# Patient Record
Sex: FEMALE | Race: WHITE | NOT HISPANIC OR LATINO | Employment: PART TIME | URBAN - METROPOLITAN AREA
[De-identification: names, ages, dates, MRNs, and addresses within clinical notes are randomized per-mention and may not be internally consistent; named-entity substitution may affect disease eponyms.]

---

## 2017-01-11 ENCOUNTER — LAB REQUISITION (OUTPATIENT)
Dept: LAB | Facility: HOSPITAL | Age: 37
End: 2017-01-11
Payer: COMMERCIAL

## 2017-01-11 ENCOUNTER — ALLSCRIPTS OFFICE VISIT (OUTPATIENT)
Dept: OTHER | Facility: OTHER | Age: 37
End: 2017-01-11

## 2017-01-11 DIAGNOSIS — B37.3 CANDIDIASIS OF VULVA AND VAGINA: ICD-10-CM

## 2017-01-11 PROCEDURE — 87480 CANDIDA DNA DIR PROBE: CPT | Performed by: NURSE PRACTITIONER

## 2017-01-11 PROCEDURE — 87660 TRICHOMONAS VAGIN DIR PROBE: CPT | Performed by: NURSE PRACTITIONER

## 2017-01-11 PROCEDURE — 87510 GARDNER VAG DNA DIR PROBE: CPT | Performed by: NURSE PRACTITIONER

## 2017-01-13 LAB
CANDIDA RRNA VAG QL PROBE: NEGATIVE
G VAGINALIS RRNA GENITAL QL PROBE: NEGATIVE
T VAGINALIS RRNA GENITAL QL PROBE: NEGATIVE

## 2017-02-28 ENCOUNTER — ALLSCRIPTS OFFICE VISIT (OUTPATIENT)
Dept: OTHER | Facility: OTHER | Age: 37
End: 2017-02-28

## 2017-03-22 ENCOUNTER — TRANSCRIBE ORDERS (OUTPATIENT)
Dept: ADMINISTRATIVE | Facility: HOSPITAL | Age: 37
End: 2017-03-22

## 2017-03-22 ENCOUNTER — ALLSCRIPTS OFFICE VISIT (OUTPATIENT)
Dept: OTHER | Facility: OTHER | Age: 37
End: 2017-03-22

## 2017-03-22 ENCOUNTER — APPOINTMENT (OUTPATIENT)
Dept: LAB | Facility: HOSPITAL | Age: 37
End: 2017-03-22
Payer: COMMERCIAL

## 2017-03-22 DIAGNOSIS — J30.9 ALLERGIC RHINITIS: ICD-10-CM

## 2017-03-22 DIAGNOSIS — J45.909 UNCOMPLICATED ASTHMA: ICD-10-CM

## 2017-03-22 DIAGNOSIS — M06.9 RHEUMATOID ARTHRITIS (HCC): ICD-10-CM

## 2017-03-22 DIAGNOSIS — F41.9 ANXIETY DISORDER: ICD-10-CM

## 2017-03-22 DIAGNOSIS — E55.9 VITAMIN D DEFICIENCY: ICD-10-CM

## 2017-03-22 LAB
25(OH)D3 SERPL-MCNC: 28.2 NG/ML (ref 30–100)
ALBUMIN SERPL BCP-MCNC: 3.9 G/DL (ref 3.5–5)
ALP SERPL-CCNC: 87 U/L (ref 46–116)
ALT SERPL W P-5'-P-CCNC: 30 U/L (ref 12–78)
ANION GAP SERPL CALCULATED.3IONS-SCNC: 8 MMOL/L (ref 4–13)
AST SERPL W P-5'-P-CCNC: 19 U/L (ref 5–45)
BASOPHILS # BLD AUTO: 0.11 THOUSANDS/ΜL (ref 0–0.1)
BASOPHILS NFR BLD AUTO: 2 % (ref 0–1)
BILIRUB SERPL-MCNC: 1 MG/DL (ref 0.2–1)
BUN SERPL-MCNC: 12 MG/DL (ref 5–25)
CALCIUM SERPL-MCNC: 8.9 MG/DL (ref 8.3–10.1)
CHLORIDE SERPL-SCNC: 104 MMOL/L (ref 100–108)
CO2 SERPL-SCNC: 30 MMOL/L (ref 21–32)
CREAT SERPL-MCNC: 0.8 MG/DL (ref 0.6–1.3)
EOSINOPHIL # BLD AUTO: 0.66 THOUSAND/ΜL (ref 0–0.61)
EOSINOPHIL NFR BLD AUTO: 11 % (ref 0–6)
ERYTHROCYTE [DISTWIDTH] IN BLOOD BY AUTOMATED COUNT: 13.5 % (ref 11.6–15.1)
GFR SERPL CREATININE-BSD FRML MDRD: >60 ML/MIN/1.73SQ M
GLUCOSE P FAST SERPL-MCNC: 92 MG/DL (ref 65–99)
HCT VFR BLD AUTO: 43 % (ref 34.8–46.1)
HGB BLD-MCNC: 14.1 G/DL (ref 11.5–15.4)
LYMPHOCYTES # BLD AUTO: 1.5 THOUSANDS/ΜL (ref 0.6–4.47)
LYMPHOCYTES NFR BLD AUTO: 25 % (ref 14–44)
MCH RBC QN AUTO: 30.1 PG (ref 26.8–34.3)
MCHC RBC AUTO-ENTMCNC: 32.8 G/DL (ref 31.4–37.4)
MCV RBC AUTO: 92 FL (ref 82–98)
MONOCYTES # BLD AUTO: 0.53 THOUSAND/ΜL (ref 0.17–1.22)
MONOCYTES NFR BLD AUTO: 9 % (ref 4–12)
NEUTROPHILS # BLD AUTO: 3.19 THOUSANDS/ΜL (ref 1.85–7.62)
NEUTS SEG NFR BLD AUTO: 53 % (ref 43–75)
PLATELET # BLD AUTO: 254 THOUSANDS/UL (ref 149–390)
PMV BLD AUTO: 11.5 FL (ref 8.9–12.7)
POTASSIUM SERPL-SCNC: 4.4 MMOL/L (ref 3.5–5.3)
PROT SERPL-MCNC: 7.5 G/DL (ref 6.4–8.2)
RBC # BLD AUTO: 4.69 MILLION/UL (ref 3.81–5.12)
SODIUM SERPL-SCNC: 142 MMOL/L (ref 136–145)
TSH SERPL DL<=0.05 MIU/L-ACNC: 1.66 UIU/ML (ref 0.36–3.74)
WBC # BLD AUTO: 5.99 THOUSAND/UL (ref 4.31–10.16)

## 2017-03-22 PROCEDURE — 85025 COMPLETE CBC W/AUTO DIFF WBC: CPT

## 2017-03-22 PROCEDURE — 84443 ASSAY THYROID STIM HORMONE: CPT

## 2017-03-22 PROCEDURE — 82306 VITAMIN D 25 HYDROXY: CPT

## 2017-03-22 PROCEDURE — 80053 COMPREHEN METABOLIC PANEL: CPT

## 2017-03-22 PROCEDURE — 36415 COLL VENOUS BLD VENIPUNCTURE: CPT

## 2017-03-26 ENCOUNTER — GENERIC CONVERSION - ENCOUNTER (OUTPATIENT)
Dept: OTHER | Facility: OTHER | Age: 37
End: 2017-03-26

## 2017-03-28 ENCOUNTER — ALLSCRIPTS OFFICE VISIT (OUTPATIENT)
Dept: OTHER | Facility: OTHER | Age: 37
End: 2017-03-28

## 2017-04-07 ENCOUNTER — TRANSCRIBE ORDERS (OUTPATIENT)
Dept: SLEEP CENTER | Facility: HOSPITAL | Age: 37
End: 2017-04-07

## 2017-04-07 DIAGNOSIS — G47.33 OBSTRUCTIVE SLEEP APNEA (ADULT) (PEDIATRIC): Primary | ICD-10-CM

## 2017-04-20 ENCOUNTER — ALLSCRIPTS OFFICE VISIT (OUTPATIENT)
Dept: OTHER | Facility: OTHER | Age: 37
End: 2017-04-20

## 2017-05-03 ENCOUNTER — ALLSCRIPTS OFFICE VISIT (OUTPATIENT)
Dept: OTHER | Facility: OTHER | Age: 37
End: 2017-05-03

## 2017-05-15 ENCOUNTER — HOSPITAL ENCOUNTER (OUTPATIENT)
Dept: SLEEP CENTER | Facility: HOSPITAL | Age: 37
Discharge: HOME | End: 2017-05-15
Payer: COMMERCIAL

## 2017-05-15 DIAGNOSIS — G47.33 OBSTRUCTIVE SLEEP APNEA (ADULT) (PEDIATRIC): ICD-10-CM

## 2017-05-15 PROCEDURE — 95810 POLYSOM 6/> YRS 4/> PARAM: CPT

## 2017-05-16 ENCOUNTER — ALLSCRIPTS OFFICE VISIT (OUTPATIENT)
Dept: OTHER | Facility: OTHER | Age: 37
End: 2017-05-16

## 2017-05-23 ENCOUNTER — TRANSCRIBE ORDERS (OUTPATIENT)
Dept: SLEEP CENTER | Facility: HOSPITAL | Age: 37
End: 2017-05-23

## 2017-05-23 DIAGNOSIS — G47.33 OBSTRUCTIVE SLEEP APNEA (ADULT) (PEDIATRIC): Primary | ICD-10-CM

## 2017-05-29 DIAGNOSIS — F41.9 ANXIETY DISORDER: ICD-10-CM

## 2017-05-29 DIAGNOSIS — Z13.1 ENCOUNTER FOR SCREENING FOR DIABETES MELLITUS: ICD-10-CM

## 2017-05-29 DIAGNOSIS — B37.3 CANDIDIASIS OF VULVA AND VAGINA: ICD-10-CM

## 2017-05-29 DIAGNOSIS — E55.9 VITAMIN D DEFICIENCY: ICD-10-CM

## 2017-05-29 DIAGNOSIS — G47.19 OTHER HYPERSOMNIA: ICD-10-CM

## 2017-05-29 DIAGNOSIS — Z00.00 ENCOUNTER FOR GENERAL ADULT MEDICAL EXAMINATION WITHOUT ABNORMAL FINDINGS: ICD-10-CM

## 2017-05-29 DIAGNOSIS — E66.01 MORBID (SEVERE) OBESITY DUE TO EXCESS CALORIES (HCC): ICD-10-CM

## 2017-05-29 DIAGNOSIS — R53.83 OTHER FATIGUE: ICD-10-CM

## 2017-05-29 DIAGNOSIS — M06.9 RHEUMATOID ARTHRITIS(714.0): ICD-10-CM

## 2017-06-01 ENCOUNTER — ALLSCRIPTS OFFICE VISIT (OUTPATIENT)
Dept: OTHER | Facility: OTHER | Age: 37
End: 2017-06-01

## 2017-06-13 ENCOUNTER — ALLSCRIPTS OFFICE VISIT (OUTPATIENT)
Dept: OTHER | Facility: OTHER | Age: 37
End: 2017-06-13

## 2017-06-25 ENCOUNTER — HOSPITAL ENCOUNTER (OUTPATIENT)
Dept: SLEEP CENTER | Facility: HOSPITAL | Age: 37
Discharge: HOME | End: 2017-06-25
Payer: COMMERCIAL

## 2017-06-25 DIAGNOSIS — G47.33 OBSTRUCTIVE SLEEP APNEA (ADULT) (PEDIATRIC): ICD-10-CM

## 2017-06-25 PROCEDURE — 95811 POLYSOM 6/>YRS CPAP 4/> PARM: CPT

## 2017-06-27 ENCOUNTER — TRANSCRIBE ORDERS (OUTPATIENT)
Dept: SLEEP CENTER | Facility: HOSPITAL | Age: 37
End: 2017-06-27

## 2017-06-27 DIAGNOSIS — G47.33 OBSTRUCTIVE SLEEP APNEA (ADULT) (PEDIATRIC): Primary | ICD-10-CM

## 2017-07-06 ENCOUNTER — HOSPITAL ENCOUNTER (OUTPATIENT)
Dept: SLEEP CENTER | Facility: HOSPITAL | Age: 37
Discharge: HOME | End: 2017-07-06
Payer: COMMERCIAL

## 2017-07-06 ENCOUNTER — TRANSCRIBE ORDERS (OUTPATIENT)
Dept: SLEEP CENTER | Facility: HOSPITAL | Age: 37
End: 2017-07-06

## 2017-07-06 DIAGNOSIS — G47.33 OBSTRUCTIVE SLEEP APNEA (ADULT) (PEDIATRIC): ICD-10-CM

## 2017-07-06 DIAGNOSIS — G47.33 OBSTRUCTIVE SLEEP APNEA (ADULT) (PEDIATRIC): Primary | ICD-10-CM

## 2017-07-14 ENCOUNTER — ALLSCRIPTS OFFICE VISIT (OUTPATIENT)
Dept: OTHER | Facility: OTHER | Age: 37
End: 2017-07-14

## 2017-07-14 ENCOUNTER — TRANSCRIBE ORDERS (OUTPATIENT)
Dept: ADMINISTRATIVE | Facility: HOSPITAL | Age: 37
End: 2017-07-14

## 2017-07-14 ENCOUNTER — APPOINTMENT (OUTPATIENT)
Dept: LAB | Facility: HOSPITAL | Age: 37
End: 2017-07-14
Payer: COMMERCIAL

## 2017-07-14 DIAGNOSIS — Z00.8 HEALTH EXAMINATION IN POPULATION SURVEY: Primary | ICD-10-CM

## 2017-07-14 DIAGNOSIS — Z00.8 HEALTH EXAMINATION IN POPULATION SURVEY: ICD-10-CM

## 2017-07-14 LAB
CHOLEST SERPL-MCNC: 152 MG/DL (ref 50–200)
EST. AVERAGE GLUCOSE BLD GHB EST-MCNC: 114 MG/DL
HBA1C MFR BLD: 5.6 % (ref 4.2–6.3)
HDLC SERPL-MCNC: 43 MG/DL (ref 40–60)
LDLC SERPL CALC-MCNC: 95 MG/DL (ref 0–100)
TRIGL SERPL-MCNC: 70 MG/DL

## 2017-07-14 PROCEDURE — 83036 HEMOGLOBIN GLYCOSYLATED A1C: CPT

## 2017-07-14 PROCEDURE — 80061 LIPID PANEL: CPT

## 2017-07-14 PROCEDURE — 36415 COLL VENOUS BLD VENIPUNCTURE: CPT

## 2017-07-18 ENCOUNTER — GENERIC CONVERSION - ENCOUNTER (OUTPATIENT)
Dept: OTHER | Facility: OTHER | Age: 37
End: 2017-07-18

## 2017-08-02 ENCOUNTER — ALLSCRIPTS OFFICE VISIT (OUTPATIENT)
Dept: OTHER | Facility: OTHER | Age: 37
End: 2017-08-02

## 2017-08-02 DIAGNOSIS — E55.9 VITAMIN D DEFICIENCY: ICD-10-CM

## 2017-08-02 DIAGNOSIS — G47.33 OBSTRUCTIVE SLEEP APNEA: ICD-10-CM

## 2017-08-02 DIAGNOSIS — E66.01 MORBID (SEVERE) OBESITY DUE TO EXCESS CALORIES (HCC): ICD-10-CM

## 2017-08-02 DIAGNOSIS — J32.9 CHRONIC SINUSITIS: ICD-10-CM

## 2017-08-02 DIAGNOSIS — F41.8 OTHER SPECIFIED ANXIETY DISORDERS: ICD-10-CM

## 2017-08-02 DIAGNOSIS — J30.9 ALLERGIC RHINITIS: ICD-10-CM

## 2017-09-07 ENCOUNTER — ALLSCRIPTS OFFICE VISIT (OUTPATIENT)
Dept: OTHER | Facility: OTHER | Age: 37
End: 2017-09-07

## 2017-09-07 ENCOUNTER — HOSPITAL ENCOUNTER (OUTPATIENT)
Dept: SLEEP CENTER | Facility: HOSPITAL | Age: 37
Discharge: HOME | End: 2017-09-07
Payer: COMMERCIAL

## 2017-09-07 DIAGNOSIS — G47.33 OBSTRUCTIVE SLEEP APNEA (ADULT) (PEDIATRIC): ICD-10-CM

## 2017-09-08 ENCOUNTER — TRANSCRIBE ORDERS (OUTPATIENT)
Dept: SLEEP CENTER | Facility: HOSPITAL | Age: 37
End: 2017-09-08

## 2017-09-08 DIAGNOSIS — G47.33 OBSTRUCTIVE SLEEP APNEA (ADULT) (PEDIATRIC): Primary | ICD-10-CM

## 2017-10-13 ENCOUNTER — ALLSCRIPTS OFFICE VISIT (OUTPATIENT)
Dept: OTHER | Facility: OTHER | Age: 37
End: 2017-10-13

## 2017-10-19 ENCOUNTER — HOSPITAL ENCOUNTER (OUTPATIENT)
Dept: SLEEP CENTER | Facility: HOSPITAL | Age: 37
Discharge: HOME | End: 2017-10-19
Payer: COMMERCIAL

## 2017-10-19 DIAGNOSIS — G47.33 OBSTRUCTIVE SLEEP APNEA: ICD-10-CM

## 2017-10-19 NOTE — PROGRESS NOTES
Follow-Up Note - [address]  40 y o  female  IRP:  [facility]:[id number]    CC: I saw this patient for follow-up in clinic today for her Sleep Disordered Breathing, Coexisting Sleep and Medical Problems  Past medical, Family, Social History, ROS and medications were reviewed  Herewith my findings in summary  Full Details are available on request      HPI:  She was prescribed positive airway pressure therapy and reports:   - using 5 nights/week for 4  hours or more a night  She works night shift at times  Compliance data confirms use > 4 hours 73% of the  of the time  The AHI is 1 4 per hour at 12 cm H2O  - She experiencing no major difficulties tolerating or adverse effects  She tends to remove the musk unknowingly at times   - benefiting from  use  She is not as tired  Sleep Routine: [place] Ashraf reports getting 4-6 hours sleep; she is having no difficulty initiating or maintaining sleep    She awakens feeling refreshed but continues to report excessive drowsiness  She rated herself at 13/24 on the [place] sleepiness scale  She attributed this to insufficient sleep  She continues to report teeth grinding  On Exam:   Physical findings are essentially unchanged from previous  Impression :   1  Obstructive Sleep Apnea  2  Insufficient sleep  3  Hypersomnolence secondary to the above  4  Bruxism  5  Morbid obesity  6  Comorbidities: as outlined previously    Plan:  1  Treatment with  PAP is medically necessary and [place] Ashraf is agreable to continue use  Pressure settin cm   2  Instruction on care of equipment and strategies to improve comfort with use of PAP were discussed  A prescription to replace supplies as needed was provided and care coordinated with the DME provider  3  Need for compliance with therapy and weight reduction were emphasized  4  I advised her to increase the amount of sleep that she gets and on strategies to cope with shift work    5  Follow-up is advised in 1 year or sooner if needed to monitor progress and to adjust therapy  Thank you for allowing me to participate in the care of this patient      Sincerely,    Kiet Trimble MD  Board Certified Specialist

## 2017-10-23 ENCOUNTER — TRANSCRIBE ORDERS (OUTPATIENT)
Dept: SLEEP CENTER | Facility: HOSPITAL | Age: 37
End: 2017-10-23

## 2017-10-23 DIAGNOSIS — G47.33 OBSTRUCTIVE SLEEP APNEA: Primary | ICD-10-CM

## 2017-10-26 ENCOUNTER — ALLSCRIPTS OFFICE VISIT (OUTPATIENT)
Dept: OTHER | Facility: OTHER | Age: 37
End: 2017-10-26

## 2017-11-16 ENCOUNTER — ALLSCRIPTS OFFICE VISIT (OUTPATIENT)
Dept: OTHER | Facility: OTHER | Age: 37
End: 2017-11-16

## 2017-11-17 NOTE — PROCEDURES
Assessment  1  Encounter for IUD removal and reinsertion (V25 13) (Z30 433)    Active Problems  1  Allergic reaction, initial encounter (995 3) (T78 40XA)   2  Allergic rhinitis (477 9) (J30 9)   3  Anxiety (300 00) (F41 9)   4  Asthma (493 90) (J45 909)   5  Chronic sinusitis (473 9) (J32 9)   6  Depression with anxiety (300 4) (F41 8)   7  Fatigue (780 79) (R53 83)   8  Morbid obesity with BMI of 50 0-59 9, adult (278 01,V85 43) (E66 01,Z68 43)   9  Nasal polyp (471 9) (J33 9)   10  Obstructive sleep apnea (327 23) (G47 33)   11  Rheumatoid arthritis (714 0) (M06 9)   12  Seasonal mood disorder (296 99) (F39)   13  Snoring (786 09) (R06 83)   14  Vaginal candidiasis (112 1) (B37 3)   15  Vaginal itching (698 1) (L29 8)   16  Vitamin D deficiency (268 9) (E55 9)    Current Meds    1  Allegra Allergy 180 MG Oral Tablet; 1 PO QD; Therapy: (Recorded:22Mar2017) to Recorded    2  DiazePAM 2 MG Oral Tablet; TAKE 1 TABLET TWICE DAILY AS NEEDED; Therapy: 04Zdr8733 to (Evaluate:65Tyj1546); Last Rx:54Nqe1906 Ordered    3  Albuterol Sulfate (2 5 MG/3ML) 0 083% Inhalation Nebulization Solution; USE 1 UNIT DOSE IN NEBULIZER EVERY 4 HOURS AS NEEDED Recorded   4  Breo Ellipta 100-25 MCG/INH Inhalation Aerosol Powder Breath Activated; ONE INHALATION DAILY  AFTER INHALATION RINSE MOUTH WITH WATER & SPIT  USE SAME TIME EACH DAY, NO MORE THAN 1 TIME IN 24 HOURS; Therapy: 59JDY9828 to (Last Rx:32Svf9745)  Requested for: 72DSE3702 Ordered   5  ProAir  (90 Base) MCG/ACT Inhalation Aerosol Solution; INHALE 1 TO 2 PUFFS EVERY 4 TO 6 HOURS AS NEEDED  Requested for: 71Ihb6145; Last EP:53BQQ5005 Ordered    6  PredniSONE 10 MG Oral Tablet; 5 a day for 2 days, 4 a day for 2 days, 3 a day for 2 days, 2 a day for 2 days, 1 a day for 2 days; Therapy: 02Aug2017 to (Last Rx:02Aug2017)  Requested for: 02Aug2017 Ordered    7  Fluconazole 150 MG Oral Tablet; TAKE 1 TABLET AS NEEDED  MAY REPEAT IN 3-5 DAYS IF SYMPTOMS PERSIST;  Therapy: 02Aug2017 to (Evaluate:2017)  Requested for: 98Izi4192; Last Rx:90Zww9184 Ordered    8  FLUoxetine HCl - 40 MG Oral Capsule; TAKE ONE CAPSULE BY MOUTH EVERY DAY; Therapy: 15TRV7305 to (Last Rx:2017)  Requested for: 50VWL5712 Ordered    9  Mirena (52 MG) 20 MCG/24HR Intrauterine Intrauterine Device; USE AS DIRECTED; Therapy: 95ALM1381 to (Last 78 170)  Requested for: 2012 Ordered    10  Vitamin D TABS; TAKE 1 TABLET Daily 4000 units; Therapy: (Doriemundo Garces) to Recorded    11  Magnesium CAPS; take 1 capsule daily; Therapy: (Recorded:66Eap5664) to Recorded    Allergies    1  No Known Drug Allergies    Procedure   Procedure: intrauterine device (IUD) placement  Risks, benefits and alternatives were discussed with the patient  We discussed possible complications and risks, including infection,-- bleeding,-- pelvic pain,-- expulsion,-- uterine perforation-- and-- increased risk of ectopic should pregnancy occur  written consent was obtained prior to the procedure and is detailed in the patient's record  Procedure Note:  Anesthesia: none  The cervix was prepped with betadine  The cervix was stabilized with a single toothed tenaculum  The cervix allowed easy passage of a uterine sound without dilation  The uterus was in mid position-- and-- sounded to 7 cm  The Mirena IUD was gently inserted to the fundus of the uterus using standard technique  Strings were cut to 4 cm  Post-Procedure:  Patient Status: the patient tolerated the procedure well  Complications: there were no complications  Follow up: return for follow up visit in 1-2 months  Procedure: removal of Mirena IUD  Indications for the procedure include  IUD  Risks, benefits and alternatives were discussed with the patient  Procedure Note:   a speculum was placed in the vagina,-- the IUD strings were visualized-- and-- the strings were grasped with forceps and the IUD was removed  The IUD was discarded     Post-Procedure: Complications: none  Follow-up in the office as needed  Future Appointments    Date/Time Provider Specialty Site   02/05/2018 09:20 AM Donnise Seip, M D   [address] [place] [address]       Signatures   Electronically signed by : ESTELA Thomas ; Nov 16 2017 10:22AM EST                       (Author)

## 2017-11-22 ENCOUNTER — ALLSCRIPTS OFFICE VISIT (OUTPATIENT)
Dept: OTHER | Facility: OTHER | Age: 37
End: 2017-11-22

## 2018-01-09 ENCOUNTER — GENERIC CONVERSION - ENCOUNTER (OUTPATIENT)
Dept: OTHER | Facility: OTHER | Age: 38
End: 2018-01-09

## 2018-01-09 NOTE — PROGRESS NOTES
Chief Complaint  CC Patient is here for Allergy injections  Active Problems    1  Allergic reaction, initial encounter (995 3) (T78 40XA)   2  Allergic rhinitis (477 9) (J30 9)   3  Anxiety (300 00) (F41 9)   4  Asthma (493 90) (J45 909)   5  Daytime hypersomnolence (780 54) (G47 19)   6  Depression with anxiety (300 4) (F41 8)   7  Encounter for routine gynecological examination (V72 31) (Z01 419)   8  Fatigue (780 79) (R53 83)   9  Morbid obesity with BMI of 50 0-59 9, adult (278 01,V85 43) (E66 01,Z68 43)   10  Nasal polyp (471 9) (J33 9)   11  Obstructive sleep apnea (327 23) (G47 33)   12  Rheumatoid arthritis (714 0) (M06 9)   13  Screening for diabetes mellitus (V77 1) (Z13 1)   14  Seasonal mood disorder (296 99) (F39)   15  Snoring (786 09) (R06 83)   16  Vaginal candidiasis (112 1) (B37 3)   17  Vaginal itching (698 1) (L29 8)   18  Vitamin D deficiency (268 9) (E55 9)    Current Meds   1  Albuterol Sulfate (2 5 MG/3ML) 0 083% Inhalation Nebulization Solution; USE 1 UNIT   DOSE IN NEBULIZER EVERY 4 HOURS AS NEEDED Recorded   2  Allegra Allergy 180 MG Oral Tablet; 1 PO QD; Therapy: (Aggie Matute to Recorded   3  Breo Ellipta 100-25 MCG/INH Inhalation Aerosol Powder Breath Activated; ONE   INHALATION DAILY  AFTER INHALATION RINSE MOUTH WITH WATER & SPIT  USE   SAME TIME EACH DAY, NO MORE THAN 1 TIME IN 24 HOURS; Therapy: 60VMC6028 to (Last Rx:26Goe2590)  Requested for: 32XLY4794 Ordered   4  DiazePAM 2 MG Oral Tablet; TAKE 1 TABLET TWICE DAILY AS NEEDED; Therapy: 34KJW4050 to (Evaluate:03Dxz1946); Last Rx:72Eni8720 Ordered   5  FLUoxetine HCl - 20 MG Oral Tablet; TAKE 1 TABLET DAILY; Therapy: 99QKA6070 to (Evaluate:12Nov2017)  Requested for: 40WGT8801; Last   Rx:17Vqa4270 Ordered   6  Magnesium CAPS; take 1 capsule daily; Therapy: (Recorded:76Xwc1391) to Recorded   7  Mirena (52 MG) 20 MCG/24HR Intrauterine Intrauterine Device; USE AS DIRECTED;    Therapy: 27Nov2012 to (Last 9651 1475)  Requested for: 43STB2854 Ordered   8  ProAir  (90 Base) MCG/ACT Inhalation Aerosol Solution; INHALE 1 TO 2 PUFFS   EVERY 4 TO 6 HOURS AS NEEDED  Requested for: 07Sep2016; Last Rx:07Sep2016   Ordered   9  Vitamin D TABS; TAKE 1 TABLET Daily 4000 units; Therapy: (Recorded:22Mar2017) to Recorded    Allergies    1  No Known Drug Allergies    Results/Data  Allergy Injection Flow Sheet [id number] 09:36AM      Test Name Result Flag Reference   Allergy Injection #1 0 50     Allergy Injection #1 Site L     Allergy Inj #1 Lot Number A4     Allergy Inj #1 Reaction No Reaction     Allergy Injection #2 0 50     Allergy Injection #2 Site R     Allergy Inj #2 Lot Number B4     Allergy Inj #2 Reaction Light Reddness         Future Appointments    Date/Time Provider Specialty Site   08/02/2017 10:00 AM ESTELA Jin   [address] [place] [address]     Signatures   Electronically signed by : ESTELA Jacobsen ; Jun 16 2017  3:32PM EST                       (Author)

## 2018-01-10 NOTE — PROGRESS NOTES
Chief Complaint  CC patient here today for allergy injections  Active Problems    1  Allergic reaction, initial encounter (995 3) (T78 40XA)   2  Allergic rhinitis (477 9) (J30 9)   3  Anxiety (300 00) (F41 9)   4  Asthma (493 90) (J45 909)   5  Chronic sinusitis (473 9) (J32 9)   6  Depression with anxiety (300 4) (F41 8)   7  Fatigue (780 79) (R53 83)   8  Morbid obesity with BMI of 50 0-59 9, adult (278 01,V85 43) (E66 01,Z68 43)   9  Nasal polyp (471 9) (J33 9)   10  Obstructive sleep apnea (327 23) (G47 33)   11  Rheumatoid arthritis (714 0) (M06 9)   12  Seasonal mood disorder (296 99) (F39)   13  Snoring (786 09) (R06 83)   14  Vaginal candidiasis (112 1) (B37 3)   15  Vaginal itching (698 1) (L29 8)   16  Vitamin D deficiency (268 9) (E55 9)    Current Meds   1  Albuterol Sulfate (2 5 MG/3ML) 0 083% Inhalation Nebulization Solution; USE 1 UNIT   DOSE IN NEBULIZER EVERY 4 HOURS AS NEEDED Recorded   2  Allegra Allergy 180 MG Oral Tablet; 1 PO QD; Therapy: (Macario Quiles to Recorded   3  Amoxicillin-Pot Clavulanate 875-125 MG Oral Tablet; TAKE 1 TABLET EVERY 12   HOURS WITH MEALS UNTIL GONE;   Therapy: 72Ors9182 to (Evaluate:91Yye4212)  Requested for: 66Ryj4347; Last   Rx:25Eli7742 Ordered   4  Breo Ellipta 100-25 MCG/INH Inhalation Aerosol Powder Breath Activated; ONE   INHALATION DAILY  AFTER INHALATION RINSE MOUTH WITH WATER & SPIT  USE   SAME TIME EACH DAY, NO MORE THAN 1 TIME IN 24 HOURS; Therapy: 45HKP8797 to (Last Rx:51Znn2670)  Requested for: 83ZMC6806 Ordered   5  DiazePAM 2 MG Oral Tablet; TAKE 1 TABLET TWICE DAILY AS NEEDED; Therapy: 09Vku8725 to (Evaluate:96Iuc1155); Last Rx:68Iyn3792 Ordered   6  Fluconazole 150 MG Oral Tablet; TAKE 1 TABLET AS NEEDED  MAY REPEAT IN 3-5   DAYS IF SYMPTOMS PERSIST; Therapy: 83Cep4676 to (Evaluate:11Aug2017)  Requested for: 86Jtr9079; Last   Rx:17Ijf0089 Ordered   7   FLUoxetine HCl - 40 MG Oral Capsule; take 1 capsule daily  Requested for: 04ZAB8869;   Last Rx:71Hrc4635 Ordered   8  Magnesium CAPS; take 1 capsule daily; Therapy: (Recorded:04Xxl8387) to Recorded   9  Mirena (52 MG) 20 MCG/24HR Intrauterine Intrauterine Device; USE AS DIRECTED; Therapy: 30FWF9320 to (Last Jose A Frost)  Requested for: 27Nov2012 Ordered   10  PredniSONE 10 MG Oral Tablet; 5 a day for 2 days, 4 a day for 2 days, 3 a day    for 2 days, 2 a day for 2 days, 1 a day for 2 days; Therapy: 49Jtk6746 to (Last Rx:33Bex4057)  Requested for: 02Aug2017 Ordered   11  ProAir  (90 Base) MCG/ACT Inhalation Aerosol Solution; INHALE 1 TO 2 PUFFS    EVERY 4 TO 6 HOURS AS NEEDED  Requested for: 07Sep2016; Last Rx:60Com2870    Ordered   12  Vitamin D TABS; TAKE 1 TABLET Daily 4000 units; Therapy: (Recorded:22Mar2017) to Recorded    Allergies    1  No Known Drug Allergies    Future Appointments    Date/Time Provider Specialty Site   11/16/2017 09:30 AM ESTELA Quintero  Obstetrics/Gynecology [facility]   02/05/2018 09:20 AM ESTELA Will   [address] [place] [address]     Signatures   Electronically signed by : Cynthia Maldonado, ; Oct 13 2017  2:26PM EST                       (Author)    Electronically signed by : ESTELA Porter ; Oct 13 2017  2:38PM EST                       (Co-author)

## 2018-01-10 NOTE — PROGRESS NOTES
Chief Complaint  CC patient here today for allergy injections  Active Problems    1  Allergic reaction, initial encounter (995 3) (T78 40XA)   2  Allergic rhinitis (477 9) (J30 9)   3  Anxiety (300 00) (F41 9)   4  Asthma (493 90) (J45 909)   5  Chronic sinusitis (473 9) (J32 9)   6  Depression with anxiety (300 4) (F41 8)   7  Fatigue (780 79) (R53 83)   8  Morbid obesity with BMI of 50 0-59 9, adult (278 01,V85 43) (E66 01,Z68 43)   9  Nasal polyp (471 9) (J33 9)   10  Obstructive sleep apnea (327 23) (G47 33)   11  Rheumatoid arthritis (714 0) (M06 9)   12  Seasonal mood disorder (296 99) (F39)   13  Snoring (786 09) (R06 83)   14  Vaginal candidiasis (112 1) (B37 3)   15  Vaginal itching (698 1) (L29 8)   16  Vitamin D deficiency (268 9) (E55 9)    Current Meds   1  Albuterol Sulfate (2 5 MG/3ML) 0 083% Inhalation Nebulization Solution; USE 1 UNIT   DOSE IN NEBULIZER EVERY 4 HOURS AS NEEDED Recorded   2  Allegra Allergy 180 MG Oral Tablet; 1 PO QD; Therapy: (Guillermina Joseph) to Recorded   3  Amoxicillin-Pot Clavulanate 875-125 MG Oral Tablet; TAKE 1 TABLET EVERY 12   HOURS WITH MEALS UNTIL GONE;   Therapy: 01Agg8692 to (Evaluate:47Vsh9836)  Requested for: 72Nrn3646; Last   Rx:74Wsg6226 Ordered   4  Breo Ellipta 100-25 MCG/INH Inhalation Aerosol Powder Breath Activated; ONE   INHALATION DAILY  AFTER INHALATION RINSE MOUTH WITH WATER & SPIT  USE   SAME TIME EACH DAY, NO MORE THAN 1 TIME IN 24 HOURS; Therapy: 63UNV1987 to (Last Rx:52Vig9364)  Requested for: 03HWI7680 Ordered   5  DiazePAM 2 MG Oral Tablet; TAKE 1 TABLET TWICE DAILY AS NEEDED; Therapy: 82Oih1992 to (Evaluate:44Ofo4253); Last Rx:59Vsd1900 Ordered   6  Fluconazole 150 MG Oral Tablet; TAKE 1 TABLET AS NEEDED  MAY REPEAT IN 3-5   DAYS IF SYMPTOMS PERSIST; Therapy: 01Owa0010 to (Evaluate:11Aug2017)  Requested for: 72Cxb4860; Last   Rx:72Jas7080 Ordered   7   FLUoxetine HCl - 40 MG Oral Capsule; take 1 capsule daily  Requested for: 53IFI5161;   Last Rx:88Ttx0592 Ordered   8  Magnesium CAPS; take 1 capsule daily; Therapy: (Recorded:44Ckp0449) to Recorded   9  Mirena (52 MG) 20 MCG/24HR Intrauterine Intrauterine Device; USE AS DIRECTED; Therapy: 74JDE2794 to (Last 21 )  Requested for: 27Nov2012 Ordered   10  PredniSONE 10 MG Oral Tablet; 5 a day for 2 days, 4 a day for 2 days, 3 a day    for 2 days, 2 a day for 2 days, 1 a day for 2 days; Therapy: 22Mlf1090 to (Last Rx:73Kwo3593)  Requested for: 02Aug2017 Ordered   11  ProAir  (90 Base) MCG/ACT Inhalation Aerosol Solution; INHALE 1 TO 2 PUFFS    EVERY 4 TO 6 HOURS AS NEEDED  Requested for: 07Oyv1143; Last Rx:83Fsl3482    Ordered   12  Vitamin D TABS; TAKE 1 TABLET Daily 4000 units; Therapy: (Recorded:22Mar2017) to Recorded    Allergies    1  No Known Drug Allergies    Future Appointments    Date/Time Provider Specialty Site   11/16/2017 09:30 AM ESTELA Hernandez  Obstetrics/Gynecology OB [facility]   02/05/2018 09:20 AM ESTELA Krause   [address] [place] [address]     Signatures   Electronically signed by : ESTELA Roberts ; Oct 27 2017 11:25AM EST                       (Author)

## 2018-01-11 NOTE — PROGRESS NOTES
Chief Complaint  Pt here for allergy injections      Active Problems    1  Allergic reaction, initial encounter (995 3) (T78 40XA)   2  Allergic rhinitis (477 9) (J30 9)   3  Anxiety (300 00) (F41 9)   4  Asthma (493 90) (J45 909)   5  Depression with anxiety (300 4) (F41 8)   6  Morbid obesity with BMI of 50 0-59 9, adult (278 01,V85 43) (E66 01,Z68 43)   7  Nasal polyp (471 9) (J33 9)   8  Rheumatoid arthritis (714 0) (M06 9)   9  Vaginal candidiasis (112 1) (B37 3)   10  Vitamin D deficiency (268 9) (E55 9)    Current Meds   1  Albuterol Sulfate (2 5 MG/3ML) 0 083% Inhalation Nebulization Solution; USE 1 UNIT   DOSE IN NEBULIZER EVERY 4 HOURS AS NEEDED Recorded   2  Breo Ellipta 100-25 MCG/INH Inhalation Aerosol Powder Breath Activated; ONE   INHALATION DAILY  AFTER INHALATION RINSE MOUTH WITH WATER & SPIT  USE   SAME TIME EACH DAY, NO MORE THAN 1 TIME IN 24 HOURS    Requested for: 09IYW4376; Last Rx:09Emp0650 Ordered   3  Citalopram Hydrobromide 20 MG Oral Tablet; Take 1 tablet daily  Requested for:   83DLF0405; Last Rx:61Rao1915 Ordered   4  DiazePAM 2 MG Oral Tablet; TAKE 1 TABLET TWICE DAILY AS NEEDED; Therapy: 57WQJ8799 to (Evaluate:53Niz0704); Last Rx:73Cdp2754 Ordered   5  Mirena (52 MG) 20 MCG/24HR Intrauterine Intrauterine Device; USE AS DIRECTED; Therapy: 02IQF5472 to (Last 781 6484)  Requested for: 75Blg7820 Ordered   6  ProAir  (90 Base) MCG/ACT Inhalation Aerosol Solution; INHALE 1 TO 2 PUFFS   EVERY 4 TO 6 HOURS AS NEEDED  Requested for: 29Vhy8323; Last Rx:82Hni7217   Ordered   7  Vitamin D 2000 UNIT Oral Tablet; Take 1 tablet daily; Therapy: 72Xca3004 to (Evaluate:55Pzt5085); Last Rx:03Flk7612 Ordered    Allergies    1   No Known Drug Allergies    Results/Data  Allergy Injection Flow Sheet [id number] 01:44PM      Test Name Result Flag Reference   Informed Consent Signed Yes     Allergy Injection #1  15     Allergy Injection #1 Site LA     Allergy Inj #1 Lot Number A4     Allergy Inj #1 Reaction None     Allergy Injection #2 0 15     Allergy Injection #2 Site RA     Allergy Inj #2 Lot Number B4     Allergy Inj #2 Reaction None         Plan  Allergic rhinitis    · Allergy Injection, multiple injections; Status:Active; Requested for:11Muq9509;   Vaginal candidiasis    · Fluconazole 150 MG Oral Tablet; TAKE 1 TABLET 1 TIME ONLY    Future Appointments    Date/Time Provider Specialty Site   03/07/2017 09:30 AM ESTELA Sofia   [address] [place] [address]     Signatures   Electronically signed by : ESTELA Mcrae ; Dec  6 2016  7:59PM EST                       (Author)

## 2018-01-12 VITALS
BODY MASS INDEX: 45.99 KG/M2 | OXYGEN SATURATION: 98 % | RESPIRATION RATE: 18 BRPM | TEMPERATURE: 97.5 F | WEIGHT: 293 LBS | HEIGHT: 67 IN | SYSTOLIC BLOOD PRESSURE: 118 MMHG | DIASTOLIC BLOOD PRESSURE: 70 MMHG | HEART RATE: 78 BPM

## 2018-01-12 NOTE — PROGRESS NOTES
Chief Complaint  Pt here for allergy injections      Active Problems    1  Allergic reaction, initial encounter (995 3) (T78 40XA)   2  Allergic rhinitis (477 9) (J30 9)   3  Anxiety (300 00) (F41 9)   4  Asthma (493 90) (J45 909)   5  Depression with anxiety (300 4) (F41 8)   6  Morbid obesity with BMI of 50 0-59 9, adult (278 01,V85 43) (E66 01,Z68 43)   7  Nasal polyp (471 9) (J33 9)   8  Rheumatoid arthritis (714 0) (M06 9)   9  Vitamin D deficiency (268 9) (E55 9)    Current Meds   1  Albuterol Sulfate (2 5 MG/3ML) 0 083% Inhalation Nebulization Solution; USE 1 UNIT   DOSE IN NEBULIZER EVERY 4 HOURS AS NEEDED Recorded   2  Breo Ellipta 100-25 MCG/INH Inhalation Aerosol Powder Breath Activated; ONE   INHALATION DAILY  AFTER INHALATION RINSE MOUTH WITH WATER & SPIT  USE   SAME TIME EACH DAY, NO MORE THAN 1 TIME IN 24 HOURS    Requested for: 07JDN2590; Last Rx:35Isg0037 Ordered   3  Citalopram Hydrobromide 20 MG Oral Tablet; Take 1 tablet daily  Requested for:   27UYV2007; Last Rx:14Jwn7816 Ordered   4  Diazepam 2 MG Oral Tablet; TAKE 1 TABLET TWICE DAILY AS NEEDED; Therapy: 45DLB9933 to (Evaluate:81Mrh1932); Last Rx:15Xzn4059 Ordered   5  Mirena 20 MCG/24HR Intrauterine Intrauterine Device; USE AS DIRECTED; Therapy: 50XQH2224 to (Last 9138 7233)  Requested for: 60Xgo4251 Ordered   6  ProAir  (90 Base) MCG/ACT Inhalation Aerosol Solution; INHALE 1 TO 2 PUFFS   EVERY 4 TO 6 HOURS AS NEEDED  Requested for: 23Apz1881; Last Rx:17Zjt5313   Ordered   7  Vitamin D 2000 UNIT Oral Tablet; Take 1 tablet daily; Therapy: 42Dob1575 to (Evaluate:61Yup4634); Last Rx:88Xuy1041 Ordered    Allergies    1  No Known Drug Allergies    Plan  Allergic rhinitis    · Allergy Injection, multiple injections; Status:Active - Perform Order; Requested  for:06Oct2016;     Future Appointments    Date/Time Provider Specialty Site   03/07/2017 09:30 AM ESTELA Phillips   [address] [place] [address] Signatures   Electronically signed by : ESTELA Pedraza ; Oct  7 2016  7:36AM EST                       (Author)

## 2018-01-12 NOTE — PROGRESS NOTES
Chief Complaint  CC patient here today for allergy injections  Active Problems    1  Acute vulvitis (616 10) (N76 2)   2  Allergic reaction, initial encounter (995 3) (T78 40XA)   3  Allergic rhinitis (477 9) (J30 9)   4  Anxiety (300 00) (F41 9)   5  Asthma (493 90) (J45 909)   6  Daytime hypersomnolence (780 54) (G47 19)   7  Depression with anxiety (300 4) (F41 8)   8  Encounter for routine gynecological examination (V72 31) (Z01 419)   9  Fatigue (780 79) (R53 83)   10  Morbid obesity with BMI of 50 0-59 9, adult (278 01,V85 43) (E66 01,Z68 43)   11  Nasal polyp (471 9) (J33 9)   12  Rheumatoid arthritis (714 0) (M06 9)   13  Seasonal mood disorder (296 99) (F39)   14  Snoring (786 09) (R06 83)   15  Vaginal candidiasis (112 1) (B37 3)   16  Vaginal itching (698 1) (L29 8)   17  Vitamin D deficiency (268 9) (E55 9)    Current Meds   1  Albuterol Sulfate (2 5 MG/3ML) 0 083% Inhalation Nebulization Solution; USE 1 UNIT   DOSE IN NEBULIZER EVERY 4 HOURS AS NEEDED Recorded   2  Allegra Allergy 180 MG Oral Tablet; 1 PO QD; Therapy: (Arnold Bumpers) to Recorded   3  Breo Ellipta 100-25 MCG/INH Inhalation Aerosol Powder Breath Activated; ONE   INHALATION DAILY  AFTER INHALATION RINSE MOUTH WITH WATER & SPIT  USE   SAME TIME EACH DAY, NO MORE THAN 1 TIME IN 24 HOURS    Requested for: 55STS6266; Last Rx:95Fkd1934 Ordered   4  BuPROPion HCl ER (XL) 150 MG Oral Tablet Extended Release 24 Hour; TAKE 1   TABLET DAILY AS DIRECTED; Therapy: 57OFV3213 to (Evaluate:41Eoy2838)  Requested for: 18OZR3086; Last   Rx:22Mar2017 Ordered   5  Citalopram Hydrobromide 20 MG Oral Tablet; Take 1 tablet daily  Requested for:   77DXU6665; Last Rx:04Feb2016 Ordered   6  DiazePAM 2 MG Oral Tablet; TAKE 1 TABLET TWICE DAILY AS NEEDED; Therapy: 61MTQ5565 to (Evaluate:87Lbu5142); Last Rx:07Sep2016 Ordered   7  Mirena (52 MG) 20 MCG/24HR Intrauterine Intrauterine Device; USE AS DIRECTED;    Therapy: 96QWA0891 to (Last Amos Hickman) Requested for: 27Nov2012 Ordered   8  ProAir  (90 Base) MCG/ACT Inhalation Aerosol Solution; INHALE 1 TO 2 PUFFS   EVERY 4 TO 6 HOURS AS NEEDED  Requested for: 07Sep2016; Last Rx:07Sep2016   Ordered   9  Vitamin D TABS; TAKE 1 TABLET Daily 4000 units; Therapy: (Recorded:22Mar2017) to Recorded    Allergies    1  No Known Drug Allergies    Results/Data  Allergy Injection Flow Sheet [id number] 10:24AM      Test Name Result Flag Reference   Allergy Injection #1 0 45     Allergy Injection #1 Site L     Allergy Inj #1 Lot Number A4     Allergy Inj #1 Reaction hive     Allergy Injection #2 0 45     Allergy Injection #2 Site R     Allergy Inj #2 Lot Number B4     Allergy Inj #2 Reaction hive with reddness         Future Appointments    Date/Time Provider Specialty Site   05/22/2017 08:40 AM ESTELA Hdez   [address] [place] [address]     Signatures   Electronically signed by : ESTELA Foster ; May  4 2017 11:08AM EST                       (Author)

## 2018-01-13 VITALS — BODY MASS INDEX: 51.21 KG/M2 | DIASTOLIC BLOOD PRESSURE: 78 MMHG | SYSTOLIC BLOOD PRESSURE: 130 MMHG | WEIGHT: 293 LBS

## 2018-01-13 VITALS
RESPIRATION RATE: 18 BRPM | DIASTOLIC BLOOD PRESSURE: 98 MMHG | BODY MASS INDEX: 45.99 KG/M2 | SYSTOLIC BLOOD PRESSURE: 130 MMHG | TEMPERATURE: 97.6 F | OXYGEN SATURATION: 97 % | WEIGHT: 293 LBS | HEART RATE: 75 BPM | HEIGHT: 67 IN

## 2018-01-13 VITALS
WEIGHT: 293 LBS | SYSTOLIC BLOOD PRESSURE: 130 MMHG | BODY MASS INDEX: 45.99 KG/M2 | DIASTOLIC BLOOD PRESSURE: 80 MMHG | HEIGHT: 67 IN

## 2018-01-13 VITALS
BODY MASS INDEX: 45.99 KG/M2 | HEIGHT: 67 IN | DIASTOLIC BLOOD PRESSURE: 82 MMHG | SYSTOLIC BLOOD PRESSURE: 112 MMHG | WEIGHT: 293 LBS

## 2018-01-13 NOTE — RESULT NOTES
Verified Results  (1) CBC/PLT/DIFF 35Irq2860 11:25AM Jeremy Chowdhury     Test Name Result Flag Reference   WBC COUNT 6 57 Thousand/uL  4 31-10 16   RBC COUNT 4 49 Million/uL  3 81-5 12   HEMOGLOBIN 13 4 g/dL  11 5-15 4   HEMATOCRIT 40 6 %  34 8-46  1   MCV 90 fL  82-98   MCH 29 8 pg  26 8-34 3   MCHC 33 0 g/dL  31 4-37 4   RDW 13 3 %  11 6-15 1   MPV 11 5 fL  8 9-12 7   PLATELET COUNT 350 Thousands/uL  149-390   nRBC AUTOMATED 0 /100 WBCs     NEUTROPHILS RELATIVE PERCENT 58 %  43-75   LYMPHOCYTES RELATIVE PERCENT 24 %  14-44   MONOCYTES RELATIVE PERCENT 7 %  4-12   EOSINOPHILS RELATIVE PERCENT 9 % H 0-6   BASOPHILS RELATIVE PERCENT 2 % H 0-1   NEUTROPHILS ABSOLUTE COUNT 3 74 Thousands/?L  1 85-7 62   LYMPHOCYTES ABSOLUTE COUNT 1 60 Thousands/?L  0 60-4 47   MONOCYTES ABSOLUTE COUNT 0 48 Thousand/?L  0 17-1 22   EOSINOPHILS ABSOLUTE COUNT 0 62 Thousand/?L H 0 00-0 61   BASOPHILS ABSOLUTE COUNT 0 11 Thousands/?L H 0 00-0 10     (1) COMPREHENSIVE METABOLIC PANEL 59PQE5874 58:96QI Jeremy Chowdhury     Test Name Result Flag Reference   GLUCOSE,RANDM 84 mg/dL     If the patient is fasting, the ADA then defines impaired fasting glucose as > 100 mg/dL and diabetes as > or equal to 123 mg/dL  SODIUM 138 mmol/L  136-145   POTASSIUM 3 8 mmol/L  3 5-5 3   CHLORIDE 106 mmol/L  100-108   CARBON DIOXIDE 25 mmol/L  21-32   ANION GAP (CALC) 7 mmol/L  4-13   BLOOD UREA NITROGEN 11 mg/dL  5-25   CREATININE 0 76 mg/dL  0 60-1 30   Standardized to IDMS reference method   CALCIUM 8 6 mg/dL  8 3-10 1   BILI, TOTAL 0 85 mg/dL  0 20-1 00   ALK PHOSPHATAS 80 U/L     ALT (SGPT) 27 U/L  12-78   AST(SGOT) 15 U/L  5-45   ALBUMIN 3 6 g/dL  3 5-5 0   TOTAL PROTEIN 7 4 g/dL  6 4-8 2   eGFR Non-African American      >60 0 ml/min/1 73sq [facility] Disease Education Program recommendations are as follows:  GFR calculation is accurate only with a steady state creatinine  Chronic Kidney disease less than 60 ml/min/1 73 sq  meters  Kidney failure less than 15 ml/min/1 73 sq  meters  (1) HEMOGLOBIN A1C 63Tup3592 11:25AM Arabella Tran     Test Name Result Flag Reference   HEMOGLOBIN A1C 5 6 %  4 2-6 3   EST  AVG  GLUCOSE 114 mg/dl       (1) LIPID PANEL, FASTING 04Goh0050 11:25AM Arabella Tran     Test Name Result Flag Reference   CHOLESTEROL 128 mg/dL     HDL,DIRECT 48 mg/dL  40-60   Specimen collection should occur prior to Metamizole administration due to the potential for falsely depressed results  LDL CHOLESTEROL CALCULATED 65 mg/dL  0-100   Triglyceride:         Normal              <150 mg/dl       Borderline High    150-199 mg/dl       High               200-499 mg/dl       Very High          >499 mg/dl  Cholesterol:         Desirable        <200 mg/dl      Borderline High  200-239 mg/dl      High             >239 mg/dl  HDL Cholesterol:        High    >59 mg/dL      Low     <41 mg/dL  LDL CALCULATED:    This screening LDL is a calculated result  It does not have the accuracy of the Direct Measured LDL in the monitoring of patients with hyperlipidemia and/or statin therapy  Direct Measure LDL (RJK320) must be ordered separately in these patients  TRIGLYCERIDES 76 mg/dL  <=150   Specimen collection should occur prior to N-Acetylcysteine or Metamizole administration due to the potential for falsely depressed results  (1) SED RATE 03Aug2016 11:25AM Arabella Tran     Test Name Result Flag Reference   SED RATE 14 mm/hour  0-20     (1) TSH 84Jpu4167 11:25AM Arabella Tran     Test Name Result Flag Reference   TSH 1 510 uIU/mL  0 358-3 740   Patients undergoing fluorescein dye angiography may retain small amounts of fluorescein in the body for 48-72 hours post procedure  Samples containing fluorescein can produce falsely depressed TSH values  If the patient had this procedure,a specimen should be resubmitted post fluorescein clearance            The recommended reference ranges for TSH during pregnancy are as follows:  First trimester 0 1 to 2 5 uIU/mL  Second trimester  0 2 to 3 0 uIU/mL  Third trimester 0 3 to 3 0 uIU/m     (1) VITAMIN D 25-HYDROXY 70Bhr5926 11:25AM Rogue March     Test Name Result Flag Reference   VIT D 25-HYDROX 26 8 ng/mL L 30 0-100 0   This assay is a certified procedure of the CDC Vitamin D Standardization Certification Program (VDSCP)     Deficiency <20ng/ml   Insufficiency 20-30ng/ml   Sufficient  ng/ml     *Patients undergoing fluorescein dye angiography may retain small amounts of fluorescein in the body for 48-72 hours post procedure  Samples containing fluorescein can produce falsely elevated Vitamin D values  If the patient had this procedure, a specimen should be resubmitted post fluorescein clearance

## 2018-01-13 NOTE — PROGRESS NOTES
Chief Complaint  CC Patient is here for allergy injection  Active Problems    1  Allergic reaction, initial encounter (995 3) (T78 40XA)   2  Allergic rhinitis (477 9) (J30 9)   3  Anxiety (300 00) (F41 9)   4  Asthma (493 90) (J45 909)   5  Daytime hypersomnolence (780 54) (G47 19)   6  Depression with anxiety (300 4) (F41 8)   7  Encounter for routine gynecological examination (V72 31) (Z01 419)   8  Fatigue (780 79) (R53 83)   9  Morbid obesity with BMI of 50 0-59 9, adult (278 01,V85 43) (E66 01,Z68 43)   10  Nasal polyp (471 9) (J33 9)   11  Obstructive sleep apnea (327 23) (G47 33)   12  Rheumatoid arthritis (714 0) (M06 9)   13  Screening for diabetes mellitus (V77 1) (Z13 1)   14  Seasonal mood disorder (296 99) (F39)   15  Snoring (786 09) (R06 83)   16  Vaginal candidiasis (112 1) (B37 3)   17  Vaginal itching (698 1) (L29 8)   18  Vitamin D deficiency (268 9) (E55 9)    Current Meds   1  Albuterol Sulfate (2 5 MG/3ML) 0 083% Inhalation Nebulization Solution; USE 1 UNIT   DOSE IN NEBULIZER EVERY 4 HOURS AS NEEDED Recorded   2  Allegra Allergy 180 MG Oral Tablet; 1 PO QD; Therapy: (Isadora Robbins) to Recorded   3  Breo Ellipta 100-25 MCG/INH Inhalation Aerosol Powder Breath Activated; ONE   INHALATION DAILY  AFTER INHALATION RINSE MOUTH WITH WATER & SPIT  USE   SAME TIME EACH DAY, NO MORE THAN 1 TIME IN 24 HOURS; Therapy: 65CAC9662 to (Last Rx:84Rhs6121)  Requested for: 68FWB5893 Ordered   4  DiazePAM 2 MG Oral Tablet; TAKE 1 TABLET TWICE DAILY AS NEEDED; Therapy: 90UAE6491 to (Evaluate:28Vvf2449); Last Rx:48Zpy0166 Ordered   5  FLUoxetine HCl - 20 MG Oral Tablet; TAKE 1 TABLET DAILY; Therapy: 46JZQ9288 to (Evaluate:12Nov2017)  Requested for: 02IWC9609; Last   Rx:18Oqh8828 Ordered   6  Magnesium CAPS; take 1 capsule daily; Therapy: (Recorded:12Yaa7645) to Recorded   7  Mirena (52 MG) 20 MCG/24HR Intrauterine Intrauterine Device; USE AS DIRECTED;    Therapy: 27Nov2012 to (Last 787 9874)  Requested for: 62HHV4250 Ordered   8  ProAir  (90 Base) MCG/ACT Inhalation Aerosol Solution; INHALE 1 TO 2 PUFFS   EVERY 4 TO 6 HOURS AS NEEDED  Requested for: 55Grg1316; Last Rx:38Daf7659   Ordered   9  Vitamin D TABS; TAKE 1 TABLET Daily 4000 units; Therapy: (Recorded:22Mar2017) to Recorded    Allergies    1  No Known Drug Allergies    Future Appointments    Date/Time Provider Specialty Site   08/02/2017 10:00 AM ESTELA Liu   [address] [place] [address]     Signatures   Electronically signed by : Barbara Ballesteros, ; Jul 14 2017 12:28PM EST                       (Author)    Electronically signed by : ESTELA Maradiaga ; Jul 15 2017 12:57PM EST                       (Co-author)

## 2018-01-13 NOTE — PROGRESS NOTES
Chief Complaint  CC patient here today for allergy injections      Active Problems    1  Acute vulvitis (616 10) (N76 2)   2  Allergic reaction, initial encounter (995 3) (T78 40XA)   3  Allergic rhinitis (477 9) (J30 9)   4  Anxiety (300 00) (F41 9)   5  Asthma (493 90) (J45 909)   6  Depression with anxiety (300 4) (F41 8)   7  Morbid obesity with BMI of 50 0-59 9, adult (278 01,V85 43) (E66 01,Z68 43)   8  Nasal polyp (471 9) (J33 9)   9  Rheumatoid arthritis (714 0) (M06 9)   10  Vaginal candidiasis (112 1) (B37 3)   11  Vaginal itching (698 1) (L29 8)   12  Vitamin D deficiency (268 9) (E55 9)    Current Meds   1  Albuterol Sulfate (2 5 MG/3ML) 0 083% Inhalation Nebulization Solution; USE 1 UNIT   DOSE IN NEBULIZER EVERY 4 HOURS AS NEEDED Recorded   2  Breo Ellipta 100-25 MCG/INH Inhalation Aerosol Powder Breath Activated; ONE   INHALATION DAILY  AFTER INHALATION RINSE MOUTH WITH WATER & SPIT  USE   SAME TIME EACH DAY, NO MORE THAN 1 TIME IN 24 HOURS    Requested for: 98AVB1444; Last Rx:96Mks4976 Ordered   3  Citalopram Hydrobromide 20 MG Oral Tablet; Take 1 tablet daily  Requested for:   86TRP3077; Last Rx:09Rzx0665 Ordered   4  DiazePAM 2 MG Oral Tablet; TAKE 1 TABLET TWICE DAILY AS NEEDED; Therapy: 73DVP0196 to (Evaluate:22Sxq0577); Last Rx:52Ham2793 Ordered   5  Fluconazole 150 MG Oral Tablet; TAKE 1 TABLET 1 TIME ONLY; Therapy: 08IER3067 to (Evaluate:02Jan2017)  Requested for: 14Nbt1533; Last   Rx:13Fry1651 Ordered   6  Mirena (52 MG) 20 MCG/24HR Intrauterine Intrauterine Device; USE AS DIRECTED; Therapy: 87TZL7202 to (Last 9138 4367)  Requested for: 27Nov2012 Ordered   7  Nystatin-Triamcinolone 883055-0 1 UNIT/GM-% External Ointment; APPLY SPARINGLY   TO AFFECTED AREA TWICE A DAY FOR 7   TO 14 DAYS; Therapy: 63HNV9868 to (Last Rx:11Jan2017)  Requested for: 44RDF2004 Ordered   8   ProAir  (90 Base) MCG/ACT Inhalation Aerosol Solution; INHALE 1 TO 2 PUFFS   EVERY 4 TO 6 HOURS AS NEEDED Requested for: 88EHS6256; Last YO:66ALR3922   Ordered   9  Vitamin D 2000 UNIT Oral Tablet; Take 1 tablet daily; Therapy: 25Ejb0625 to (Evaluate:53Ccq1106); Last Rx:88Azd8633 Ordered    Allergies    1  No Known Drug Allergies    Results/Data  Allergy Injection Flow Sheet 29VQE2693 03:04PM      Test Name Result Flag Reference   Allergy Injection #1 0 30     Allergy Injection #1 Site LA     Allergy Inj #1 Lot Number A4     Allergy Inj #1 Reaction NONE     Allergy Injection #2 0 30     Allergy Injection #2 Site RA     Allergy Inj #2 Lot Number B4     Allergy Inj #2 Reaction      HIVE WITH A LITTLE REDNESS       Future Appointments    Date/Time Provider Specialty Site   03/28/2017 11:45 AM ESTELA Villar  Obstetrics/Gynecology OB [facility]YN [facility]ARE [facility]   03/13/2017 08:20 AM ESTELA Weaver   [address] [place] [address]     Signatures   Electronically signed by : ESTELA Hansen ; Feb 28 2017  3:58PM EST                       (Author)

## 2018-01-13 NOTE — RESULT NOTES
Verified Results  (1) CBC/PLT/DIFF 25GKN8094 11:03AM Jennifer Brantleysong Order Number: [id number]     Test Name Result Flag Reference   WBC COUNT 5 99 Thousand/uL  4 31-10 16   RBC COUNT 4 69 Million/uL  3 81-5 12   HEMOGLOBIN 14 1 g/dL  11 5-15 4   HEMATOCRIT 43 0 %  34 8-46  1   MCV 92 fL  82-98   MCH 30 1 pg  26 8-34 3   MCHC 32 8 g/dL  31 4-37 4   RDW 13 5 %  11 6-15 1   MPV 11 5 fL  8 9-12 7   PLATELET COUNT 296 Thousands/uL  149-390   NEUTROPHILS RELATIVE PERCENT 53 %  43-75   LYMPHOCYTES RELATIVE PERCENT 25 %  14-44   MONOCYTES RELATIVE PERCENT 9 %  4-12   EOSINOPHILS RELATIVE PERCENT 11 % H 0-6   BASOPHILS RELATIVE PERCENT 2 % H 0-1   NEUTROPHILS ABSOLUTE COUNT 3 19 Thousands/? ??L  1 85-7 62   LYMPHOCYTES ABSOLUTE COUNT 1 50 Thousands/? ??L  0 60-4 47   MONOCYTES ABSOLUTE COUNT 0 53 Thousand/? ??L  0 17-1 22   EOSINOPHILS ABSOLUTE COUNT 0 66 Thousand/? ??L H 0 00-0 61   BASOPHILS ABSOLUTE COUNT 0 11 Thousands/? ??L H 0 00-0 10   - Patient Instructions: This bloodwork is non-fasting  Please drink two glasses of water morning of bloodwork  - Patient Instructions: This bloodwork is non-fasting  Please drink two glasses of water morning of bloodwork  (1) COMPREHENSIVE METABOLIC PANEL 31VJN1652 57:92XJ Jennifer Brantleysong Order Number: [id number]     Test Name Result Flag Reference   SODIUM 142 mmol/L  136-145   POTASSIUM 4 4 mmol/L  3 5-5 3   CHLORIDE 104 mmol/L  100-108   CARBON DIOXIDE 30 mmol/L  21-32   ANION GAP (CALC) 8 mmol/L  4-13   BLOOD UREA NITROGEN 12 mg/dL  5-25   CREATININE 0 80 mg/dL  0 60-1 30   Standardized to IDMS reference method   CALCIUM 8 9 mg/dL  8 3-10 1   BILI, TOTAL 1 00 mg/dL  0 20-1 00   ALK PHOSPHATAS 87 U/L     ALT (SGPT) 30 U/L  12-78   AST(SGOT) 19 U/L  5-45   ALBUMIN 3 9 g/dL  3 5-5 0   TOTAL PROTEIN 7 5 g/dL  6 4-8 2   eGFR Non-African American      >60 0 ml/min/1 73sq m   - Patient Instructions: This bloodwork is non-fasting  Please drink two glasses of water morning of bloodwork  National Kidney Disease Education Program recommendations are as follows:  GFR calculation is accurate only with a steady state creatinine  Chronic Kidney disease less than 60 ml/min/1 73 sq  meters  Kidney failure less than 15 ml/min/1 73 sq  meters  GLUCOSE FASTING 92 mg/dL  65-99     (1) TSH 22Mar2017 11:03AM Merari Kacey Order Number: [id number]     Test Name Result Flag Reference   TSH 1 661 uIU/mL  0 358-3 740   - Patient Instructions: This bloodwork is non-fasting  Please drink two glasses of water morning of bloodwork  - Patient Instructions: This bloodwork is non-fasting  Please drink two glasses of water morning of bloodwork  Patients undergoing fluorescein dye angiography may retain small amounts of fluorescein in the body for 48-72 hours post procedure  Samples containing fluorescein can produce falsely depressed TSH values  If the patient had this procedure,a specimen should be resubmitted post fluorescein clearance  The recommended reference ranges for TSH during pregnancy are as follows:  First trimester 0 1 to 2 5 uIU/mL  Second trimester  0 2 to 3 0 uIU/mL  Third trimester 0 3 to 3 0 uIU/m     (1) VITAMIN D 25-HYDROXY 22Mar2017 11:03AM [employer] Order Number: [id number]     Test Name Result Flag Reference   VIT D 25-HYDROX 28 2 ng/mL L 30 0-100 0   This assay is a certified procedure of the CDC Vitamin D Standardization Certification Program (VDSCP)     Deficiency <20ng/ml   Insufficiency 20-30ng/ml   Sufficient  ng/ml     *Patients undergoing fluorescein dye angiography may retain small amounts of fluorescein in the body for 48-72 hours post procedure  Samples containing fluorescein can produce falsely elevated Vitamin D values  If the patient had this procedure, a specimen should be resubmitted post fluorescein clearance

## 2018-01-13 NOTE — PROGRESS NOTES
Chief Complaint  Pt here for allergy injections      Active Problems    1  Allergic reaction, initial encounter (995 3) (T78 40XA)   2  Allergic rhinitis (477 9) (J30 9)   3  Anxiety (300 00) (F41 9)   4  Asthma (493 90) (J45 909)   5  Depression with anxiety (300 4) (F41 8)   6  Morbid obesity with BMI of 50 0-59 9, adult (278 01,V85 43) (E66 01,Z68 43)   7  Nasal polyp (471 9) (J33 9)   8  Rheumatoid arthritis (714 0) (M06 9)   9  Vitamin D deficiency (268 9) (E55 9)    Current Meds   1  Albuterol Sulfate (2 5 MG/3ML) 0 083% Inhalation Nebulization Solution; USE 1 UNIT   DOSE IN NEBULIZER EVERY 4 HOURS AS NEEDED Recorded   2  Breo Ellipta 100-25 MCG/INH Inhalation Aerosol Powder Breath Activated; ONE   INHALATION DAILY  AFTER INHALATION RINSE MOUTH WITH WATER & SPIT  USE   SAME TIME EACH DAY, NO MORE THAN 1 TIME IN 24 HOURS    Requested for: 47ZJU5112; Last Rx:02Asg3633 Ordered   3  Citalopram Hydrobromide 20 MG Oral Tablet; Take 1 tablet daily  Requested for:   16BGA0680; Last Rx:62Xhj8537 Ordered   4  DiazePAM 2 MG Oral Tablet; TAKE 1 TABLET TWICE DAILY AS NEEDED; Therapy: 01NPO3565 to (Evaluate:59Lep0202); Last Rx:93Huu9372 Ordered   5  Mirena 20 MCG/24HR Intrauterine Intrauterine Device; USE AS DIRECTED; Therapy: 96SJV9884 to (Last 06-84202732)  Requested for: 27Nov2012 Ordered   6  ProAir  (90 Base) MCG/ACT Inhalation Aerosol Solution; INHALE 1 TO 2 PUFFS   EVERY 4 TO 6 HOURS AS NEEDED  Requested for: 24Okm2657; Last Rx:79Upw0491   Ordered   7  Vitamin D 2000 UNIT Oral Tablet; Take 1 tablet daily; Therapy: 40Rue7573 to (Evaluate:10Sse5298); Last Rx:05Fnh3154 Ordered    Allergies    1   No Known Drug Allergies    Results/Data  Allergy Injection Flow Sheet 00Fsa6588 12:39PM      Test Name Result Flag Reference   Informed Consent Signed Yes     Allergy Injection #1 0 50cc     Allergy Injection #1 Site LEFT ARM     Allergy Inj #1 Lot Number A3     Allergy Inj #1 Reaction NEG     Allergy Injection #2 0 50CC     Allergy Injection #2 Site RIGHT ARM     Allergy Inj #2 Lot Number B3     Allergy Inj #2 Reaction NEG         Future Appointments    Date/Time Provider Specialty Site   03/07/2017 09:30 AM ESTELA Phillips   [address]     Signatures   Electronically signed by : ESTELA Valencia ; Oct 21 2016  6:59PM EST                       (Author)

## 2018-01-14 VITALS
DIASTOLIC BLOOD PRESSURE: 82 MMHG | SYSTOLIC BLOOD PRESSURE: 132 MMHG | OXYGEN SATURATION: 97 % | WEIGHT: 293 LBS | BODY MASS INDEX: 45.99 KG/M2 | RESPIRATION RATE: 18 BRPM | HEIGHT: 67 IN | HEART RATE: 95 BPM | TEMPERATURE: 97.7 F

## 2018-01-15 NOTE — PROGRESS NOTES
Chief Complaint  CC patient here today for allergy injections  Active Problems    1  Allergic reaction, initial encounter (995 3) (T78 40XA)   2  Allergic rhinitis (477 9) (J30 9)   3  Anxiety (300 00) (F41 9)   4  Asthma (493 90) (J45 909)   5  Chronic sinusitis (473 9) (J32 9)   6  Depression with anxiety (300 4) (F41 8)   7  Encounter for IUD removal and reinsertion (V25 13) (Z30 433)   8  Fatigue (780 79) (R53 83)   9  Morbid obesity with BMI of 50 0-59 9, adult (278 01,V85 43) (E66 01,Z68 43)   10  Nasal polyp (471 9) (J33 9)   11  Obstructive sleep apnea (327 23) (G47 33)   12  Rheumatoid arthritis (714 0) (M06 9)   13  Seasonal mood disorder (296 99) (F39)   14  Snoring (786 09) (R06 83)   15  Vaginal candidiasis (112 1) (B37 3)   16  Vaginal itching (698 1) (L29 8)   17  Vitamin D deficiency (268 9) (E55 9)    Current Meds   1  Albuterol Sulfate (2 5 MG/3ML) 0 083% Inhalation Nebulization Solution; USE 1 UNIT   DOSE IN NEBULIZER EVERY 4 HOURS AS NEEDED Recorded   2  Allegra Allergy 180 MG Oral Tablet; 1 PO QD; Therapy: (Kari Moreira) to Recorded   3  Breo Ellipta 100-25 MCG/INH Inhalation Aerosol Powder Breath Activated; ONE   INHALATION DAILY  AFTER INHALATION RINSE MOUTH WITH WATER & SPIT  USE   SAME TIME EACH DAY, NO MORE THAN 1 TIME IN 24 HOURS; Therapy: 72YQF8315 to (Last Rx:97Twe6019)  Requested for: 71XTE4049 Ordered   4  DiazePAM 2 MG Oral Tablet; TAKE 1 TABLET TWICE DAILY AS NEEDED; Therapy: 65Omz3532 to (Evaluate:62Mke6479); Last Rx:29Fxg2693 Ordered   5  Fluconazole 150 MG Oral Tablet; TAKE 1 TABLET AS NEEDED  MAY REPEAT IN 3-5   DAYS IF SYMPTOMS PERSIST; Therapy: 21Otr1814 to (Evaluate:11Aug2017)  Requested for: 79Blg1205; Last   Rx:04Twf3175 Ordered   6  FLUoxetine HCl - 40 MG Oral Capsule; TAKE ONE CAPSULE BY MOUTH EVERY DAY; Therapy: 47ZBF9481 to (Last Rx:12Nov2017)  Requested for: 58XGJ0520 Ordered   7  Magnesium CAPS; take 1 capsule daily;    Therapy: (Recorded:03Ysp9120) to Recorded   8  Mirena (52 MG) 20 MCG/24HR Intrauterine Intrauterine Device; USE AS DIRECTED; Therapy: 49COR2098 to (Last 781 1704)  Requested for: 27Nov2012 Ordered   9  PredniSONE 10 MG Oral Tablet; 5 a day for 2 days, 4 a day for 2 days, 3 a day   for 2 days, 2 a day for 2 days, 1 a day for 2 days; Therapy: 46Eyw4474 to (Last Rx:15Rio0677)  Requested for: 02Aug2017 Ordered   10  ProAir  (90 Base) MCG/ACT Inhalation Aerosol Solution; INHALE 1 TO 2 PUFFS    EVERY 4 TO 6 HOURS AS NEEDED  Requested for: 07Sep2016; Last Rx:55Hhf7534    Ordered   11  Vitamin D TABS; TAKE 1 TABLET Daily 4000 units; Therapy: (Recorded:22Mar2017) to Recorded    Allergies    1  No Known Drug Allergies    Future Appointments    Date/Time Provider Specialty Site   02/05/2018 09:20 AM ESTELA Hoskins   [address] [place] [address]   12/26/2017 02:10 PM Claudine Anthony, [facility]  Obstetrics/Gynecology OB GYN CARE [facility]     Signatures   Electronically signed by : Gianna Whatley, ; Nov 22 2017  2:33PM EST                       (Author)    Electronically signed by : ESTELA Puri ; Nov 23 2017 11:49AM EST                       (Co-author)

## 2018-01-15 NOTE — PROGRESS NOTES
Chief Complaint  CC patient here today for allergy injections      Active Problems    1  Acute vulvitis (616 10) (N76 2)   2  Allergic reaction, initial encounter (995 3) (T78 40XA)   3  Allergic rhinitis (477 9) (J30 9)   4  Anxiety (300 00) (F41 9)   5  Asthma (493 90) (J45 909)   6  Daytime hypersomnolence (780 54) (G47 19)   7  Depression with anxiety (300 4) (F41 8)   8  Encounter for routine gynecological examination (V72 31) (Z01 419)   9  Fatigue (780 79) (R53 83)   10  Morbid obesity with BMI of 50 0-59 9, adult (278 01,V85 43) (E66 01,Z68 43)   11  Nasal polyp (471 9) (J33 9)   12  Rheumatoid arthritis (714 0) (M06 9)   13  Seasonal mood disorder (296 99) (F39)   14  Snoring (786 09) (R06 83)   15  Vaginal candidiasis (112 1) (B37 3)   16  Vaginal itching (698 1) (L29 8)   17  Vitamin D deficiency (268 9) (E55 9)    Current Meds   1  Albuterol Sulfate (2 5 MG/3ML) 0 083% Inhalation Nebulization Solution; USE 1 UNIT   DOSE IN NEBULIZER EVERY 4 HOURS AS NEEDED Recorded   2  Allegra Allergy 180 MG Oral Tablet; 1 PO QD; Therapy: (Sudarshan Ny) to Recorded   3  Breo Ellipta 100-25 MCG/INH Inhalation Aerosol Powder Breath Activated; ONE   INHALATION DAILY  AFTER INHALATION RINSE MOUTH WITH WATER & SPIT  USE   SAME TIME EACH DAY, NO MORE THAN 1 TIME IN 24 HOURS    Requested for: 15RLQ6354; Last Rx:04Feb2016 Ordered   4  BuPROPion HCl ER (XL) 150 MG Oral Tablet Extended Release 24 Hour; TAKE 1   TABLET DAILY AS DIRECTED; Therapy: 76ICD8001 to (Evaluate:71Oct5627)  Requested for: 33BNZ1210; Last   Rx:22Mar2017 Ordered   5  Citalopram Hydrobromide 20 MG Oral Tablet; Take 1 tablet daily  Requested for:   38FMM5871; Last Rx:04Feb2016 Ordered   6  DiazePAM 2 MG Oral Tablet; TAKE 1 TABLET TWICE DAILY AS NEEDED; Therapy: 84EUS8994 to (Evaluate:02Mlm1728); Last Rx:07Sep2016 Ordered   7  Mirena (52 MG) 20 MCG/24HR Intrauterine Intrauterine Device; USE AS DIRECTED;    Therapy: 43IXP4836 to (Last 451 5854) Requested for: 27Nov2012 Ordered   8  ProAir  (90 Base) MCG/ACT Inhalation Aerosol Solution; INHALE 1 TO 2 PUFFS   EVERY 4 TO 6 HOURS AS NEEDED  Requested for: 07Sep2016; Last Rx:07Sep2016   Ordered   9  Vitamin D TABS; TAKE 1 TABLET Daily 4000 units; Therapy: (Recorded:22Mar2017) to Recorded    Allergies    1  No Known Drug Allergies    Results/Data  Allergy Injection Flow Sheet 20Apr2017 01:15PM      Test Name Result Flag Reference   Allergy Injection #1 0 40     Allergy Injection #1 Site LA     Allergy Inj #1 Lot Number A4     Allergy Inj #1 Reaction hive     Allergy Injection #2 0 40     Allergy Injection #2 Site RA     Allergy Inj #2 Lot Number B4     Allergy Inj #2 Reaction hive         Future Appointments    Date/Time Provider Specialty Site   05/03/2017 09:20 AM ESTELA Alcantara  [address] [place] [address]   05/22/2017 08:40 AM ESTELA Alcantara   [address] [place] [address]     Signatures   Electronically signed by : ESTELA Sanchez ; Apr 20 2017  9:15PM EST                       (Author)

## 2018-01-16 NOTE — PROGRESS NOTES
Assessment    1  Encounter for routine gynecological examination (V72 31) (Z01 419)    Plan  Encounter for routine gynecological examination    · (1) THIN PREP PAP WITH IMAGING; Status: In Progress - Specimen/Data  Collected,Retrospective By Protocol Authorization;   Done: 02FVY9022   Perform:[facility] Lab In Office Collection; EWS:[id number]; Last Updated By:Corona Vogel; 2/3/2016 11:41:26 AM;Ordered;  For:Encounter for routine gynecological examination; Ordered By:Lynda Lewis; Maturation index required? : No  Date? : [id number]  HPV? : if ASCUS    Chief Complaint  Pt presents today for annual exam, IUD string check  Active Problems    1  Anxiety (300 00) (F41 9)   2  Asthma (493 90) (J45 909)   3  Depression (311) (F32 9)   4  Encounter for routine gynecological examination (V72 31) (Z01 419)   5  Gynecologic Services Intrauterine Device (IUD) Insertion   6  Morbid obesity with BMI of 50 0-59 9, adult (278 01,V85 43) (E66 01,Z68 43)   7  Oral contraceptive prescribed (V25 01) (Z30 011)   8   Rheumatoid arthritis (714 0) (M06 9)    Past Medical History    · History of Arthritis (V13 4)   · History of Encounter for routine checking of intrauterine contraceptive device (V25 42)  (Z30 431)   · History of Gallbladder disease (575 9) (K82 9)   · History of Lumbar disc disease (722 93) (M51 9)   · Oral contraceptive prescribed (V25 01) (Z30 011)    Surgical History    · Gynecologic Services Intrauterine Device (IUD) Insertion   · History of Nasal Septal Deviation Repair    Family History    · Family history of asthma (V17 5) (Z82 5)   · Family history of hypertension (V17 49) (Z82 49)    · Family history of Colon cancer   · Family history of chronic obstructive pulmonary disease (V17 6) (Z82 5)    · Family history of Asthma (493 90) (J45 909)   · Denied: Family history of Breast Cancer   · Denied: Family history of Colon Cancer   · Family history of COPD (chronic obstructive pulmonary disease) (80) (J44 9)   · Family history of Diabetes Mellitus (V18 0)   · Family history of Obesity (278 00) (E66 9)   · Denied: Family history of Osteoporosis   · Denied: Family history of Ovarian Cancer   · Denied: Family history of Uterine Cancer    Social History    · Former smoker (V15 82) (H18 801)   · No drug use   · Social drinker    Current Meds   1  Albuterol Sulfate (2 5 MG/3ML) 0 083% Inhalation Nebulization Solution; USE 1 UNIT   DOSE IN NEBULIZER EVERY 4 HOURS AS NEEDED Recorded   2  Breo Ellipta 100-25 MCG/INH Inhalation Aerosol Powder Breath Activated; Therapy: (Recorded:14Gnq4905) to Recorded   3  CeleXA 40 MG Oral Tablet; Take 1 tablet daily Recorded   4  Mirena 20 MCG/24HR Intrauterine Intrauterine Device; USE AS DIRECTED; Therapy: 72OAJ6419 to (Last Corinne Granger)  Requested for: 27Nov2012 Ordered   5  ProAir  (90 Base) MCG/ACT Inhalation Aerosol Solution; INHALE 1 TO 2 PUFFS   EVERY 4 TO 6 HOURS AS NEEDED Recorded    Allergies    1  No Known Drug Allergies    Vitals   Recorded: 09LUE1863 86:12UN   Systolic 832   Diastolic 84   Height 5 ft 7 in   Weight 328 lb    BMI Calculated 51 37   BSA Calculated 2 49     Physical Exam    Genitourinary   External genitalia: Normal and no lesions appreciated  Vagina: Normal, no lesions or dryness appreciated  Urethra: Normal     Urethral meatus: Normal     Bladder: Normal, soft, non-tender and no prolapse or masses appreciated  Cervix: Normal, no palpable masses  String present  Uterus: Normal, non-tender, not enlarged, and no palpable masses  Adnexa/parametria: Normal, non-tender and no fullness or masses appreciated  Breasts: normal in appearance, no palpable masses and no nipple discharge  Future Appointments    Date/Time Provider Specialty Site   02/25/2016 02:00 PM ESTELA Post  Bariatric Medicine [facility]   02/04/2016 09:00 AM ESTELA Hoskins Signatures   Electronically signed by : Sebastien Griffith MD; Feb  3 2016 12:04PM EST                       (Author)

## 2018-01-17 NOTE — PROGRESS NOTES
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  not consistently food logging      Past Medical History    1  History of Arthritis (V13 4)   2  History of Encounter for routine checking of intrauterine contraceptive device (V25 42)   (Z30 431)   3  History of Gallbladder disease (575 9) (K82 9)   4  History of Lumbar disc disease (722 93) (M51 9)    Assessment    1  Morbid obesity with BMI of 50 0-59 9, adult (278 01,V85 43) (C02 08,G54 66)   2  Depression with anxiety (300 4) (F41 8)   3  Rheumatoid arthritis (714 0) (M06 9)    Discussion/Summary  Discussion Summary:   29 yo female w/ asthma and morbid obesity here to pursue medical weight management to improve weight and health  Obesity class 3:  -enrolled in conservative program   -initial focus of 5-10% weight loss over 3-6 mos for improved health  -lost 1 5lbs since last visit    Asthma: stable  -c/w current regimen  -followed by pulm    Anxiety/depression: stable  -on celexa    RA: stable  -weight loss may help w/ this    +STOPBANG:  -reviewed risks of untreated NATALEE  -pt will discuss this further with her PCP    RTC in 1mos    Goals:  Brisas 7851! 1  Food log www  myfitnesspal com  2  No sugary beverages  At least 64oz of water daily  Cut caffeine in half  3  Increase physical activity: goal steps 7500 per day  4  4767-9160 calories, see sample menu  5  Meal plan/prep for the week  6  Eat within 2 hours of waking up  Active Problems    1  Allergic rhinitis (477 9) (J30 9)   2  Anxiety (300 00) (F41 9)   3  Asthma (493 90) (J45 909)   4  Depression with anxiety (300 4) (F41 8)   5  Morbid obesity with BMI of 50 0-59 9, adult (278 01,V85 43) (E66 01,Z68 43)   6  Nasal polyp (471 9) (J33 9)   7  Rheumatoid arthritis (714 0) (M06 9)   8   Vitamin D deficiency (268 9) (E55 9)    Surgical History    1  History of Nasal Septal Deviation Repair    Family History    1  Family history of asthma (V17 5) (Z82 5)   2  Family history of hypertension (V17 49) (Z82 49)    3  Family history of Colon cancer   4  Family history of chronic obstructive pulmonary disease (V17 6) (Z82 5)    5  Family history of Asthma (493 90) (J45 909)   6  Denied: Family history of Breast Cancer   7  Denied: Family history of Colon Cancer   8  Family history of COPD (chronic obstructive pulmonary disease) (496) (J44 9)   9  Family history of Diabetes Mellitus (V18 0)   10  Family history of Obesity (278 00) (E66 9)   11  Denied: Family history of Osteoporosis   12  Denied: Family history of Ovarian Cancer   13  Denied: Family history of Uterine Cancer    Social History    · Former smoker (S27 30) (U85 115)   · No drug use   · Social drinker    Current Meds   1  Albuterol Sulfate (2 5 MG/3ML) 0 083% Inhalation Nebulization Solution; USE 1 UNIT   DOSE IN NEBULIZER EVERY 4 HOURS AS NEEDED Recorded   2  Breo Ellipta 100-25 MCG/INH Inhalation Aerosol Powder Breath Activated; ONE   INHALATION DAILY  AFTER INHALATION RINSE MOUTH WITH WATER & SPIT  USE SAME   TIME EACH DAY, NO MORE THAN 1 TIME IN 24 HOURS    Requested for: 15BSK7674; Last Rx:85Huy0182 Ordered   3  Citalopram Hydrobromide 20 MG Oral Tablet; Take 1 tablet daily  Requested for:   19EBO4840; Last Rx:13Dxh0128 Ordered   4  Diazepam TABS; TAKE 1 TABLET  AS NEEDED; Therapy: (Recorded:04Feb2016) to Recorded   5  Mirena 20 MCG/24HR Intrauterine Intrauterine Device; USE AS DIRECTED; Therapy: 13ZQE6802 to (Last 781 0044)  Requested for: 27Nov2012 Ordered   6  ProAir  (90 Base) MCG/ACT Inhalation Aerosol Solution; INHALE 1 TO 2 PUFFS   EVERY 4 TO 6 HOURS AS NEEDED Recorded    Allergies    1   No Known Drug Allergies    Vitals  Vital Signs [Data Includes: Current Encounter]    Recorded: 25Feb2016 02:07PM   Temperature 98 4 F   Heart Rate 80   Respiration 16   Systolic 841 Diastolic 70   Height 5 ft 7 in   Weight 324 lb 11 2 oz   BMI Calculated 50 85   BSA Calculated 2 49     Physical Exam    Constitutional   General appearance: No acute distress, well appearing and well nourished  well developed and morbidly obese  Eyes no conjunctival pallor  Ears, Nose, Mouth, and Throat moist oral mucosa  Pulmonary   Respiratory effort: No increased work of breathing or signs of respiratory distress  Auscultation of lungs: Clear to auscultation  Cardiovascular   Auscultation of heart: Normal rate and rhythm, normal S1 and S2, without murmurs  Abdomen   Abdomen: Non-tender, no masses  The abdomen was obese  The abdomen was soft and nontender  Musculoskeletal   Gait and station: Normal     Psychiatric   Orientation to person, place, and time: Normal     Mood and affect: Normal          Future Appointments    Date/Time Provider Specialty Site   04/01/2016 01:30 PM Mortimer Keeling, M D  Bariatric Medicine [facility]   09/07/2016 09:00 AM ESTELA Mccullough   [address]     Signatures   Electronically signed by : ESTELA Casiano ; Feb 25 2016  2:39PM EST                       (Author)

## 2018-01-17 NOTE — PROGRESS NOTES
Chief Complaint  Chief Complaint Free Text Note Form: Pt is here for 1 month MWM f/u  She denies any concerns  Past Medical History    1  History of Arthritis (V13 4)   2  History of Encounter for routine checking of intrauterine contraceptive device (V25 42)   (Z30 431)   3  History of Gallbladder disease (575 9) (K82 9)   4  History of Lumbar disc disease (722 93) (M51 9)   5  Oral contraceptive prescribed (V25 01) (Z30 011)    Assessment    1  Morbid obesity with BMI of 50 0-59 9, adult (278 01,V85 43) (S50 62,N19 91)   2  Depression (311) (F32 9)   3  Anxiety (300 00) (F41 9)   4  Rheumatoid arthritis (714 0) (M06 9)    Discussion/Summary  Discussion Summary:   29 yo female w/ asthma and morbid obesity here to pursue medical weight management to improve weight and health  Obesity class 3:  -enrolled in conservative program   -initial focus of 5-10% weight loss over 3-6 mos for improved health  -lost 3 lbs    Asthma: stable  -c/w current regimen  -followed by pulm    Anxiety/depression: stable  -on celexa    RA: stable  -weight loss may help w/ this    +STOPBANG:  -reviewed risks of untreated NATALEE  -pt will discuss this further with her PCP    RTC in 1mos    Goals:  Brisas 7851! 1  Food log www  myfitnesspal com  2  No sugary beverages  At least 64oz of water daily  Cut caffeine in half  3  Increase physical activity: goal steps 7500 per day  4  3488-3364 calories, see sample menu  5  Meal plan/prep for the week  6  Eat within 2 hours of waking up  Active Problems    1  Anxiety (300 00) (F41 9)   2  Asthma (493 90) (J45 909)   3  Depression (311) (F32 9)   4  Encounter for routine gynecological examination (V72 31) (Z01 419)   5  Gynecologic Services Intrauterine Device (IUD) Insertion   6  Morbid obesity with BMI of 50 0-59 9, adult (278 01,V85 43) (E66 01,Z68 43)   7  Oral contraceptive prescribed (V25 01) (Z30 011)   8  Rheumatoid arthritis (714 0) (M06 9)    Surgical History    1  Gynecologic Services Intrauterine Device (IUD) Insertion   2  History of Nasal Septal Deviation Repair    Family History    1  Family history of asthma (V17 5) (Z82 5)   2  Family history of hypertension (V17 49) (Z82 49)    3  Family history of Colon cancer   4  Family history of chronic obstructive pulmonary disease (V17 6) (Z82 5)    5  Family history of Asthma (493 90) (J45 909)   6  Denied: Family history of Breast Cancer   7  Denied: Family history of Colon Cancer   8  Family history of COPD (chronic obstructive pulmonary disease) (496) (J44 9)   9  Family history of Diabetes Mellitus (V18 0)   10  Family history of Obesity (278 00) (E66 9)   11  Denied: Family history of Osteoporosis   12  Denied: Family history of Ovarian Cancer   13  Denied: Family history of Uterine Cancer    Social History    · Former smoker (M67 60) (I97 365)   · No drug use   · Social drinker    Current Meds   1  Albuterol Sulfate (2 5 MG/3ML) 0 083% Inhalation Nebulization Solution; USE 1 UNIT   DOSE IN NEBULIZER EVERY 4 HOURS AS NEEDED Recorded   2  Breo Ellipta 100-25 MCG/INH Inhalation Aerosol Powder Breath Activated; Therapy: (Recorded:54Bwq4758) to Recorded   3  CeleXA 40 MG Oral Tablet; Take 1 tablet daily Recorded   4  Mirena 20 MCG/24HR Intrauterine Intrauterine Device; USE AS DIRECTED; Therapy: 43NFK0542 to (Last Lary Thornton)  Requested for: 27Nov2012 Ordered   5  ProAir  (90 Base) MCG/ACT Inhalation Aerosol Solution; INHALE 1 TO 2 PUFFS   EVERY 4 TO 6 HOURS AS NEEDED Recorded    Allergies    1  No Known Drug Allergies    Vitals  Vital Signs [Data Includes: Current Encounter]    Recorded: 26NFA7640 03:12PM   Temperature 97 9 F   Heart Rate 68   Respiration 14   Systolic 271   Diastolic 84   Height 5 ft 7 in   Weight 326 lb    BMI Calculated 51 06   BSA Calculated 2 49     Physical Exam    Constitutional   General appearance: No acute distress, well appearing and well nourished  well developed and morbidly obese  Eyes no conjunctival pallor  Ears, Nose, Mouth, and Throat moist oral mucosa  Pulmonary   Respiratory effort: No increased work of breathing or signs of respiratory distress  Auscultation of lungs: Clear to auscultation  Cardiovascular   Auscultation of heart: Normal rate and rhythm, normal S1 and S2, without murmurs  Abdomen   Abdomen: Non-tender, no masses  The abdomen was obese  The abdomen was soft and nontender  Musculoskeletal   Gait and station: Normal     Psychiatric   Orientation to person, place, and time: Normal     Mood and affect: Normal          Future Appointments    Date/Time Provider Specialty Site   02/25/2016 02:00 PM ESTELA Goncalves  Bariatric Medicine [facility]   01/28/2016 09:30 AM Maurilio Powers MD Obstetrics/Gynecology [facility]   02/04/2016 09:00 AM ESTELA Dukes   [address]     Signatures   Electronically signed by : ESTELA Low ; Jan 19 2016  3:48PM EST                       (Author)

## 2018-01-17 NOTE — PROGRESS NOTES
Chief Complaint  CC Patient is here for allergy injections  Active Problems    1  Allergic reaction, initial encounter (995 3) (T78 40XA)   2  Allergic rhinitis (477 9) (J30 9)   3  Anxiety (300 00) (F41 9)   4  Asthma (493 90) (J45 909)   5  Chronic sinusitis (473 9) (J32 9)   6  Depression with anxiety (300 4) (F41 8)   7  Fatigue (780 79) (R53 83)   8  Morbid obesity with BMI of 50 0-59 9, adult (278 01,V85 43) (E66 01,Z68 43)   9  Nasal polyp (471 9) (J33 9)   10  Obstructive sleep apnea (327 23) (G47 33)   11  Rheumatoid arthritis (714 0) (M06 9)   12  Seasonal mood disorder (296 99) (F39)   13  Snoring (786 09) (R06 83)   14  Vaginal candidiasis (112 1) (B37 3)   15  Vaginal itching (698 1) (L29 8)   16  Vitamin D deficiency (268 9) (E55 9)    Current Meds   1  Albuterol Sulfate (2 5 MG/3ML) 0 083% Inhalation Nebulization Solution; USE 1 UNIT   DOSE IN NEBULIZER EVERY 4 HOURS AS NEEDED Recorded   2  Allegra Allergy 180 MG Oral Tablet; 1 PO QD; Therapy: (Nitin Nix) to Recorded   3  Amoxicillin-Pot Clavulanate 875-125 MG Oral Tablet; TAKE 1 TABLET EVERY 12   HOURS WITH MEALS UNTIL GONE;   Therapy: 46Fgp8753 to (Evaluate:84Yyj3675)  Requested for: 49Iea1554; Last   Rx:57Uyj8253 Ordered   4  Breo Ellipta 100-25 MCG/INH Inhalation Aerosol Powder Breath Activated; ONE   INHALATION DAILY  AFTER INHALATION RINSE MOUTH WITH WATER & SPIT  USE   SAME TIME EACH DAY, NO MORE THAN 1 TIME IN 24 HOURS; Therapy: 13QVF4134 to (Last Rx:97Kmw6353)  Requested for: 47JKH3615 Ordered   5  DiazePAM 2 MG Oral Tablet; TAKE 1 TABLET TWICE DAILY AS NEEDED; Therapy: 22Dck6400 to (Evaluate:09Bso6521); Last Rx:76Ubu9839 Ordered   6  Fluconazole 150 MG Oral Tablet; TAKE 1 TABLET AS NEEDED  MAY REPEAT IN 3-5   DAYS IF SYMPTOMS PERSIST; Therapy: 02Kjm2766 to (Evaluate:11Aug2017)  Requested for: 00Qzj4290; Last   Rx:95Pre7590 Ordered   7   FLUoxetine HCl - 40 MG Oral Capsule; take 1 capsule daily  Requested for: 11LFW2638;   Last Rx:74Zow8414 Ordered   8  Magnesium CAPS; take 1 capsule daily; Therapy: (Recorded:67Udh7063) to Recorded   9  Mirena (52 MG) 20 MCG/24HR Intrauterine Intrauterine Device; USE AS DIRECTED; Therapy: 41HIT6352 to (Last 781 1704)  Requested for: 27Nov2012 Ordered   10  PredniSONE 10 MG Oral Tablet; 5 a day for 2 days, 4 a day for 2 days, 3 a day    for 2 days, 2 a day for 2 days, 1 a day for 2 days; Therapy: 02Aug2017 to (Last Rx:99Jfz0202)  Requested for: 02Aug2017 Ordered   11  ProAir  (90 Base) MCG/ACT Inhalation Aerosol Solution; INHALE 1 TO 2 PUFFS    EVERY 4 TO 6 HOURS AS NEEDED  Requested for: 07Sep2016; Last Rx:43Fva3509    Ordered   12  Vitamin D TABS; TAKE 1 TABLET Daily 4000 units; Therapy: (Recorded:22Mar2017) to Recorded    Allergies    1  No Known Drug Allergies    Results/Data  Allergy Injection Flow Sheet 73LRJ3274 02:51PM      Test Name Result Flag Reference   Allergy Injection #1 0 15     Allergy Injection #1 Site LA     Allergy Inj #1 Lot Number A4     Allergy Inj #1 Reaction Redness     Allergy Injection #2 0 15     Allergy Injection #2 Site RA     Allergy Inj #2 Lot Number B4     Allergy Inj #2 Reaction Redness         Future Appointments    Date/Time Provider Specialty Site   02/05/2018 09:20 AM ESTELA Will   [address] [place] [address]     Signatures   Electronically signed by : ESTELA [address] ; Sep  8 2017 12:06AM EST                       (Author)

## 2018-01-18 NOTE — PROGRESS NOTES
Chief Complaint  CC patient here today for allergy injection today  Pt developed 2 small hives on R side of arm  Patient complained of some itching  Pt had no SOB and had no other symptoms  Patient advised to put ice on R side of arm  Pt said that she would take Benadryl when she got back to work  Active Problems    1  Allergic reaction, initial encounter (995 3) (T78 40XA)   2  Allergic rhinitis (477 9) (J30 9)   3  Anxiety (300 00) (F41 9)   4  Asthma (493 90) (J45 909)   5  Depression with anxiety (300 4) (F41 8)   6  Morbid obesity with BMI of 50 0-59 9, adult (278 01,V85 43) (E66 01,Z68 43)   7  Nasal polyp (471 9) (J33 9)   8  Rheumatoid arthritis (714 0) (M06 9)   9  Vaginal candidiasis (112 1) (B37 3)   10  Vitamin D deficiency (268 9) (E55 9)    Current Meds   1  Albuterol Sulfate (2 5 MG/3ML) 0 083% Inhalation Nebulization Solution; USE 1 UNIT   DOSE IN NEBULIZER EVERY 4 HOURS AS NEEDED Recorded   2  Breo Ellipta 100-25 MCG/INH Inhalation Aerosol Powder Breath Activated; ONE   INHALATION DAILY  AFTER INHALATION RINSE MOUTH WITH WATER & SPIT  USE   SAME TIME EACH DAY, NO MORE THAN 1 TIME IN 24 HOURS    Requested for: 36EYP3682; Last Rx:86Ddh6726 Ordered   3  Citalopram Hydrobromide 20 MG Oral Tablet; Take 1 tablet daily  Requested for:   50JAH6564; Last Rx:81Cth1151 Ordered   4  DiazePAM 2 MG Oral Tablet; TAKE 1 TABLET TWICE DAILY AS NEEDED; Therapy: 15UCA5095 to (Evaluate:32Tgz2142); Last Rx:73Zpx3229 Ordered   5  Fluconazole 150 MG Oral Tablet; TAKE 1 TABLET 1 TIME ONLY; Therapy: 75JIH6069 to (Patt Corn)  Requested for: 31DDK2003; Last   Rx:35Kqt9448 Ordered   6  Mirena (52 MG) 20 MCG/24HR Intrauterine Intrauterine Device; USE AS DIRECTED; Therapy: 58PBD6954 to (Last 872 9644)  Requested for: 27Nov2012 Ordered   7   ProAir  (90 Base) MCG/ACT Inhalation Aerosol Solution; INHALE 1 TO 2 PUFFS   EVERY 4 TO 6 HOURS AS NEEDED  Requested for: 63Kvl4407; Last LO:08LTF3863 Ordered   8  Vitamin D 2000 UNIT Oral Tablet; Take 1 tablet daily; Therapy: 47Yhh4240 to (Evaluate:93Rxk4943); Last Rx:14Wvf9734 Ordered    Allergies    1  No Known Drug Allergies    Results/Data  Allergy Injection Flow Sheet [id number] 01:34PM      Test Name Result Flag Reference   Informed Consent Signed Yes     Allergy Injection #1  20     Allergy Injection #1 Site la     Allergy Inj #1 Lot Number A4     Allergy Inj #1 Reaction none     Allergy Injection #2 0 20     Allergy Injection #2 Site RA     Allergy Inj #2 Lot Number B4     Allergy Inj #2 Reaction Pt got hives         Plan  Allergic rhinitis    · Allergy Injection, multiple injections; Status:Active - Retrospective By Protocol  Authorization; Requested for:43Rhz3836;     Future Appointments    Date/Time Provider Specialty Site   03/07/2017 09:30 AM ESTELA Carreno   [address] [place] [address]     Signatures   Electronically signed by : ESTELA Eli ; Dec 29 2016  5:44PM EST                       (Author)

## 2018-01-18 NOTE — PROGRESS NOTES
Chief Complaint  CC Patient is here for Allergy injections  Active Problems    1  Allergic reaction, initial encounter (995 3) (T78 40XA)   2  Allergic rhinitis (477 9) (J30 9)   3  Anxiety (300 00) (F41 9)   4  Asthma (493 90) (J45 909)   5  Daytime hypersomnolence (780 54) (G47 19)   6  Depression with anxiety (300 4) (F41 8)   7  Encounter for routine gynecological examination (V72 31) (Z01 419)   8  Fatigue (780 79) (R53 83)   9  Morbid obesity with BMI of 50 0-59 9, adult (278 01,V85 43) (E66 01,Z68 43)   10  Nasal polyp (471 9) (J33 9)   11  Obstructive sleep apnea (327 23) (G47 33)   12  Rheumatoid arthritis (714 0) (M06 9)   13  Screening for diabetes mellitus (V77 1) (Z13 1)   14  Seasonal mood disorder (296 99) (F39)   15  Snoring (786 09) (R06 83)   16  Vaginal candidiasis (112 1) (B37 3)   17  Vaginal itching (698 1) (L29 8)   18  Vitamin D deficiency (268 9) (E55 9)    Current Meds   1  Albuterol Sulfate (2 5 MG/3ML) 0 083% Inhalation Nebulization Solution; USE 1 UNIT   DOSE IN NEBULIZER EVERY 4 HOURS AS NEEDED Recorded   2  Allegra Allergy 180 MG Oral Tablet (Fexofenadine HCl); 1 PO QD; Therapy: (Isadora Flow) to Recorded   3  Breo Ellipta 100-25 MCG/INH Inhalation Aerosol Powder Breath Activated; ONE   INHALATION DAILY  AFTER INHALATION RINSE MOUTH WITH WATER & SPIT  USE   SAME TIME EACH DAY, NO MORE THAN 1 TIME IN 24 HOURS; Therapy: 16HWA3545 to (Last Rx:84Gzq5930)  Requested for: 78CVH7611 Ordered   4  DiazePAM 2 MG Oral Tablet; TAKE 1 TABLET TWICE DAILY AS NEEDED; Therapy: 68KHO1456 to (Evaluate:35Caf4037); Last Rx:96Nsv8150 Ordered   5  FLUoxetine HCl - 20 MG Oral Tablet; TAKE 1 TABLET DAILY; Therapy: 82IXH7649 to (Evaluate:12Nov2017)  Requested for: 58BPQ2317; Last   Rx:16May2017 Ordered   6  Magnesium CAPS; take 1 capsule daily; Therapy: (Recorded:62Irh4283) to Recorded   7  Mirena (52 MG) 20 MCG/24HR Intrauterine Intrauterine Device; USE AS DIRECTED;    Therapy: 17EAE2463 to (Last 9138 4547)  Requested for: 27Nov2012 Ordered   8  ProAir  (90 Base) MCG/ACT Inhalation Aerosol Solution; INHALE 1 TO 2 PUFFS   EVERY 4 TO 6 HOURS AS NEEDED  Requested for: 07Sep2016; Last Rx:07Sep2016   Ordered   9  Vitamin D TABS; TAKE 1 TABLET Daily 4000 units; Therapy: (Recorded:22Mar2017) to Recorded    Allergies    1  No Known Drug Allergies    Results/Data  Allergy Injection Flow Sheet [id number] 10:24AM      Test Name Result Flag Reference   Allergy Injection #1 0 50     Allergy Injection #1 Site L     Allergy Inj #1 Lot Number A4     Allergy Inj #1 Reaction Hive     Allergy Injection #2 0 50     Allergy Injection #2 Site R     Allergy Inj #2 Lot Number B4     Allergy Inj #2 Reaction Small hive         Future Appointments    Date/Time Provider Specialty Site   08/02/2017 10:00 AM ESTELA Giraldo   [address] [place] [address]     Signatures   Electronically signed by : ESTELA Kraft ; Jun 16 2017  3:33PM EST                       (Author)

## 2018-01-24 VITALS — WEIGHT: 293 LBS | DIASTOLIC BLOOD PRESSURE: 80 MMHG | BODY MASS INDEX: 51.06 KG/M2 | SYSTOLIC BLOOD PRESSURE: 126 MMHG

## 2018-02-01 ENCOUNTER — OFFICE VISIT (OUTPATIENT)
Dept: INTERNAL MEDICINE CLINIC | Facility: CLINIC | Age: 38
End: 2018-02-01
Payer: COMMERCIAL

## 2018-02-01 ENCOUNTER — TRANSCRIBE ORDERS (OUTPATIENT)
Dept: LAB | Facility: CLINIC | Age: 38
End: 2018-02-01

## 2018-02-01 ENCOUNTER — APPOINTMENT (OUTPATIENT)
Dept: LAB | Facility: CLINIC | Age: 38
End: 2018-02-01
Payer: COMMERCIAL

## 2018-02-01 VITALS
RESPIRATION RATE: 18 BRPM | HEIGHT: 67 IN | TEMPERATURE: 99.6 F | SYSTOLIC BLOOD PRESSURE: 128 MMHG | BODY MASS INDEX: 45.99 KG/M2 | WEIGHT: 293 LBS | OXYGEN SATURATION: 98 % | HEART RATE: 85 BPM | DIASTOLIC BLOOD PRESSURE: 74 MMHG

## 2018-02-01 DIAGNOSIS — E55.9 VITAMIN D DEFICIENCY: ICD-10-CM

## 2018-02-01 DIAGNOSIS — F41.8 OTHER SPECIFIED ANXIETY DISORDERS: ICD-10-CM

## 2018-02-01 DIAGNOSIS — G47.33 OBSTRUCTIVE SLEEP APNEA: ICD-10-CM

## 2018-02-01 DIAGNOSIS — J20.9 ACUTE BRONCHITIS, UNSPECIFIED ORGANISM: Primary | ICD-10-CM

## 2018-02-01 DIAGNOSIS — E66.01 MORBID (SEVERE) OBESITY DUE TO EXCESS CALORIES (HCC): ICD-10-CM

## 2018-02-01 DIAGNOSIS — J32.9 CHRONIC SINUSITIS: ICD-10-CM

## 2018-02-01 DIAGNOSIS — J30.9 ALLERGIC RHINITIS: ICD-10-CM

## 2018-02-01 LAB
25(OH)D3 SERPL-MCNC: 25 NG/ML (ref 30–100)
ALBUMIN SERPL BCP-MCNC: 3.7 G/DL (ref 3.5–5)
ALP SERPL-CCNC: 82 U/L (ref 46–116)
ALT SERPL W P-5'-P-CCNC: 42 U/L (ref 12–78)
ANION GAP SERPL CALCULATED.3IONS-SCNC: 7 MMOL/L (ref 4–13)
AST SERPL W P-5'-P-CCNC: 21 U/L (ref 5–45)
BASOPHILS # BLD AUTO: 0.05 THOUSANDS/ΜL (ref 0–0.1)
BASOPHILS NFR BLD AUTO: 1 % (ref 0–1)
BILIRUB SERPL-MCNC: 0.82 MG/DL (ref 0.2–1)
BUN SERPL-MCNC: 16 MG/DL (ref 5–25)
CALCIUM SERPL-MCNC: 8.6 MG/DL (ref 8.3–10.1)
CHLORIDE SERPL-SCNC: 107 MMOL/L (ref 100–108)
CHOLEST SERPL-MCNC: 153 MG/DL (ref 50–200)
CO2 SERPL-SCNC: 27 MMOL/L (ref 21–32)
CREAT SERPL-MCNC: 0.75 MG/DL (ref 0.6–1.3)
EOSINOPHIL # BLD AUTO: 0.29 THOUSAND/ΜL (ref 0–0.61)
EOSINOPHIL NFR BLD AUTO: 5 % (ref 0–6)
ERYTHROCYTE [DISTWIDTH] IN BLOOD BY AUTOMATED COUNT: 13.7 % (ref 11.6–15.1)
GFR SERPL CREATININE-BSD FRML MDRD: 103 ML/MIN/1.73SQ M
GLUCOSE P FAST SERPL-MCNC: 89 MG/DL (ref 65–99)
HCT VFR BLD AUTO: 40.6 % (ref 34.8–46.1)
HDLC SERPL-MCNC: 42 MG/DL (ref 40–60)
HGB BLD-MCNC: 13.5 G/DL (ref 11.5–15.4)
LDLC SERPL CALC-MCNC: 92 MG/DL (ref 0–100)
LYMPHOCYTES # BLD AUTO: 1.57 THOUSANDS/ΜL (ref 0.6–4.47)
LYMPHOCYTES NFR BLD AUTO: 28 % (ref 14–44)
MCH RBC QN AUTO: 30 PG (ref 26.8–34.3)
MCHC RBC AUTO-ENTMCNC: 33.3 G/DL (ref 31.4–37.4)
MCV RBC AUTO: 90 FL (ref 82–98)
MONOCYTES # BLD AUTO: 0.5 THOUSAND/ΜL (ref 0.17–1.22)
MONOCYTES NFR BLD AUTO: 9 % (ref 4–12)
NEUTROPHILS # BLD AUTO: 3.28 THOUSANDS/ΜL (ref 1.85–7.62)
NEUTS SEG NFR BLD AUTO: 57 % (ref 43–75)
NRBC BLD AUTO-RTO: 0 /100 WBCS
PLATELET # BLD AUTO: 279 THOUSANDS/UL (ref 149–390)
PMV BLD AUTO: 11.1 FL (ref 8.9–12.7)
POTASSIUM SERPL-SCNC: 4.1 MMOL/L (ref 3.5–5.3)
PROT SERPL-MCNC: 7.7 G/DL (ref 6.4–8.2)
RBC # BLD AUTO: 4.5 MILLION/UL (ref 3.81–5.12)
SODIUM SERPL-SCNC: 141 MMOL/L (ref 136–145)
TRIGL SERPL-MCNC: 94 MG/DL
WBC # BLD AUTO: 5.71 THOUSAND/UL (ref 4.31–10.16)

## 2018-02-01 PROCEDURE — 99214 OFFICE O/P EST MOD 30 MIN: CPT | Performed by: NURSE PRACTITIONER

## 2018-02-01 PROCEDURE — 82306 VITAMIN D 25 HYDROXY: CPT

## 2018-02-01 PROCEDURE — 80053 COMPREHEN METABOLIC PANEL: CPT

## 2018-02-01 PROCEDURE — 85025 COMPLETE CBC W/AUTO DIFF WBC: CPT

## 2018-02-01 PROCEDURE — 36415 COLL VENOUS BLD VENIPUNCTURE: CPT

## 2018-02-01 PROCEDURE — 80061 LIPID PANEL: CPT

## 2018-02-01 RX ORDER — FLUTICASONE FUROATE AND VILANTEROL 100; 25 UG/1; UG/1
1 POWDER RESPIRATORY (INHALATION) DAILY
COMMUNITY
Start: 2017-05-17 | End: 2018-02-01

## 2018-02-01 RX ORDER — DIAZEPAM 2 MG/1
1 TABLET ORAL 2 TIMES DAILY PRN
COMMUNITY
Start: 2016-09-07 | End: 2018-03-14 | Stop reason: SDUPTHER

## 2018-02-01 RX ORDER — ALBUTEROL SULFATE 90 UG/1
1-2 AEROSOL, METERED RESPIRATORY (INHALATION) EVERY 6 HOURS PRN
COMMUNITY

## 2018-02-01 RX ORDER — PREDNISONE 10 MG/1
TABLET ORAL
Qty: 18 TABLET | Refills: 0 | Status: SHIPPED | OUTPATIENT
Start: 2018-02-01 | End: 2018-03-14

## 2018-02-01 RX ORDER — DOXYCYCLINE 100 MG/1
100 CAPSULE ORAL 2 TIMES DAILY
Qty: 20 CAPSULE | Refills: 0 | Status: SHIPPED | OUTPATIENT
Start: 2018-02-01 | End: 2018-02-08

## 2018-02-01 RX ORDER — PREDNISONE 10 MG/1
10 TABLET ORAL
COMMUNITY
Start: 2017-08-02 | End: 2018-03-14

## 2018-02-01 RX ORDER — ALBUTEROL SULFATE 2.5 MG/3ML
1 SOLUTION RESPIRATORY (INHALATION) EVERY 4 HOURS PRN
COMMUNITY

## 2018-02-01 RX ORDER — FLUOXETINE HYDROCHLORIDE 40 MG/1
1 CAPSULE ORAL DAILY
COMMUNITY
Start: 2017-11-12 | End: 2018-10-01 | Stop reason: SDUPTHER

## 2018-02-01 RX ORDER — BENZONATATE 200 MG/1
200 CAPSULE ORAL 3 TIMES DAILY PRN
Qty: 20 CAPSULE | Refills: 0 | Status: SHIPPED | OUTPATIENT
Start: 2018-02-01 | End: 2018-03-14

## 2018-02-01 RX ORDER — FLUCONAZOLE 150 MG/1
150 TABLET ORAL
COMMUNITY
Start: 2017-08-02 | End: 2018-02-01

## 2018-02-01 RX ORDER — FEXOFENADINE HCL 180 MG/1
180 TABLET ORAL DAILY
COMMUNITY
End: 2018-12-31

## 2018-02-01 NOTE — PATIENT INSTRUCTIONS

## 2018-02-01 NOTE — PROGRESS NOTES
Assessment/Plan:  Patient to start Prednisone taper, Tessalon 200 mg by mouth TID PRN for cough, and Doxycycline 100 mg by mouth every 12 hours for 10 days  Continue supportive care increase fluid intake, take Tylenol or Motrin for pain or fever, and continue breathing treatments  Rapid flu in office was negative  If any worsening of symptoms please call office  No problem-specific Assessment & Plan notes found for this encounter  Problem List Items Addressed This Visit     None      Visit Diagnoses     Acute bronchitis, unspecified organism    -  Primary    Relevant Medications    predniSONE 10 mg tablet    benzonatate (TESSALON) 200 MG capsule    doxycycline monohydrate (MONODOX) 100 mg capsule            Subjective:      Patient ID: Antonia Bob is a 40 y o  female  Roxy Lathe is here today for a sick visit  She states starting last week she was having chills, fever, body aches, and congestion  She was taking Motrin and Tylenol and her fever did break but she is having a dry hacking cough  She states at times she is having chest congestion  She denies any chest pain or SOB but states when she does cough her chest feels tight  She is drinking but is not having an appetite  She is using her breathing treatments at home as needed  She offers no other complaints  The following portions of the patient's history were reviewed and updated as appropriate:   She  has a past medical history of Asthma; GERD (gastroesophageal reflux disease); and Rheumatoid arthritis ([address] )  She  does not have a problem list on file  She  has a past surgical history that includes Cholecystectomy and Sinus surgery  Her family history is not on file  She  reports that she has quit smoking  She does not have any smokeless tobacco history on file  She reports that she drinks alcohol  She reports that she does not use drugs    Current Outpatient Prescriptions   Medication Sig Dispense Refill    albuterol (2 5 mg/3 mL) 0 083 % nebulizer solution Inhale 1 each every 4 (four) hours as needed      albuterol (PROAIR HFA) 90 mcg/act inhaler Inhale 1-2 puffs      Cholecalciferol (VITAMIN D) 2000 UNITS tablet Take 2,000 Units by mouth once a week   diazepam (VALIUM) 2 mg tablet Take 1 tablet by mouth 2 (two) times a day as needed      fexofenadine (ALLEGRA) 180 MG tablet Take 180 mg by mouth daily      FLUoxetine (PROzac) 40 MG capsule Take 1 capsule by mouth daily      Fluticasone Furoate-Vilanterol (BREO ELLIPTA) 100-25 MCG/INH AEPB Inhale 1 puff daily   ibuprofen (MOTRIN) 200 mg tablet Take 200 mg by mouth every 6 (six) hours as needed for mild pain   Magnesium Oxide -Mg Supplement 400 MG CAPS Take 1 capsule by mouth daily      benzonatate (TESSALON) 200 MG capsule Take 1 capsule (200 mg total) by mouth 3 (three) times a day as needed for cough 20 capsule 0    doxycycline monohydrate (MONODOX) 100 mg capsule Take 1 capsule (100 mg total) by mouth 2 (two) times a day for 7 days 20 capsule 0    predniSONE 10 mg tablet Take 10 mg by mouth      predniSONE 10 mg tablet 30 mg by mouth daily for 3 days, then 20 mg by mouth daily for 3 days, then 10 mg by mouth daily for 3 days, then stop 18 tablet 0     No current facility-administered medications for this visit  Current Outpatient Prescriptions on File Prior to Visit   Medication Sig    Cholecalciferol (VITAMIN D) 2000 UNITS tablet Take 2,000 Units by mouth once a week   Fluticasone Furoate-Vilanterol (BREO ELLIPTA) 100-25 MCG/INH AEPB Inhale 1 puff daily   ibuprofen (MOTRIN) 200 mg tablet Take 200 mg by mouth every 6 (six) hours as needed for mild pain   [DISCONTINUED] citalopram (CeleXA) 20 mg tablet Take 20 mg by mouth daily   [DISCONTINUED] diazepam (VALIUM) 2 mg tablet Take 2 mg by mouth as needed for anxiety   [DISCONTINUED] loratadine (CLARITIN) 10 mg tablet Take 10 mg by mouth as needed for allergies       No current facility-administered medications on file prior to visit  [facility] Below is the patient's most recent value for Albumin, ALT, AST, BUN, Calcium, Chloride, Cholesterol, CO2, Creatinine, GFR, Glucose, HDL, Hematocrit, Hemoglobin, Hemoglobin A1C, LDL, Magnesium, Phosphorus, Platelets, Potassium, PSA, Sodium, Triglycerides, and WBC  Lab Results   Component Value Date    ALT 30 03/22/2017    AST 19 03/22/2017    BUN 12 03/22/2017    CALCIUM 8 9 03/22/2017     03/22/2017    CHOL 152 07/14/2017    CO2 30 03/22/2017    CREATININE 0 80 03/22/2017    HDL 43 07/14/2017    HCT 43 0 03/22/2017    HGB 14 1 03/22/2017    HGBA1C 5 6 07/14/2017    MG 2 0 08/25/2016     03/22/2017    K 4 4 03/22/2017     03/22/2017    TRIG 70 07/14/2017    WBC 5 99 03/22/2017     Note: for a comprehensive list of the patient's lab results, access the Results Review activity  Review of Systems   Constitutional: Positive for appetite change, chills, fatigue and fever  HENT: Positive for congestion  Eyes: Negative  Respiratory: Positive for chest tightness  Cardiovascular: Negative  Gastrointestinal: Negative  Endocrine: Negative  Genitourinary: Negative  Musculoskeletal: Negative  Skin: Negative  Allergic/Immunologic: Negative  Neurological: Negative  Hematological: Negative  Psychiatric/Behavioral: Negative  Objective:     Physical Exam   Constitutional: She is oriented to person, place, and time  She appears well-developed and well-nourished  HENT:   Head: Normocephalic and atraumatic  Right Ear: External ear normal    Left Ear: External ear normal    Nose: Nose normal    Mouth/Throat: Oropharyngeal exudate present  Eyes: Conjunctivae and EOM are normal  Pupils are equal, round, and reactive to light  Neck: Normal range of motion  Neck supple  Cardiovascular: Normal rate, regular rhythm, normal heart sounds and intact distal pulses      Pulmonary/Chest: Effort normal and breath sounds normal    Abdominal: Soft  Bowel sounds are normal    Musculoskeletal: Normal range of motion  Neurological: She is alert and oriented to person, place, and time  She has normal reflexes  Skin: Skin is warm and dry  Psychiatric: She has a normal mood and affect   Her behavior is normal  Judgment and thought content normal

## 2018-02-01 NOTE — LETTER
February 1, 2018     Patient: Dariana Wiggins   YOB: 1980   Date of Visit: 2/1/2018       To Whom it May Concern:    Dariana Wiggins is under my professional care  She was seen in my office on 2/1/2018  She may return to school on 2/2/2018  If you have any questions or concerns, please don't hesitate to call           Sincerely,          LIONEL Beauchamp        CC: Dex Sandoval, [address] [place] [address]

## 2018-02-05 PROBLEM — J32.9 CHRONIC SINUSITIS: Status: ACTIVE | Noted: 2017-08-02

## 2018-02-05 PROBLEM — R53.83 FATIGUE: Status: ACTIVE | Noted: 2017-03-22

## 2018-02-05 PROBLEM — F39 SEASONAL MOOD DISORDER (HCC): Status: ACTIVE | Noted: 2017-03-22

## 2018-02-05 PROBLEM — G47.33 OBSTRUCTIVE SLEEP APNEA: Status: ACTIVE | Noted: 2017-05-23

## 2018-03-14 ENCOUNTER — OFFICE VISIT (OUTPATIENT)
Dept: INTERNAL MEDICINE CLINIC | Facility: CLINIC | Age: 38
End: 2018-03-14
Payer: COMMERCIAL

## 2018-03-14 VITALS
RESPIRATION RATE: 16 BRPM | SYSTOLIC BLOOD PRESSURE: 124 MMHG | HEART RATE: 79 BPM | TEMPERATURE: 98.2 F | BODY MASS INDEX: 45.99 KG/M2 | HEIGHT: 67 IN | DIASTOLIC BLOOD PRESSURE: 70 MMHG | WEIGHT: 293 LBS | OXYGEN SATURATION: 95 %

## 2018-03-14 DIAGNOSIS — E66.01 MORBID OBESITY WITH BMI OF 50.0-59.9, ADULT (HCC): ICD-10-CM

## 2018-03-14 DIAGNOSIS — F41.8 DEPRESSION WITH ANXIETY: Primary | ICD-10-CM

## 2018-03-14 DIAGNOSIS — R53.83 FATIGUE, UNSPECIFIED TYPE: ICD-10-CM

## 2018-03-14 DIAGNOSIS — J30.9 CHRONIC ALLERGIC RHINITIS, UNSPECIFIED SEASONALITY, UNSPECIFIED TRIGGER: ICD-10-CM

## 2018-03-14 PROCEDURE — 99214 OFFICE O/P EST MOD 30 MIN: CPT | Performed by: FAMILY MEDICINE

## 2018-03-14 PROCEDURE — 3008F BODY MASS INDEX DOCD: CPT | Performed by: FAMILY MEDICINE

## 2018-03-14 RX ORDER — DIAZEPAM 2 MG/1
2 TABLET ORAL EVERY 6 HOURS PRN
Qty: 200 TABLET | Refills: 0 | Status: SHIPPED | OUTPATIENT
Start: 2018-03-14 | End: 2020-05-12 | Stop reason: SDUPTHER

## 2018-03-14 NOTE — PROGRESS NOTES
Assessment/Plan:    Allergic rhinitis  Allergy symptoms controlled off of allergy shots at present  She plans on following up with ENT to discuss whether to restart the allergy shots and house to do so since she has been off of them for a prolonged period at present  Depression with anxiety  Symptoms relatively well controlled except for the stress and anxiety symptoms at times  Discussed methods for stress relief  Continue with the Valium as needed  Continue with the fluoxetine  Fatigue  Advised her to get rest if possible  Continue to use the CPAP  Continue to use the mouth guard  Morbid obesity with BMI of 50 0-59 9, adult ([facility] [address] )  Because of the recent stressors she has not been watching her diet as closely  Advised her to try to watch her diet more closely especially once the stressors of past somewhat  Small changes can make a big difference  Increase activity  Diagnoses and all orders for this visit:    Depression with anxiety  -     diazepam (VALIUM) 2 mg tablet; Take 1 tablet (2 mg total) by mouth every 6 (six) hours as needed for anxiety    Chronic allergic rhinitis, unspecified seasonality, unspecified trigger    Fatigue, unspecified type    Morbid obesity with BMI of 50 0-59 9, adult [facility])        Reviewed recent laboratory testing with her  Discussed taking over-the-counter vitamin-D on a daily basis  Recommend 2000 units on a daily basis  Discussed rechecking laboratory testing over the summer to satisfy the wellness program through work as well  Discussed stress relief techniques  Discussed with her that she has to take time for herself 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 as well as her and her  both going to school  Will have her follow up in about 4-6 months or sooner if needed  Discussed these issues with her for approximately 30 minutes with over half of this time spent in counseling      Subjective:      Patient ID: Jeniferdwain Garsia is a 40 y o  female  She presents for routine visit  Has been under a lot of stress  Her daughter has continued to have some difficulty with her type 1 diabetes  Her son follows up for his autism and is undergoing the further workup and evaluation  Her mother was recently hospitalized for prolonged period and has chronic medical issues  Her father recently had abscess in the rectal area and is being evaluated for possible cancer  She is doing 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 and doing clinicals at this point  Her  is going for his nursing degree and is spending a week had school in [place]  She has a lot on her plate at present  Does not sleep well  Continues to work full-time  She has noticed that she is grinding her teeth even during the day  Has difficulty relaxing  Does notice a large difference with the fluoxetine but continues to take the Valium if needed  This does not give her significant sedation but tends to give her some calming effect  Uses her CPAP at night but does not get much sleep at all  Is tired most of the time  Denies any suicidal homicidal ideation  Has some joint stiffness but nothing severe at present  Has been eating more and not watching her diet as closely  She feels this is because of the recent stressors  Allergy symptoms have been well controlled  She did not go on her allergy shots after her recent illness  Her cough and chest congestion have basically resolved  Since that point her allergy symptoms have been stable  She is hesitant to resume the allergy shots since her symptoms have been stable and she is concerned because of the localized reaction she had even when she was getting the regularly  She plans on following up with ENT prior to resuming the shots  Denies any chest pain or palpitations  Breathing has been stable  Denies any significant shortness of breath          The following portions of the patient's history were reviewed and updated as appropriate:   She  has a past medical history of Arthritis; Asthma; Daytime hypersomnolence; Gallbladder disease; GERD (gastroesophageal reflux disease); Lumbar disc disease; and Rheumatoid arthritis ([place] [address] )  She   Patient Active Problem List    Diagnosis Date Noted    Chronic sinusitis 08/02/2017    Obstructive sleep apnea 05/23/2017    Fatigue 03/22/2017    Seasonal mood disorder ([place] [address] ) 03/22/2017    Vaginal candidiasis 12/06/2016    Allergic rhinitis 02/04/2016    Nasal polyp 02/04/2016    Vitamin D deficiency 02/04/2016    Depression with anxiety 12/17/2015    Morbid obesity with BMI of 50 0-59 9, adult ([place] [address] ) 12/17/2015    Rheumatoid arthritis ([place] [address] ) 12/17/2015    Anxiety 08/04/2015    Asthma 08/04/2015     She  has a past surgical history that includes Cholecystectomy; Sinus surgery; and Nasal septum surgery  Her family history includes Asthma in her family and mother; Breast cancer in her family; COPD in her family and father; Colon cancer in her father; Diabetes in her family; Hypertension in her mother; Obesity in her family  She  reports that she has quit smoking  She does not have any smokeless tobacco history on file  She reports that she drinks alcohol  She reports that she does not use drugs  Current Outpatient Prescriptions   Medication Sig Dispense Refill    albuterol (2 5 mg/3 mL) 0 083 % nebulizer solution Inhale 1 each every 4 (four) hours as needed      albuterol (PROAIR HFA) 90 mcg/act inhaler Inhale 1-2 puffs      Cholecalciferol (VITAMIN D) 2000 UNITS tablet Take 2,000 Units by mouth once a week   diazepam (VALIUM) 2 mg tablet Take 1 tablet (2 mg total) by mouth every 6 (six) hours as needed for anxiety 200 tablet 0    fexofenadine (ALLEGRA) 180 MG tablet Take 180 mg by mouth daily      FLUoxetine (PROzac) 40 MG capsule Take 1 capsule by mouth daily      Fluticasone Furoate-Vilanterol (BREO ELLIPTA) 100-25 MCG/INH AEPB Inhale 1 puff daily        ibuprofen (MOTRIN) 200 mg tablet Take 200 mg by mouth every 6 (six) hours as needed for mild pain   Magnesium Oxide -Mg Supplement 400 MG CAPS Take 1 capsule by mouth daily       No current facility-administered medications for this visit  Current Outpatient Prescriptions on File Prior to Visit   Medication Sig    albuterol (2 5 mg/3 mL) 0 083 % nebulizer solution Inhale 1 each every 4 (four) hours as needed    albuterol (PROAIR HFA) 90 mcg/act inhaler Inhale 1-2 puffs    Cholecalciferol (VITAMIN D) 2000 UNITS tablet Take 2,000 Units by mouth once a week   fexofenadine (ALLEGRA) 180 MG tablet Take 180 mg by mouth daily    FLUoxetine (PROzac) 40 MG capsule Take 1 capsule by mouth daily    Fluticasone Furoate-Vilanterol (BREO ELLIPTA) 100-25 MCG/INH AEPB Inhale 1 puff daily   ibuprofen (MOTRIN) 200 mg tablet Take 200 mg by mouth every 6 (six) hours as needed for mild pain   Magnesium Oxide -Mg Supplement 400 MG CAPS Take 1 capsule by mouth daily    [DISCONTINUED] diazepam (VALIUM) 2 mg tablet Take 1 tablet by mouth 2 (two) times a day as needed    [DISCONTINUED] benzonatate (TESSALON) 200 MG capsule Take 1 capsule (200 mg total) by mouth 3 (three) times a day as needed for cough    [DISCONTINUED] predniSONE 10 mg tablet Take 10 mg by mouth    [DISCONTINUED] predniSONE 10 mg tablet 30 mg by mouth daily for 3 days, then 20 mg by mouth daily for 3 days, then 10 mg by mouth daily for 3 days, then stop     No current facility-administered medications on file prior to visit  She has No Known Allergies       Review of Systems   Constitutional: Positive for activity change, appetite change and fatigue  HENT: Negative for congestion and rhinorrhea  See HPI   Eyes: Negative for visual disturbance  Respiratory: Negative for cough  Cardiovascular: Negative for chest pain and palpitations  Gastrointestinal: Negative for abdominal pain  Endocrine: Negative for polydipsia, polyphagia and polyuria  Musculoskeletal: Positive for arthralgias  Psychiatric/Behavioral: Positive for decreased concentration  The patient is nervous/anxious  See HPI         Objective:      /70 (BP Location: Left arm, Patient Position: Sitting, Cuff Size: Large)   Pulse 79   Temp 98 2 °F (36 8 °C) (Temporal)   Resp 16   Ht 5' 7" (1 702 m)   Wt (!) 150 kg (331 lb)   SpO2 95%   BMI 51 84 kg/m²          Physical Exam   Constitutional: She is cooperative  HENT:   Head: Normocephalic and atraumatic  Slight cerumen bilaterally; no significant nasal congestion noted; slight posterior pharyngeal cobblestoning   Cardiovascular: Normal rate and regular rhythm  No murmur heard  Pulmonary/Chest: Breath sounds normal  She has no decreased breath sounds  She has no wheezes  She has no rhonchi  Lymphadenopathy:     She has no cervical adenopathy  Neurological: She is alert  Psychiatric: Her mood appears anxious  Tired appearing   Nursing note and vitals reviewed

## 2018-03-15 NOTE — ASSESSMENT & PLAN NOTE
Because of the recent stressors she has not been watching her diet as closely  Advised her to try to watch her diet more closely especially once the stressors of past somewhat  Small changes can make a big difference  Increase activity

## 2018-03-15 NOTE — ASSESSMENT & PLAN NOTE
Symptoms relatively well controlled except for the stress and anxiety symptoms at times  Discussed methods for stress relief  Continue with the Valium as needed  Continue with the fluoxetine

## 2018-03-15 NOTE — ASSESSMENT & PLAN NOTE
Allergy symptoms controlled off of allergy shots at present  She plans on following up with ENT to discuss whether to restart the allergy shots and house to do so since she has been off of them for a prolonged period at present

## 2018-04-23 ENCOUNTER — TRANSCRIBE ORDERS (OUTPATIENT)
Dept: ADMINISTRATIVE | Facility: HOSPITAL | Age: 38
End: 2018-04-23

## 2018-04-23 ENCOUNTER — APPOINTMENT (OUTPATIENT)
Dept: LAB | Facility: HOSPITAL | Age: 38
End: 2018-04-23
Attending: EMERGENCY MEDICINE
Payer: COMMERCIAL

## 2018-04-23 DIAGNOSIS — Z13.9 SCREENING FOR CONDITION: Primary | ICD-10-CM

## 2018-04-23 PROCEDURE — 80307 DRUG TEST PRSMV CHEM ANLYZR: CPT | Performed by: EMERGENCY MEDICINE

## 2018-04-28 LAB
AMPHETAMINES UR QL SCN: NEGATIVE NG/ML
BARBITURATES UR QL SCN: NEGATIVE NG/ML
BENZODIAZ UR QL SCN: POSITIVE
BZE UR QL: NEGATIVE NG/ML
CANNABINOIDS UR QL SCN: NEGATIVE NG/ML
METHADONE UR QL SCN: NEGATIVE NG/ML
OPIATES UR QL: NEGATIVE NG/ML
PCP UR QL: NEGATIVE NG/ML
PROPOXYPH UR QL: NEGATIVE NG/ML

## 2018-07-16 DIAGNOSIS — J45.909 ASTHMA, UNSPECIFIED ASTHMA SEVERITY, UNSPECIFIED WHETHER COMPLICATED, UNSPECIFIED WHETHER PERSISTENT: Primary | ICD-10-CM

## 2018-07-16 RX ORDER — FLUTICASONE FUROATE AND VILANTEROL TRIFENATATE 100; 25 UG/1; UG/1
POWDER RESPIRATORY (INHALATION)
Qty: 60 EACH | Refills: 3 | Status: SHIPPED | OUTPATIENT
Start: 2018-07-16 | End: 2019-04-06 | Stop reason: SDUPTHER

## 2018-07-19 ENCOUNTER — TELEPHONE (OUTPATIENT)
Dept: INTERNAL MEDICINE CLINIC | Facility: CLINIC | Age: 38
End: 2018-07-19

## 2018-07-19 NOTE — TELEPHONE ENCOUNTER
Patient called stating that she is seeing Dr West Thompson for her allergy injections  She wants to know if we have her allergy injection chart from when she received her allergy injections here  If so please fax it to Dr Gabi Busch office at [phone]

## 2018-09-14 ENCOUNTER — TRANSCRIBE ORDERS (OUTPATIENT)
Dept: ADMINISTRATIVE | Facility: HOSPITAL | Age: 38
End: 2018-09-14

## 2018-09-14 ENCOUNTER — APPOINTMENT (OUTPATIENT)
Dept: LAB | Facility: HOSPITAL | Age: 38
End: 2018-09-14

## 2018-09-14 DIAGNOSIS — Z00.8 HEALTH EXAMINATION IN POPULATION SURVEYS: Primary | ICD-10-CM

## 2018-09-14 DIAGNOSIS — Z00.8 HEALTH EXAMINATION IN POPULATION SURVEYS: ICD-10-CM

## 2018-09-14 LAB
CHOLEST SERPL-MCNC: 144 MG/DL (ref 50–200)
EST. AVERAGE GLUCOSE BLD GHB EST-MCNC: 114 MG/DL
HBA1C MFR BLD: 5.6 % (ref 4.2–6.3)
HDLC SERPL-MCNC: 45 MG/DL (ref 40–60)
LDLC SERPL CALC-MCNC: 81 MG/DL (ref 0–100)
NONHDLC SERPL-MCNC: 99 MG/DL
TRIGL SERPL-MCNC: 91 MG/DL

## 2018-09-14 PROCEDURE — 36415 COLL VENOUS BLD VENIPUNCTURE: CPT

## 2018-09-14 PROCEDURE — 80061 LIPID PANEL: CPT

## 2018-09-14 PROCEDURE — 83036 HEMOGLOBIN GLYCOSYLATED A1C: CPT

## 2018-10-01 ENCOUNTER — OFFICE VISIT (OUTPATIENT)
Dept: INTERNAL MEDICINE CLINIC | Facility: CLINIC | Age: 38
End: 2018-10-01
Payer: COMMERCIAL

## 2018-10-01 VITALS
SYSTOLIC BLOOD PRESSURE: 132 MMHG | OXYGEN SATURATION: 98 % | HEIGHT: 67 IN | TEMPERATURE: 98.5 F | BODY MASS INDEX: 45.99 KG/M2 | DIASTOLIC BLOOD PRESSURE: 72 MMHG | WEIGHT: 293 LBS | HEART RATE: 94 BPM

## 2018-10-01 DIAGNOSIS — R53.83 FATIGUE, UNSPECIFIED TYPE: ICD-10-CM

## 2018-10-01 DIAGNOSIS — E66.01 MORBID OBESITY WITH BMI OF 50.0-59.9, ADULT (HCC): ICD-10-CM

## 2018-10-01 DIAGNOSIS — F41.8 DEPRESSION WITH ANXIETY: Primary | ICD-10-CM

## 2018-10-01 DIAGNOSIS — E55.9 VITAMIN D DEFICIENCY: ICD-10-CM

## 2018-10-01 PROBLEM — M35.00 SJOGREN'S SYNDROME (HCC): Status: ACTIVE | Noted: 2018-10-01

## 2018-10-01 PROCEDURE — 99214 OFFICE O/P EST MOD 30 MIN: CPT | Performed by: FAMILY MEDICINE

## 2018-10-01 RX ORDER — FLUOXETINE HYDROCHLORIDE 40 MG/1
40 CAPSULE ORAL DAILY
Qty: 90 CAPSULE | Refills: 1 | Status: SHIPPED | OUTPATIENT
Start: 2018-10-01 | End: 2019-04-18 | Stop reason: SDUPTHER

## 2018-10-01 RX ORDER — BUPROPION HYDROCHLORIDE 75 MG/1
75 TABLET ORAL 2 TIMES DAILY
Qty: 60 TABLET | Refills: 1 | Status: SHIPPED | OUTPATIENT
Start: 2018-10-01 | End: 2019-02-09 | Stop reason: SDUPTHER

## 2018-10-01 NOTE — ASSESSMENT & PLAN NOTE
Anxiety symptoms are improved with the Prozac but the depression symptoms have worsened  Has a lot of anhedonia  Discussed options with her  Would like to give her something that is more activating  Will start with the quick release Wellbutrin and have her initially take this once a day in the morning and then increase to twice a day if needed  Potential side effects reviewed with her  Will have her follow up in about 2 months for recheck

## 2018-10-01 NOTE — ASSESSMENT & PLAN NOTE
Watch diet more closely  She feels that improvement in her mood will help with this  She feels that if her mood improve she will also exercise which will help with weight loss  Continue watch diet  Increase fiber in diet  Increase activity level

## 2018-10-01 NOTE — PROGRESS NOTES
Assessment/Plan:    Depression with anxiety  Anxiety symptoms are improved with the Prozac but the depression symptoms have worsened  Has a lot of anhedonia  Discussed options with her  Would like to give her something that is more activating  Will start with the quick release Wellbutrin and have her initially take this once a day in the morning and then increase to twice a day if needed  Potential side effects reviewed with her  Will have her follow up in about 2 months for recheck  Fatigue  Fatigue is multi factorial most likely  Possibly related to depression as well as stress at home and at work as well as CPAP although she uses the mask regularly  Discussed with her watching that she gets adequate sleep  Watch stress level  Try to take some time for herself  Morbid obesity with BMI of 50 0-59 9, adult ([facility] )  Watch diet more closely  She feels that improvement in her mood will help with this  She feels that if her mood improve she will also exercise which will help with weight loss  Continue watch diet  Increase fiber in diet  Increase activity level  Vitamin D deficiency  Continue vitamin-D supplementation  Will recheck laboratory testing  Diagnoses and all orders for this visit:    Depression with anxiety  -     Comprehensive metabolic panel; Future  -     TSH, 3rd generation; Future  -     buPROPion (WELLBUTRIN) 75 mg tablet; Take 1 tablet (75 mg total) by mouth 2 (two) times a day    Vitamin D deficiency  -     Comprehensive metabolic panel; Future  -     TSH, 3rd generation; Future  -     Vitamin D 25 hydroxy; Future    Fatigue, unspecified type  -     CBC and differential; Future  -     Comprehensive metabolic panel; Future  -     TSH, 3rd generation; Future    Morbid obesity with BMI of 50 0-59 9, adult ([facility])  -     Comprehensive metabolic panel; Future  -     TSH, 3rd generation; Future        Orders and recommendations as noted above  Slip given for laboratory testing  Continue the Prozac  Will start low dose Wellbutrin once daily in the morning and then increase to twice a day thereafter  Watch for potential side effects  Will have her follow up in about 2-3 months or sooner if needed  Subjective:      Patient ID: Sarah Burton is a 41 y o  female  She presents for routine visit  Has been under a lot of stress  Will be changing shifts soon  Will have to change her whole schedule  Son is still having some issues  Her daughter is still having issues with her blood sugars and iliac  Her  is still away most of the time at nursing school  Her mother still has medical issues  Anxiety issues have improved significantly but she is feeling increasingly depressed  Feels overwhelmed  Has difficulty concentrating  Feels sad most of the time  Tired almost all the time  Feels like she can sleep all the time  The Wellbutrin had helped more with the depression symptoms but had made her very irritable  Has not been very active because of the overwhelming fatigue  Has been taking the vitamin-D on a regular basis  Has not had her vitamin-D level checked recently  Allergy symptoms have generally been poorly controlled this year  Much more congested  He has been back on her allergy shots  Has been using the Guerraview  Is using her Breo inhaler  Denies any chest pain or palpitations  Has some joint aches but not severe  Takes ibuprofen if needed  The following portions of the patient's history were reviewed and updated as appropriate:   She  has a past medical history of Arthritis; Asthma; Daytime hypersomnolence; Gallbladder disease; GERD (gastroesophageal reflux disease); Lumbar disc disease; and Rheumatoid arthritis ([facility] )    She   Patient Active Problem List    Diagnosis Date Noted    Sjogren's syndrome ([facility] ) 10/01/2018    Chronic sinusitis 08/02/2017    Obstructive sleep apnea 05/23/2017    Fatigue 03/22/2017    Seasonal mood disorder ([facility] ) 03/22/2017    Vaginal candidiasis 12/06/2016    Allergic rhinitis 02/04/2016    Nasal polyp 02/04/2016    Vitamin D deficiency 02/04/2016    Depression with anxiety 12/17/2015    Morbid obesity with BMI of 50 0-59 9, adult ([facility] ) 12/17/2015    Rheumatoid arthritis ([facility] ) 12/17/2015    Anxiety 08/04/2015    Asthma 08/04/2015     She  has a past surgical history that includes Cholecystectomy; Sinus surgery; and Nasal septum surgery  Her family history includes Asthma in her family and mother; Breast cancer in her family; COPD in her family and father; Colon cancer in her father; Diabetes in her family; Hypertension in her mother; Obesity in her family  She  reports that she has quit smoking  She does not have any smokeless tobacco history on file  She reports that she drinks alcohol  She reports that she does not use drugs  Current Outpatient Prescriptions   Medication Sig Dispense Refill    albuterol (2 5 mg/3 mL) 0 083 % nebulizer solution Inhale 1 each every 4 (four) hours as needed      albuterol (PROAIR HFA) 90 mcg/act inhaler Inhale 1-2 puffs      BREO ELLIPTA 100-25 MCG/INH inhaler ONE INHALATION DAILY  AFTER INHALATION RINSE MOUTH WITH WATER & SPIT  USE SAME TIME EACH DAY, NO MORE THAN 1 TIME IN 24 HOURS 60 each 3    buPROPion (WELLBUTRIN) 75 mg tablet Take 1 tablet (75 mg total) by mouth 2 (two) times a day 60 tablet 1    Cholecalciferol (VITAMIN D) 2000 UNITS tablet Take 2,000 Units by mouth once a week   diazepam (VALIUM) 2 mg tablet Take 1 tablet (2 mg total) by mouth every 6 (six) hours as needed for anxiety 200 tablet 0    fexofenadine (ALLEGRA) 180 MG tablet Take 180 mg by mouth daily      FLUoxetine (PROzac) 40 MG capsule Take 1 capsule (40 mg total) by mouth daily 90 capsule 1    ibuprofen (MOTRIN) 200 mg tablet Take 200 mg by mouth every 6 (six) hours as needed for mild pain        Magnesium Oxide -Mg Supplement 400 MG CAPS Take 1 capsule by mouth daily       No current facility-administered medications for this visit  Current Outpatient Prescriptions on File Prior to Visit   Medication Sig    albuterol (2 5 mg/3 mL) 0 083 % nebulizer solution Inhale 1 each every 4 (four) hours as needed    albuterol (PROAIR HFA) 90 mcg/act inhaler Inhale 1-2 puffs    BREO ELLIPTA 100-25 MCG/INH inhaler ONE INHALATION DAILY  AFTER INHALATION RINSE MOUTH WITH WATER & SPIT  USE SAME TIME EACH DAY, NO MORE THAN 1 TIME IN 24 HOURS    Cholecalciferol (VITAMIN D) 2000 UNITS tablet Take 2,000 Units by mouth once a week   diazepam (VALIUM) 2 mg tablet Take 1 tablet (2 mg total) by mouth every 6 (six) hours as needed for anxiety    fexofenadine (ALLEGRA) 180 MG tablet Take 180 mg by mouth daily    ibuprofen (MOTRIN) 200 mg tablet Take 200 mg by mouth every 6 (six) hours as needed for mild pain   Magnesium Oxide -Mg Supplement 400 MG CAPS Take 1 capsule by mouth daily    [DISCONTINUED] FLUoxetine (PROzac) 40 MG capsule Take 1 capsule by mouth daily     No current facility-administered medications on file prior to visit  She has No Known Allergies       Review of Systems   Constitutional: Positive for activity change, appetite change and fatigue  HENT: Positive for congestion, postnasal drip and sinus pressure  Negative for rhinorrhea and voice change  See HPI   Eyes: Negative for visual disturbance  Respiratory: Negative for cough  Cardiovascular: Negative for chest pain and palpitations  Gastrointestinal: Negative for abdominal pain, constipation, diarrhea and nausea  Endocrine: Negative for polydipsia, polyphagia and polyuria  Musculoskeletal: Positive for arthralgias  Psychiatric/Behavioral: Positive for decreased concentration  The patient is nervous/anxious           See HPI         Objective:      /72 (BP Location: Left arm, Patient Position: Sitting, Cuff Size: Large)   Pulse 94   Temp 98 5 °F (36 9 °C) (Temporal)   Ht 5' 7" (1 702 m)   Wt (!) 153 kg (337 lb)   SpO2 98%   BMI 52 78 kg/m²          Physical Exam   Constitutional: She is cooperative  Morbidly obese   HENT:   Head: Normocephalic and atraumatic  Slight cerumen bilaterally; slight posterior pharyngeal cobblestoning; clear nasal discharge   Cardiovascular: Normal rate and regular rhythm  No murmur heard  Pulmonary/Chest: Breath sounds normal  She has no decreased breath sounds  She has no wheezes  She has no rhonchi  Lymphadenopathy:     She has no cervical adenopathy  Neurological: She is alert  Psychiatric: Her mood appears not anxious  She exhibits a depressed mood  Tired appearing   Nursing note and vitals reviewed  Below is the patient's most recent value for Albumin, ALT, AST, BUN, Calcium, Chloride, Cholesterol, CO2, Creatinine, GFR, Glucose, HDL, Hematocrit, Hemoglobin, Hemoglobin A1C, LDL, Magnesium, Phosphorus, Platelets, Potassium, PSA, Sodium, Triglycerides, and WBC  Lab Results   Component Value Date    ALT 42 02/01/2018    AST 21 02/01/2018    BUN 16 02/01/2018    CALCIUM 8 6 02/01/2018     02/01/2018    CHOL 152 08/12/2015    CO2 27 02/01/2018    CREATININE 0 75 02/01/2018    HDL 45 09/14/2018    HCT 40 6 02/01/2018    HGB 13 5 02/01/2018    HGBA1C 5 6 09/14/2018    MG 2 0 08/25/2016     02/01/2018    K 4 1 02/01/2018     02/01/2018    TRIG 91 09/14/2018    WBC 5 71 02/01/2018     Note: for a comprehensive list of the patient's lab results, access the Results Review activity

## 2018-10-01 NOTE — ASSESSMENT & PLAN NOTE
Fatigue is multi factorial most likely  Possibly related to depression as well as stress at home and at work as well as CPAP although she uses the mask regularly  Discussed with her watching that she gets adequate sleep  Watch stress level  Try to take some time for herself

## 2018-10-01 NOTE — PATIENT INSTRUCTIONS
Depression   AMBULATORY CARE:   Depression  is a medical condition that causes feelings of sadness or hopelessness that do not go away  Depression may cause you to lose interest in things you used to enjoy  These feelings may interfere with your daily life  Common symptoms include the following:   · Appetite changes, or weight gain or loss    · Trouble going to sleep or staying asleep, or sleeping too much    · Fatigue or lack of energy    · Feeling restless, irritable, or withdrawn    · Feeling worthless, hopeless, discouraged, or guilty    · Trouble concentrating, remembering things, doing daily tasks, or making decisions    · Thoughts about hurting or killing yourself  Call 911 for any of the following:   · You think about harming yourself or someone else  Contact your healthcare provider if:   · Your symptoms do not improve  · You cannot make it to your next appointment  · You have new symptoms  · You have questions or concerns about your condition or care  Treatment for depression  may include medicine to improve or balance your mood  Therapy may also be used to treat your depression  A therapist will help you learn to cope with your thoughts and feelings  Therapy can be done alone or in a group  It may also be done with family members or a significant other  Self-care:   · Get regular physical activity  Try to exercise for 30 minutes, 3 to 5 days a week  Work with your healthcare provider to develop an exercise plan that you enjoy  Physical activity may improve your symptoms  · Get enough sleep  Create a routine to help you relax before bed  You can listen to music, read, or do yoga  Try to go to bed and wake up at the same time every day  Sleep is important for emotional health  · Eat a variety of healthy foods from all of the food groups  A healthy meal plan is low in fat, salt, and added sugar   Ask your healthcare provider for more information about a meal plan that is right for you      · Do not drink alcohol or use drugs  Alcohol and drugs can make your symptoms worse  Follow up with your healthcare provider as directed: Your healthcare provider will monitor your progress at follow-up visits  He or she will also monitor your medicine if you take antidepressants  Your healthcare provider will ask if the medicine is helping  Tell him or her about any side effects or problems you may have with your medicine  The type or amount of medicine may need to be changed  Write down your questions so you remember to ask them during your visits  © 2017 [employer] Information is for End User's use only and may not be sold, redistributed or otherwise used for commercial purposes  All illustrations and images included in CareNotes® are the copyrighted property of [employer]  or [employer]  The above information is an  only  It is not intended as medical advice for individual conditions or treatments  Talk to your doctor, nurse or pharmacist before following any medical regimen to see if it is safe and effective for you

## 2018-10-01 NOTE — TELEPHONE ENCOUNTER
Patient also needed her Prozac refilled and sent to [facility] mail order

## 2018-10-02 RX ORDER — CELECOXIB 200 MG/1
CAPSULE ORAL
COMMUNITY
End: 2018-12-11

## 2018-10-02 RX ORDER — CYCLOSPORINE 0.5 MG/ML
EMULSION OPHTHALMIC
COMMUNITY
End: 2018-12-11

## 2018-10-02 RX ORDER — ERGOCALCIFEROL (VITAMIN D2) 1250 MCG
CAPSULE ORAL
COMMUNITY
End: 2018-12-11

## 2018-10-02 RX ORDER — CYCLOBENZAPRINE HCL 10 MG
TABLET ORAL 3 TIMES DAILY
COMMUNITY
End: 2018-12-11

## 2018-10-04 ENCOUNTER — OFFICE VISIT (OUTPATIENT)
Dept: SLEEP CENTER | Facility: CLINIC | Age: 38
End: 2018-10-04
Payer: COMMERCIAL

## 2018-10-04 VITALS
WEIGHT: 293 LBS | SYSTOLIC BLOOD PRESSURE: 128 MMHG | OXYGEN SATURATION: 98 % | BODY MASS INDEX: 45.99 KG/M2 | HEIGHT: 67 IN | DIASTOLIC BLOOD PRESSURE: 82 MMHG

## 2018-10-04 DIAGNOSIS — J32.0 CHRONIC MAXILLARY SINUSITIS: ICD-10-CM

## 2018-10-04 DIAGNOSIS — G47.33 MODERATE OBSTRUCTIVE SLEEP APNEA: ICD-10-CM

## 2018-10-04 DIAGNOSIS — J45.20 MILD INTERMITTENT ASTHMA WITHOUT COMPLICATION: ICD-10-CM

## 2018-10-04 DIAGNOSIS — E66.01 MORBID OBESITY WITH BMI OF 40.0-44.9, ADULT (HCC): ICD-10-CM

## 2018-10-04 DIAGNOSIS — G47.33 OBSTRUCTIVE SLEEP APNEA: Primary | ICD-10-CM

## 2018-10-04 DIAGNOSIS — J33.9 NASAL POLYP: ICD-10-CM

## 2018-10-04 DIAGNOSIS — F41.8 DEPRESSION WITH ANXIETY: ICD-10-CM

## 2018-10-04 DIAGNOSIS — F51.12 INSUFFICIENT SLEEP SYNDROME: ICD-10-CM

## 2018-10-04 PROCEDURE — 99214 OFFICE O/P EST MOD 30 MIN: CPT | Performed by: INTERNAL MEDICINE

## 2018-10-04 NOTE — PROGRESS NOTES
Follow-Up Note - [address]  41 y o  female  GSV:[birth year]  YOT:[id number]    CC: I saw this patient for follow-up in clinic today for her Sleep Disordered Breathing, Coexisting Sleep and Medical Problems  PFSH, Problem List, Medications & Allergies were reviewed in EMR  Interval changes: none reported  She  has a past medical history of Arthritis; Asthma; Daytime hypersomnolence; Gallbladder disease; GERD (gastroesophageal reflux disease); Lumbar disc disease; and Rheumatoid arthritis ([facility] )  She has a current medication list which includes the following prescription(s): albuterol, albuterol, breo ellipta, bupropion, vitamin d, cyclobenzaprine, cyclosporine, diazepam, ergocalciferol, escitalopram oxalate, fexofenadine, fluoxetine, ibuprofen, magnesium oxide -mg supplement, celecoxib, etanercept, and etanercept  ROS: Reviewed (see attached)  Significant for allergies and asthma are controlled  She has nasal symptoms due to nasal polyps and chronic sinusitis  She had recent medication change for her anxiety and depression  HPI:  With respect to compliance, data download shows use for only 40 out of the past 90 days and using PAP > 4 hours/night 32% of the time  RACHAEL (estimated) 1 5/hour at  pressure of 12cm H2O  Parminder Yan reports  · no  difficulty tolerating PAP;   · significant adverse effects: nasal congesetion and removes mask unknowingly  · Benefitting from use: more rested      Sleep Routine: She reports getting 5-6 hours sleep  ; she has no difficulty initiating or maintaining sleep   She awakens with the aid of an alarm feeling refreshed  She denied excessive drowsiness   She rated herself at Total score: 7 /24 on the [place] sleepiness scale  Habits: reports that she has quit smoking  She does not have any smokeless tobacco history on file  ,  reports that she drinks alcohol ,  reports that she does not use drugs  , Caffeine use: limited , Exercise routine: sometimes   EXAM: /82   Ht 5' 7" (1 702 m)   Wt (!) 151 kg (333 lb 3 2 oz)   SpO2 98%   BMI 52 19 kg/m²      Patient is well groomed; well appearing; no deformity  She is alert, orientated, cooperative and in no distress  Mental state appears normal   Skin/Extrem: warm & dry; col & hydration normal; no edema  EOMI; There are no facial pressure marks or rashes  Neck Circumference: 45 cm  Nasal airway is patent  Oral airway is crowded  Mucous membranes appeared normal  RRR; Resp effort is normal  There is truncal obesity  Gait & Balance normal   Speech is clear & coherent  IMPRESSION: Primary Sleep/Secondary(to Medical or Psych conditions) & comorbidities   1  Obstructive sleep apnea  Sleep F/U  - established patient    PAP DME Resupply/Reorder   2  Insufficient sleep syndrome     3  Chronic maxillary sinusitis     4  Nasal polyp     5  Mild intermittent asthma without complication     6  Depression with anxiety     7  Morbid obesity with BMI of 40 0-44 9, adult ([address] )     8  Moderate obstructive sleep apnea         PLAN:  1  Treatment with  [facility] is medically necessary [facility]  2  We discussed methods to improve comfort using PAP, care of equipment, and importance of compliance with therapy  3  Pressure settincm H2O     4  She is using insufficiently but feels she is benefitting from use  Rx provided to replace supplies and Care coordinated with DME provider  5  Strategies for weight reduction were discussed  6  I also advised allowing sufficient opportunity for sleep  7  In addition to medications, she is getting immunotherapy for her allergies  8  Follow-up is advised in 1 year or sooner if needed to monitor progress, compliance and to adjust therapy  Thank you for allowing me to participate in the care of this patient      Sincerely,    Authenticated electronically by Mehrdad Hilton MD on    Board Certified Specialist

## 2018-10-04 NOTE — PROGRESS NOTES
Review of Systems      Genitourinary none   Cardiology none   Gastrointestinal none   Neurology none   Constitutional fatigue   Integumentary none   Psychiatry anxiety and depression   Musculoskeletal none   Pulmonary wheezing   ENT none   Endocrine none   Hematological none

## 2018-11-14 ENCOUNTER — HOSPITAL ENCOUNTER (OUTPATIENT)
Dept: CT IMAGING | Facility: HOSPITAL | Age: 38
Discharge: HOME | End: 2018-11-14
Payer: COMMERCIAL

## 2018-11-14 DIAGNOSIS — J01.90 SINUSITIS, ACUTE: ICD-10-CM

## 2018-11-14 PROCEDURE — 70486 CT MAXILLOFACIAL W/O DYE: CPT

## 2018-11-29 ENCOUNTER — TELEPHONE (OUTPATIENT)
Dept: INTERNAL MEDICINE CLINIC | Facility: CLINIC | Age: 38
End: 2018-11-29

## 2018-11-29 NOTE — TELEPHONE ENCOUNTER
Received a call from Tomas Miller requesting other labs to be ordered  She would like a rheumatoid factor and sed rate      Please advise

## 2018-12-05 ENCOUNTER — APPOINTMENT (OUTPATIENT)
Dept: LAB | Facility: HOSPITAL | Age: 38
End: 2018-12-05
Payer: COMMERCIAL

## 2018-12-05 DIAGNOSIS — M06.9 RHEUMATOID ARTHRITIS INVOLVING MULTIPLE SITES, UNSPECIFIED RHEUMATOID FACTOR PRESENCE: ICD-10-CM

## 2018-12-05 DIAGNOSIS — F41.8 DEPRESSION WITH ANXIETY: ICD-10-CM

## 2018-12-05 DIAGNOSIS — R53.83 FATIGUE, UNSPECIFIED TYPE: ICD-10-CM

## 2018-12-05 DIAGNOSIS — E55.9 VITAMIN D DEFICIENCY: ICD-10-CM

## 2018-12-05 DIAGNOSIS — M06.9 RHEUMATOID ARTHRITIS INVOLVING MULTIPLE SITES, UNSPECIFIED RHEUMATOID FACTOR PRESENCE: Primary | ICD-10-CM

## 2018-12-05 DIAGNOSIS — E66.01 MORBID OBESITY WITH BMI OF 50.0-59.9, ADULT (HCC): ICD-10-CM

## 2018-12-05 LAB
25(OH)D3 SERPL-MCNC: 25.6 NG/ML (ref 30–100)
ALBUMIN SERPL BCP-MCNC: 3.8 G/DL (ref 3.5–5)
ALP SERPL-CCNC: 86 U/L (ref 46–116)
ALT SERPL W P-5'-P-CCNC: 44 U/L (ref 12–78)
ANION GAP SERPL CALCULATED.3IONS-SCNC: 10 MMOL/L (ref 4–13)
AST SERPL W P-5'-P-CCNC: 22 U/L (ref 5–45)
BASOPHILS # BLD AUTO: 0.11 THOUSANDS/ΜL (ref 0–0.1)
BASOPHILS NFR BLD AUTO: 2 % (ref 0–1)
BILIRUB SERPL-MCNC: 0.8 MG/DL (ref 0.2–1)
BUN SERPL-MCNC: 18 MG/DL (ref 5–25)
CALCIUM SERPL-MCNC: 8.9 MG/DL (ref 8.3–10.1)
CHLORIDE SERPL-SCNC: 102 MMOL/L (ref 100–108)
CO2 SERPL-SCNC: 27 MMOL/L (ref 21–32)
CREAT SERPL-MCNC: 0.87 MG/DL (ref 0.6–1.3)
EOSINOPHIL # BLD AUTO: 0.78 THOUSAND/ΜL (ref 0–0.61)
EOSINOPHIL NFR BLD AUTO: 11 % (ref 0–6)
ERYTHROCYTE [DISTWIDTH] IN BLOOD BY AUTOMATED COUNT: 13.2 % (ref 11.6–15.1)
ERYTHROCYTE [SEDIMENTATION RATE] IN BLOOD: 26 MM/HOUR (ref 0–20)
GFR SERPL CREATININE-BSD FRML MDRD: 85 ML/MIN/1.73SQ M
GLUCOSE SERPL-MCNC: 86 MG/DL (ref 65–140)
HCT VFR BLD AUTO: 41.6 % (ref 34.8–46.1)
HGB BLD-MCNC: 13.6 G/DL (ref 11.5–15.4)
IMM GRANULOCYTES # BLD AUTO: 0.03 THOUSAND/UL (ref 0–0.2)
IMM GRANULOCYTES NFR BLD AUTO: 0 % (ref 0–2)
LYMPHOCYTES # BLD AUTO: 1.84 THOUSANDS/ΜL (ref 0.6–4.47)
LYMPHOCYTES NFR BLD AUTO: 26 % (ref 14–44)
MCH RBC QN AUTO: 30.2 PG (ref 26.8–34.3)
MCHC RBC AUTO-ENTMCNC: 32.7 G/DL (ref 31.4–37.4)
MCV RBC AUTO: 92 FL (ref 82–98)
MONOCYTES # BLD AUTO: 0.61 THOUSAND/ΜL (ref 0.17–1.22)
MONOCYTES NFR BLD AUTO: 8 % (ref 4–12)
NEUTROPHILS # BLD AUTO: 3.85 THOUSANDS/ΜL (ref 1.85–7.62)
NEUTS SEG NFR BLD AUTO: 53 % (ref 43–75)
NRBC BLD AUTO-RTO: 0 /100 WBCS
PLATELET # BLD AUTO: 279 THOUSANDS/UL (ref 149–390)
PMV BLD AUTO: 10.8 FL (ref 8.9–12.7)
POTASSIUM SERPL-SCNC: 4.2 MMOL/L (ref 3.5–5.3)
PROT SERPL-MCNC: 7.6 G/DL (ref 6.4–8.2)
RBC # BLD AUTO: 4.5 MILLION/UL (ref 3.81–5.12)
SODIUM SERPL-SCNC: 139 MMOL/L (ref 136–145)
TSH SERPL DL<=0.05 MIU/L-ACNC: 1.12 UIU/ML (ref 0.36–3.74)
WBC # BLD AUTO: 7.22 THOUSAND/UL (ref 4.31–10.16)

## 2018-12-05 PROCEDURE — 85025 COMPLETE CBC W/AUTO DIFF WBC: CPT

## 2018-12-05 PROCEDURE — 82306 VITAMIN D 25 HYDROXY: CPT

## 2018-12-05 PROCEDURE — 86430 RHEUMATOID FACTOR TEST QUAL: CPT

## 2018-12-05 PROCEDURE — 86431 RHEUMATOID FACTOR QUANT: CPT

## 2018-12-05 PROCEDURE — 85652 RBC SED RATE AUTOMATED: CPT

## 2018-12-05 PROCEDURE — 36415 COLL VENOUS BLD VENIPUNCTURE: CPT

## 2018-12-05 PROCEDURE — 80053 COMPREHEN METABOLIC PANEL: CPT

## 2018-12-05 PROCEDURE — 84443 ASSAY THYROID STIM HORMONE: CPT

## 2018-12-06 LAB
CRYOGLOB RF SER-ACNC: ABNORMAL [IU]/ML
RHEUMATOID FACT SER QL LA: POSITIVE

## 2018-12-11 ENCOUNTER — OFFICE VISIT (OUTPATIENT)
Dept: INTERNAL MEDICINE CLINIC | Facility: CLINIC | Age: 38
End: 2018-12-11
Payer: COMMERCIAL

## 2018-12-11 VITALS
OXYGEN SATURATION: 98 % | BODY MASS INDEX: 45.99 KG/M2 | HEART RATE: 78 BPM | SYSTOLIC BLOOD PRESSURE: 110 MMHG | DIASTOLIC BLOOD PRESSURE: 70 MMHG | TEMPERATURE: 99.5 F | HEIGHT: 67 IN | WEIGHT: 293 LBS

## 2018-12-11 DIAGNOSIS — R53.83 FATIGUE, UNSPECIFIED TYPE: ICD-10-CM

## 2018-12-11 DIAGNOSIS — E66.01 MORBID OBESITY WITH BMI OF 40.0-44.9, ADULT (HCC): ICD-10-CM

## 2018-12-11 DIAGNOSIS — Z13.1 SCREENING FOR DIABETES MELLITUS: ICD-10-CM

## 2018-12-11 DIAGNOSIS — E55.9 VITAMIN D DEFICIENCY: ICD-10-CM

## 2018-12-11 DIAGNOSIS — J30.9 ALLERGIC RHINITIS, UNSPECIFIED SEASONALITY, UNSPECIFIED TRIGGER: ICD-10-CM

## 2018-12-11 DIAGNOSIS — G47.33 MODERATE OBSTRUCTIVE SLEEP APNEA: Chronic | ICD-10-CM

## 2018-12-11 DIAGNOSIS — F41.8 DEPRESSION WITH ANXIETY: ICD-10-CM

## 2018-12-11 DIAGNOSIS — M06.9 RHEUMATOID ARTHRITIS INVOLVING MULTIPLE SITES, UNSPECIFIED RHEUMATOID FACTOR PRESENCE: Primary | ICD-10-CM

## 2018-12-11 PROCEDURE — 3008F BODY MASS INDEX DOCD: CPT | Performed by: FAMILY MEDICINE

## 2018-12-11 PROCEDURE — 99214 OFFICE O/P EST MOD 30 MIN: CPT | Performed by: FAMILY MEDICINE

## 2018-12-11 NOTE — PROGRESS NOTES
Assessment/Plan: Moderate obstructive sleep apnea  Continue with the CPAP on a regular basis  Continue follow-up with the sleep specialist   Watch for any increasing fatigue  Allergic rhinitis  Continue with allergy injections  Continue follow-up with ENT and proceed with sinus surgery as planned in January  Rheumatoid arthritis (HCC)  Rheumatoid arthritis symptoms have worsened again  Her fatigue was 1 of the symptoms she had initially and is recurrent now despite treatment of her anxiety in depression symptoms  Reviewed inflammatory testing with her  Recommend that she follow up with Rheumatology  She wants to hold off until after she has her sinus surgery  Watch for worsening symptoms  Depression with anxiety  Anxiety in depression symptoms are improved with the addition of the Wellbutrin along with Prozac  Continue current medications  Watch for potential side effects  Watch for any worsening of symptoms especially with the upcoming winter weather  Fatigue  Sleep apnea as well as depression symptoms controlled  She feels that the fatigue is related to her rheumatoid since this was 1 of her initial symptoms when she was diagnosed  Follow-up with Rheumatology as discussed  Morbid obesity with BMI of 40 0-44 9, adult ([address] )  Continue be as active as possible  Watch diet  Vitamin D deficiency  Continue with vitamin-D supplementation  Will continue follow vitamin-D level about 2 to 3 times a year  Diagnoses and all orders for this visit:    Rheumatoid arthritis involving multiple sites, unspecified rheumatoid factor presence (HCC)  -     CBC and differential; Future    Moderate obstructive sleep apnea    Depression with anxiety  -     CBC and differential; Future  -     TSH, 3rd generation; Future    Vitamin D deficiency  -     Vitamin D 25 hydroxy; Future    Fatigue, unspecified type  -     CBC and differential; Future  -     Comprehensive metabolic panel;  Future  -     TSH, 3rd generation; Future    Morbid obesity with BMI of 40 0-44 9, adult ([facility] [address] )  -     Lipid panel; Future    Allergic rhinitis, unspecified seasonality, unspecified trigger    Screening for diabetes mellitus  -     Comprehensive metabolic panel; Future  -     HEMOGLOBIN A1C W/ EAG ESTIMATION; Future        Orders and recommendations as noted above  Recent laboratory testing reviewed with her  Will have her follow up in about 6 months with laboratory testing prior to that visit  Follow up sooner if needed  Subjective:      Patient ID: Betito Alas is a 41 y o  female  She presents for routine visit  Mood has improved significantly with the addition of the Wellbutrin along with the Prozac  Denies any significant irritability with these medications  She feels that her mood is the best that it has been quite awhile  Her only major complaint at present is the persistent fatigue  She feels this may be related to her rheumatoid arthritis since this was 1 of her initial symptoms upon diagnosis years ago  Sleep apnea is controlled  Continues with the CPAP and has followed up with the sleep specialist   Has had some increasing joint aches especially into her hands which she had initially when she was diagnosed with the rheumatoid arthritis  Sinus symptoms have worsened  She continues to follow-up with ENT on a regular basis and they plan on surgery near the beginning of January  They will be doing surgery to open the sinus area and using steroid implants to keep the areas open  She will likely be doing fungal rinses thereafter since they have diagnosed her with sinus infection related to fungus  Had a CT scan which showed opacification of her sinuses  Has had increasing sinus pressure as well as headaches at times  Denies any fevers or chills  Allergy testing was done and had improved somewhat  Continues with allergy shots  Appetite has been stable  Denies any nausea, vomiting, or diarrhea    Denies any changes in bowel movements  Usually sleeps relatively well but is in adjustment now that she is back on day shift  Still has more stressors with her  still going to school for nursing  Denies any significant cough or chest congestion recently  Denies any wheezing  He denies any chest pain or palpitations  The following portions of the patient's history were reviewed and updated as appropriate:   She  has a past medical history of Arthritis; Asthma; Daytime hypersomnolence; Gallbladder disease; GERD (gastroesophageal reflux disease); Lumbar disc disease; and Rheumatoid arthritis ([place] [address] )  She   Patient Active Problem List    Diagnosis Date Noted    Insufficient sleep syndrome 10/04/2018    Sjogren's syndrome ([place] [address] ) 10/01/2018    Chronic sinusitis 08/02/2017    Moderate obstructive sleep apnea 05/23/2017    Fatigue 03/22/2017    Seasonal mood disorder ([place] [address] ) 03/22/2017    Vaginal candidiasis 12/06/2016    Allergic rhinitis 02/04/2016    Nasal polyp 02/04/2016    Vitamin D deficiency 02/04/2016    Depression with anxiety 12/17/2015    Morbid obesity with BMI of 40 0-44 9, adult ([place] [address] ) 12/17/2015    Rheumatoid arthritis ([place] [address] ) 12/17/2015    Anxiety 08/04/2015    Asthma 08/04/2015     She  has a past surgical history that includes Cholecystectomy; Sinus surgery; and Nasal septum surgery  Her family history includes Asthma in her family and mother; Breast cancer in her family; COPD in her family and father; Colon cancer in her father; Diabetes in her family; Hypertension in her mother; Obesity in her family  She  reports that she has quit smoking  She does not have any smokeless tobacco history on file  She reports that she drinks alcohol  She reports that she does not use drugs    Current Outpatient Prescriptions   Medication Sig Dispense Refill    albuterol (2 5 mg/3 mL) 0 083 % nebulizer solution Inhale 1 each every 4 (four) hours as needed      albuterol (PROAIR HFA) 90 mcg/act inhaler Inhale 1-2 puffs      BREO ELLIPTA 100-25 MCG/INH inhaler ONE INHALATION DAILY  AFTER INHALATION RINSE MOUTH WITH WATER & SPIT  USE SAME TIME EACH DAY, NO MORE THAN 1 TIME IN 24 HOURS 60 each 3    buPROPion (WELLBUTRIN) 75 mg tablet Take 1 tablet (75 mg total) by mouth 2 (two) times a day 60 tablet 1    Cholecalciferol (VITAMIN D) 2000 UNITS tablet Take 2,000 Units by mouth once a week   diazepam (VALIUM) 2 mg tablet Take 1 tablet (2 mg total) by mouth every 6 (six) hours as needed for anxiety 200 tablet 0    fexofenadine (ALLEGRA) 180 MG tablet Take 180 mg by mouth daily      FLUoxetine (PROzac) 40 MG capsule Take 1 capsule (40 mg total) by mouth daily 90 capsule 1    ibuprofen (MOTRIN) 200 mg tablet Take 200 mg by mouth every 6 (six) hours as needed for mild pain   Magnesium Oxide -Mg Supplement 400 MG CAPS Take 1 capsule by mouth daily       No current facility-administered medications for this visit  Current Outpatient Prescriptions on File Prior to Visit   Medication Sig    albuterol (2 5 mg/3 mL) 0 083 % nebulizer solution Inhale 1 each every 4 (four) hours as needed    albuterol (PROAIR HFA) 90 mcg/act inhaler Inhale 1-2 puffs    BREO ELLIPTA 100-25 MCG/INH inhaler ONE INHALATION DAILY  AFTER INHALATION RINSE MOUTH WITH WATER & SPIT  USE SAME TIME EACH DAY, NO MORE THAN 1 TIME IN 24 HOURS    buPROPion (WELLBUTRIN) 75 mg tablet Take 1 tablet (75 mg total) by mouth 2 (two) times a day    Cholecalciferol (VITAMIN D) 2000 UNITS tablet Take 2,000 Units by mouth once a week   diazepam (VALIUM) 2 mg tablet Take 1 tablet (2 mg total) by mouth every 6 (six) hours as needed for anxiety    fexofenadine (ALLEGRA) 180 MG tablet Take 180 mg by mouth daily    FLUoxetine (PROzac) 40 MG capsule Take 1 capsule (40 mg total) by mouth daily    ibuprofen (MOTRIN) 200 mg tablet Take 200 mg by mouth every 6 (six) hours as needed for mild pain      Magnesium Oxide -Mg Supplement 400 MG CAPS Take 1 capsule by mouth daily    [DISCONTINUED] celecoxib (CELEBREX) 200 mg capsule Celebrex 200 mg capsule    [DISCONTINUED] cyclobenzaprine (FLEXERIL) 10 mg tablet 3 (three) times a day    [DISCONTINUED] cycloSPORINE (RESTASIS) 0 05 % ophthalmic emulsion Instill 1 drop into affected eye(s) by ophthalmic route every 12 hours    [DISCONTINUED] ergocalciferol (DRISDOL) 78336 units capsule Take 1 capsule (50,000 unit) by oral route once weekly x 8 weeks and then 1 capsule every other week until next blood draw    [DISCONTINUED] Escitalopram Oxalate (LEXAPRO PO) Lexapro    [DISCONTINUED] etanercept (ENBREL SURECLICK) 50 MG/ML injection 1 SC qweek    [DISCONTINUED] etanercept (ENBREL) 50 mg/mL SOSY 1 mL     No current facility-administered medications on file prior to visit  She has No Known Allergies       Review of Systems   Constitutional: Positive for fatigue  Negative for activity change and appetite change  HENT: Positive for congestion, postnasal drip, sinus pain and sinus pressure  Negative for rhinorrhea and voice change  See HPI   Eyes: Negative for visual disturbance  Respiratory: Negative for cough and wheezing  Cardiovascular: Negative for chest pain and palpitations  Gastrointestinal: Negative for abdominal pain, constipation, diarrhea and nausea  Endocrine: Negative for polydipsia, polyphagia and polyuria  Genitourinary: Negative for dysuria  Musculoskeletal: Positive for arthralgias  Allergic/Immunologic: Positive for environmental allergies  Neurological: Positive for headaches  Negative for dizziness  Psychiatric/Behavioral: Negative for decreased concentration and dysphoric mood  The patient is not nervous/anxious           See HPI         Objective:      /70 (BP Location: Left arm, Patient Position: Sitting, Cuff Size: Large)   Pulse 78   Temp 99 5 °F (37 5 °C) (Temporal)   Ht 5' 7" (1 702 m)   Wt (!) 152 kg (334 lb)   SpO2 98%   BMI 52 31 kg/m² Physical Exam   Constitutional: She is cooperative  Morbidly obese   HENT:   Head: Normocephalic and atraumatic  Slight cerumen bilaterally; slight posterior pharyngeal cobblestoning; purulent nasal discharge   Cardiovascular: Normal rate and regular rhythm  No murmur heard  Pulmonary/Chest: Breath sounds normal  She has no decreased breath sounds  She has no wheezes  She has no rhonchi  Lymphadenopathy:     She has no cervical adenopathy  Neurological: She is alert  Psychiatric: Her mood appears not anxious  She does not exhibit a depressed mood  Tired appearing   Nursing note and vitals reviewed  Below is the patient's most recent value for Albumin, ALT, AST, BUN, Calcium, Chloride, Cholesterol, CO2, Creatinine, GFR, Glucose, HDL, Hematocrit, Hemoglobin, Hemoglobin A1C, LDL, Magnesium, Phosphorus, Platelets, Potassium, PSA, Sodium, Triglycerides, and WBC  Lab Results   Component Value Date    ALT 44 12/05/2018    AST 22 12/05/2018    BUN 18 12/05/2018    CALCIUM 8 9 12/05/2018     12/05/2018    CHOL 152 08/12/2015    CO2 27 12/05/2018    CREATININE 0 87 12/05/2018    HDL 45 09/14/2018    HCT 41 6 12/05/2018    HGB 13 6 12/05/2018    HGBA1C 5 6 09/14/2018    MG 2 0 08/25/2016     12/05/2018    K 4 2 12/05/2018     12/05/2014    TRIG 91 09/14/2018    WBC 7 22 12/05/2018     Note: for a comprehensive list of the patient's lab results, access the Results Review activity

## 2018-12-12 NOTE — ASSESSMENT & PLAN NOTE
Rheumatoid arthritis symptoms have worsened again  Her fatigue was 1 of the symptoms she had initially and is recurrent now despite treatment of her anxiety in depression symptoms  Reviewed inflammatory testing with her  Recommend that she follow up with Rheumatology  She wants to hold off until after she has her sinus surgery  Watch for worsening symptoms

## 2018-12-12 NOTE — ASSESSMENT & PLAN NOTE
Continue with vitamin-D supplementation  Will continue follow vitamin-D level about 2 to 3 times a year

## 2018-12-12 NOTE — ASSESSMENT & PLAN NOTE
Continue with allergy injections  Continue follow-up with ENT and proceed with sinus surgery as planned in January

## 2018-12-12 NOTE — ASSESSMENT & PLAN NOTE
Sleep apnea as well as depression symptoms controlled  She feels that the fatigue is related to her rheumatoid since this was 1 of her initial symptoms when she was diagnosed  Follow-up with Rheumatology as discussed

## 2018-12-12 NOTE — ASSESSMENT & PLAN NOTE
Anxiety in depression symptoms are improved with the addition of the Wellbutrin along with Prozac  Continue current medications  Watch for potential side effects  Watch for any worsening of symptoms especially with the upcoming winter weather

## 2018-12-12 NOTE — ASSESSMENT & PLAN NOTE
Continue with the CPAP on a regular basis  Continue follow-up with the sleep specialist   Watch for any increasing fatigue

## 2018-12-31 RX ORDER — FEXOFENADINE HCL 180 MG/1
180 TABLET ORAL DAILY
COMMUNITY

## 2018-12-31 NOTE — PRE-PROCEDURE INSTRUCTIONS
Pre-Surgery Instructions:   Medication Instructions    albuterol (2 5 mg/3 mL) 0 083 % nebulizer solution Instructed patient per Anesthesia Guidelines   albuterol (PROAIR HFA) 90 mcg/act inhaler Instructed patient per Anesthesia Guidelines   BREO ELLIPTA 100-25 MCG/INH inhaler Instructed patient per Anesthesia Guidelines   buPROPion (WELLBUTRIN) 75 mg tablet Instructed patient per Anesthesia Guidelines   Cholecalciferol (VITAMIN D) 2000 UNITS tablet Instructed patient per Anesthesia Guidelines   diazepam (VALIUM) 2 mg tablet Instructed patient per Anesthesia Guidelines   fexofenadine (ALLEGRA) 180 MG tablet Instructed patient per Anesthesia Guidelines   FLUoxetine (PROzac) 40 MG capsule Instructed patient per Anesthesia Guidelines   ibuprofen (MOTRIN) 200 mg tablet Instructed patient per Anesthesia Guidelines   Magnesium Oxide -Mg Supplement 400 MG CAPS Instructed patient per Anesthesia Guidelines  Patient via TC and anesth guidelines instructed  to take wellbutrin and prozac*with a sip of water the morning of surgery  Patient given/ instructed on use of chlorhexidine soap per hospital protocol    Patient instructed to stop all ASA, NSAIDS, vitamins and herbal supplements one week prior to surgery or per Dr Mynor Georges

## 2019-01-02 ENCOUNTER — TRANSCRIBE ORDERS (OUTPATIENT)
Dept: ADMINISTRATIVE | Facility: HOSPITAL | Age: 39
End: 2019-01-02

## 2019-01-02 ENCOUNTER — APPOINTMENT (OUTPATIENT)
Dept: LAB | Facility: MEDICAL CENTER | Age: 39
End: 2019-01-02
Payer: COMMERCIAL

## 2019-01-02 DIAGNOSIS — J32.4 CHRONIC PANSINUSITIS: ICD-10-CM

## 2019-01-02 DIAGNOSIS — D68.8 FAMILIAL MULTIPLE FACTOR DEFICIENCY SYNDROME (HCC): ICD-10-CM

## 2019-01-02 DIAGNOSIS — D68.8 FAMILIAL MULTIPLE FACTOR DEFICIENCY SYNDROME (HCC): Primary | ICD-10-CM

## 2019-01-02 LAB
APTT PPP: 30 SECONDS (ref 26–38)
BASOPHILS # BLD AUTO: 0.13 THOUSANDS/ΜL (ref 0–0.1)
BASOPHILS NFR BLD AUTO: 2 % (ref 0–1)
EOSINOPHIL # BLD AUTO: 0.64 THOUSAND/ΜL (ref 0–0.61)
EOSINOPHIL NFR BLD AUTO: 10 % (ref 0–6)
ERYTHROCYTE [DISTWIDTH] IN BLOOD BY AUTOMATED COUNT: 13.2 % (ref 11.6–15.1)
HCT VFR BLD AUTO: 42.6 % (ref 34.8–46.1)
HGB BLD-MCNC: 13.6 G/DL (ref 11.5–15.4)
IMM GRANULOCYTES # BLD AUTO: 0.02 THOUSAND/UL (ref 0–0.2)
IMM GRANULOCYTES NFR BLD AUTO: 0 % (ref 0–2)
INR PPP: 0.97 (ref 0.86–1.17)
LYMPHOCYTES # BLD AUTO: 1.93 THOUSANDS/ΜL (ref 0.6–4.47)
LYMPHOCYTES NFR BLD AUTO: 30 % (ref 14–44)
MCH RBC QN AUTO: 30 PG (ref 26.8–34.3)
MCHC RBC AUTO-ENTMCNC: 31.9 G/DL (ref 31.4–37.4)
MCV RBC AUTO: 94 FL (ref 82–98)
MONOCYTES # BLD AUTO: 0.56 THOUSAND/ΜL (ref 0.17–1.22)
MONOCYTES NFR BLD AUTO: 9 % (ref 4–12)
NEUTROPHILS # BLD AUTO: 3.22 THOUSANDS/ΜL (ref 1.85–7.62)
NEUTS SEG NFR BLD AUTO: 49 % (ref 43–75)
NRBC BLD AUTO-RTO: 0 /100 WBCS
PLATELET # BLD AUTO: 265 THOUSANDS/UL (ref 149–390)
PMV BLD AUTO: 11.4 FL (ref 8.9–12.7)
PROTHROMBIN TIME: 13 SECONDS (ref 11.8–14.2)
RBC # BLD AUTO: 4.53 MILLION/UL (ref 3.81–5.12)
WBC # BLD AUTO: 6.5 THOUSAND/UL (ref 4.31–10.16)

## 2019-01-02 PROCEDURE — 36415 COLL VENOUS BLD VENIPUNCTURE: CPT

## 2019-01-02 PROCEDURE — 85025 COMPLETE CBC W/AUTO DIFF WBC: CPT

## 2019-01-02 PROCEDURE — 85730 THROMBOPLASTIN TIME PARTIAL: CPT

## 2019-01-02 PROCEDURE — 85610 PROTHROMBIN TIME: CPT

## 2019-01-07 ENCOUNTER — ANESTHESIA EVENT (OUTPATIENT)
Dept: PERIOP | Facility: HOSPITAL | Age: 39
End: 2019-01-07
Payer: COMMERCIAL

## 2019-01-08 ENCOUNTER — HOSPITAL ENCOUNTER (OUTPATIENT)
Facility: HOSPITAL | Age: 39
Setting detail: OUTPATIENT SURGERY
Discharge: HOME | End: 2019-01-08
Attending: OTOLARYNGOLOGY | Admitting: OTOLARYNGOLOGY
Payer: COMMERCIAL

## 2019-01-08 ENCOUNTER — ANESTHESIA (OUTPATIENT)
Dept: PERIOP | Facility: HOSPITAL | Age: 39
End: 2019-01-08
Payer: COMMERCIAL

## 2019-01-08 VITALS
BODY MASS INDEX: 45.99 KG/M2 | OXYGEN SATURATION: 97 % | SYSTOLIC BLOOD PRESSURE: 130 MMHG | TEMPERATURE: 98 F | DIASTOLIC BLOOD PRESSURE: 60 MMHG | HEIGHT: 67 IN | WEIGHT: 293 LBS | HEART RATE: 76 BPM | RESPIRATION RATE: 20 BRPM

## 2019-01-08 DIAGNOSIS — J33.9 NASAL POLYP: Primary | ICD-10-CM

## 2019-01-08 DIAGNOSIS — J32.4 CHRONIC PANSINUSITIS: ICD-10-CM

## 2019-01-08 LAB — EXT PREGNANCY TEST URINE: NEGATIVE

## 2019-01-08 PROCEDURE — 88304 TISSUE EXAM BY PATHOLOGIST: CPT | Performed by: PATHOLOGY

## 2019-01-08 PROCEDURE — C2625 STENT, NON-COR, TEM W/DEL SY: HCPCS | Performed by: OTOLARYNGOLOGY

## 2019-01-08 PROCEDURE — 81025 URINE PREGNANCY TEST: CPT | Performed by: OTOLARYNGOLOGY

## 2019-01-08 DEVICE — PROPEL SINUS IMPLANT
Type: IMPLANTABLE DEVICE | Site: NOSE | Status: FUNCTIONAL
Brand: PROPEL

## 2019-01-08 DEVICE — PROPEL MINI SINUS IMPLANT
Type: IMPLANTABLE DEVICE | Site: NOSE | Status: FUNCTIONAL
Brand: PROPEL MINI

## 2019-01-08 DEVICE — PROPEL CONTOUR SINUS IMPLANT
Type: IMPLANTABLE DEVICE | Site: NOSE | Status: FUNCTIONAL
Brand: PROPEL CONTOUR

## 2019-01-08 RX ORDER — ONDANSETRON 2 MG/ML
4 INJECTION INTRAMUSCULAR; INTRAVENOUS EVERY 6 HOURS PRN
Status: DISCONTINUED | OUTPATIENT
Start: 2019-01-08 | End: 2019-01-08 | Stop reason: HOSPADM

## 2019-01-08 RX ORDER — FENTANYL CITRATE 50 UG/ML
INJECTION, SOLUTION INTRAMUSCULAR; INTRAVENOUS AS NEEDED
Status: DISCONTINUED | OUTPATIENT
Start: 2019-01-08 | End: 2019-01-08 | Stop reason: SURG

## 2019-01-08 RX ORDER — ROCURONIUM BROMIDE 10 MG/ML
INJECTION, SOLUTION INTRAVENOUS AS NEEDED
Status: DISCONTINUED | OUTPATIENT
Start: 2019-01-08 | End: 2019-01-08 | Stop reason: SURG

## 2019-01-08 RX ORDER — GLYCOPYRROLATE 0.2 MG/ML
INJECTION INTRAMUSCULAR; INTRAVENOUS AS NEEDED
Status: DISCONTINUED | OUTPATIENT
Start: 2019-01-08 | End: 2019-01-08 | Stop reason: SURG

## 2019-01-08 RX ORDER — OXYCODONE HYDROCHLORIDE AND ACETAMINOPHEN 5; 325 MG/1; MG/1
TABLET ORAL
Refills: 0
Start: 2019-01-08 | End: 2019-06-18

## 2019-01-08 RX ORDER — OXYCODONE HYDROCHLORIDE AND ACETAMINOPHEN 5; 325 MG/1; MG/1
2 TABLET ORAL EVERY 4 HOURS PRN
Status: DISCONTINUED | OUTPATIENT
Start: 2019-01-08 | End: 2019-01-08 | Stop reason: HOSPADM

## 2019-01-08 RX ORDER — SODIUM CHLORIDE 9 MG/ML
125 INJECTION, SOLUTION INTRAVENOUS CONTINUOUS
Status: DISCONTINUED | OUTPATIENT
Start: 2019-01-08 | End: 2019-01-08 | Stop reason: HOSPADM

## 2019-01-08 RX ORDER — ONDANSETRON 2 MG/ML
INJECTION INTRAMUSCULAR; INTRAVENOUS AS NEEDED
Status: DISCONTINUED | OUTPATIENT
Start: 2019-01-08 | End: 2019-01-08 | Stop reason: SURG

## 2019-01-08 RX ORDER — ONDANSETRON 2 MG/ML
4 INJECTION INTRAMUSCULAR; INTRAVENOUS ONCE AS NEEDED
Status: DISCONTINUED | OUTPATIENT
Start: 2019-01-08 | End: 2019-01-08 | Stop reason: HOSPADM

## 2019-01-08 RX ORDER — DEXTROSE MONOHYDRATE AND SODIUM CHLORIDE 5; .45 G/100ML; G/100ML
125 INJECTION, SOLUTION INTRAVENOUS CONTINUOUS
Status: DISCONTINUED | OUTPATIENT
Start: 2019-01-08 | End: 2019-01-08 | Stop reason: HOSPADM

## 2019-01-08 RX ORDER — MIDAZOLAM HYDROCHLORIDE 1 MG/ML
INJECTION INTRAMUSCULAR; INTRAVENOUS AS NEEDED
Status: DISCONTINUED | OUTPATIENT
Start: 2019-01-08 | End: 2019-01-08 | Stop reason: SURG

## 2019-01-08 RX ORDER — LIDOCAINE HYDROCHLORIDE 10 MG/ML
INJECTION, SOLUTION INFILTRATION; PERINEURAL AS NEEDED
Status: DISCONTINUED | OUTPATIENT
Start: 2019-01-08 | End: 2019-01-08 | Stop reason: SURG

## 2019-01-08 RX ORDER — LIDOCAINE HYDROCHLORIDE AND EPINEPHRINE 10; 10 MG/ML; UG/ML
INJECTION, SOLUTION INFILTRATION; PERINEURAL AS NEEDED
Status: DISCONTINUED | OUTPATIENT
Start: 2019-01-08 | End: 2019-01-08 | Stop reason: HOSPADM

## 2019-01-08 RX ORDER — NEOSTIGMINE METHYLSULFATE 1 MG/ML
INJECTION INTRAVENOUS AS NEEDED
Status: DISCONTINUED | OUTPATIENT
Start: 2019-01-08 | End: 2019-01-08 | Stop reason: SURG

## 2019-01-08 RX ORDER — PROPOFOL 10 MG/ML
INJECTION, EMULSION INTRAVENOUS AS NEEDED
Status: DISCONTINUED | OUTPATIENT
Start: 2019-01-08 | End: 2019-01-08 | Stop reason: SURG

## 2019-01-08 RX ORDER — MAGNESIUM HYDROXIDE 1200 MG/15ML
LIQUID ORAL AS NEEDED
Status: DISCONTINUED | OUTPATIENT
Start: 2019-01-08 | End: 2019-01-08 | Stop reason: HOSPADM

## 2019-01-08 RX ORDER — PROPOFOL 10 MG/ML
INJECTION, EMULSION INTRAVENOUS CONTINUOUS PRN
Status: DISCONTINUED | OUTPATIENT
Start: 2019-01-08 | End: 2019-01-08 | Stop reason: SURG

## 2019-01-08 RX ORDER — FENTANYL CITRATE/PF 50 MCG/ML
50 SYRINGE (ML) INJECTION
Status: DISCONTINUED | OUTPATIENT
Start: 2019-01-08 | End: 2019-01-08 | Stop reason: HOSPADM

## 2019-01-08 RX ADMIN — ONDANSETRON 4 MG: 2 INJECTION INTRAMUSCULAR; INTRAVENOUS at 11:36

## 2019-01-08 RX ADMIN — ROCURONIUM BROMIDE 10 MG: 10 INJECTION INTRAVENOUS at 10:25

## 2019-01-08 RX ADMIN — FENTANYL CITRATE 50 MCG: 50 INJECTION, SOLUTION INTRAMUSCULAR; INTRAVENOUS at 10:23

## 2019-01-08 RX ADMIN — PROPOFOL 100 MCG/KG/MIN: 10 INJECTION, EMULSION INTRAVENOUS at 09:57

## 2019-01-08 RX ADMIN — GLYCOPYRROLATE 0.4 MG: 0.2 INJECTION, SOLUTION INTRAMUSCULAR; INTRAVENOUS at 11:40

## 2019-01-08 RX ADMIN — FENTANYL CITRATE 50 MCG: 50 INJECTION, SOLUTION INTRAMUSCULAR; INTRAVENOUS at 09:46

## 2019-01-08 RX ADMIN — OXYCODONE HYDROCHLORIDE AND ACETAMINOPHEN 2 TABLET: 5; 325 TABLET ORAL at 13:15

## 2019-01-08 RX ADMIN — NEOSTIGMINE METHYLSULFATE 3 MG: 1 INJECTION INTRAVENOUS at 11:40

## 2019-01-08 RX ADMIN — ROCURONIUM BROMIDE 40 MG: 10 INJECTION INTRAVENOUS at 09:52

## 2019-01-08 RX ADMIN — GLYCOPYRROLATE 0.1 MG: 0.2 INJECTION, SOLUTION INTRAMUSCULAR; INTRAVENOUS at 09:46

## 2019-01-08 RX ADMIN — PROPOFOL 200 MG: 10 INJECTION, EMULSION INTRAVENOUS at 09:52

## 2019-01-08 RX ADMIN — MIDAZOLAM 2 MG: 1 INJECTION INTRAMUSCULAR; INTRAVENOUS at 09:46

## 2019-01-08 RX ADMIN — SODIUM CHLORIDE 125 ML/HR: 0.9 INJECTION, SOLUTION INTRAVENOUS at 08:22

## 2019-01-08 RX ADMIN — REMIFENTANIL HYDROCHLORIDE 0.15 MCG/KG/MIN: 1 INJECTION, POWDER, LYOPHILIZED, FOR SOLUTION INTRAVENOUS at 09:57

## 2019-01-08 RX ADMIN — DEXAMETHASONE SODIUM PHOSPHATE 4 MG: 10 INJECTION INTRAMUSCULAR; INTRAVENOUS at 11:36

## 2019-01-08 RX ADMIN — ROCURONIUM BROMIDE 20 MG: 10 INJECTION INTRAVENOUS at 11:05

## 2019-01-08 RX ADMIN — LIDOCAINE HYDROCHLORIDE 100 MG: 10 INJECTION, SOLUTION INFILTRATION; PERINEURAL at 09:52

## 2019-01-08 RX ADMIN — SODIUM CHLORIDE: 0.9 INJECTION, SOLUTION INTRAVENOUS at 10:51

## 2019-01-08 NOTE — OP NOTE
OPERATIVE REPORT  PATIENT NAME: Kita Tilley    :  1980  MRN: [id number]  Pt Location: [facility] OR ROOM 08    SURGERY DATE: 2019    Surgeon(s) and Role:     * Joanna Gonzalez DO - Primary    Preop Diagnosis:  Chronic pansinusitis [J32 4]    Post-Op Diagnosis Codes:     * Chronic pansinusitis [J32 4]    Procedure(s) (LRB):  FUNCTIONAL ENDOSCOPIC SINUS SURGERY (FESS) IMAGED GUIDED; PLACEMENT OF MULTIPLE PROPEL IMPLANTS (N/A)    Specimen(s):  ID Type Source Tests Collected by Time Destination   1 : left sinus contents Tissue Nasal/Sinus Polyps TISSUE EXAM Joanna Gonzalez DO 2019 1013    2 : right sinus contents Tissue Nasal/Sinus Polyps TISSUE EXAM Joanna Gonzalez DO 2019 1013        Estimated Blood Loss:   Minimal    Drains:       Anesthesia Type:   General    Operative Indications:  Chronic pansinusitis [J32 4]      Operative Findings:  Significant polyp disease, significant edema of the tissues, and significant osteitis    Complications:   None    Procedure and Technique:  Patient was identified in the holding area  She was taken to the operating room and placed on the OR table in the supine position  She was placed under total IV anesthesia with endotracheal intubation the table was turned in normal position for image guided sinus surgery  She was prepped and draped in the typical manner for the surgery and a formal time-out was obtained  All information was noted to be correct  Cocaine soaked cotton pledgets x2 were placed in the nasal cavity for 5 min and then removed  Upon removal I tried to evaluate the nasal cavity with the 0 degree endoscope I was unable to visualize anything  There were placed in for another 5 min and then removed  There was now some decreased congestion and I was able to inject portions of the polyps, inferior turbinates, septal mucosa all with 1% xylocaine with 1 100,000 epinephrine    Cocaine pledgets were then reintroduced for another few minutes before they were removed  It is important to note that in this case I did go from left to right sides few times in order to tamponade bleeding with the cocaine soaked cotton pledgets but for the sake of this operative note I will dictate the entire right-sided procedure 1st followed by the left side  On the right side there was noted to be significant polyps  Using the image guidance endoscopic shaver I removed these polyps taking them down into the maxillary sinus and all the way up into the ethmoids  There was significant osteogenic tissue and because of this there was some oozing of blood during the case  After removing all of the polyp disease that I was able to with the suction debrider I then needed smaller instruments in order to get rid of the polyps along the lamina papyracea and the skull base  Throughout each of these maneuvers I would check my anatomic site with the image guided suction and then used the instruments including the up-biting Blakesley, straight Blakesley, and frontal sinus instruments to remove bone and polyp tissue from within all of the sinuses  At the completion I was able to open up all of the sinuses on the right side  There was still some oozing of blood but it was not from any specific site and no cauterization was completed  At this point I placed a contour propel in the right maxillary sinus and a large propel in the ethmoid sinuses  It is important to note dental otherwise is she is an extremely small frontal cell there was no duct and a propel could not be implanted into this area  I then proceeded with the same procedure on the opposite side with similar findings this again there was continued oozing on the left side however I was able to place a propel implant into the left frontal duct along with the 1 in the maxillary sinus and ethmoid sinuses  Within the lumens of each of these I placed Stammberger sinus foam to tamponade some of the bleeding    Upon awakening there was no significant bleeding noted at all  A drip pad was placed  She was woken, extubated, taken recovery in stable condition     I was present for the entire procedure    Patient Disposition:  PACU     SIGNATURE: Ramon Magallon DO  DATE: January 8, 2019  TIME: 11:43 AM

## 2019-01-08 NOTE — DISCHARGE INSTRUCTIONS
[facility]    POST OPERATIVE SINUS SURGERY INSTRUCTIONS      1  Change drip pad under her nose as needed  2  Keep head of bed elevated  Sleep on at least a few pillows at night  3  Expect and do not become concerned about not being able to breathe through your nose  You will be a mouth-breather for approximately one week  4  You may cleanse crusting from the anterior portion of your nose with hydrogen peroxide and Q-tips  You may use Ocean nasal spray throughout the day to prevent crusting inside nasal cavity and splints  5  Begin using "sinus rinse "2 times daily  If you have nasal splints in place you may start after the nasal splints are removed  6  Do not blow your nose until exactly 1 week after surgery  7  If you must sneeze, sneeze with mouth open  8  Avoid any strenuous activity, exercise, bending, or lifting, until approved by your surgeon  9  If there is significant bleeding, you may use over-the-counter Afrin  Place 2 sprays into the bleeding nostril every 5 minutes up to 3 consecutive times  If bleeding does not stop, call our office at [phone] or [phone] or go directly to the emergency room  10  If you have severe pain which is uncontrolled by medication or a fever greater than 101°F, notify our office immediately at [phone] or [phone]

## 2019-01-08 NOTE — ANESTHESIA POSTPROCEDURE EVALUATION
Post-Op Assessment Note      CV Status:  Stable    Mental Status:  Alert and awake    Hydration Status:  Euvolemic    PONV Controlled:  Controlled    Airway Patency:  Patent    Post Op Vitals Reviewed: Yes          Staff: CRNA, Anesthesiologist           /85 (01/08/19 1150)    Temp 98 4 °F (36 9 °C) (01/08/19 1150)    Pulse 71 (01/08/19 1150)   Resp 20 (01/08/19 1150)    SpO2 98 % (01/08/19 1150)

## 2019-01-08 NOTE — ANESTHESIA PREPROCEDURE EVALUATION
Review of Systems/Medical History  Patient summary reviewed  Chart reviewed  No history of anesthetic complications     Cardiovascular  Negative cardio ROS    Pulmonary  Smoker ex-smoker  , Asthma , well controlled/ stable , Sleep apnea CPAP,   Comment: Allergy/sinus induced asthma -- no recent symptoms or wheezing      GI/Hepatic    GERD ,        Negative  ROS        Endo/Other    Obesity  super morbid obesity   GYN  Negative gynecology ROS          Hematology  Negative hematology ROS      Musculoskeletal  Rheumatoid arthritis ,   Comment: Sjogrens  Arthritis     Neurology  Negative neurology ROS      Psychology   Anxiety, Depression ,              Physical Exam    Airway    Mallampati score: I  TM Distance: >3 FB  Neck ROM: full     Dental   No notable dental hx     Cardiovascular  Comment: Negative ROS, Rhythm: regular, Rate: normal, Cardiovascular exam normal    Pulmonary  Pulmonary exam normal Breath sounds clear to auscultation,     Other Findings        Anesthesia Plan  ASA Score- 3     Anesthesia Type- general with ASA Monitors  Additional Monitors:   Airway Plan:     Comment: TIVA     Very nervous   Plan Factors- Patient instructed to abstain from smoking on day of procedure       Induction- intravenous  Postoperative Plan- Plan for postoperative opioid use  Planned trial extubation    Informed Consent- Anesthetic plan and risks discussed with patient

## 2019-01-08 NOTE — ANESTHESIA POSTPROCEDURE EVALUATION
Post-Op Assessment Note      CV Status:  Stable    Mental Status:  Alert and awake    Hydration Status:  Euvolemic    PONV Controlled:  Controlled    Airway Patency:  Patent  Airway: intubated    Post Op Vitals Reviewed: Yes          Staff: AnesthesiologistKIRT           /60 (01/08/19 1330)    Temp      Pulse 76 (01/08/19 1330)   Resp 20 (01/08/19 1330)    SpO2 97 % (01/08/19 1330)

## 2019-01-11 ENCOUNTER — CLINICAL SUPPORT (OUTPATIENT)
Dept: INTERNAL MEDICINE CLINIC | Facility: CLINIC | Age: 39
End: 2019-01-11
Payer: COMMERCIAL

## 2019-01-11 DIAGNOSIS — J30.9 ALLERGIC RHINITIS, UNSPECIFIED SEASONALITY, UNSPECIFIED TRIGGER: ICD-10-CM

## 2019-01-11 PROCEDURE — 95117 IMMUNOTHERAPY INJECTIONS: CPT | Performed by: NURSE PRACTITIONER

## 2019-01-25 ENCOUNTER — CLINICAL SUPPORT (OUTPATIENT)
Dept: INTERNAL MEDICINE CLINIC | Facility: CLINIC | Age: 39
End: 2019-01-25
Payer: COMMERCIAL

## 2019-01-25 DIAGNOSIS — J30.9 ALLERGIC RHINITIS, UNSPECIFIED SEASONALITY, UNSPECIFIED TRIGGER: ICD-10-CM

## 2019-01-25 PROCEDURE — 95117 IMMUNOTHERAPY INJECTIONS: CPT | Performed by: NURSE PRACTITIONER

## 2019-02-09 DIAGNOSIS — F41.8 DEPRESSION WITH ANXIETY: ICD-10-CM

## 2019-02-09 RX ORDER — BUPROPION HYDROCHLORIDE 75 MG/1
TABLET ORAL
Qty: 60 TABLET | Refills: 1 | Status: SHIPPED | OUTPATIENT
Start: 2019-02-09 | End: 2019-06-22 | Stop reason: SDUPTHER

## 2019-02-13 ENCOUNTER — CLINICAL SUPPORT (OUTPATIENT)
Dept: INTERNAL MEDICINE CLINIC | Facility: CLINIC | Age: 39
End: 2019-02-13
Payer: COMMERCIAL

## 2019-02-13 DIAGNOSIS — J30.9 ALLERGIC RHINITIS, UNSPECIFIED SEASONALITY, UNSPECIFIED TRIGGER: ICD-10-CM

## 2019-02-13 PROCEDURE — 95117 IMMUNOTHERAPY INJECTIONS: CPT | Performed by: FAMILY MEDICINE

## 2019-02-21 ENCOUNTER — CLINICAL SUPPORT (OUTPATIENT)
Dept: INTERNAL MEDICINE CLINIC | Facility: CLINIC | Age: 39
End: 2019-02-21
Payer: COMMERCIAL

## 2019-02-21 DIAGNOSIS — J30.9 ALLERGIC RHINITIS, UNSPECIFIED SEASONALITY, UNSPECIFIED TRIGGER: ICD-10-CM

## 2019-02-21 PROCEDURE — 95115 IMMUNOTHERAPY ONE INJECTION: CPT | Performed by: FAMILY MEDICINE

## 2019-03-25 ENCOUNTER — CLINICAL SUPPORT (OUTPATIENT)
Dept: INTERNAL MEDICINE CLINIC | Facility: CLINIC | Age: 39
End: 2019-03-25
Payer: COMMERCIAL

## 2019-03-25 DIAGNOSIS — J30.9 ALLERGIC RHINITIS, UNSPECIFIED SEASONALITY, UNSPECIFIED TRIGGER: ICD-10-CM

## 2019-03-25 PROCEDURE — 95115 IMMUNOTHERAPY ONE INJECTION: CPT

## 2019-04-04 ENCOUNTER — CLINICAL SUPPORT (OUTPATIENT)
Dept: INTERNAL MEDICINE CLINIC | Facility: CLINIC | Age: 39
End: 2019-04-04
Payer: COMMERCIAL

## 2019-04-04 DIAGNOSIS — J30.9 ALLERGIC RHINITIS, UNSPECIFIED SEASONALITY, UNSPECIFIED TRIGGER: ICD-10-CM

## 2019-04-04 PROCEDURE — 95115 IMMUNOTHERAPY ONE INJECTION: CPT

## 2019-04-06 DIAGNOSIS — J45.909 ASTHMA, UNSPECIFIED ASTHMA SEVERITY, UNSPECIFIED WHETHER COMPLICATED, UNSPECIFIED WHETHER PERSISTENT: ICD-10-CM

## 2019-04-07 RX ORDER — FLUTICASONE FUROATE AND VILANTEROL TRIFENATATE 100; 25 UG/1; UG/1
POWDER RESPIRATORY (INHALATION)
Qty: 60 EACH | Refills: 4 | Status: SHIPPED | OUTPATIENT
Start: 2019-04-07 | End: 2019-11-14 | Stop reason: SDUPTHER

## 2019-04-17 ENCOUNTER — CLINICAL SUPPORT (OUTPATIENT)
Dept: INTERNAL MEDICINE CLINIC | Facility: CLINIC | Age: 39
End: 2019-04-17
Payer: COMMERCIAL

## 2019-04-17 DIAGNOSIS — J30.9 ALLERGIC RHINITIS, UNSPECIFIED SEASONALITY, UNSPECIFIED TRIGGER: ICD-10-CM

## 2019-04-17 PROCEDURE — 95115 IMMUNOTHERAPY ONE INJECTION: CPT

## 2019-04-18 DIAGNOSIS — F41.8 DEPRESSION WITH ANXIETY: ICD-10-CM

## 2019-04-18 RX ORDER — FLUOXETINE HYDROCHLORIDE 40 MG/1
CAPSULE ORAL
Qty: 90 CAPSULE | Refills: 0 | Status: SHIPPED | OUTPATIENT
Start: 2019-04-18 | End: 2019-07-26 | Stop reason: SDUPTHER

## 2019-05-14 ENCOUNTER — CLINICAL SUPPORT (OUTPATIENT)
Dept: INTERNAL MEDICINE CLINIC | Facility: CLINIC | Age: 39
End: 2019-05-14
Payer: COMMERCIAL

## 2019-05-14 DIAGNOSIS — J30.9 ALLERGIC RHINITIS, UNSPECIFIED SEASONALITY, UNSPECIFIED TRIGGER: ICD-10-CM

## 2019-05-14 PROCEDURE — 95115 IMMUNOTHERAPY ONE INJECTION: CPT

## 2019-05-21 ENCOUNTER — CLINICAL SUPPORT (OUTPATIENT)
Dept: INTERNAL MEDICINE CLINIC | Facility: CLINIC | Age: 39
End: 2019-05-21
Payer: COMMERCIAL

## 2019-05-21 DIAGNOSIS — J30.9 ALLERGIC RHINITIS, UNSPECIFIED SEASONALITY, UNSPECIFIED TRIGGER: ICD-10-CM

## 2019-05-21 PROCEDURE — 95115 IMMUNOTHERAPY ONE INJECTION: CPT

## 2019-06-06 ENCOUNTER — CLINICAL SUPPORT (OUTPATIENT)
Dept: INTERNAL MEDICINE CLINIC | Facility: CLINIC | Age: 39
End: 2019-06-06
Payer: COMMERCIAL

## 2019-06-06 DIAGNOSIS — J30.9 ALLERGIC RHINITIS, UNSPECIFIED SEASONALITY, UNSPECIFIED TRIGGER: ICD-10-CM

## 2019-06-06 PROCEDURE — 95115 IMMUNOTHERAPY ONE INJECTION: CPT

## 2019-06-18 ENCOUNTER — OFFICE VISIT (OUTPATIENT)
Dept: URGENT CARE | Facility: CLINIC | Age: 39
End: 2019-06-18
Payer: COMMERCIAL

## 2019-06-18 VITALS
HEIGHT: 67 IN | OXYGEN SATURATION: 97 % | DIASTOLIC BLOOD PRESSURE: 82 MMHG | SYSTOLIC BLOOD PRESSURE: 126 MMHG | RESPIRATION RATE: 18 BRPM | HEART RATE: 90 BPM | TEMPERATURE: 97.5 F | WEIGHT: 293 LBS | BODY MASS INDEX: 45.99 KG/M2

## 2019-06-18 DIAGNOSIS — J01.90 ACUTE SINUSITIS, RECURRENCE NOT SPECIFIED, UNSPECIFIED LOCATION: Primary | ICD-10-CM

## 2019-06-18 PROCEDURE — S9088 SERVICES PROVIDED IN URGENT: HCPCS | Performed by: PHYSICIAN ASSISTANT

## 2019-06-18 PROCEDURE — 99213 OFFICE O/P EST LOW 20 MIN: CPT | Performed by: PHYSICIAN ASSISTANT

## 2019-06-18 RX ORDER — AMOXICILLIN AND CLAVULANATE POTASSIUM 875; 125 MG/1; MG/1
1 TABLET, FILM COATED ORAL EVERY 12 HOURS SCHEDULED
Qty: 14 TABLET | Refills: 0 | Status: SHIPPED | OUTPATIENT
Start: 2019-06-18 | End: 2019-06-25

## 2019-06-18 RX ORDER — METHYLPREDNISOLONE 4 MG/1
TABLET ORAL
Qty: 1 EACH | Refills: 0 | Status: SHIPPED | OUTPATIENT
Start: 2019-06-18 | End: 2019-09-10

## 2019-06-22 DIAGNOSIS — F41.8 DEPRESSION WITH ANXIETY: ICD-10-CM

## 2019-06-24 RX ORDER — BUPROPION HYDROCHLORIDE 75 MG/1
TABLET ORAL
Qty: 60 TABLET | Refills: 1 | Status: SHIPPED | OUTPATIENT
Start: 2019-06-24 | End: 2019-09-10

## 2019-07-26 DIAGNOSIS — F41.8 DEPRESSION WITH ANXIETY: ICD-10-CM

## 2019-07-28 RX ORDER — FLUOXETINE HYDROCHLORIDE 40 MG/1
40 CAPSULE ORAL DAILY
Qty: 90 CAPSULE | Refills: 0 | Status: SHIPPED | OUTPATIENT
Start: 2019-07-28 | End: 2019-11-27 | Stop reason: SDUPTHER

## 2019-08-21 ENCOUNTER — CLINICAL SUPPORT (OUTPATIENT)
Dept: INTERNAL MEDICINE CLINIC | Facility: CLINIC | Age: 39
End: 2019-08-21
Payer: COMMERCIAL

## 2019-08-21 DIAGNOSIS — J30.9 ALLERGIC RHINITIS, UNSPECIFIED SEASONALITY, UNSPECIFIED TRIGGER: ICD-10-CM

## 2019-08-21 PROCEDURE — 95115 IMMUNOTHERAPY ONE INJECTION: CPT | Performed by: FAMILY MEDICINE

## 2019-08-29 ENCOUNTER — CLINICAL SUPPORT (OUTPATIENT)
Dept: INTERNAL MEDICINE CLINIC | Facility: CLINIC | Age: 39
End: 2019-08-29
Payer: COMMERCIAL

## 2019-08-29 DIAGNOSIS — J30.9 ALLERGIC RHINITIS, UNSPECIFIED SEASONALITY, UNSPECIFIED TRIGGER: ICD-10-CM

## 2019-08-29 PROCEDURE — 95115 IMMUNOTHERAPY ONE INJECTION: CPT

## 2019-09-05 ENCOUNTER — CLINICAL SUPPORT (OUTPATIENT)
Dept: INTERNAL MEDICINE CLINIC | Facility: CLINIC | Age: 39
End: 2019-09-05
Payer: COMMERCIAL

## 2019-09-05 ENCOUNTER — APPOINTMENT (OUTPATIENT)
Dept: LAB | Facility: CLINIC | Age: 39
End: 2019-09-05
Payer: COMMERCIAL

## 2019-09-05 DIAGNOSIS — R53.83 FATIGUE, UNSPECIFIED TYPE: ICD-10-CM

## 2019-09-05 DIAGNOSIS — F41.8 DEPRESSION WITH ANXIETY: ICD-10-CM

## 2019-09-05 DIAGNOSIS — E55.9 VITAMIN D DEFICIENCY: ICD-10-CM

## 2019-09-05 DIAGNOSIS — E66.01 MORBID OBESITY WITH BMI OF 40.0-44.9, ADULT (HCC): ICD-10-CM

## 2019-09-05 DIAGNOSIS — M06.9 RHEUMATOID ARTHRITIS INVOLVING MULTIPLE SITES, UNSPECIFIED RHEUMATOID FACTOR PRESENCE: ICD-10-CM

## 2019-09-05 DIAGNOSIS — Z13.1 SCREENING FOR DIABETES MELLITUS: ICD-10-CM

## 2019-09-05 DIAGNOSIS — J30.9 ALLERGIC RHINITIS, UNSPECIFIED SEASONALITY, UNSPECIFIED TRIGGER: ICD-10-CM

## 2019-09-05 LAB
25(OH)D3 SERPL-MCNC: 22.8 NG/ML (ref 30–100)
ALBUMIN SERPL BCP-MCNC: 3.7 G/DL (ref 3.5–5)
ALP SERPL-CCNC: 95 U/L (ref 46–116)
ALT SERPL W P-5'-P-CCNC: 30 U/L (ref 12–78)
ANION GAP SERPL CALCULATED.3IONS-SCNC: 6 MMOL/L (ref 4–13)
AST SERPL W P-5'-P-CCNC: 20 U/L (ref 5–45)
BASOPHILS # BLD AUTO: 0.12 THOUSANDS/ΜL (ref 0–0.1)
BASOPHILS NFR BLD AUTO: 2 % (ref 0–1)
BILIRUB SERPL-MCNC: 1.18 MG/DL (ref 0.2–1)
BUN SERPL-MCNC: 18 MG/DL (ref 5–25)
CALCIUM SERPL-MCNC: 8.7 MG/DL (ref 8.3–10.1)
CHLORIDE SERPL-SCNC: 106 MMOL/L (ref 100–108)
CHOLEST SERPL-MCNC: 163 MG/DL (ref 50–200)
CO2 SERPL-SCNC: 26 MMOL/L (ref 21–32)
CREAT SERPL-MCNC: 0.83 MG/DL (ref 0.6–1.3)
EOSINOPHIL # BLD AUTO: 0.52 THOUSAND/ΜL (ref 0–0.61)
EOSINOPHIL NFR BLD AUTO: 9 % (ref 0–6)
ERYTHROCYTE [DISTWIDTH] IN BLOOD BY AUTOMATED COUNT: 13.4 % (ref 11.6–15.1)
EST. AVERAGE GLUCOSE BLD GHB EST-MCNC: 105 MG/DL
GFR SERPL CREATININE-BSD FRML MDRD: 90 ML/MIN/1.73SQ M
GLUCOSE P FAST SERPL-MCNC: 86 MG/DL (ref 65–99)
HBA1C MFR BLD: 5.3 % (ref 4.2–6.3)
HCT VFR BLD AUTO: 42.4 % (ref 34.8–46.1)
HDLC SERPL-MCNC: 47 MG/DL (ref 40–60)
HGB BLD-MCNC: 13.7 G/DL (ref 11.5–15.4)
IMM GRANULOCYTES # BLD AUTO: 0.02 THOUSAND/UL (ref 0–0.2)
IMM GRANULOCYTES NFR BLD AUTO: 0 % (ref 0–2)
LDLC SERPL CALC-MCNC: 105 MG/DL (ref 0–100)
LYMPHOCYTES # BLD AUTO: 1.12 THOUSANDS/ΜL (ref 0.6–4.47)
LYMPHOCYTES NFR BLD AUTO: 19 % (ref 14–44)
MCH RBC QN AUTO: 29.8 PG (ref 26.8–34.3)
MCHC RBC AUTO-ENTMCNC: 32.3 G/DL (ref 31.4–37.4)
MCV RBC AUTO: 92 FL (ref 82–98)
MONOCYTES # BLD AUTO: 0.52 THOUSAND/ΜL (ref 0.17–1.22)
MONOCYTES NFR BLD AUTO: 9 % (ref 4–12)
NEUTROPHILS # BLD AUTO: 3.59 THOUSANDS/ΜL (ref 1.85–7.62)
NEUTS SEG NFR BLD AUTO: 61 % (ref 43–75)
NONHDLC SERPL-MCNC: 116 MG/DL
NRBC BLD AUTO-RTO: 0 /100 WBCS
PLATELET # BLD AUTO: 299 THOUSANDS/UL (ref 149–390)
PMV BLD AUTO: 11.7 FL (ref 8.9–12.7)
POTASSIUM SERPL-SCNC: 4.1 MMOL/L (ref 3.5–5.3)
PROT SERPL-MCNC: 8 G/DL (ref 6.4–8.2)
RBC # BLD AUTO: 4.59 MILLION/UL (ref 3.81–5.12)
SODIUM SERPL-SCNC: 138 MMOL/L (ref 136–145)
TRIGL SERPL-MCNC: 57 MG/DL
TSH SERPL DL<=0.05 MIU/L-ACNC: 1.41 UIU/ML (ref 0.36–3.74)
WBC # BLD AUTO: 5.89 THOUSAND/UL (ref 4.31–10.16)

## 2019-09-05 PROCEDURE — 84443 ASSAY THYROID STIM HORMONE: CPT

## 2019-09-05 PROCEDURE — 83036 HEMOGLOBIN GLYCOSYLATED A1C: CPT

## 2019-09-05 PROCEDURE — 95115 IMMUNOTHERAPY ONE INJECTION: CPT

## 2019-09-05 PROCEDURE — 82306 VITAMIN D 25 HYDROXY: CPT

## 2019-09-05 PROCEDURE — 36415 COLL VENOUS BLD VENIPUNCTURE: CPT

## 2019-09-05 PROCEDURE — 80053 COMPREHEN METABOLIC PANEL: CPT

## 2019-09-05 PROCEDURE — 85025 COMPLETE CBC W/AUTO DIFF WBC: CPT

## 2019-09-05 PROCEDURE — 80061 LIPID PANEL: CPT

## 2019-09-10 ENCOUNTER — OFFICE VISIT (OUTPATIENT)
Dept: INTERNAL MEDICINE CLINIC | Facility: CLINIC | Age: 39
End: 2019-09-10
Payer: COMMERCIAL

## 2019-09-10 VITALS
OXYGEN SATURATION: 98 % | HEART RATE: 70 BPM | DIASTOLIC BLOOD PRESSURE: 76 MMHG | WEIGHT: 293 LBS | BODY MASS INDEX: 45.99 KG/M2 | TEMPERATURE: 98.5 F | SYSTOLIC BLOOD PRESSURE: 130 MMHG | HEIGHT: 67 IN

## 2019-09-10 DIAGNOSIS — M06.9 RHEUMATOID ARTHRITIS INVOLVING MULTIPLE SITES, UNSPECIFIED RHEUMATOID FACTOR PRESENCE: ICD-10-CM

## 2019-09-10 DIAGNOSIS — E66.01 MORBID OBESITY WITH BMI OF 50.0-59.9, ADULT (HCC): ICD-10-CM

## 2019-09-10 DIAGNOSIS — E78.5 MILD HYPERLIPIDEMIA: ICD-10-CM

## 2019-09-10 DIAGNOSIS — E55.9 VITAMIN D DEFICIENCY: ICD-10-CM

## 2019-09-10 DIAGNOSIS — J30.9 ALLERGIC RHINITIS, UNSPECIFIED SEASONALITY, UNSPECIFIED TRIGGER: Primary | ICD-10-CM

## 2019-09-10 DIAGNOSIS — F41.8 DEPRESSION WITH ANXIETY: ICD-10-CM

## 2019-09-10 PROCEDURE — 99214 OFFICE O/P EST MOD 30 MIN: CPT | Performed by: FAMILY MEDICINE

## 2019-09-10 PROCEDURE — 3008F BODY MASS INDEX DOCD: CPT | Performed by: FAMILY MEDICINE

## 2019-09-10 RX ORDER — ERGOCALCIFEROL 1.25 MG/1
50000 CAPSULE ORAL WEEKLY
Qty: 12 CAPSULE | Refills: 1 | Status: SHIPPED | OUTPATIENT
Start: 2019-09-10 | End: 2020-03-10 | Stop reason: SDUPTHER

## 2019-09-10 NOTE — PATIENT INSTRUCTIONS

## 2019-09-10 NOTE — PROGRESS NOTES
Assessment/Plan:    Allergic rhinitis  Continue with allergy shots  Continue follow-up with ENT  She does have some persistent congestion  Discussed with her doing the nasal irrigation more regularly  Rheumatoid arthritis ([facility] )  Symptoms currently stable  Continue with the ibuprofen if needed  Continue to be as active as possible  Depression with anxiety  Symptoms currently stable off of the Wellbutrin  Continue with fluoxetine  She feels that more of her symptoms are related to anxiety than depression and fluoxetine has been controlling this well  Watch for any exacerbations  Mild hyperlipidemia  Cholesterol very mildly elevated with the LDL being 105  Watch diet  Increase fiber in diet  Increase activity level  Slow, steady weight loss recommended  Vitamin D deficiency  Vitamin-D level remains low  She admits that she has not always been taking her vitamin-D on a daily basis  Will give for the high-dose vitamin-D once a week  Advised her to continue this for about 4-6 months  Will recheck with the next laboratory testing  Diagnoses and all orders for this visit:    Allergic rhinitis, unspecified seasonality, unspecified trigger  -     CBC and differential; Future    Depression with anxiety  -     CBC and differential; Future    Vitamin D deficiency  -     Vitamin D 25 hydroxy; Future  -     ergocalciferol (VITAMIN D2) 50,000 units; Take 1 capsule (50,000 Units total) by mouth once a week    Morbid obesity with BMI of 50 0-59 9, adult (HCC)    Mild hyperlipidemia  -     Comprehensive metabolic panel; Future  -     Lipid panel; Future  -     TSH, 3rd generation; Future    Rheumatoid arthritis involving multiple sites, unspecified rheumatoid factor presence ([facility] )        Orders recommendations as noted above  She will likely receive her flu shot at work  Slow but steady weight loss recommended  Discussed with her making small, daily changes to her activity level and diet    Continue with the Prozac  Will have her follow up in about 6 months or sooner if needed  Subjective:      Patient ID: Anthony Gaffney is a 46 y o  female  She presents for routine follow-up  Has generally been doing well  Her  is back at home but continues to work part-time and take classes part-time  With him being back at home, distressed level has decreased somewhat  Her children are both doing well in school  She is considering changing jobs but feels that this is a good thing so the stress level is manageable at this point  She does feel that the Prozac help significantly with anxiety  Depression symptoms have resolved  No longer on the Wellbutrin since she did not feel this was helping significantly  Joint symptoms have improved  Some achiness but not severe  Takes ibuprofen if needed  Still continues with the allergy symptoms despite the allergy shots but has noticed significant improvement over the last few years  Still with significant nasal congestion and feels that she may be developing more issues in her nose again despite the previous surgery over the past year  She plans on following up with ENT again  She Will Be getting a new vial of her allergy medication this week and will be getting the 1st injection at the ENT  Usually sleeps relatively well  Appetite has been stable  She has been trying to lose weight and had lost a few lb but then did gain some of it back  Denies any nausea or vomiting  Denies any chest pain or palpitations  Denies any significant shortness of breath        The following portions of the patient's history were reviewed and updated as appropriate:   She  has a past medical history of Anxiety, Arthritis, Asthma, CPAP (continuous positive airway pressure) dependence, Daytime hypersomnolence, Depression, GERD (gastroesophageal reflux disease), IUD (intrauterine device) in place, Lumbar disc disease, RA (rheumatoid arthritis) ([facility] ), Rheumatoid arthritis ([facility] ), Sleep apnea, and Wears glasses  She   Patient Active Problem List    Diagnosis Date Noted    Mild hyperlipidemia 09/10/2019    Insufficient sleep syndrome 10/04/2018    Sjogren's syndrome ([place] [address] ) 10/01/2018    Chronic sinusitis 08/02/2017    Moderate obstructive sleep apnea 05/23/2017    Fatigue 03/22/2017    Seasonal mood disorder ([place] [address] ) 03/22/2017    Vaginal candidiasis 12/06/2016    Allergic rhinitis 02/04/2016    Nasal polyp 02/04/2016    Vitamin D deficiency 02/04/2016    Depression with anxiety 12/17/2015    Rheumatoid arthritis ([place] [address] ) 12/17/2015    Anxiety 08/04/2015    Asthma 08/04/2015     She  has a past surgical history that includes Cholecystectomy; Sinus surgery; Nasal septum surgery; [place] tooth extraction; and pr stereotactic comp assist proc,cranial,extradural (N/A, 1/8/2019)  Her family history includes Asthma in her family and mother; Breast cancer in her family; COPD in her family and father; Colon cancer in her father; Diabetes in her family; Hypertension in her mother; Obesity in her family  She  reports that she has quit smoking  Her smoking use included cigarettes  She has never used smokeless tobacco  She reports that she drinks alcohol  She reports that she does not use drugs  Current Outpatient Medications   Medication Sig Dispense Refill    albuterol (2 5 mg/3 mL) 0 083 % nebulizer solution Inhale 1 each every 4 (four) hours as needed      albuterol (PROAIR HFA) 90 mcg/act inhaler Inhale 1-2 puffs every 6 (six) hours as needed        BREO ELLIPTA 100-25 MCG/INH inhaler ONE INHALATION DAILY  AFTER INHALATION RINSE MOUTH WITH WATER & SPIT  USE SAME TIME EACH DAY, NO MORE THAN 1 TIME IN 24 HOURS 60 each 4    Cholecalciferol (VITAMIN D) 2000 UNITS tablet Take 2,000 Units by mouth once a week        diazepam (VALIUM) 2 mg tablet Take 1 tablet (2 mg total) by mouth every 6 (six) hours as needed for anxiety 200 tablet 0    fexofenadine (ALLEGRA) 180 MG tablet Take 180 mg by mouth daily      FLUoxetine (PROzac) 40 MG capsule Take 1 capsule (40 mg total) by mouth daily 90 capsule 0    ibuprofen (MOTRIN) 200 mg tablet Take 200 mg by mouth every 6 (six) hours as needed for mild pain   Magnesium Oxide -Mg Supplement 400 MG CAPS Take 1 capsule by mouth daily      ergocalciferol (VITAMIN D2) 50,000 units Take 1 capsule (50,000 Units total) by mouth once a week 12 capsule 1     No current facility-administered medications for this visit  Current Outpatient Medications on File Prior to Visit   Medication Sig    albuterol (2 5 mg/3 mL) 0 083 % nebulizer solution Inhale 1 each every 4 (four) hours as needed    albuterol (PROAIR HFA) 90 mcg/act inhaler Inhale 1-2 puffs every 6 (six) hours as needed      BREO ELLIPTA 100-25 MCG/INH inhaler ONE INHALATION DAILY  AFTER INHALATION RINSE MOUTH WITH WATER & SPIT  USE SAME TIME EACH DAY, NO MORE THAN 1 TIME IN 24 HOURS    Cholecalciferol (VITAMIN D) 2000 UNITS tablet Take 2,000 Units by mouth once a week   diazepam (VALIUM) 2 mg tablet Take 1 tablet (2 mg total) by mouth every 6 (six) hours as needed for anxiety    fexofenadine (ALLEGRA) 180 MG tablet Take 180 mg by mouth daily    FLUoxetine (PROzac) 40 MG capsule Take 1 capsule (40 mg total) by mouth daily    ibuprofen (MOTRIN) 200 mg tablet Take 200 mg by mouth every 6 (six) hours as needed for mild pain   Magnesium Oxide -Mg Supplement 400 MG CAPS Take 1 capsule by mouth daily     No current facility-administered medications on file prior to visit  She is allergic to morphine       Review of Systems   Constitutional: Negative for activity change, appetite change, chills and fever  HENT: Positive for congestion  Negative for rhinorrhea  Eyes: Negative for visual disturbance  Respiratory: Negative for cough, chest tightness and shortness of breath  Cardiovascular: Negative for chest pain and palpitations     Gastrointestinal: Negative for abdominal pain, blood in stool, diarrhea, nausea and vomiting  Endocrine: Negative for polydipsia, polyphagia and polyuria  Genitourinary: Negative for dysuria, frequency and urgency  Musculoskeletal: Negative for gait problem  Skin: Negative for color change  Neurological: Negative for dizziness and headaches  Hematological: Does not bruise/bleed easily  Psychiatric/Behavioral: Negative for confusion and sleep disturbance  The patient is not nervous/anxious  As per HPI         Objective:      /76 (BP Location: Left arm, Patient Position: Sitting, Cuff Size: Large)   Pulse 70   Temp 98 5 °F (36 9 °C) (Temporal)   Ht 5' 7" (1 702 m)   Wt (!) 152 kg (335 lb 12 8 oz)   SpO2 98%   BMI 52 59 kg/m²          Physical Exam   Constitutional: She is oriented to person, place, and time  She is cooperative  No distress  Morbidly obese   HENT:   Head: Normocephalic and atraumatic  Mouth/Throat: Oropharynx is clear and moist    Boggy nasal mucosa with clear nasal discharge   Eyes: Pupils are equal, round, and reactive to light  Conjunctivae are normal  Right eye exhibits no discharge  Left eye exhibits no discharge  Cardiovascular: Normal rate, regular rhythm and normal heart sounds  Exam reveals no gallop and no friction rub  No murmur heard  Pulmonary/Chest: No respiratory distress  She has no wheezes  She has no rales  Abdominal: Bowel sounds are normal  She exhibits no distension  There is no tenderness  Musculoskeletal: She exhibits no edema  Lymphadenopathy:     She has no cervical adenopathy  Neurological: She is alert and oriented to person, place, and time  Skin: Skin is warm and dry  Psychiatric: She has a normal mood and affect  Her behavior is normal    Nursing note and vitals reviewed        Below is the patient's most recent value for Albumin, ALT, AST, BUN, Calcium, Chloride, Cholesterol, CO2, Creatinine, GFR, Glucose, HDL, Hematocrit, Hemoglobin, Hemoglobin A1C, LDL, Magnesium, Phosphorus, Platelets, Potassium, PSA, Sodium, Triglycerides, and WBC  Lab Results   Component Value Date    ALT 30 09/05/2019    AST 20 09/05/2019    BUN 18 09/05/2019    CALCIUM 8 7 09/05/2019     09/05/2019    CHOL 152 08/12/2015    CO2 26 09/05/2019    CREATININE 0 83 09/05/2019    HDL 47 09/05/2019    HCT 42 4 09/05/2019    HGB 13 7 09/05/2019    HGBA1C 5 3 09/05/2019    MG 2 0 08/25/2016     09/05/2019    K 4 1 09/05/2019     12/05/2014    TRIG 57 09/05/2019    WBC 5 89 09/05/2019     Note: for a comprehensive list of the patient's lab results, access the Results Review activity  BMI Counseling: Body mass index is 52 59 kg/m²  The BMI is above normal  Nutrition recommendations include reducing portion sizes, decreasing overall calorie intake, 3-5 servings of fruits/vegetables daily, consuming healthier snacks, decreasing soda and/or juice intake, moderation in carbohydrate intake, reducing intake of saturated fat and trans fat and reducing intake of cholesterol  Exercise recommendations include exercising 3-5 times per week

## 2019-09-11 NOTE — ASSESSMENT & PLAN NOTE
Continue with allergy shots  Continue follow-up with ENT  She does have some persistent congestion  Discussed with her doing the nasal irrigation more regularly

## 2019-09-11 NOTE — ASSESSMENT & PLAN NOTE
Vitamin-D level remains low  She admits that she has not always been taking her vitamin-D on a daily basis  Will give for the high-dose vitamin-D once a week  Advised her to continue this for about 4-6 months  Will recheck with the next laboratory testing

## 2019-09-11 NOTE — ASSESSMENT & PLAN NOTE
Symptoms currently stable off of the Wellbutrin  Continue with fluoxetine  She feels that more of her symptoms are related to anxiety than depression and fluoxetine has been controlling this well  Watch for any exacerbations

## 2019-09-11 NOTE — ASSESSMENT & PLAN NOTE
Cholesterol very mildly elevated with the LDL being 105  Watch diet  Increase fiber in diet  Increase activity level  Slow, steady weight loss recommended

## 2019-09-11 NOTE — ASSESSMENT & PLAN NOTE
Symptoms currently stable  Continue with the ibuprofen if needed  Continue to be as active as possible

## 2019-09-24 ENCOUNTER — CLINICAL SUPPORT (OUTPATIENT)
Dept: INTERNAL MEDICINE CLINIC | Facility: CLINIC | Age: 39
End: 2019-09-24
Payer: COMMERCIAL

## 2019-09-24 DIAGNOSIS — J30.9 ALLERGIC RHINITIS, UNSPECIFIED SEASONALITY, UNSPECIFIED TRIGGER: ICD-10-CM

## 2019-09-24 PROCEDURE — 95115 IMMUNOTHERAPY ONE INJECTION: CPT

## 2019-10-09 ENCOUNTER — CLINICAL SUPPORT (OUTPATIENT)
Dept: INTERNAL MEDICINE CLINIC | Facility: CLINIC | Age: 39
End: 2019-10-09
Payer: COMMERCIAL

## 2019-10-09 DIAGNOSIS — J30.9 ALLERGIC RHINITIS, UNSPECIFIED SEASONALITY, UNSPECIFIED TRIGGER: ICD-10-CM

## 2019-10-09 PROCEDURE — 95115 IMMUNOTHERAPY ONE INJECTION: CPT

## 2019-11-06 ENCOUNTER — ALLERGY (OUTPATIENT)
Dept: INTERNAL MEDICINE CLINIC | Facility: CLINIC | Age: 39
End: 2019-11-06

## 2019-11-06 ENCOUNTER — CLINICAL SUPPORT (OUTPATIENT)
Dept: INTERNAL MEDICINE CLINIC | Facility: CLINIC | Age: 39
End: 2019-11-06
Payer: COMMERCIAL

## 2019-11-06 DIAGNOSIS — J30.9 ALLERGIC RHINITIS, UNSPECIFIED SEASONALITY, UNSPECIFIED TRIGGER: ICD-10-CM

## 2019-11-06 DIAGNOSIS — J30.9 ALLERGIC RHINITIS, UNSPECIFIED SEASONALITY, UNSPECIFIED TRIGGER: Primary | ICD-10-CM

## 2019-11-06 PROCEDURE — 95115 IMMUNOTHERAPY ONE INJECTION: CPT

## 2019-11-14 DIAGNOSIS — J45.909 ASTHMA, UNSPECIFIED ASTHMA SEVERITY, UNSPECIFIED WHETHER COMPLICATED, UNSPECIFIED WHETHER PERSISTENT: ICD-10-CM

## 2019-11-14 RX ORDER — FLUTICASONE FUROATE AND VILANTEROL TRIFENATATE 100; 25 UG/1; UG/1
POWDER RESPIRATORY (INHALATION)
Qty: 60 EACH | Refills: 0 | Status: SHIPPED | OUTPATIENT
Start: 2019-11-14 | End: 2019-12-30 | Stop reason: SDUPTHER

## 2019-11-27 ENCOUNTER — CLINICAL SUPPORT (OUTPATIENT)
Dept: INTERNAL MEDICINE CLINIC | Facility: CLINIC | Age: 39
End: 2019-11-27
Payer: COMMERCIAL

## 2019-11-27 DIAGNOSIS — F41.8 DEPRESSION WITH ANXIETY: ICD-10-CM

## 2019-11-27 DIAGNOSIS — J30.9 ALLERGIC RHINITIS, UNSPECIFIED SEASONALITY, UNSPECIFIED TRIGGER: ICD-10-CM

## 2019-11-27 PROCEDURE — 95115 IMMUNOTHERAPY ONE INJECTION: CPT

## 2019-11-27 RX ORDER — FLUOXETINE HYDROCHLORIDE 40 MG/1
CAPSULE ORAL
Qty: 90 CAPSULE | Refills: 0 | Status: SHIPPED | OUTPATIENT
Start: 2019-11-27 | End: 2020-03-10 | Stop reason: SDUPTHER

## 2019-12-16 ENCOUNTER — CLINICAL SUPPORT (OUTPATIENT)
Dept: INTERNAL MEDICINE CLINIC | Facility: CLINIC | Age: 39
End: 2019-12-16
Payer: COMMERCIAL

## 2019-12-16 DIAGNOSIS — J30.9 ALLERGIC RHINITIS, UNSPECIFIED SEASONALITY, UNSPECIFIED TRIGGER: ICD-10-CM

## 2019-12-16 PROCEDURE — 95115 IMMUNOTHERAPY ONE INJECTION: CPT

## 2019-12-30 DIAGNOSIS — J45.909 ASTHMA, UNSPECIFIED ASTHMA SEVERITY, UNSPECIFIED WHETHER COMPLICATED, UNSPECIFIED WHETHER PERSISTENT: ICD-10-CM

## 2019-12-30 RX ORDER — FLUTICASONE FUROATE AND VILANTEROL TRIFENATATE 100; 25 UG/1; UG/1
POWDER RESPIRATORY (INHALATION)
Qty: 3 INHALER | Refills: 1 | Status: SHIPPED | OUTPATIENT
Start: 2019-12-30 | End: 2020-07-14

## 2020-01-09 ENCOUNTER — CLINICAL SUPPORT (OUTPATIENT)
Dept: INTERNAL MEDICINE CLINIC | Facility: CLINIC | Age: 40
End: 2020-01-09
Payer: COMMERCIAL

## 2020-01-09 DIAGNOSIS — J30.9 ALLERGIC RHINITIS, UNSPECIFIED SEASONALITY, UNSPECIFIED TRIGGER: ICD-10-CM

## 2020-01-09 PROCEDURE — 95115 IMMUNOTHERAPY ONE INJECTION: CPT

## 2020-03-10 ENCOUNTER — HOSPITAL ENCOUNTER (OUTPATIENT)
Dept: MRI IMAGING | Facility: HOSPITAL | Age: 40
Discharge: HOME | End: 2020-03-10
Payer: COMMERCIAL

## 2020-03-10 ENCOUNTER — OFFICE VISIT (OUTPATIENT)
Dept: INTERNAL MEDICINE CLINIC | Facility: CLINIC | Age: 40
End: 2020-03-10
Payer: COMMERCIAL

## 2020-03-10 ENCOUNTER — APPOINTMENT (OUTPATIENT)
Dept: LAB | Facility: CLINIC | Age: 40
End: 2020-03-10
Payer: COMMERCIAL

## 2020-03-10 VITALS
WEIGHT: 293 LBS | TEMPERATURE: 97.5 F | OXYGEN SATURATION: 98 % | SYSTOLIC BLOOD PRESSURE: 130 MMHG | HEIGHT: 67 IN | HEART RATE: 75 BPM | BODY MASS INDEX: 45.99 KG/M2 | DIASTOLIC BLOOD PRESSURE: 78 MMHG

## 2020-03-10 DIAGNOSIS — E55.9 VITAMIN D DEFICIENCY: ICD-10-CM

## 2020-03-10 DIAGNOSIS — F41.8 DEPRESSION WITH ANXIETY: ICD-10-CM

## 2020-03-10 DIAGNOSIS — Z13.1 SCREENING FOR DIABETES MELLITUS: ICD-10-CM

## 2020-03-10 DIAGNOSIS — E78.5 MILD HYPERLIPIDEMIA: ICD-10-CM

## 2020-03-10 DIAGNOSIS — M25.362 INSTABILITY OF LEFT KNEE JOINT: ICD-10-CM

## 2020-03-10 DIAGNOSIS — J30.9 ALLERGIC RHINITIS, UNSPECIFIED SEASONALITY, UNSPECIFIED TRIGGER: ICD-10-CM

## 2020-03-10 DIAGNOSIS — M25.362 KNEE INSTABILITY, LEFT: ICD-10-CM

## 2020-03-10 DIAGNOSIS — M25.562 CHRONIC PAIN OF LEFT KNEE: Primary | ICD-10-CM

## 2020-03-10 DIAGNOSIS — G89.29 CHRONIC PAIN OF LEFT KNEE: Primary | ICD-10-CM

## 2020-03-10 LAB
25(OH)D3 SERPL-MCNC: 32 NG/ML (ref 30–100)
ALBUMIN SERPL BCP-MCNC: 3.1 G/DL (ref 3.5–5)
ALP SERPL-CCNC: 101 U/L (ref 46–116)
ALT SERPL W P-5'-P-CCNC: 21 U/L (ref 12–78)
ANION GAP SERPL CALCULATED.3IONS-SCNC: 7 MMOL/L (ref 4–13)
AST SERPL W P-5'-P-CCNC: 14 U/L (ref 5–45)
BASOPHILS # BLD AUTO: 0.13 THOUSANDS/ΜL (ref 0–0.1)
BASOPHILS NFR BLD AUTO: 2 % (ref 0–1)
BILIRUB SERPL-MCNC: 0.68 MG/DL (ref 0.2–1)
BUN SERPL-MCNC: 14 MG/DL (ref 5–25)
CALCIUM SERPL-MCNC: 8.8 MG/DL (ref 8.3–10.1)
CHLORIDE SERPL-SCNC: 109 MMOL/L (ref 100–108)
CHOLEST SERPL-MCNC: 158 MG/DL (ref 50–200)
CO2 SERPL-SCNC: 25 MMOL/L (ref 21–32)
CREAT SERPL-MCNC: 0.71 MG/DL (ref 0.6–1.3)
EOSINOPHIL # BLD AUTO: 0.68 THOUSAND/ΜL (ref 0–0.61)
EOSINOPHIL NFR BLD AUTO: 11 % (ref 0–6)
ERYTHROCYTE [DISTWIDTH] IN BLOOD BY AUTOMATED COUNT: 13.1 % (ref 11.6–15.1)
EST. AVERAGE GLUCOSE BLD GHB EST-MCNC: 100 MG/DL
GFR SERPL CREATININE-BSD FRML MDRD: 108 ML/MIN/1.73SQ M
GLUCOSE SERPL-MCNC: 83 MG/DL (ref 65–140)
HBA1C MFR BLD: 5.1 %
HCT VFR BLD AUTO: 39.7 % (ref 34.8–46.1)
HDLC SERPL-MCNC: 40 MG/DL
HGB BLD-MCNC: 12.5 G/DL (ref 11.5–15.4)
IMM GRANULOCYTES # BLD AUTO: 0.02 THOUSAND/UL (ref 0–0.2)
IMM GRANULOCYTES NFR BLD AUTO: 0 % (ref 0–2)
LDLC SERPL CALC-MCNC: 102 MG/DL (ref 0–100)
LYMPHOCYTES # BLD AUTO: 1.55 THOUSANDS/ΜL (ref 0.6–4.47)
LYMPHOCYTES NFR BLD AUTO: 25 % (ref 14–44)
MCH RBC QN AUTO: 29 PG (ref 26.8–34.3)
MCHC RBC AUTO-ENTMCNC: 31.5 G/DL (ref 31.4–37.4)
MCV RBC AUTO: 92 FL (ref 82–98)
MONOCYTES # BLD AUTO: 0.5 THOUSAND/ΜL (ref 0.17–1.22)
MONOCYTES NFR BLD AUTO: 8 % (ref 4–12)
NEUTROPHILS # BLD AUTO: 3.44 THOUSANDS/ΜL (ref 1.85–7.62)
NEUTS SEG NFR BLD AUTO: 54 % (ref 43–75)
NONHDLC SERPL-MCNC: 118 MG/DL
NRBC BLD AUTO-RTO: 0 /100 WBCS
PLATELET # BLD AUTO: 284 THOUSANDS/UL (ref 149–390)
PMV BLD AUTO: 11.2 FL (ref 8.9–12.7)
POTASSIUM SERPL-SCNC: 4.2 MMOL/L (ref 3.5–5.3)
PROT SERPL-MCNC: 7.4 G/DL (ref 6.4–8.2)
RBC # BLD AUTO: 4.31 MILLION/UL (ref 3.81–5.12)
SODIUM SERPL-SCNC: 141 MMOL/L (ref 136–145)
TRIGL SERPL-MCNC: 81 MG/DL
TSH SERPL DL<=0.05 MIU/L-ACNC: 1.71 UIU/ML (ref 0.36–3.74)
WBC # BLD AUTO: 6.32 THOUSAND/UL (ref 4.31–10.16)

## 2020-03-10 PROCEDURE — 1036F TOBACCO NON-USER: CPT | Performed by: FAMILY MEDICINE

## 2020-03-10 PROCEDURE — 85025 COMPLETE CBC W/AUTO DIFF WBC: CPT

## 2020-03-10 PROCEDURE — 82306 VITAMIN D 25 HYDROXY: CPT

## 2020-03-10 PROCEDURE — 83036 HEMOGLOBIN GLYCOSYLATED A1C: CPT

## 2020-03-10 PROCEDURE — 99214 OFFICE O/P EST MOD 30 MIN: CPT | Performed by: FAMILY MEDICINE

## 2020-03-10 PROCEDURE — 36415 COLL VENOUS BLD VENIPUNCTURE: CPT

## 2020-03-10 PROCEDURE — 73721 MRI JNT OF LWR EXTRE W/O DYE: CPT

## 2020-03-10 PROCEDURE — 84443 ASSAY THYROID STIM HORMONE: CPT

## 2020-03-10 PROCEDURE — 80061 LIPID PANEL: CPT

## 2020-03-10 PROCEDURE — 80053 COMPREHEN METABOLIC PANEL: CPT

## 2020-03-10 PROCEDURE — 3008F BODY MASS INDEX DOCD: CPT | Performed by: FAMILY MEDICINE

## 2020-03-10 RX ORDER — ERGOCALCIFEROL 1.25 MG/1
50000 CAPSULE ORAL WEEKLY
Qty: 12 CAPSULE | Refills: 1 | Status: SHIPPED | OUTPATIENT
Start: 2020-03-10 | End: 2020-03-10 | Stop reason: SDUPTHER

## 2020-03-10 RX ORDER — ERGOCALCIFEROL 1.25 MG/1
50000 CAPSULE ORAL WEEKLY
Qty: 12 CAPSULE | Refills: 1 | Status: SHIPPED | OUTPATIENT
Start: 2020-03-10 | End: 2022-02-21

## 2020-03-10 RX ORDER — FLUOXETINE HYDROCHLORIDE 40 MG/1
40 CAPSULE ORAL DAILY
Qty: 90 CAPSULE | Refills: 3 | Status: SHIPPED | OUTPATIENT
Start: 2020-03-10 | End: 2021-04-22

## 2020-03-10 RX ORDER — FLUOXETINE HYDROCHLORIDE 40 MG/1
40 CAPSULE ORAL DAILY
Qty: 90 CAPSULE | Refills: 3 | Status: SHIPPED | OUTPATIENT
Start: 2020-03-10 | End: 2020-03-10 | Stop reason: SDUPTHER

## 2020-03-11 NOTE — PROGRESS NOTES
Assessment/Plan:    No problem-specific Assessment & Plan notes found for this encounter  Diagnoses and all orders for this visit:    Chronic pain of left knee    Instability of left knee joint  -     MRI knee left  wo contrast; Future    Depression with anxiety  -     Discontinue: FLUoxetine (PROzac) 40 MG capsule; Take 1 capsule (40 mg total) by mouth daily  -     FLUoxetine (PROzac) 40 MG capsule; Take 1 capsule (40 mg total) by mouth daily    Vitamin D deficiency  -     Discontinue: ergocalciferol (VITAMIN D2) 50,000 units; Take 1 capsule (50,000 Units total) by mouth once a week  -     ergocalciferol (VITAMIN D2) 50,000 units; Take 1 capsule (50,000 Units total) by mouth once a week    Mild hyperlipidemia    Screening for diabetes mellitus  -     HEMOGLOBIN A1C W/ EAG ESTIMATION; Future    Knee instability, left  -     MRI knee left  wo contrast; Future        Orders recommendations as noted above  She will have her laboratory testing done today  We will contact her with the results  This should satisfy the wellness program through work  Try to remain as active as possible but this is difficult with her work schedule and especially with the worsening left knee pain  Discussed with her further investigation for this  Discussed with her that there is likely some effusion as well as meniscal issues based on her symptoms and physical exam   Will have her get an MRI  She will set this up at her workplace  Continue with vitamin-D supplementation  Continue with the high-dose vitamin-D on a weekly basis  Will adjust this if needed based on her upcoming laboratory testing  Watch for any increasing allergy symptoms  Continue follow-up with ENT if needed  Continue with nasal sprays and nasal irrigation  Continue with the Prozac  Mood has been stable  Watch for any worsening  Will have her follow up in about 6 months or sooner if needed  Subjective:      Patient ID: Grant Madrid is a 46 y o  female  She presents for routine follow-up  Has generally been doing relatively well  Has had some recent stressors with her family recently undergoing flu symptoms  Has enjoyed her new job at the [employer]   It is not yet busy but getting busier every day  Allergy and sinus symptoms persist   Not as bad as they had been but not well controlled at present either  Denies any fevers or chills  Denies any significant cough or shortness of breath  She did not have her laboratory testing done prior to this visit  She will have done today and we will contact her with results  Has been trying to watch her diet  Has gained some weight back and is frustrated by this  She is limited with her activities because of left knee pain  Has been having worsening left knee pain over the last few months  Left knee seems sore and swollen at times  Worse over the medial aspect of the knee  Denies any injury  Has noticed the pain has worsened over the last 2 weeks  Has been clicking and almost locking at times  Has tried ice, heat, and Motrin with minimal alleviation  Appetite has been generally stable  Denies any nausea, vomiting, or diarrhea  Denies any chest pain or palpitations  Denies any significant shortness of breath  Mood has been stable  Continues on Prozac without difficulty  Continues with vitamin-D supplementation  The following portions of the patient's history were reviewed and updated as appropriate:   She  has a past medical history of Allergic rhinitis, Anxiety, Arthritis, Asthma, CPAP (continuous positive airway pressure) dependence, Daytime hypersomnolence, Depression, GERD (gastroesophageal reflux disease), IUD (intrauterine device) in place, Lumbar disc disease, RA (rheumatoid arthritis) ([facility] ), Rheumatoid arthritis ([facility] ), Sleep apnea, and Wears glasses    She   Patient Active Problem List    Diagnosis Date Noted    Chronic pain of left knee 03/10/2020    Knee instability, left 03/10/2020    Mild hyperlipidemia 09/10/2019    Insufficient sleep syndrome 10/04/2018    Sjogren's syndrome ([place] [address] ) 10/01/2018    Chronic sinusitis 08/02/2017    Moderate obstructive sleep apnea 05/23/2017    Fatigue 03/22/2017    Seasonal mood disorder ([place] [address] ) 03/22/2017    Vaginal candidiasis 12/06/2016    Allergic rhinitis 02/04/2016    Nasal polyp 02/04/2016    Vitamin D deficiency 02/04/2016    Depression with anxiety 12/17/2015    Rheumatoid arthritis ([place] [address] ) 12/17/2015    Anxiety 08/04/2015    Asthma 08/04/2015     She  has a past surgical history that includes Cholecystectomy; Sinus surgery; Nasal septum surgery; [place] tooth extraction; and pr stereotactic comp assist proc,cranial,extradural (N/A, 1/8/2019)  Her family history includes Asthma in her family and mother; Breast cancer in her family; COPD in her family and father; Colon cancer in her father; Diabetes in her family; Hypertension in her mother; Obesity in her family  She  reports that she has quit smoking  Her smoking use included cigarettes  She has never used smokeless tobacco  She reports that she drinks alcohol  She reports that she does not use drugs  Current Outpatient Medications   Medication Sig Dispense Refill    albuterol (2 5 mg/3 mL) 0 083 % nebulizer solution Inhale 1 each every 4 (four) hours as needed      albuterol (PROAIR HFA) 90 mcg/act inhaler Inhale 1-2 puffs every 6 (six) hours as needed        BREO ELLIPTA 100-25 MCG/INH inhaler ONE INHALATION DAILY  AFTER INHALATION RINSE MOUTH WITH WATER & SPIT  USE SAME TIME EACH DAY, NO MORE THAN 1 TIME IN 24 HOURS 3 Inhaler 1    Cholecalciferol (VITAMIN D) 2000 UNITS tablet Take 2,000 Units by mouth once a week        diazepam (VALIUM) 2 mg tablet Take 1 tablet (2 mg total) by mouth every 6 (six) hours as needed for anxiety 200 tablet 0    ergocalciferol (VITAMIN D2) 50,000 units Take 1 capsule (50,000 Units total) by mouth once a week 12 capsule 1    fexofenadine (ALLEGRA) 180 MG tablet Take 180 mg by mouth daily      FLUoxetine (PROzac) 40 MG capsule Take 1 capsule (40 mg total) by mouth daily 90 capsule 3    fluticasone (FLONASE) 50 mcg/act nasal spray 2 sprays into each nostril daily      ibuprofen (MOTRIN) 200 mg tablet Take 200 mg by mouth every 6 (six) hours as needed for mild pain   Magnesium Oxide -Mg Supplement 400 MG CAPS Take 1 capsule by mouth daily      predniSONE 20 mg tablet 3 tabs by mouth daily x 3 days, 2 tabs by mouth daily x 3 days, then 1 tab by mouth daily x 3 days 18 tablet 1     No current facility-administered medications for this visit  Current Outpatient Medications on File Prior to Visit   Medication Sig    albuterol (2 5 mg/3 mL) 0 083 % nebulizer solution Inhale 1 each every 4 (four) hours as needed    albuterol (PROAIR HFA) 90 mcg/act inhaler Inhale 1-2 puffs every 6 (six) hours as needed      BREO ELLIPTA 100-25 MCG/INH inhaler ONE INHALATION DAILY  AFTER INHALATION RINSE MOUTH WITH WATER & SPIT  USE SAME TIME EACH DAY, NO MORE THAN 1 TIME IN 24 HOURS    Cholecalciferol (VITAMIN D) 2000 UNITS tablet Take 2,000 Units by mouth once a week   diazepam (VALIUM) 2 mg tablet Take 1 tablet (2 mg total) by mouth every 6 (six) hours as needed for anxiety    fexofenadine (ALLEGRA) 180 MG tablet Take 180 mg by mouth daily    fluticasone (FLONASE) 50 mcg/act nasal spray 2 sprays into each nostril daily    ibuprofen (MOTRIN) 200 mg tablet Take 200 mg by mouth every 6 (six) hours as needed for mild pain      Magnesium Oxide -Mg Supplement 400 MG CAPS Take 1 capsule by mouth daily    predniSONE 20 mg tablet 3 tabs by mouth daily x 3 days, 2 tabs by mouth daily x 3 days, then 1 tab by mouth daily x 3 days    [DISCONTINUED] ergocalciferol (VITAMIN D2) 50,000 units Take 1 capsule (50,000 Units total) by mouth once a week    [DISCONTINUED] FLUoxetine (PROzac) 40 MG capsule TAKE ONE CAPSULE BY MOUTH DAILY     No current facility-administered medications on file prior to visit  She is allergic to morphine       Review of Systems   Constitutional: Positive for fatigue  Negative for activity change, appetite change, chills and fever  HENT: Positive for congestion, postnasal drip and sinus pressure  Negative for rhinorrhea  Eyes: Negative for visual disturbance  Respiratory: Negative for chest tightness and shortness of breath  Cardiovascular: Negative for chest pain and palpitations  Gastrointestinal: Negative for abdominal pain, blood in stool, diarrhea, nausea and vomiting  Endocrine: Negative for polydipsia, polyphagia and polyuria  Genitourinary: Negative for dysuria, frequency and urgency  Musculoskeletal: Positive for arthralgias and joint swelling  Negative for gait problem  Skin: Negative for color change  Neurological: Negative for dizziness and headaches  Hematological: Does not bruise/bleed easily  Psychiatric/Behavioral: Negative for confusion and sleep disturbance  The patient is not nervous/anxious  Objective:      /78 (BP Location: Left arm, Patient Position: Sitting, Cuff Size: Large)   Pulse 75   Temp 97 5 °F (36 4 °C) (Temporal)   Ht 5' 7" (1 702 m)   Wt (!) 156 kg (344 lb 8 oz)   SpO2 98%   BMI 53 96 kg/m²          Physical Exam   Constitutional: She is oriented to person, place, and time  She is cooperative  No distress  Morbidly obese   HENT:   Head: Normocephalic and atraumatic  Mouth/Throat: Oropharynx is clear and moist    Eyes: Pupils are equal, round, and reactive to light  Conjunctivae are normal  Right eye exhibits no discharge  Left eye exhibits no discharge  Cardiovascular: Normal rate, regular rhythm and normal heart sounds  Exam reveals no gallop and no friction rub  No murmur heard  Pulmonary/Chest: No respiratory distress  She has no wheezes  She has no rales  Abdominal: Bowel sounds are normal  She exhibits no distension   There is no tenderness  Musculoskeletal: She exhibits no edema  Left knee with medial joint line tenderness; slight effusion some palpable degenerative changes medial and lateral joint line bilaterally   Lymphadenopathy:     She has no cervical adenopathy  Neurological: She is alert and oriented to person, place, and time  Skin: Skin is warm and dry  Psychiatric: She has a normal mood and affect  Her behavior is normal    Nursing note and vitals reviewed

## 2020-03-25 ENCOUNTER — CLINICAL SUPPORT (OUTPATIENT)
Dept: INTERNAL MEDICINE CLINIC | Facility: CLINIC | Age: 40
End: 2020-03-25
Payer: COMMERCIAL

## 2020-03-25 DIAGNOSIS — J30.9 ALLERGIC RHINITIS, UNSPECIFIED SEASONALITY, UNSPECIFIED TRIGGER: ICD-10-CM

## 2020-03-25 PROCEDURE — 95115 IMMUNOTHERAPY ONE INJECTION: CPT | Performed by: NURSE PRACTITIONER

## 2020-04-16 ENCOUNTER — TELEMEDICINE (OUTPATIENT)
Dept: SLEEP CENTER | Facility: CLINIC | Age: 40
End: 2020-04-16
Payer: COMMERCIAL

## 2020-04-16 DIAGNOSIS — J30.9 ALLERGIC RHINITIS, UNSPECIFIED SEASONALITY, UNSPECIFIED TRIGGER: ICD-10-CM

## 2020-04-16 DIAGNOSIS — J45.20 MILD INTERMITTENT ASTHMA WITHOUT COMPLICATION: ICD-10-CM

## 2020-04-16 DIAGNOSIS — F41.8 DEPRESSION WITH ANXIETY: ICD-10-CM

## 2020-04-16 DIAGNOSIS — G47.33 OBSTRUCTIVE SLEEP APNEA: Primary | ICD-10-CM

## 2020-04-16 DIAGNOSIS — E66.01 MORBID OBESITY WITH BMI OF 40.0-44.9, ADULT (HCC): ICD-10-CM

## 2020-04-16 PROCEDURE — 1036F TOBACCO NON-USER: CPT | Performed by: INTERNAL MEDICINE

## 2020-04-16 PROCEDURE — 99214 OFFICE O/P EST MOD 30 MIN: CPT | Performed by: INTERNAL MEDICINE

## 2020-04-20 ENCOUNTER — TELEPHONE (OUTPATIENT)
Dept: SLEEP CENTER | Facility: CLINIC | Age: 40
End: 2020-04-20

## 2020-05-12 ENCOUNTER — TELEMEDICINE (OUTPATIENT)
Dept: INTERNAL MEDICINE CLINIC | Facility: CLINIC | Age: 40
End: 2020-05-12
Payer: COMMERCIAL

## 2020-05-12 VITALS
HEIGHT: 67 IN | DIASTOLIC BLOOD PRESSURE: 71 MMHG | SYSTOLIC BLOOD PRESSURE: 110 MMHG | WEIGHT: 293 LBS | BODY MASS INDEX: 45.99 KG/M2 | HEART RATE: 74 BPM

## 2020-05-12 DIAGNOSIS — E55.9 VITAMIN D DEFICIENCY: ICD-10-CM

## 2020-05-12 DIAGNOSIS — F41.8 DEPRESSION WITH ANXIETY: ICD-10-CM

## 2020-05-12 DIAGNOSIS — H93.A2 PULSATILE TINNITUS OF LEFT EAR: Primary | ICD-10-CM

## 2020-05-12 DIAGNOSIS — J32.0 CHRONIC MAXILLARY SINUSITIS: ICD-10-CM

## 2020-05-12 DIAGNOSIS — E78.5 MILD HYPERLIPIDEMIA: ICD-10-CM

## 2020-05-12 PROCEDURE — 99214 OFFICE O/P EST MOD 30 MIN: CPT | Performed by: FAMILY MEDICINE

## 2020-05-12 RX ORDER — DIAZEPAM 2 MG/1
2 TABLET ORAL EVERY 12 HOURS PRN
Qty: 30 TABLET | Refills: 0 | Status: SHIPPED | OUTPATIENT
Start: 2020-05-12 | End: 2022-02-21

## 2020-05-19 ENCOUNTER — HOSPITAL ENCOUNTER (OUTPATIENT)
Dept: MRI IMAGING | Facility: HOSPITAL | Age: 40
Discharge: HOME | End: 2020-05-19
Payer: COMMERCIAL

## 2020-05-19 DIAGNOSIS — H93.A2 PULSATILE TINNITUS OF LEFT EAR: ICD-10-CM

## 2020-05-19 PROCEDURE — 70547 MR ANGIOGRAPHY NECK W/O DYE: CPT

## 2020-05-19 PROCEDURE — 70544 MR ANGIOGRAPHY HEAD W/O DYE: CPT

## 2020-07-14 ENCOUNTER — TELEPHONE (OUTPATIENT)
Dept: INTERNAL MEDICINE CLINIC | Facility: CLINIC | Age: 40
End: 2020-07-14

## 2020-07-14 DIAGNOSIS — J45.20 MILD INTERMITTENT ASTHMA WITHOUT COMPLICATION: Primary | ICD-10-CM

## 2020-07-14 DIAGNOSIS — J45.909 ASTHMA, UNSPECIFIED ASTHMA SEVERITY, UNSPECIFIED WHETHER COMPLICATED, UNSPECIFIED WHETHER PERSISTENT: ICD-10-CM

## 2020-07-14 RX ORDER — FLUTICASONE FUROATE AND VILANTEROL TRIFENATATE 100; 25 UG/1; UG/1
POWDER RESPIRATORY (INHALATION)
Qty: 180 EACH | Refills: 0 | Status: SHIPPED | OUTPATIENT
Start: 2020-07-14 | End: 2020-07-14

## 2020-07-14 NOTE — TELEPHONE ENCOUNTER
Patient called asking if there was an alternative to her Breo  Her formulary changed and this is very expensive

## 2020-09-14 ENCOUNTER — OFFICE VISIT (OUTPATIENT)
Dept: INTERNAL MEDICINE CLINIC | Facility: CLINIC | Age: 40
End: 2020-09-14
Payer: COMMERCIAL

## 2020-09-14 DIAGNOSIS — M79.10 MYALGIA: Primary | ICD-10-CM

## 2020-09-14 DIAGNOSIS — R53.83 FATIGUE, UNSPECIFIED TYPE: ICD-10-CM

## 2020-09-14 DIAGNOSIS — Z20.828 EXPOSURE TO SARS-ASSOCIATED CORONAVIRUS: ICD-10-CM

## 2020-09-14 DIAGNOSIS — H69.83 DYSFUNCTION OF BOTH EUSTACHIAN TUBES: ICD-10-CM

## 2020-09-14 PROCEDURE — U0003 INFECTIOUS AGENT DETECTION BY NUCLEIC ACID (DNA OR RNA); SEVERE ACUTE RESPIRATORY SYNDROME CORONAVIRUS 2 (SARS-COV-2) (CORONAVIRUS DISEASE [COVID-19]), AMPLIFIED PROBE TECHNIQUE, MAKING USE OF HIGH THROUGHPUT TECHNOLOGIES AS DESCRIBED BY CMS-2020-01-R: HCPCS | Performed by: FAMILY MEDICINE

## 2020-09-14 PROCEDURE — 99213 OFFICE O/P EST LOW 20 MIN: CPT | Performed by: FAMILY MEDICINE

## 2020-09-14 RX ORDER — AZITHROMYCIN 250 MG/1
TABLET, FILM COATED ORAL
Qty: 6 TABLET | Refills: 0 | Status: SHIPPED | OUTPATIENT
Start: 2020-09-14 | End: 2020-09-19

## 2020-09-14 RX ORDER — PREDNISONE 10 MG/1
40 TABLET ORAL DAILY
Qty: 20 TABLET | Refills: 0 | Status: SHIPPED | OUTPATIENT
Start: 2020-09-14 | End: 2020-09-19

## 2020-09-14 NOTE — PROGRESS NOTES
Assessment/Plan:    No problem-specific Assessment & Plan notes found for this encounter  Diagnoses and all orders for this visit:    Myalgia  -     azithromycin (ZITHROMAX) 250 mg tablet; 2 pills today, then one daily for 4 more days  -     predniSONE 10 mg tablet; Take 4 tablets (40 mg total) by mouth daily for 5 days    Fatigue, unspecified type  -     azithromycin (ZITHROMAX) 250 mg tablet; 2 pills today, then one daily for 4 more days  -     predniSONE 10 mg tablet; Take 4 tablets (40 mg total) by mouth daily for 5 days    Exposure to SARS-associated coronavirus  -     Novel Coronavirus (COVID-19), PCR [facility] - Collected in Office  -     azithromycin (ZITHROMAX) 250 mg tablet; 2 pills today, then one daily for 4 more days  -     predniSONE 10 mg tablet; Take 4 tablets (40 mg total) by mouth daily for 5 days    Dysfunction of both eustachian tubes  -     azithromycin (ZITHROMAX) 250 mg tablet; 2 pills today, then one daily for 4 more days  -     predniSONE 10 mg tablet; Take 4 tablets (40 mg total) by mouth daily for 5 days        Orders recommendations as noted above  She has multiple possible exposures for the coronavirus with her working in the hospital, her son in school, her daughter working in a grocery store, and her  working in the detention  Discussed with her that the symptoms are variable  Test was done in the parking lot today  We will call her with the results  Self isolation required  This includes not only herself but her family as well  Watch for any worsening of symptoms  Discussed starting on vitamins including vitamin-C, vitamin-D, zinc   Will have her start the Z-Go as well as the steroids  Subjective:      Patient ID: David Silva is a 45 y o  female  She presents for acute visit  She started last week with some nausea and abdominal upset  This was relatively unusual for her and she did not feel was related to anything she ate    Over the next few days she developed worsening overall body aches and extreme fatigue  She states that it has difficulty even to get out of bed at times  Exhausted and has the myalgias  Feels hot and cold at times but denies any definite fever  Has some chronic rhinorrhea related to her allergies  She has multiple exposures to the coronavirus with her working in the hospital, her  working at present, her son in school, and her daughter working at [employer]  The following portions of the patient's history were reviewed and updated as appropriate: She  has a past medical history of Allergic rhinitis, Anxiety, Arthritis, Asthma, CPAP (continuous positive airway pressure) dependence, Daytime hypersomnolence, Depression, GERD (gastroesophageal reflux disease), IUD (intrauterine device) in place, Lumbar disc disease, RA (rheumatoid arthritis) ([facility] [address] ), Rheumatoid arthritis ([facility] [address] ), Sleep apnea, and Wears glasses  She   Patient Active Problem List    Diagnosis Date Noted    Myalgia 09/14/2020    Pulsatile tinnitus of left ear 05/12/2020    Chronic pain of left knee 03/10/2020    Knee instability, left 03/10/2020    Mild hyperlipidemia 09/10/2019    Insufficient sleep syndrome 10/04/2018    Sjogren's syndrome ([facility] [address] ) 10/01/2018    Chronic sinusitis 08/02/2017    Moderate obstructive sleep apnea 05/23/2017    Fatigue 03/22/2017    Seasonal mood disorder ([facility] [address] ) 03/22/2017    Vaginal candidiasis 12/06/2016    Allergic rhinitis 02/04/2016    Nasal polyp 02/04/2016    Vitamin D deficiency 02/04/2016    Depression with anxiety 12/17/2015    Rheumatoid arthritis ([facility] [address] ) 12/17/2015    Anxiety 08/04/2015    Asthma 08/04/2015     She  has a past surgical history that includes Cholecystectomy; Sinus surgery; Nasal septum surgery; [place] tooth extraction; and pr stereotactic comp assist proc,cranial,extradural (N/A, 1/8/2019)    Her family history includes Asthma in her family and mother; Breast cancer in her family; COPD in her family and father; Colon cancer in her father; Diabetes in her family; Hypertension in her mother; Obesity in her family  She  reports that she has quit smoking  Her smoking use included cigarettes  She has never used smokeless tobacco  She reports current alcohol use  She reports that she does not use drugs       Review of Systems   Constitutional: Positive for activity change, appetite change, fatigue and fever  Negative for chills  HENT: Positive for congestion and rhinorrhea  Negative for ear discharge and ear pain  Respiratory: Negative for cough  Cardiovascular: Negative for chest pain and palpitations  Musculoskeletal: Positive for arthralgias and myalgias  Neurological: Positive for headaches  Psychiatric/Behavioral: Positive for decreased concentration  Objective:      Pulse 104   Temp 99 9 °F (37 7 °C)   SpO2 96%          Physical Exam  Vitals signs and nursing note reviewed  Constitutional:       Appearance: She is ill-appearing  HENT:      Head:      Comments: Moist mucous membranes; no significant pharyngeal erythema  Neck:      Musculoskeletal: Neck supple  Cardiovascular:      Rate and Rhythm: Normal rate and regular rhythm  Pulmonary:      Breath sounds: Transmitted upper airway sounds present  Lymphadenopathy:      Cervical: No cervical adenopathy  Neurological:      Mental Status: She is alert  Psychiatric:         Behavior: Behavior is cooperative

## 2020-09-15 VITALS — HEART RATE: 104 BPM | TEMPERATURE: 99.9 F | OXYGEN SATURATION: 96 %

## 2020-09-15 LAB — SARS-COV-2 RNA SPEC QL NAA+PROBE: NOT DETECTED

## 2020-12-15 DIAGNOSIS — Z20.822 EXPOSURE TO COVID-19 VIRUS: ICD-10-CM

## 2020-12-15 DIAGNOSIS — Z20.822 EXPOSURE TO COVID-19 VIRUS: Primary | ICD-10-CM

## 2020-12-15 PROCEDURE — U0003 INFECTIOUS AGENT DETECTION BY NUCLEIC ACID (DNA OR RNA); SEVERE ACUTE RESPIRATORY SYNDROME CORONAVIRUS 2 (SARS-COV-2) (CORONAVIRUS DISEASE [COVID-19]), AMPLIFIED PROBE TECHNIQUE, MAKING USE OF HIGH THROUGHPUT TECHNOLOGIES AS DESCRIBED BY CMS-2020-01-R: HCPCS | Performed by: NURSE PRACTITIONER

## 2020-12-16 LAB — SARS-COV-2 RNA SPEC QL NAA+PROBE: DETECTED

## 2020-12-17 ENCOUNTER — TELEMEDICINE (OUTPATIENT)
Dept: INTERNAL MEDICINE CLINIC | Facility: CLINIC | Age: 40
End: 2020-12-17
Payer: COMMERCIAL

## 2020-12-17 DIAGNOSIS — U07.1 COVID-19: Primary | ICD-10-CM

## 2020-12-17 PROCEDURE — 99442 PR PHYS/QHP TELEPHONE EVALUATION 11-20 MIN: CPT | Performed by: NURSE PRACTITIONER

## 2020-12-23 ENCOUNTER — TELEMEDICINE (OUTPATIENT)
Dept: INTERNAL MEDICINE CLINIC | Facility: CLINIC | Age: 40
End: 2020-12-23
Payer: COMMERCIAL

## 2020-12-23 DIAGNOSIS — U07.1 COVID-19: Primary | ICD-10-CM

## 2020-12-23 PROCEDURE — 99213 OFFICE O/P EST LOW 20 MIN: CPT | Performed by: NURSE PRACTITIONER

## 2021-02-11 DIAGNOSIS — Z23 ENCOUNTER FOR IMMUNIZATION: ICD-10-CM

## 2021-03-01 ENCOUNTER — APPOINTMENT (OUTPATIENT)
Dept: LAB | Facility: MEDICAL CENTER | Age: 41
End: 2021-03-01
Payer: COMMERCIAL

## 2021-03-01 ENCOUNTER — OFFICE VISIT (OUTPATIENT)
Dept: INTERNAL MEDICINE CLINIC | Facility: CLINIC | Age: 41
End: 2021-03-01
Payer: COMMERCIAL

## 2021-03-01 VITALS
OXYGEN SATURATION: 97 % | DIASTOLIC BLOOD PRESSURE: 74 MMHG | HEART RATE: 100 BPM | HEIGHT: 67 IN | SYSTOLIC BLOOD PRESSURE: 116 MMHG | TEMPERATURE: 96.8 F | BODY MASS INDEX: 45.99 KG/M2 | WEIGHT: 293 LBS

## 2021-03-01 DIAGNOSIS — M06.9 RHEUMATOID ARTHRITIS INVOLVING MULTIPLE SITES, UNSPECIFIED WHETHER RHEUMATOID FACTOR PRESENT (HCC): ICD-10-CM

## 2021-03-01 DIAGNOSIS — Z01.419 WELL FEMALE EXAM WITH ROUTINE GYNECOLOGICAL EXAM: ICD-10-CM

## 2021-03-01 DIAGNOSIS — F41.8 DEPRESSION WITH ANXIETY: ICD-10-CM

## 2021-03-01 DIAGNOSIS — F98.8 ATTENTION DEFICIT DISORDER (ADD) WITHOUT HYPERACTIVITY: ICD-10-CM

## 2021-03-01 DIAGNOSIS — Z11.4 SCREENING FOR HIV (HUMAN IMMUNODEFICIENCY VIRUS): ICD-10-CM

## 2021-03-01 DIAGNOSIS — G47.33 MODERATE OBSTRUCTIVE SLEEP APNEA: Chronic | ICD-10-CM

## 2021-03-01 DIAGNOSIS — Z00.00 ANNUAL PHYSICAL EXAM: Primary | ICD-10-CM

## 2021-03-01 DIAGNOSIS — Z12.31 VISIT FOR SCREENING MAMMOGRAM: ICD-10-CM

## 2021-03-01 DIAGNOSIS — E66.01 MORBID OBESITY WITH BMI OF 50.0-59.9, ADULT (HCC): ICD-10-CM

## 2021-03-01 DIAGNOSIS — E78.5 MILD HYPERLIPIDEMIA: ICD-10-CM

## 2021-03-01 DIAGNOSIS — E55.9 VITAMIN D DEFICIENCY: ICD-10-CM

## 2021-03-01 PROBLEM — U07.1 COVID-19: Status: RESOLVED | Noted: 2020-12-17 | Resolved: 2021-03-01

## 2021-03-01 LAB
25(OH)D3 SERPL-MCNC: 26.3 NG/ML (ref 30–100)
ALBUMIN SERPL BCP-MCNC: 3.6 G/DL (ref 3.5–5)
ALP SERPL-CCNC: 97 U/L (ref 46–116)
ALT SERPL W P-5'-P-CCNC: 19 U/L (ref 12–78)
ANION GAP SERPL CALCULATED.3IONS-SCNC: 2 MMOL/L (ref 4–13)
AST SERPL W P-5'-P-CCNC: 12 U/L (ref 5–45)
BASOPHILS # BLD AUTO: 0.14 THOUSANDS/ΜL (ref 0–0.1)
BASOPHILS NFR BLD AUTO: 2 % (ref 0–1)
BILIRUB SERPL-MCNC: 0.52 MG/DL (ref 0.2–1)
BUN SERPL-MCNC: 18 MG/DL (ref 5–25)
CALCIUM SERPL-MCNC: 9.1 MG/DL (ref 8.3–10.1)
CHLORIDE SERPL-SCNC: 107 MMOL/L (ref 100–108)
CHOLEST SERPL-MCNC: 154 MG/DL (ref 50–200)
CO2 SERPL-SCNC: 30 MMOL/L (ref 21–32)
CREAT SERPL-MCNC: 0.84 MG/DL (ref 0.6–1.3)
EOSINOPHIL # BLD AUTO: 0.73 THOUSAND/ΜL (ref 0–0.61)
EOSINOPHIL NFR BLD AUTO: 9 % (ref 0–6)
ERYTHROCYTE [DISTWIDTH] IN BLOOD BY AUTOMATED COUNT: 13.6 % (ref 11.6–15.1)
ERYTHROCYTE [SEDIMENTATION RATE] IN BLOOD: 33 MM/HOUR (ref 0–19)
GFR SERPL CREATININE-BSD FRML MDRD: 88 ML/MIN/1.73SQ M
GLUCOSE P FAST SERPL-MCNC: 75 MG/DL (ref 65–99)
HCT VFR BLD AUTO: 42.8 % (ref 34.8–46.1)
HDLC SERPL-MCNC: 46 MG/DL
HGB BLD-MCNC: 13.4 G/DL (ref 11.5–15.4)
IMM GRANULOCYTES # BLD AUTO: 0.03 THOUSAND/UL (ref 0–0.2)
IMM GRANULOCYTES NFR BLD AUTO: 0 % (ref 0–2)
LDLC SERPL CALC-MCNC: 91 MG/DL (ref 0–100)
LYMPHOCYTES # BLD AUTO: 1.77 THOUSANDS/ΜL (ref 0.6–4.47)
LYMPHOCYTES NFR BLD AUTO: 22 % (ref 14–44)
MCH RBC QN AUTO: 29.5 PG (ref 26.8–34.3)
MCHC RBC AUTO-ENTMCNC: 31.3 G/DL (ref 31.4–37.4)
MCV RBC AUTO: 94 FL (ref 82–98)
MONOCYTES # BLD AUTO: 0.8 THOUSAND/ΜL (ref 0.17–1.22)
MONOCYTES NFR BLD AUTO: 10 % (ref 4–12)
NEUTROPHILS # BLD AUTO: 4.52 THOUSANDS/ΜL (ref 1.85–7.62)
NEUTS SEG NFR BLD AUTO: 57 % (ref 43–75)
NONHDLC SERPL-MCNC: 108 MG/DL
NRBC BLD AUTO-RTO: 0 /100 WBCS
PLATELET # BLD AUTO: 299 THOUSANDS/UL (ref 149–390)
PMV BLD AUTO: 11.4 FL (ref 8.9–12.7)
POTASSIUM SERPL-SCNC: 4.2 MMOL/L (ref 3.5–5.3)
PROT SERPL-MCNC: 7.8 G/DL (ref 6.4–8.2)
RBC # BLD AUTO: 4.55 MILLION/UL (ref 3.81–5.12)
SODIUM SERPL-SCNC: 139 MMOL/L (ref 136–145)
TRIGL SERPL-MCNC: 87 MG/DL
TSH SERPL DL<=0.05 MIU/L-ACNC: 1.32 UIU/ML (ref 0.36–3.74)
WBC # BLD AUTO: 7.99 THOUSAND/UL (ref 4.31–10.16)

## 2021-03-01 PROCEDURE — 80053 COMPREHEN METABOLIC PANEL: CPT

## 2021-03-01 PROCEDURE — 36415 COLL VENOUS BLD VENIPUNCTURE: CPT

## 2021-03-01 PROCEDURE — 87389 HIV-1 AG W/HIV-1&-2 AB AG IA: CPT

## 2021-03-01 PROCEDURE — 82306 VITAMIN D 25 HYDROXY: CPT

## 2021-03-01 PROCEDURE — 85025 COMPLETE CBC W/AUTO DIFF WBC: CPT

## 2021-03-01 PROCEDURE — 80061 LIPID PANEL: CPT

## 2021-03-01 PROCEDURE — 85652 RBC SED RATE AUTOMATED: CPT

## 2021-03-01 PROCEDURE — 99395 PREV VISIT EST AGE 18-39: CPT | Performed by: FAMILY MEDICINE

## 2021-03-01 PROCEDURE — 83036 HEMOGLOBIN GLYCOSYLATED A1C: CPT

## 2021-03-01 PROCEDURE — 84443 ASSAY THYROID STIM HORMONE: CPT

## 2021-03-01 RX ORDER — METHYLPHENIDATE HYDROCHLORIDE 27 MG/1
27 TABLET ORAL DAILY
Qty: 30 TABLET | Refills: 0 | Status: SHIPPED | OUTPATIENT
Start: 2021-03-01 | End: 2021-03-29 | Stop reason: SDUPTHER

## 2021-03-01 NOTE — PATIENT INSTRUCTIONS

## 2021-03-01 NOTE — PROGRESS NOTES
[address] [facility]    NAME: Ailyn Ybarra  AGE: 45 y o  SEX: female  : 1980     DATE: 3/1/2021     Assessment and Plan:     Problem List Items Addressed This Visit        Respiratory    Moderate obstructive sleep apnea (Chronic)       Musculoskeletal and Integument    Rheumatoid arthritis (HCC)    Relevant Orders    CBC and differential (Completed)    Sedimentation rate, automated (Completed)       Other    Depression with anxiety    Relevant Medications    methylphenidate (CONCERTA) 27 MG ER tablet    Other Relevant Orders    CBC and differential (Completed)    Vitamin D deficiency    Relevant Orders    Vitamin D 25 hydroxy (Completed)    Mild hyperlipidemia    Relevant Orders    Comprehensive metabolic panel (Completed)    Hemoglobin A1C    Lipid panel (Completed)    TSH, 3rd generation (Completed)    Attention deficit disorder (ADD) without hyperactivity    Relevant Medications    methylphenidate (CONCERTA) 27 MG ER tablet    Other Relevant Orders    CBC and differential (Completed)    Morbid obesity with BMI of 50 0-59 9, adult ([facility] [address] )    Relevant Orders    CBC and differential (Completed)    Comprehensive metabolic panel (Completed)    Hemoglobin A1C    Lipid panel (Completed)      Other Visit Diagnoses     Annual physical exam    -  Primary    Screening for HIV (human immunodeficiency virus)        Relevant Orders    HIV 1/2 Antigen/Antibody (4th Generation) w Reflex [facility]    Well female exam with routine gynecological exam        Relevant Orders    Ambulatory referral to Gynecology    Visit for screening mammogram        Relevant Orders    Mammo screening bilateral w 3d & cad        Orders and recommendations as noted above  Slip given for laboratory testing  It has been close to a year since she has had any testing done  Discussed with her options that may be available for weight loss depending on her laboratory testing  If she has insulin resistance/impaired glucose tolerance, options may be available to have output with weight loss as well as blood sugar control  Her anxiety symptoms are relatively well controlled with Prozac  She does have some persistent symptoms of depression  She also has a definite issue with her attention span in motivation  Discussed with her possibility that her sleep apnea is worsening but she does not feel that is the case since her CPAP is working well and she does not feel overly tired  More  Of her issue is motivation and concentration  Symptoms seem more consistent with ADD  Discussed options for this  Will try a trial of Concerta  Discussed potential risks associated with this  Discussed the potential for addiction although it is unlikely with the extended form  Discussed the possibility of increased irritability since she did have the symptoms with the Wellbutrin  She will monitor for this  Discussed dietary changes  Weight loss would likely help her overall  Try to increase activity level  Watch for any worsening of joint symptoms  Inflammatory markers ordered especially with her history of the rheumatoid arthritis  Will check vitamin-D level especially with this being the winter months and her not taking the vitamin-D at present  She will be turning 41 in the near future  Slip given for her mammogram   Will have her follow up in about 6 months or sooner if needed depending on the results of the laboratory testing and her response to the Concerta  If we decide to continue with the Concerta, we will likely need to follow-up with her every 1-2 months  Immunizations and preventive care screenings were discussed with patient today  Appropriate education was printed on patient's after visit summary  Counseling:  Alcohol/drug use: discussed moderation in alcohol intake, the recommendations for healthy alcohol use, and avoidance of illicit drug use    Dental Health: discussed importance of regular tooth brushing, flossing, and dental visits  Injury prevention: discussed safety/seat belts, safety helmets, smoke detectors, carbon dioxide detectors, and smoking near bedding or upholstery  Sexual health: discussed sexually transmitted diseases, partner selection, use of condoms, avoidance of unintended pregnancy, and contraceptive alternatives  · Exercise: the importance of regular exercise/physical activity was discussed  Recommend exercise 3-5 times per week for at least 30 minutes  BMI Counseling: Body mass index is 55 19 kg/m²  The BMI is above normal  Nutrition recommendations include decreasing portion sizes, encouraging healthy choices of fruits and vegetables, decreasing fast food intake, consuming healthier snacks, limiting drinks that contain sugar, moderation in carbohydrate intake, reducing intake of saturated and trans fat and reducing intake of cholesterol  Exercise recommendations include exercising 3-5 times per week  No follow-ups on file  Chief Complaint:     Chief Complaint   Patient presents with    Annual Exam      History of Present Illness:     Adult Annual Physical   Patient here for a comprehensive physical exam  The patient reports problems - See below  She presents for wellness visit  She has been feeling much less motivated  Attention span is worse  Does not feel depressed or anxious since she is on the Prozac but just feels a complete lack of concentration and motivation  She was unsure whether this is related to her turning 41 later this month or related to the stressors of coronavirus pandemic along with the winter weather  Has been using the CPAP regularly and feels well rested but has difficulty completing even simple tasks around the house  Has had more stress at work because trying to fill fill her full hours  Has not been taking her vitamin-D regularly  Continues with joint symptoms especially into her hands and wrists  Not taking anything for this regularly  Denies any chest pain or palpitations  Denies any significant shortness of breath  Diet and Physical Activity  · Diet/Nutrition: poor diet and limited fruits/vegetables  · Exercise: no formal exercise  Depression Screening  PHQ-9 Depression Screening    PHQ-9:   Frequency of the following problems over the past two weeks:      Little interest or pleasure in doing things: 3 - nearly every day  Feeling down, depressed, or hopeless: 0 - not at all  Trouble falling or staying asleep, or sleeping too much: 0 - not at all  Feeling tired or having little energy: 0 - not at all  Poor appetite or overeatin - not at all  Feeling bad about yourself - or that you are a failure or have let yourself or your family down: 0 - not at all  Trouble concentrating on things, such as reading the newspaper or watching television: 0 - not at all  Moving or speaking so slowly that other people could have noticed  Or the opposite - being so fidgety or restless that you have been moving around a lot more than usual: 0 - not at all  Thoughts that you would be better off dead, or of hurting yourself in some way: 0 - not at all  PHQ-2 Score: 3  PHQ-9 Score: 3       General Health  · Sleep: gets 7-8 hours of sleep on average and experiences daytime hypersomnolence  · Hearing: normal - bilateral   · Vision: no vision problems and most recent eye exam <1 year ago  · Dental: regular dental visits  /GYN Health  · Last menstrual period:    · Contraceptive method:     · History of STDs?: no      Review of Systems:     Review of Systems   Constitutional: Positive for activity change, appetite change, fatigue and unexpected weight change  Negative for chills and fever  HENT: Positive for congestion, postnasal drip and sinus pressure  Negative for rhinorrhea  Eyes: Negative for visual disturbance  Respiratory: Negative for cough, chest tightness and shortness of breath  Cardiovascular: Negative for chest pain and palpitations  Gastrointestinal: Negative for abdominal pain, blood in stool, diarrhea, nausea and vomiting  Endocrine: Negative for polydipsia, polyphagia and polyuria  Genitourinary: Negative for dysuria, frequency and urgency  Musculoskeletal: Positive for arthralgias  Negative for gait problem  Skin: Negative for color change  Neurological: Negative for dizziness and headaches  Hematological: Does not bruise/bleed easily  Psychiatric/Behavioral: Positive for decreased concentration and dysphoric mood  Negative for confusion and sleep disturbance  The patient is not nervous/anxious         Past Medical History:     Past Medical History:   Diagnosis Date    Allergic rhinitis     Anxiety     Arthritis     Asthma     COVID-19 12/17/2020    CPAP (continuous positive airway pressure) dependence     Daytime hypersomnolence     LAST ASSESSED [id number]    Depression     GERD (gastroesophageal reflux disease)     occas    IUD (intrauterine device) in place     Lumbar disc disease     RA [facility]) ([facility] [address] [facility]     Rheumatoid arthritis ([facility] [address] )     Sleep apnea     Wears glasses       Past Surgical History:     Past Surgical History:   Procedure Laterality Date    CHOLECYSTECTOMY      NASAL SEPTUM SURGERY      NASAL SEPTAL DEVIATION REPAIR-Dr Saman Kent    SD STEREOTACTIC COMP ASSIST PROC,CRANIAL,EXTRADURAL N/A 1/8/2019    Procedure: FUNCTIONAL ENDOSCOPIC SINUS SURGERY (FESS) IMAGED GUIDED; PLACEMENT OF MULTIPLE PROPEL IMPLANTS;  Surgeon: Tyler Nesbitt DO;  Location: [facility];  Service: ENT    SINUS SURGERY      WISDOM TOOTH EXTRACTION        Social History:        Social History     Socioeconomic History    Marital status: /Civil Union     Spouse name: None    Number of children: None    Years of education: None    Highest education level: None   Occupational History    None   Social Needs    Financial resource strain: None    Food insecurity     Worry: None     Inability: None    Transportation needs     Medical: None     Non-medical: None   Tobacco Use    Smoking status: Former Smoker     Types: Cigarettes    Smokeless tobacco: Never Used    Tobacco comment: quit 5 years ago    Substance and Sexual Activity    Alcohol use: Yes     Comment: occas    Drug use: No    Sexual activity: None   Lifestyle    Physical activity     Days per week: None     Minutes per session: None    Stress: None   Relationships    Social connections     Talks on phone: None     Gets together: None     Attends Denominational service: None     Active member of club or organization: None     Attends meetings of clubs or organizations: None     Relationship status: None    Intimate partner violence     Fear of current or ex partner: None     Emotionally abused: None     Physically abused: None     Forced sexual activity: None   Other Topics Concern    None   Social History Narrative    None      Family History:     Family History   Problem Relation Age of Onset    Asthma Family     Breast cancer Family     COPD Family     Diabetes Family     Obesity Family     Asthma Mother     Hypertension Mother     Colon cancer Father     COPD Father       Current Medications:     Current Outpatient Medications   Medication Sig Dispense Refill    albuterol (2 5 mg/3 mL) 0 083 % nebulizer solution Inhale 1 each every 4 (four) hours as needed      albuterol (PROAIR HFA) 90 mcg/act inhaler Inhale 1-2 puffs every 6 (six) hours as needed        Cholecalciferol (VITAMIN D) 2000 UNITS tablet Take 2,000 Units by mouth once a week        diazepam (VALIUM) 2 mg tablet Take 1 tablet (2 mg total) by mouth every 12 (twelve) hours as needed for anxiety (prior to MRI/procedure) 30 tablet 0    fexofenadine (ALLEGRA) 180 MG tablet Take 180 mg by mouth daily      FLUoxetine (PROzac) 40 MG capsule Take 1 capsule (40 mg total) by mouth daily 90 capsule 3    fluticasone (FLONASE) 50 mcg/act nasal spray 2 sprays into each nostril daily      ibuprofen (MOTRIN) 200 mg tablet Take 200 mg by mouth every 6 (six) hours as needed for mild pain   Magnesium Oxide -Mg Supplement 400 MG CAPS Take 1 capsule by mouth daily      mometasone-formoterol (DULERA) 200-5 MCG/ACT inhaler Inhale 2 puffs 2 (two) times a day Rinse mouth after use  3 Inhaler 3    ergocalciferol (VITAMIN D2) 50,000 units Take 1 capsule (50,000 Units total) by mouth once a week (Patient not taking: Reported on 3/1/2021) 12 capsule 1    methylphenidate (CONCERTA) 27 MG ER tablet Take 1 tablet (27 mg total) by mouth dailyMax Daily Amount: 27 mg 30 tablet 0     No current facility-administered medications for this visit  Allergies: Allergies   Allergen Reactions    Morphine      "with epidural during pregnancy caused hypotension"      Physical Exam:     /74 (BP Location: Left arm, Patient Position: Sitting, Cuff Size: Large)   Pulse 100   Temp (!) 96 8 °F (36 °C) (Temporal)   Ht 5' 7" (1 702 m)   Wt (!) 160 kg (352 lb 6 4 oz)   SpO2 97%   BMI 55 19 kg/m²     Physical Exam  Vitals signs and nursing note reviewed  Constitutional:       General: She is not in acute distress  Appearance: She is well-developed and well-groomed  She is morbidly obese  HENT:      Head: Normocephalic and atraumatic  Comments:   Boggy nasal mucosa with clear nasal discharge and swelling left greater than right  Eyes:      Conjunctiva/sclera: Conjunctivae normal    Neck:      Musculoskeletal: Neck supple  Thyroid: No thyromegaly  Vascular: No carotid bruit  Cardiovascular:      Rate and Rhythm: Normal rate and regular rhythm  Heart sounds: No murmur  Pulmonary:      Effort: Pulmonary effort is normal  No respiratory distress  Breath sounds: Normal breath sounds  Abdominal:      Palpations: Abdomen is soft  Tenderness: There is no abdominal tenderness     Skin:     General: Skin is warm and dry  Neurological:      Mental Status: She is alert and oriented to person, place, and time  Psychiatric:         Mood and Affect: Mood is depressed  Mood is not anxious  Speech: Speech normal          Behavior: Behavior is cooperative           Cognition and Memory: Cognition and memory normal           Samira Rodriguez MD   [address]

## 2021-03-02 LAB
EST. AVERAGE GLUCOSE BLD GHB EST-MCNC: 108 MG/DL
HBA1C MFR BLD: 5.4 %

## 2021-03-03 LAB — HIV 1+2 AB+HIV1 P24 AG SERPL QL IA: NORMAL

## 2021-03-16 ENCOUNTER — IMMUNIZATIONS (OUTPATIENT)
Dept: FAMILY MEDICINE CLINIC | Facility: HOSPITAL | Age: 41
End: 2021-03-16

## 2021-03-16 DIAGNOSIS — Z23 ENCOUNTER FOR IMMUNIZATION: Primary | ICD-10-CM

## 2021-03-16 PROCEDURE — 91300 SARS-COV-2 / COVID-19 MRNA VACCINE (PFIZER-BIONTECH) 30 MCG: CPT

## 2021-03-16 PROCEDURE — 0001A SARS-COV-2 / COVID-19 MRNA VACCINE (PFIZER-BIONTECH) 30 MCG: CPT

## 2021-03-29 DIAGNOSIS — F98.8 ATTENTION DEFICIT DISORDER (ADD) WITHOUT HYPERACTIVITY: ICD-10-CM

## 2021-03-29 RX ORDER — METHYLPHENIDATE HYDROCHLORIDE 27 MG/1
27 TABLET ORAL DAILY
Qty: 30 TABLET | Refills: 0 | Status: SHIPPED | OUTPATIENT
Start: 2021-03-29 | End: 2021-05-03 | Stop reason: SDUPTHER

## 2021-04-07 ENCOUNTER — OFFICE VISIT (OUTPATIENT)
Dept: OBGYN CLINIC | Facility: CLINIC | Age: 41
End: 2021-04-07
Payer: COMMERCIAL

## 2021-04-07 VITALS
BODY MASS INDEX: 45.99 KG/M2 | SYSTOLIC BLOOD PRESSURE: 122 MMHG | TEMPERATURE: 98 F | HEIGHT: 67 IN | HEART RATE: 78 BPM | DIASTOLIC BLOOD PRESSURE: 82 MMHG | WEIGHT: 293 LBS

## 2021-04-07 DIAGNOSIS — Z12.4 ENCOUNTER FOR SCREENING FOR MALIGNANT NEOPLASM OF CERVIX: Primary | ICD-10-CM

## 2021-04-07 DIAGNOSIS — Z30.431 CONTRACEPTIVE, SURVEILLANCE, INTRAUTERINE DEVICE: ICD-10-CM

## 2021-04-07 PROCEDURE — 87624 HPV HI-RISK TYP POOLED RSLT: CPT | Performed by: CLINICAL NURSE SPECIALIST

## 2021-04-07 PROCEDURE — 99386 PREV VISIT NEW AGE 40-64: CPT | Performed by: CLINICAL NURSE SPECIALIST

## 2021-04-07 PROCEDURE — G0145 SCR C/V CYTO,THINLAYER,RESCR: HCPCS | Performed by: CLINICAL NURSE SPECIALIST

## 2021-04-07 NOTE — PROGRESS NOTES
Subjective:        Betito Alas is a 37 y o  female  Here for Gynecologic Exam (New Patient here for yv, states no problems  Last Pap- 02/03/2016 Negative )      GYN HPI  Here for annual gyn exam  New pt to establish GYN care-last GYN exam 2017  MRI tech for [facility]  In general has no major complaints today    She Denies menstrual problems  Menstrual patttern: amenorrhea due to contraception/IUD  She denies any abnormal vaginal or urinary complaints    Denies changes or concerns r/t breasts  She does performs self breast exams  She reports she generally eats a healthy diet and has been trying to increase activity  She does not get adequate dietary calcium/vit D  Counseled on importance  She denies any untreated or poorly controlled anxiety or depression sxs  Currently on prozac  Working w/ PCP    She is sexually active  Her current method of contraception includes IUD , She does not use condoms for STI prevention  , She denies issues with intimacy  She reports that she does feel safe at home  The following portions of the patient's history were reviewed and updated as appropriate: allergies, current medications, past family history, past medical history, past social history, past surgical history, and problem list       Hereditary Cancer Screening  Family history reviewed and patient does not meet criteria for referral for genetic cancer  assessment  Substance Abuse Screening Completed  See hx and flowsheet  Screens Positive for NONE       HEALTH MAINTENANCE SCREENINGS:    · History of abnormal pap: No, Last Papanicolaou test:  02/03/2016  · History of abnormal mammogram: N/A,- just turned 41- hasn't had one yet- has order from PCP  · Last screening for Diabetes, lipids, and thyroid disorder just done last month and WNL    IMMUNIZATIONS  Tetanus vaccination status reviewed: tetanus re-vaccination not indicated  rec'd flu vax this season  Got 1st dose COVID, 2nd due soon  Discussed Gardasil  Risk is low    Review of Systems   Constitutional: Negative for appetite change, chills, fatigue, fever and unexpected weight change  HENT: Negative  Eyes: Negative  Respiratory: Negative for chest tightness and shortness of breath  Cardiovascular: Negative for chest pain and palpitations  Gastrointestinal: Negative for abdominal pain, constipation and vomiting  Endocrine: Negative for cold intolerance and heat intolerance  Genitourinary:        As per HPI   Musculoskeletal: Negative for back pain, joint swelling and neck pain  Skin: Negative for color change and rash  Neurological: Negative for dizziness, weakness and numbness  Hematological: Does not bruise/bleed easily  Psychiatric/Behavioral: Negative  Objective:  /82 (BP Location: Right arm, Patient Position: Sitting, Cuff Size: Standard)   Pulse 78   Temp 98 °F (36 7 °C) (Temporal)   Ht 5' 7" (1 702 m)   Wt (!) 162 kg (357 lb 3 2 oz)   LMP  (LMP Unknown)   BMI 55 95 kg/m²        Physical Exam  Constitutional:       General: She is not in acute distress  Appearance: Normal appearance  Genitourinary:      Pelvic exam was performed with patient in the lithotomy position  Vulva, urethra and rectum normal       No vulval lesion, tenderness or rash noted  No posterior fourchette injury or lesion present  No urethral prolapse or caruncle present  No lesions in the vagina  No vaginal discharge, erythema, tenderness, bleeding or prolapse  No cervical motion tenderness, discharge, friability or erythema  Uterus is not enlarged or tender  Uterus is regular  No right or left adnexal mass present  Right adnexa not tender  Left adnexa not tender  Genitourinary Comments: Black IUD strings noted during exam   Approx 3 cm in length   HENT:      Head: Normocephalic and atraumatic  Neck:      Musculoskeletal: Normal range of motion     Cardiovascular:      Rate and Rhythm: Normal rate  Heart sounds: No murmur  Pulmonary:      Effort: Pulmonary effort is normal       Breath sounds: Normal breath sounds  Chest:      Breasts: Breasts are symmetrical          Right: No inverted nipple, mass, nipple discharge, skin change or tenderness  Left: No inverted nipple, mass, nipple discharge, skin change or tenderness  Abdominal:      General: There is no distension  Palpations: Abdomen is soft  Tenderness: There is no abdominal tenderness  Musculoskeletal: Normal range of motion  Lymphadenopathy:      Cervical: No cervical adenopathy  Neurological:      Mental Status: She is alert and oriented to person, place, and time  Skin:     General: Skin is warm and dry  Psychiatric:         Mood and Affect: Mood normal          Behavior: Behavior normal    Vitals signs reviewed  Assessment/Plan:           Ashia Mosqueda- Primary  Annual GYN examination completed today  Risk prevention and anticipatory guidance provided including:   Pap/breast screenings- see below   Risk for hereditary cancers: Family history reviewed and patient does not qualify for referral for genetics consult/testing  Referral to genetics oncology offered as indicated   Calcium and vitamin D supplementation   Risk factors for lipid, diabetes and thryroid screening, ordered if needed   Dietary and lifestyle recommendations based on her age and weight  body mass index is 55 95 kg/m²  Alonzo Rocha PHQ-2 depression screen completed  Reviewed and interventions recommended if needed   Tobacco and alcohol use, intervention ordered if applicable  Cervical cancer screening  Previous pap smears and ASCCP screening guidelines have been reviewed  Pap smear is indicated at this time  Contraception   see A&P for IUD surv  STI Screening  STI screening is declined  STI prevention discussed with use of condoms      Breast exam and breast cancer screening  Breast exam was done today and no abnormalities id'd  Based on [facility] imaging  [facility], ACOG, and ACS, I have recommended yearly screening mammogram- however, there are other professional organizations that may recommend deferring mammograms until age 48 or screening every other year  I discussed that a clinical breast exams may miss certain cancers and imaging should be considered  I have discussed the above recommendations and she wishes to proceed as noted yearly    Problem List Items Addressed This Visit     IUD (mirena) Surveillance     Patient has Mirena IUD, she denies any complaints  Is mostly amenorrheic on this  This is her 3rd IUD and desires to continue use  Was most recently inserted November 2017  We reviewed that Mirena IUD was newly approved by the FDA for use for contraception for 6 years and thus does not need to be removed until November of 2023  Other Visit Diagnoses     Encounter for screening for malignant neoplasm of cervix    -  Primary    Relevant Orders    Liquid-based pap, screening          No orders of the defined types were placed in this encounter

## 2021-04-07 NOTE — ASSESSMENT & PLAN NOTE
Patient has Mirena IUD, she denies any complaints  Is mostly amenorrheic on this  This is her 3rd IUD and desires to continue use  Was most recently inserted November 2017  We reviewed that Mirena IUD was newly approved by the FDA for use for contraception for 6 years and thus does not need to be removed until November of 2023

## 2021-04-10 LAB
HPV HR 12 DNA CVX QL NAA+PROBE: NEGATIVE
HPV16 DNA CVX QL NAA+PROBE: NEGATIVE
HPV18 DNA CVX QL NAA+PROBE: NEGATIVE

## 2021-04-14 ENCOUNTER — IMMUNIZATIONS (OUTPATIENT)
Dept: FAMILY MEDICINE CLINIC | Facility: HOSPITAL | Age: 41
End: 2021-04-14

## 2021-04-14 DIAGNOSIS — Z23 ENCOUNTER FOR IMMUNIZATION: Primary | ICD-10-CM

## 2021-04-14 LAB
LAB AP GYN PRIMARY INTERPRETATION: NORMAL
Lab: NORMAL

## 2021-04-14 PROCEDURE — 0002A SARS-COV-2 / COVID-19 MRNA VACCINE (PFIZER-BIONTECH) 30 MCG: CPT

## 2021-04-14 PROCEDURE — 91300 SARS-COV-2 / COVID-19 MRNA VACCINE (PFIZER-BIONTECH) 30 MCG: CPT

## 2021-04-22 DIAGNOSIS — F41.8 DEPRESSION WITH ANXIETY: ICD-10-CM

## 2021-04-22 RX ORDER — FLUOXETINE HYDROCHLORIDE 40 MG/1
CAPSULE ORAL
Qty: 90 CAPSULE | Refills: 0 | Status: SHIPPED | OUTPATIENT
Start: 2021-04-22 | End: 2021-07-26 | Stop reason: SDUPTHER

## 2021-05-03 DIAGNOSIS — F98.8 ATTENTION DEFICIT DISORDER (ADD) WITHOUT HYPERACTIVITY: Primary | ICD-10-CM

## 2021-05-03 RX ORDER — METHYLPHENIDATE HYDROCHLORIDE 27 MG/1
27 TABLET ORAL DAILY
Qty: 30 TABLET | Refills: 0 | Status: SHIPPED | OUTPATIENT
Start: 2021-05-03 | End: 2021-05-25 | Stop reason: SDUPTHER

## 2021-05-25 ENCOUNTER — OFFICE VISIT (OUTPATIENT)
Dept: INTERNAL MEDICINE CLINIC | Facility: CLINIC | Age: 41
End: 2021-05-25
Payer: COMMERCIAL

## 2021-05-25 VITALS
WEIGHT: 293 LBS | TEMPERATURE: 97.5 F | BODY MASS INDEX: 45.99 KG/M2 | SYSTOLIC BLOOD PRESSURE: 128 MMHG | HEART RATE: 86 BPM | HEIGHT: 67 IN | DIASTOLIC BLOOD PRESSURE: 84 MMHG | OXYGEN SATURATION: 97 %

## 2021-05-25 DIAGNOSIS — F41.8 DEPRESSION WITH ANXIETY: ICD-10-CM

## 2021-05-25 DIAGNOSIS — F98.8 ATTENTION DEFICIT DISORDER (ADD) WITHOUT HYPERACTIVITY: ICD-10-CM

## 2021-05-25 DIAGNOSIS — M06.9 RHEUMATOID ARTHRITIS INVOLVING MULTIPLE SITES, UNSPECIFIED WHETHER RHEUMATOID FACTOR PRESENT (HCC): Primary | ICD-10-CM

## 2021-05-25 PROCEDURE — 99214 OFFICE O/P EST MOD 30 MIN: CPT | Performed by: FAMILY MEDICINE

## 2021-05-25 RX ORDER — TOFACITINIB 5 MG/1
1 TABLET, FILM COATED ORAL 2 TIMES DAILY
Qty: 180 TABLET | Refills: 0 | Status: SHIPPED | OUTPATIENT
Start: 2021-05-25 | End: 2021-08-31

## 2021-05-25 RX ORDER — METHYLPHENIDATE HYDROCHLORIDE 36 MG/1
36 TABLET ORAL DAILY
Qty: 30 TABLET | Refills: 0 | Status: SHIPPED | OUTPATIENT
Start: 2021-05-25 | End: 2021-06-03 | Stop reason: SDUPTHER

## 2021-05-26 NOTE — PROGRESS NOTES
Assessment/Plan:    No problem-specific Assessment & Plan notes found for this encounter  Diagnoses and all orders for this visit:    Rheumatoid arthritis involving multiple sites, unspecified whether rheumatoid factor present (HCC)  -     Tofacitinib Citrate (Xeljanz) 5 MG TABS; Take 1 tablet (5 mg total) by mouth 2 (two) times a day  -     CBC and differential; Future  -     Comprehensive metabolic panel; Future    Attention deficit disorder (ADD) without hyperactivity  -     methylphenidate (CONCERTA) 36 MG ER tablet; Take 1 tablet (36 mg total) by mouth dailyMax Daily Amount: 36 mg    Depression with anxiety         orders and recommendations as noted above  Discussed her current concerns with her  She does seem to be having an exacerbation of her rheumatoid arthritis  This is based on her symptoms and presentation  Discussed options with her  Would like to avoid long-term steroids  Discussed following up with rheumatology  She has seen 1 in the past and she plans on contacting them for follow-up  She is interested in some of the new medications  Methotrexate is not an option since she had significant shortness of breath with this  Plaquenil may be an option but potential side effects   Concerning to her especially the vision changes  Discussed other disease modulating medications  Discussed Hershall Marisol since this is an oral option  We will attempt to order this but discussed with her that this may require authorization  Potential side effects discussed  Depression anxiety symptoms are controlled with the Prozac  Continue current dosage  Discussed her concentration issues especially toward evening  Concerta was initially working well  Now she feels that it is not as effective and has not lasted through the whole day  Discussed increasing the dosage to 36 mg on a daily basis  Order placed  Will have her follow up in about 4-6 weeks or sooner if needed      Subjective:      Patient ID: Waylon Cano is a 37 y o  female  She presents for follow-up  She has been having issues with her joints over the past few weeks  Has noticed increased swelling along the knuckles bilaterally right greater than left  Has similar issues in her feet especially at the base of the 1st and 2nd toe  Significant stiffness into the wrists bilaterally right greater than left  Some stiffness as well into the ankles  She did notice some swelling  She had some prednisone left at home and started taking this  She has put herself on a tapering dose  She has noticed significant improvement with this but would like to try to avoid steroids if possible  Denies any fevers or chills  Denies any significant shortness of breath  Mood has been stable on the Prozac  Has been dealing with some stressors both at home and at work without much difficulty  She noticed significant improvement with concentration with the Concerta initially  Over the past few months she has noticed this has not seemed as effective  More difficulty concentrating and completing tasks  Denies any significant irritability  Feels like the symptoms worsen as the day goes on  The following portions of the patient's history were reviewed and updated as appropriate:   She  has a past medical history of Allergic rhinitis, Anxiety, Arthritis, Asthma, COVID-19 (12/17/2020), CPAP (continuous positive airway pressure) dependence, Daytime hypersomnolence, Depression, GERD (gastroesophageal reflux disease), IUD (intrauterine device) in place, Lumbar disc disease, Migraine, RA (rheumatoid arthritis) ([facility] [address] ), Rheumatoid arthritis ([facility] [address] ), Sleep apnea, Varicella, and Wears glasses    She   Patient Active Problem List    Diagnosis Date Noted    IUD (mirena) Surveillance 04/07/2021    Attention deficit disorder (ADD) without hyperactivity 03/01/2021    Morbid obesity with BMI of 50 0-59 9, adult ([address] ) 03/01/2021    Myalgia 09/14/2020    Pulsatile tinnitus of left ear 05/12/2020    Chronic pain of left knee 03/10/2020    Knee instability, left 03/10/2020    Mild hyperlipidemia 09/10/2019    Insufficient sleep syndrome 10/04/2018    Sjogren's syndrome ([facility] ) 10/01/2018    Chronic sinusitis 08/02/2017    Moderate obstructive sleep apnea 05/23/2017    Fatigue 03/22/2017    Seasonal mood disorder ([place] [address] ) 03/22/2017    Vaginal candidiasis 12/06/2016    Allergic rhinitis 02/04/2016    Nasal polyp 02/04/2016    Vitamin D deficiency 02/04/2016    Depression with anxiety 12/17/2015    Rheumatoid arthritis ([place] [address] ) 12/17/2015    Anxiety 08/04/2015    Asthma 08/04/2015     She  has a past surgical history that includes Cholecystectomy; Sinus surgery; Nasal septum surgery; [place] tooth extraction; and pr stereotactic comp assist proc,cranial,extradural (N/A, 1/8/2019)  Her family history includes Aneurysm in her paternal grandfather; Asthma in her mother; Autism in her son; COPD in her mother; Cancer in her paternal grandmother; Celiac disease in her daughter and paternal grandfather; Chiari malformation in her brother and daughter; Diabetes in her daughter; Hypertension in her mother; Hypothyroidism in her father; Lung disease in her maternal grandfather  She  reports that she has quit smoking  Her smoking use included cigarettes  She smoked 0 00 packs per day for 0 00 years  She has never used smokeless tobacco  She reports current alcohol use  She reports that she does not use drugs    Current Outpatient Medications   Medication Sig Dispense Refill    albuterol (2 5 mg/3 mL) 0 083 % nebulizer solution Inhale 1 each every 4 (four) hours as needed      albuterol (PROAIR HFA) 90 mcg/act inhaler Inhale 1-2 puffs every 6 (six) hours as needed        diazepam (VALIUM) 2 mg tablet Take 1 tablet (2 mg total) by mouth every 12 (twelve) hours as needed for anxiety (prior to MRI/procedure) 30 tablet 0    ergocalciferol (VITAMIN D2) 50,000 units Take 1 capsule (50,000 Units total) by mouth once a week 12 capsule 1    fexofenadine (ALLEGRA) 180 MG tablet Take 180 mg by mouth daily      FLUoxetine (PROzac) 40 MG capsule TAKE ONE CAPSULE BY MOUTH EVERY DAY 90 capsule 0    fluticasone (FLONASE) 50 mcg/act nasal spray 2 sprays into each nostril daily      ibuprofen (MOTRIN) 200 mg tablet Take 200 mg by mouth every 6 (six) hours as needed for mild pain   Magnesium Oxide -Mg Supplement 400 MG CAPS Take 1 capsule by mouth daily      methylphenidate (CONCERTA) 36 MG ER tablet Take 1 tablet (36 mg total) by mouth dailyMax Daily Amount: 36 mg 30 tablet 0    mometasone-formoterol (DULERA) 200-5 MCG/ACT inhaler Inhale 2 puffs 2 (two) times a day Rinse mouth after use  3 Inhaler 3    Tofacitinib Citrate (Xeljanz) 5 MG TABS Take 1 tablet (5 mg total) by mouth 2 (two) times a day 180 tablet 0     No current facility-administered medications for this visit  Current Outpatient Medications on File Prior to Visit   Medication Sig    albuterol (2 5 mg/3 mL) 0 083 % nebulizer solution Inhale 1 each every 4 (four) hours as needed    albuterol (PROAIR HFA) 90 mcg/act inhaler Inhale 1-2 puffs every 6 (six) hours as needed      diazepam (VALIUM) 2 mg tablet Take 1 tablet (2 mg total) by mouth every 12 (twelve) hours as needed for anxiety (prior to MRI/procedure)    ergocalciferol (VITAMIN D2) 50,000 units Take 1 capsule (50,000 Units total) by mouth once a week    fexofenadine (ALLEGRA) 180 MG tablet Take 180 mg by mouth daily    FLUoxetine (PROzac) 40 MG capsule TAKE ONE CAPSULE BY MOUTH EVERY DAY    fluticasone (FLONASE) 50 mcg/act nasal spray 2 sprays into each nostril daily    ibuprofen (MOTRIN) 200 mg tablet Take 200 mg by mouth every 6 (six) hours as needed for mild pain   Magnesium Oxide -Mg Supplement 400 MG CAPS Take 1 capsule by mouth daily    mometasone-formoterol (DULERA) 200-5 MCG/ACT inhaler Inhale 2 puffs 2 (two) times a day Rinse mouth after use       No current facility-administered medications on file prior to visit  She is allergic to morphine       Review of Systems   Constitutional: Positive for activity change and fatigue  Negative for appetite change, chills and fever  HENT: Positive for congestion  Negative for postnasal drip  Respiratory: Negative for cough and shortness of breath  Cardiovascular: Negative for chest pain and palpitations  Gastrointestinal: Negative for abdominal pain, constipation, diarrhea and nausea  Musculoskeletal: Positive for arthralgias, back pain and joint swelling  Neurological: Negative for light-headedness and headaches  Psychiatric/Behavioral: Positive for decreased concentration  Negative for agitation, dysphoric mood and sleep disturbance  The patient is not nervous/anxious  Objective:      /84 (BP Location: Left arm, Patient Position: Sitting, Cuff Size: Large)   Pulse 86   Temp 97 5 °F (36 4 °C)   Ht 5' 7" (1 702 m)   Wt (!) 159 kg (351 lb 4 8 oz)   SpO2 97%   BMI 55 02 kg/m²          Physical Exam  Vitals signs and nursing note reviewed  Constitutional:       General: She is not in acute distress  Appearance: She is well-developed  She is morbidly obese  HENT:      Head: Normocephalic and atraumatic  Eyes:      General:         Right eye: No discharge  Left eye: No discharge  Conjunctiva/sclera: Conjunctivae normal       Pupils: Pupils are equal, round, and reactive to light  Cardiovascular:      Rate and Rhythm: Normal rate and regular rhythm  Heart sounds: Normal heart sounds  No murmur  No friction rub  No gallop  Pulmonary:      Effort: No respiratory distress  Breath sounds: No wheezing or rales  Abdominal:      General: Bowel sounds are normal  There is no distension  Tenderness: There is no abdominal tenderness     Musculoskeletal:      Comments:   Some degenerative changes into the hands and wrist areas bilaterally right greater than left; this is most pronounced at the 2nd and 3rd MCP joints; bogginess to the wrists bilaterally right greater than left   Lymphadenopathy:      Cervical: No cervical adenopathy  Skin:     General: Skin is warm and dry  Neurological:      Mental Status: She is alert and oriented to person, place, and time  Mental status is at baseline  Psychiatric:         Attention and Perception: She is inattentive  Mood and Affect: Mood and affect normal          Speech: Speech normal          Behavior: Behavior normal  Behavior is cooperative

## 2021-06-03 DIAGNOSIS — F98.8 ATTENTION DEFICIT DISORDER (ADD) WITHOUT HYPERACTIVITY: ICD-10-CM

## 2021-06-03 RX ORDER — METHYLPHENIDATE HYDROCHLORIDE 36 MG/1
36 TABLET ORAL DAILY
Qty: 30 TABLET | Refills: 0 | Status: SHIPPED | OUTPATIENT
Start: 2021-06-03 | End: 2021-07-06 | Stop reason: SDUPTHER

## 2021-06-18 ENCOUNTER — APPOINTMENT (OUTPATIENT)
Dept: LAB | Facility: HOSPITAL | Age: 41
End: 2021-06-18
Payer: COMMERCIAL

## 2021-06-18 DIAGNOSIS — M06.9 RHEUMATOID ARTHRITIS INVOLVING MULTIPLE SITES, UNSPECIFIED WHETHER RHEUMATOID FACTOR PRESENT (HCC): ICD-10-CM

## 2021-06-18 LAB
ALBUMIN SERPL BCP-MCNC: 3.5 G/DL (ref 3.5–5)
ALP SERPL-CCNC: 77 U/L (ref 46–116)
ALT SERPL W P-5'-P-CCNC: 26 U/L (ref 12–78)
ANION GAP SERPL CALCULATED.3IONS-SCNC: 5 MMOL/L (ref 4–13)
AST SERPL W P-5'-P-CCNC: 11 U/L (ref 5–45)
BASOPHILS # BLD AUTO: 0.13 THOUSANDS/ΜL (ref 0–0.1)
BASOPHILS NFR BLD AUTO: 1 % (ref 0–1)
BILIRUB SERPL-MCNC: 0.85 MG/DL (ref 0.2–1)
BUN SERPL-MCNC: 28 MG/DL (ref 5–25)
CALCIUM SERPL-MCNC: 9 MG/DL (ref 8.3–10.1)
CHLORIDE SERPL-SCNC: 102 MMOL/L (ref 100–108)
CO2 SERPL-SCNC: 29 MMOL/L (ref 21–32)
CREAT SERPL-MCNC: 1.04 MG/DL (ref 0.6–1.3)
EOSINOPHIL # BLD AUTO: 0.29 THOUSAND/ΜL (ref 0–0.61)
EOSINOPHIL NFR BLD AUTO: 2 % (ref 0–6)
ERYTHROCYTE [DISTWIDTH] IN BLOOD BY AUTOMATED COUNT: 14 % (ref 11.6–15.1)
GFR SERPL CREATININE-BSD FRML MDRD: 67 ML/MIN/1.73SQ M
GLUCOSE SERPL-MCNC: 94 MG/DL (ref 65–140)
HCT VFR BLD AUTO: 42.1 % (ref 34.8–46.1)
HGB BLD-MCNC: 13.4 G/DL (ref 11.5–15.4)
IMM GRANULOCYTES # BLD AUTO: 0.1 THOUSAND/UL (ref 0–0.2)
IMM GRANULOCYTES NFR BLD AUTO: 1 % (ref 0–2)
LYMPHOCYTES # BLD AUTO: 1.99 THOUSANDS/ΜL (ref 0.6–4.47)
LYMPHOCYTES NFR BLD AUTO: 15 % (ref 14–44)
MCH RBC QN AUTO: 29.7 PG (ref 26.8–34.3)
MCHC RBC AUTO-ENTMCNC: 31.8 G/DL (ref 31.4–37.4)
MCV RBC AUTO: 93 FL (ref 82–98)
MONOCYTES # BLD AUTO: 0.94 THOUSAND/ΜL (ref 0.17–1.22)
MONOCYTES NFR BLD AUTO: 7 % (ref 4–12)
NEUTROPHILS # BLD AUTO: 10.09 THOUSANDS/ΜL (ref 1.85–7.62)
NEUTS SEG NFR BLD AUTO: 74 % (ref 43–75)
NRBC BLD AUTO-RTO: 0 /100 WBCS
PLATELET # BLD AUTO: 314 THOUSANDS/UL (ref 149–390)
PMV BLD AUTO: 10.6 FL (ref 8.9–12.7)
POTASSIUM SERPL-SCNC: 4 MMOL/L (ref 3.5–5.3)
PROT SERPL-MCNC: 7.6 G/DL (ref 6.4–8.2)
RBC # BLD AUTO: 4.51 MILLION/UL (ref 3.81–5.12)
SODIUM SERPL-SCNC: 136 MMOL/L (ref 136–145)
WBC # BLD AUTO: 13.54 THOUSAND/UL (ref 4.31–10.16)

## 2021-06-18 PROCEDURE — 80053 COMPREHEN METABOLIC PANEL: CPT

## 2021-06-18 PROCEDURE — 85025 COMPLETE CBC W/AUTO DIFF WBC: CPT

## 2021-06-18 PROCEDURE — 36415 COLL VENOUS BLD VENIPUNCTURE: CPT

## 2021-07-01 ENCOUNTER — OFFICE VISIT (OUTPATIENT)
Dept: SLEEP CENTER | Facility: CLINIC | Age: 41
End: 2021-07-01
Payer: COMMERCIAL

## 2021-07-01 VITALS
TEMPERATURE: 96.8 F | OXYGEN SATURATION: 98 % | DIASTOLIC BLOOD PRESSURE: 80 MMHG | SYSTOLIC BLOOD PRESSURE: 122 MMHG | HEART RATE: 77 BPM | HEIGHT: 67 IN | WEIGHT: 293 LBS | BODY MASS INDEX: 45.99 KG/M2

## 2021-07-01 DIAGNOSIS — F41.8 DEPRESSION WITH ANXIETY: ICD-10-CM

## 2021-07-01 DIAGNOSIS — E66.01 MORBID OBESITY WITH BMI OF 40.0-44.9, ADULT (HCC): ICD-10-CM

## 2021-07-01 DIAGNOSIS — G47.33 OBSTRUCTIVE SLEEP APNEA: Primary | ICD-10-CM

## 2021-07-01 DIAGNOSIS — G47.26 SHIFT WORK SLEEP DISORDER: ICD-10-CM

## 2021-07-01 DIAGNOSIS — J30.9 ALLERGIC RHINITIS, UNSPECIFIED SEASONALITY, UNSPECIFIED TRIGGER: ICD-10-CM

## 2021-07-01 DIAGNOSIS — J33.9 NASAL POLYP: ICD-10-CM

## 2021-07-01 DIAGNOSIS — J45.20 MILD INTERMITTENT ASTHMA WITHOUT COMPLICATION: ICD-10-CM

## 2021-07-01 PROCEDURE — 99213 OFFICE O/P EST LOW 20 MIN: CPT | Performed by: INTERNAL MEDICINE

## 2021-07-01 NOTE — PATIENT INSTRUCTIONS
Continuous Positive Airway Pressure (CPAP) therapy was prescribed to you as a medical necessity and there are risks of discontinuing  use of the device, some of which may be long term  Symptoms you experienced before using CPAP may return such as snoring, apneas, excessive daytime sleepiness, hypertension, cardiac arrhythmias, risk of stroke, congestive heart-failure, exacerbation of COPD and potential respiratory failure  Ultimately, it is a personal decision for you to make if you continue use of an affected device or discontinue until a replacement is provided  Unfortunately, [employer] has not yet provided us with information about available devices  You can visit their website at [email or web address]  [email or web address] to register your device and to learn more about how [employer] plans to replace your device  Another option would be to check with your medical equipment provider to determine if you are eligible for a new machine through your insurance, if not you can pay out of pocket for a new machine  We are able to provide you with a script, please include your mask type  Nursing Support:  When: Monday through Friday 7A-5PM except holidays  Where: Our direct line is [phone]  If you are having a true emergency please call 911  In the event that the line is busy or it is after hours please leave a voice message and we will return your call  Please speak clearly, leaving your full name, birth date, best number to reach you and the reason for your call  Medication refills: We will need the name of the medication, the dosage, the ordering provider, whether you get a 30 or 90 day refill, and the pharmacy name and address  Medications will be ordered by the provider only  Nurses cannot call in prescriptions  Please allow 7 days for medication refills  Physician requested updates:  If your provider requested that you call with an update after starting medication, please be ready to provide us the medication and dosage, what time you take your medication, the time you attempt to fall asleep, time you fall asleep, when you wake up, and what time you get out of bed  Sleep Study Results: We will contact you with sleep study results and/or next steps after the physician has reviewed your testing

## 2021-07-01 NOTE — PROGRESS NOTES
Follow-Up Note - [address]  37 y o  female  QMR:[birth year]  JESSIE:[id number]    CC: I saw this patient for follow-up in clinic today for Sleep disordered breathing, Coexisting Sleep and Medical Problems  Results of prior studies in 2017:  The diagnostic study demonstrated an AHI of 11 3 per hour, considerably higher during REM at 36 per hour  Minimum oxygen saturation was 78% and 16% of the study was spent with saturations below 90%  During the subsequent therapeutic study, sleep disordered breathing was successfully remediated with nasal CPAP at 12 cm H2O  PFSH, Problem List, Medications & Allergies were reviewed in EMR  Interval changes: none reported  She  has a past medical history of Allergic rhinitis, Anxiety, Arthritis, Asthma, COVID-19 (12/17/2020), CPAP (continuous positive airway pressure) dependence, Daytime hypersomnolence, Depression, GERD (gastroesophageal reflux disease), IUD (intrauterine device) in place, Lumbar disc disease, Migraine, RA (rheumatoid arthritis) ([facility] [address] ), Rheumatoid arthritis ([facility] [address] ), Sleep apnea, Varicella, and Wears glasses  She has a current medication list which includes the following prescription(s): albuterol, albuterol, diazepam, ergocalciferol, fexofenadine, fluoxetine, fluticasone, ibuprofen, methylphenidate, mometasone-formoterol, and xeljanz  PHYSIOLOGICAL DATA REVIEW AND INTERPRETATION:   using PAP > 4 hours/night 90%  Estimated RACHAEL 1 4/hour with pressure of 12cm H2O @90th percentile; Compliance: excellent; Sleep disordered breathing:stable & within target range; Patient reports has not been using So Clean to sanitize the machine      SUBJECTIVE: Regarding use of PAPHemalatha reports:   · She is experiencing no  adverse effects:   · She is benefiting from use: sleeping better   Sleep Routine: [facility] ERIKA reports getting 5-8 hrs sleep because of her work schedule and at times needing to work swing shifts; she has no difficulty initiating or maintaining sleep   She arises spontaneously and usually feels refreshed  Thong Ng denies Excessive Daytime Sleepiness,  and rated herself at Total score: 6 /24 on the [place] Sleepiness Scale  Habits: reports that she has quit smoking  Her smoking use included cigarettes  She smoked 0 00 packs per day for 0 00 years  She has never used smokeless tobacco ,  reports current alcohol use ,  reports no history of drug use , Caffeine use: limited , Exercise routine: sometimes   ROS: as attached  Significant for some weight gain  She has nasal and respiratory symptoms episodically due to allergies and asthma, but symptoms arm no more than usual   Mood is stable on current medications  EXAM: /80 (BP Location: Left arm, Cuff Size: Large)   Pulse 77   Temp (!) 96 8 °F (36 °C) (Temporal)   Ht 5' 7" (1 702 m)   Wt (!) 162 kg (357 lb)   SpO2 98%   BMI 55 91 kg/m²     Patient is well groomed; well appearing  H&N: EOMI; NC/AT:no facial pressure marks, no rashes  Skin/Extrem: col & hydration normal; no edema  Psych: cooperativeand in no distress  Mental state:appears normal   Resp: Respiratory effort is normal  CNS: Alert, orientated, clear & coherent speech  Physical findings otherwise essentially unchanged from previous  IMPRESSION: Problem List Items & Comorbidities Addressed this Visit    1  Obstructive sleep apnea  PAP DME Resupply/Reorder   2  Shift work sleep disorder     3  Allergic rhinitis, unspecified seasonality, unspecified trigger     4  Nasal polyp     5  Mild intermittent asthma without complication     6  Depression with anxiety     7  Morbid obesity with BMI of 40 0-44 9, adult ([facility] )         PLAN:  I reviewed results of prior studies and physiologic data with the patient  Notified regarding recall of Marie devices and the recommendation to discontinue use pending resolution of degradation of the foam by replacing it  I discussed treatment options with risks and benefits  Patient understands risks of discontinuing PAP vs continuing use while awaiting resolution of the new issue  Instructed patient to register  machine with [employer] and to follow progress on their website  Including web sites of DME provider, [facility] and [facility]  looking out for notifications  Treatment is medically necessary and patient elected to continue using CPAP while awaiting remediation  Care of equipment, methods to improve comfort using PAP and importance of compliance with therapy were discussed  He was instructed not to use So Clean or other cleaning devices unless approved by  a and FDA  Pressure settin cmH2O  Rx provided to replace the machine, supplies and Care coordinated with DME provider  Discussed strategies for weight reduction  Also advised allowing sufficient opportunity for sleep  Follow-up is advised in 1 year or sooner if needed to monitor progress, compliance and to adjust therapy  Thank you for allowing me to participate in the care of this patient      Sincerely,    Authenticated electronically by Casey Diaz MD on    Board Certified Specialist

## 2021-07-01 NOTE — PROGRESS NOTES
Review of Systems      Genitourinary none   Cardiology ankle/leg swelling   Gastrointestinal none   Neurology none   Constitutional none   Integumentary none   Psychiatry none   Musculoskeletal joint pain   Pulmonary shortness of breath with activity   ENT none   Endocrine none   Hematological none

## 2021-07-02 ENCOUNTER — TELEPHONE (OUTPATIENT)
Dept: SLEEP CENTER | Facility: CLINIC | Age: 41
End: 2021-07-02

## 2021-07-06 DIAGNOSIS — F98.8 ATTENTION DEFICIT DISORDER (ADD) WITHOUT HYPERACTIVITY: ICD-10-CM

## 2021-07-06 RX ORDER — METHYLPHENIDATE HYDROCHLORIDE 36 MG/1
36 TABLET ORAL DAILY
Qty: 30 TABLET | Refills: 0 | Status: SHIPPED | OUTPATIENT
Start: 2021-07-06 | End: 2021-08-05 | Stop reason: SDUPTHER

## 2021-07-26 DIAGNOSIS — J45.20 MILD INTERMITTENT ASTHMA WITHOUT COMPLICATION: ICD-10-CM

## 2021-07-26 DIAGNOSIS — F41.8 DEPRESSION WITH ANXIETY: ICD-10-CM

## 2021-07-26 RX ORDER — FLUOXETINE HYDROCHLORIDE 40 MG/1
40 CAPSULE ORAL DAILY
Qty: 90 CAPSULE | Refills: 1 | Status: SHIPPED | OUTPATIENT
Start: 2021-07-26 | End: 2022-02-10 | Stop reason: SDUPTHER

## 2021-07-27 ENCOUNTER — APPOINTMENT (OUTPATIENT)
Dept: LAB | Facility: CLINIC | Age: 41
End: 2021-07-27
Payer: COMMERCIAL

## 2021-07-27 DIAGNOSIS — M25.561 RIGHT KNEE PAIN, UNSPECIFIED CHRONICITY: ICD-10-CM

## 2021-07-27 DIAGNOSIS — M06.89 OTHER SPECIFIED RHEUMATOID ARTHRITIS, MULTIPLE SITES (HCC): ICD-10-CM

## 2021-07-27 DIAGNOSIS — M35.01 SICCA SYNDROME WITH KERATOCONJUNCTIVITIS (HCC): ICD-10-CM

## 2021-07-27 DIAGNOSIS — M25.562 LEFT KNEE PAIN, UNSPECIFIED CHRONICITY: ICD-10-CM

## 2021-07-27 LAB
CRP SERPL QL: 11.9 MG/L
ERYTHROCYTE [SEDIMENTATION RATE] IN BLOOD: 47 MM/HOUR (ref 0–19)

## 2021-07-27 PROCEDURE — 86200 CCP ANTIBODY: CPT

## 2021-07-27 PROCEDURE — 86235 NUCLEAR ANTIGEN ANTIBODY: CPT

## 2021-07-27 PROCEDURE — 86140 C-REACTIVE PROTEIN: CPT

## 2021-07-27 PROCEDURE — 86430 RHEUMATOID FACTOR TEST QUAL: CPT

## 2021-07-27 PROCEDURE — 86618 LYME DISEASE ANTIBODY: CPT

## 2021-07-27 PROCEDURE — 85652 RBC SED RATE AUTOMATED: CPT

## 2021-07-27 PROCEDURE — 36415 COLL VENOUS BLD VENIPUNCTURE: CPT

## 2021-07-27 PROCEDURE — 86431 RHEUMATOID FACTOR QUANT: CPT

## 2021-07-28 LAB
CCP AB SER IA-ACNC: 13
CRYOGLOB RF SER-ACNC: ABNORMAL [IU]/ML
ENA SS-A AB SER-ACNC: <0.2 AI (ref 0–0.9)
ENA SS-B AB SER-ACNC: <0.2 AI (ref 0–0.9)
RHEUMATOID FACT SER QL LA: POSITIVE

## 2021-07-30 LAB — B BURGDOR IGG+IGM SER-ACNC: 24

## 2021-08-04 ENCOUNTER — HOSPITAL ENCOUNTER (OUTPATIENT)
Dept: RADIOLOGY | Facility: HOSPITAL | Age: 41
Discharge: HOME | End: 2021-08-04
Payer: COMMERCIAL

## 2021-08-04 DIAGNOSIS — M25.561 RIGHT KNEE PAIN, UNSPECIFIED CHRONICITY: ICD-10-CM

## 2021-08-04 DIAGNOSIS — M25.562 LEFT KNEE PAIN, UNSPECIFIED CHRONICITY: ICD-10-CM

## 2021-08-04 DIAGNOSIS — M25.571 PAIN IN RIGHT ANKLE AND JOINTS OF RIGHT FOOT: ICD-10-CM

## 2021-08-04 DIAGNOSIS — M25.572 PAIN IN LEFT ANKLE AND JOINTS OF LEFT FOOT: ICD-10-CM

## 2021-08-04 PROCEDURE — 73562 X-RAY EXAM OF KNEE 3: CPT

## 2021-08-04 PROCEDURE — 73610 X-RAY EXAM OF ANKLE: CPT

## 2021-08-05 DIAGNOSIS — F98.8 ATTENTION DEFICIT DISORDER (ADD) WITHOUT HYPERACTIVITY: ICD-10-CM

## 2021-08-05 NOTE — TELEPHONE ENCOUNTER
----- Message from [facility] sent at 8/5/2021  4:55 PM EDT -----  Regarding: Prescription Question  Contact: [phone]  Could you please fill my Concerta rx with [facility] pharmacy? Thank you!

## 2021-08-06 RX ORDER — METHYLPHENIDATE HYDROCHLORIDE 36 MG/1
36 TABLET ORAL DAILY
Qty: 30 TABLET | Refills: 0 | Status: SHIPPED | OUTPATIENT
Start: 2021-08-06 | End: 2021-09-02 | Stop reason: SDUPTHER

## 2021-08-20 ENCOUNTER — APPOINTMENT (OUTPATIENT)
Dept: LAB | Facility: HOSPITAL | Age: 41
End: 2021-08-20
Payer: COMMERCIAL

## 2021-08-20 DIAGNOSIS — Z11.59 ENCOUNTER FOR SCREENING FOR OTHER VIRAL DISEASES: ICD-10-CM

## 2021-08-20 DIAGNOSIS — Z11.7 ENCOUNTER FOR TESTING FOR LATENT TUBERCULOSIS INFECTION: ICD-10-CM

## 2021-08-20 DIAGNOSIS — M06.89 OTHER SPECIFIED RHEUMATOID ARTHRITIS, MULTIPLE SITES (HCC): ICD-10-CM

## 2021-08-20 DIAGNOSIS — Z78.9 OTHER SPECIFIED HEALTH STATUS: ICD-10-CM

## 2021-08-20 LAB
CRP SERPL QL: 2 MG/L
ERYTHROCYTE [SEDIMENTATION RATE] IN BLOOD: 36 MM/HOUR (ref 0–19)

## 2021-08-20 PROCEDURE — 36415 COLL VENOUS BLD VENIPUNCTURE: CPT

## 2021-08-20 PROCEDURE — 86480 TB TEST CELL IMMUN MEASURE: CPT

## 2021-08-20 PROCEDURE — 85652 RBC SED RATE AUTOMATED: CPT

## 2021-08-20 PROCEDURE — 86803 HEPATITIS C AB TEST: CPT

## 2021-08-20 PROCEDURE — 86706 HEP B SURFACE ANTIBODY: CPT

## 2021-08-20 PROCEDURE — 86140 C-REACTIVE PROTEIN: CPT

## 2021-08-20 PROCEDURE — 87340 HEPATITIS B SURFACE AG IA: CPT

## 2021-08-21 LAB
HBV SURFACE AB SER-ACNC: 10.32 MIU/ML
HBV SURFACE AG SER QL: NORMAL
HCV AB SER QL: NORMAL

## 2021-08-24 LAB
GAMMA INTERFERON BACKGROUND BLD IA-ACNC: 0.03 IU/ML
M TB IFN-G BLD-IMP: NEGATIVE
M TB IFN-G CD4+ BCKGRND COR BLD-ACNC: -0.01 IU/ML
M TB IFN-G CD4+ BCKGRND COR BLD-ACNC: -0.01 IU/ML
MITOGEN IGNF BCKGRD COR BLD-ACNC: >10 IU/ML

## 2021-08-31 ENCOUNTER — OFFICE VISIT (OUTPATIENT)
Dept: INTERNAL MEDICINE CLINIC | Facility: CLINIC | Age: 41
End: 2021-08-31
Payer: COMMERCIAL

## 2021-08-31 ENCOUNTER — EVALUATION (OUTPATIENT)
Dept: PHYSICAL THERAPY | Facility: HOME HEALTHCARE | Age: 41
End: 2021-08-31
Payer: COMMERCIAL

## 2021-08-31 VITALS
WEIGHT: 293 LBS | SYSTOLIC BLOOD PRESSURE: 124 MMHG | OXYGEN SATURATION: 98 % | DIASTOLIC BLOOD PRESSURE: 80 MMHG | BODY MASS INDEX: 45.99 KG/M2 | TEMPERATURE: 97.4 F | HEIGHT: 67 IN | HEART RATE: 82 BPM

## 2021-08-31 DIAGNOSIS — M06.9 RHEUMATOID ARTHRITIS INVOLVING MULTIPLE SITES, UNSPECIFIED WHETHER RHEUMATOID FACTOR PRESENT (HCC): ICD-10-CM

## 2021-08-31 DIAGNOSIS — G89.29 CHRONIC PAIN OF BOTH KNEES: Primary | ICD-10-CM

## 2021-08-31 DIAGNOSIS — E66.01 MORBID OBESITY WITH BMI OF 50.0-59.9, ADULT (HCC): ICD-10-CM

## 2021-08-31 DIAGNOSIS — M25.561 CHRONIC PAIN OF BOTH KNEES: Primary | ICD-10-CM

## 2021-08-31 DIAGNOSIS — M79.89 REDNESS AND SWELLING OF THIGH: Primary | ICD-10-CM

## 2021-08-31 DIAGNOSIS — R23.8 REDNESS AND SWELLING OF THIGH: Primary | ICD-10-CM

## 2021-08-31 DIAGNOSIS — M25.562 CHRONIC PAIN OF BOTH KNEES: Primary | ICD-10-CM

## 2021-08-31 PROBLEM — M35.00 SJOGREN'S SYNDROME (HCC): Status: RESOLVED | Noted: 2018-10-01 | Resolved: 2021-08-31

## 2021-08-31 PROCEDURE — 97110 THERAPEUTIC EXERCISES: CPT | Performed by: PHYSICAL THERAPIST

## 2021-08-31 PROCEDURE — 99213 OFFICE O/P EST LOW 20 MIN: CPT | Performed by: FAMILY MEDICINE

## 2021-08-31 PROCEDURE — 97162 PT EVAL MOD COMPLEX 30 MIN: CPT | Performed by: PHYSICAL THERAPIST

## 2021-08-31 NOTE — PROGRESS NOTES
PT Evaluation     Today's date: 2021  Patient name: Jerardo Morales  : 1980  MRN: [id number]  Referring provider: Nando Salamanca MD  Dx:   Encounter Diagnosis     ICD-10-CM    1  Chronic pain of both knees  M25 561     M25 562     G89 29                   Assessment  Assessment details: Pt Jerardo Morales is a 37 y o  who presents to OPPT with s/s consistent with B knee OA and associated chronic pain  Pt maintains B knee mobility WFL at this time but does have pain at end range flexion  B LE strength deficits evident and TTP medial aspect of B knees and posterior L knee  Pt notes limited tolerance to prolonged ambulation, difficulty with stair negotiation, decreased tolerance to typical ADLs, and increased swelling towards end of day  Pt would benefit from skilled therapy services to address outlined impairments, work towards goals, and restore pts PLOF  Thank you! Impairments: abnormal gait, abnormal or restricted ROM, abnormal movement, activity intolerance, impaired balance, impaired physical strength, lacks appropriate home exercise program, pain with function, safety issue and weight-bearing intolerance  Understanding of Dx/Px/POC: good   Prognosis: good    Goals  STGs to be achieved in 4 weeks:  -Pt to demonstrate reduced subjective pain rating "at worst" by at least 2-3 points from Initial Eval to allow for reduced pain with ADLs and improved functional activity tolerance    -Pt to maintain B knee ROM WFL in order to maximize joint mobility and function and allow for progression of exercise program and achievement of goals    -Pt to demonstrate increased MMT of BLE by at least 1/2 grade in order to improve safety and stability with ADLs and functional mobility       LTGs to be achieved upon discharge:   -Pt will be I with HEP in order to continue to improve quality of life and independence and reduce risk for re-injury    -Pt to demonstrate return to activities of daily living without limiations or restrictions    -Pt will return to ambulation x 2 hours to help facilitate return to community activities independently   -Pt to demonstrate return to work activities without limitations or restrictions    -Pt to demonstrate improved function as noted by achieving or exceeding predicted score on FOTO outcomes assessment tool  Plan  Plan details: PT provided pt with detailed HEP program; pt verbalized understanding of program    Patient would benefit from: skilled physical therapy  Planned modality interventions: cryotherapy  Planned therapy interventions: balance, manual therapy, neuromuscular re-education, therapeutic exercise, therapeutic activities, gait training, functional ROM exercises, flexibility, patient education and home exercise program  Frequency: 2x week  Duration in weeks: 4  Plan of Care beginning date: 2021  Plan of Care expiration date: 2021  Treatment plan discussed with: patient         Subjective Evaluation    History of Present Illness  Mechanism of injury: Pt reporting that she has been having bilateral knee pain for several years  She notes that she has RA and was in remission for 10 years but symptoms have flared up since she had Covid in 2020  She notes that she is now on a RA medication which is helping to decrease her overall aches/pains but now is really noticing the knees bothering her more  She is referred to [facility]  for management of pain and improved activity levels     Quality of life: good    Pain  At best pain ratin  At worst pain ratin  Quality: dull ache  Relieving factors: medications and rest  Aggravating factors: standing, walking and stair climbing  Progression: worsening    Social Support  Steps to enter house: no  Stairs in house: yes   Lives in: multiple-level home  Lives with: spouse    Employment status: working (full duty)    Diagnostic Tests  X-ray: abnormal  Treatments  Previous treatment: medication  Current treatment: physical therapy  Patient Goals  Patient goals for therapy: increased motion, improved balance, decreased pain, increased strength, independence with ADLs/IADLs and decreased edema          Objective     Observations     Right Knee   Positive for edema  Tenderness   Left Knee   Tenderness in the medial joint line and popliteal fossa  Right Knee   Tenderness in the medial joint line       Neurological Testing     Sensation     Knee   Left Knee   Intact: light touch    Right Knee   Intact: light touch     Active Range of Motion   Left Knee   Normal active range of motion    Right Knee   Normal active range of motion    Strength/Myotome Testing     Left Knee   Flexion: 4  Extension: 4    Right Knee   Flexion: 4  Extension: 4    Ambulation     Ambulation: Level Surfaces     Additional Level Surfaces Ambulation Details  Tolerance x 1 hour before onset of pain  No use of AD   No reported falls     Ambulation: Stairs   Pattern: reciprocal  Railings: one rail  Pattern: reciprocal  Railings: one rail      Flowsheet Rows      Most Recent Value   PT/OT G-Codes   Current Score  57   Projected Score  68               Re-eval Date: 9/31/21    Date 8/31       Visit Count 1       FOTO Completed             Precautions: obesity         Manuals 8/31                                       Neuro Re-Ed         Balance as appropriate                                                         Ther Ex        NuStep  L1 10 minutes        SLR x 3         Standing marches         Step-ups   Fwd/Lat        Step-downs        QS        LAQ        Hip add        SLR        S/L SLR        Clamshells                         Ther Activity                        Gait Training                        Modalities

## 2021-08-31 NOTE — LETTER
2021    Dean Castro [address]  [address]    Patient: Gumaro Obregon   YOB: 1980   Date of Visit: 2021     Encounter Diagnosis     ICD-10-CM    1  Chronic pain of both knees  M25 561 CANCELED: PT plan of care cert/re-cert    H63 722     E21 02        Dear Dr Espinoza Camacho: Thank you for your recent referral of Gumaro Obregon  Please review the attached evaluation summary from Hemalatha's recent visit  Please verify that you agree with the plan of care by signing the attached order  If you have any questions or concerns, please do not hesitate to call  I sincerely appreciate the opportunity to share in the care of one of your patients and hope to have another opportunity to work with you in the near future  Sincerely,    Carolyn Zhong, PT      Referring Provider:      I certify that I have read the below Plan of Care and certify the need for these services furnished under this plan of treatment while under my care  Allyson Rodriguez MD  [id number] [facility]  Via Fax: [phone]          PT Evaluation     Today's date: 2021  Patient name: Gumaro Obregon  : 1980  MRN: [id number]  Referring provider: Jloene Painter MD  Dx:   Encounter Diagnosis     ICD-10-CM    1  Chronic pain of both knees  M25 561     M25 562     G89 29                   Assessment  Assessment details: Pt Gumaro Obregon is a 37 y o  who presents to OPPT with s/s consistent with B knee OA and associated chronic pain  Pt maintains B knee mobility WFL at this time but does have pain at end range flexion  B LE strength deficits evident and TTP medial aspect of B knees and posterior L knee  Pt notes limited tolerance to prolonged ambulation, difficulty with stair negotiation, decreased tolerance to typical ADLs, and increased swelling towards end of day   Pt would benefit from skilled therapy services to address outlined impairments, work towards goals, and restore pts PLOF  Thank you! Impairments: abnormal gait, abnormal or restricted ROM, abnormal movement, activity intolerance, impaired balance, impaired physical strength, lacks appropriate home exercise program, pain with function, safety issue and weight-bearing intolerance  Understanding of Dx/Px/POC: good   Prognosis: good    Goals  STGs to be achieved in 4 weeks:  -Pt to demonstrate reduced subjective pain rating "at worst" by at least 2-3 points from Initial Eval to allow for reduced pain with ADLs and improved functional activity tolerance    -Pt to maintain B knee ROM WFL in order to maximize joint mobility and function and allow for progression of exercise program and achievement of goals    -Pt to demonstrate increased MMT of BLE by at least 1/2 grade in order to improve safety and stability with ADLs and functional mobility  LTGs to be achieved upon discharge:   -Pt will be I with HEP in order to continue to improve quality of life and independence and reduce risk for re-injury    -Pt to demonstrate return to activities of daily living without limiations or restrictions    -Pt will return to ambulation x 2 hours to help facilitate return to community activities independently   -Pt to demonstrate return to work activities without limitations or restrictions    -Pt to demonstrate improved function as noted by achieving or exceeding predicted score on FOTO outcomes assessment tool         Plan  Plan details: PT provided pt with detailed HEP program; pt verbalized understanding of program    Patient would benefit from: skilled physical therapy  Planned modality interventions: cryotherapy  Planned therapy interventions: balance, manual therapy, neuromuscular re-education, therapeutic exercise, therapeutic activities, gait training, functional ROM exercises, flexibility, patient education and home exercise program  Frequency: 2x week  Duration in weeks: 4  Plan of Care beginning date: 2021  Plan of Care expiration date: 2021  Treatment plan discussed with: patient         Subjective Evaluation    History of Present Illness  Mechanism of injury: Pt reporting that she has been having bilateral knee pain for several years  She notes that she has RA and was in remission for 10 years but symptoms have flared up since she had Covid in 2020  She notes that she is now on a RA medication which is helping to decrease her overall aches/pains but now is really noticing the knees bothering her more  She is referred to [facility] for management of pain and improved activity levels  Quality of life: good    Pain  At best pain ratin  At worst pain ratin  Quality: dull ache  Relieving factors: medications and rest  Aggravating factors: standing, walking and stair climbing  Progression: worsening    Social Support  Steps to enter house: no  Stairs in house: yes   Lives in: multiple-level home  Lives with: spouse    Employment status: working (full duty)    Diagnostic Tests  X-ray: abnormal  Treatments  Previous treatment: medication  Current treatment: physical therapy  Patient Goals  Patient goals for therapy: increased motion, improved balance, decreased pain, increased strength, independence with ADLs/IADLs and decreased edema          Objective     Observations     Right Knee   Positive for edema  Tenderness   Left Knee   Tenderness in the medial joint line and popliteal fossa  Right Knee   Tenderness in the medial joint line       Neurological Testing     Sensation     Knee   Left Knee   Intact: light touch    Right Knee   Intact: light touch     Active Range of Motion   Left Knee   Normal active range of motion    Right Knee   Normal active range of motion    Strength/Myotome Testing     Left Knee   Flexion: 4  Extension: 4    Right Knee   Flexion: 4  Extension: 4    Ambulation     Ambulation: Level Surfaces     Additional Level Surfaces Ambulation Details  Tolerance x 1 hour before onset of pain  No use of AD   No reported falls     Ambulation: Stairs   Pattern: reciprocal  Railings: one rail  Pattern: reciprocal  Railings: one rail      Flowsheet Rows      Most Recent Value   PT/OT G-Codes   Current Score  57   Projected Score  68               Re-eval Date: 9/31/21    Date 8/31       Visit Count 1       FOTO Completed             Precautions: obesity         Manuals 8/31                                       Neuro Re-Ed         Balance as appropriate                                                         Ther Ex        NuStep  L1 10 minutes        SLR x 3         Standing marches         Step-ups   Fwd/Lat        Step-downs        QS        LAQ        Hip add        SLR        S/L SLR        Clamshells                         Ther Activity                        Gait Training                        Modalities

## 2021-08-31 NOTE — PROGRESS NOTES
Assessment/Plan:    No problem-specific Assessment & Plan notes found for this encounter  Diagnoses and all orders for this visit:    Redness and swelling of thigh  -     US extremity soft tissue; Future    Rheumatoid arthritis involving multiple sites, unspecified whether rheumatoid factor present ([facility] )    Morbid obesity with BMI of 50 0-59 9, adult ([facility] )    Other orders  -     Etanercept (ENBREL SC); Inject under the skin      orders and recommendations as noted above  Discussed with her that the area the injections still appears somewhat discolored although no warmth  Possibly add deeper area of inflammation or as less likely a hematoma that is causing some of the swelling he  Will get an ultrasound of the soft tissue in the area for further investigation  Would consider CT of the area if symptoms persist   She should discuss this further with the Enbrel representative that she is meeting later this week  She is frustrated by her weight  Discussed potential options in the future such as Ozempic but this is something that should wait until after the these current symptoms resolved  Subjective:      Patient ID: Estee Estes is a 37 y o  female  She presents for problem visit  She had given her last Enbrel injection in the left distal thigh medially  She had noticed some swelling and erythema which is not unusual at the injection site  She had taken Tylenol and Benadryl as was instructed by Rheumatology  Has had some persistent stiffness and swelling of the area with tight feeling  Denies any fevers or chills  Some persistent discoloration at the injection site but that is not uncommon for her  Swelling is mostly above the knee  Minimal pain  At 1st she thought it was related to getting in and out of her 's truck but has noticed the symptoms have persisted  She is also very frustrated by her weight  Continues to gain weight    Admits that she is not eating as healthy as she should  Would like to discuss options for weight loss in the future  The following portions of the patient's history were reviewed and updated as appropriate:   She  has a past medical history of Allergic rhinitis, Anxiety, Arthritis, Asthma, COVID-19 (12/17/2020), CPAP (continuous positive airway pressure) dependence, Daytime hypersomnolence, Depression, GERD (gastroesophageal reflux disease), IUD (intrauterine device) in place, Lumbar disc disease, Migraine, RA (rheumatoid arthritis) ([facility] ), Rheumatoid arthritis ([facility] ), Sleep apnea, Varicella, and Wears glasses  She   Patient Active Problem List    Diagnosis Date Noted    Redness and swelling of thigh 08/31/2021    IUD (mirena) Surveillance 04/07/2021    Attention deficit disorder (ADD) without hyperactivity 03/01/2021    Morbid obesity with BMI of 50 0-59 9, adult ([place] [address] ) 03/01/2021    Myalgia 09/14/2020    Pulsatile tinnitus of left ear 05/12/2020    Chronic pain of left knee 03/10/2020    Knee instability, left 03/10/2020    Mild hyperlipidemia 09/10/2019    Insufficient sleep syndrome 10/04/2018    Chronic sinusitis 08/02/2017    Moderate obstructive sleep apnea 05/23/2017    Fatigue 03/22/2017    Seasonal mood disorder ([place] [address] ) 03/22/2017    Vaginal candidiasis 12/06/2016    Allergic rhinitis 02/04/2016    Nasal polyp 02/04/2016    Vitamin D deficiency 02/04/2016    Depression with anxiety 12/17/2015    Rheumatoid arthritis ([place] [address] ) 12/17/2015    Anxiety 08/04/2015    Asthma 08/04/2015     She  has a past surgical history that includes Cholecystectomy; Sinus surgery; Nasal septum surgery; [place] tooth extraction; and pr stereotactic comp assist proc,cranial,extradural (N/A, 1/8/2019)  Her family history includes Aneurysm in her paternal grandfather; Asthma in her mother;  Autism in her son; COPD in her mother; Cancer in her paternal grandmother; Celiac disease in her daughter and paternal grandfather; Chiari malformation in her brother and daughter; Diabetes in her daughter; Hypertension in her mother; Hypothyroidism in her father; Lung disease in her maternal grandfather  She  reports that she has quit smoking  Her smoking use included cigarettes  She smoked 0 00 packs per day for 0 00 years  She has never used smokeless tobacco  She reports current alcohol use  She reports that she does not use drugs  Current Outpatient Medications   Medication Sig Dispense Refill    albuterol (2 5 mg/3 mL) 0 083 % nebulizer solution Inhale 1 each every 4 (four) hours as needed      albuterol (PROAIR HFA) 90 mcg/act inhaler Inhale 1-2 puffs every 6 (six) hours as needed        diazepam (VALIUM) 2 mg tablet Take 1 tablet (2 mg total) by mouth every 12 (twelve) hours as needed for anxiety (prior to MRI/procedure) 30 tablet 0    ergocalciferol (VITAMIN D2) 50,000 units Take 1 capsule (50,000 Units total) by mouth once a week 12 capsule 1    Etanercept (ENBREL SC) Inject under the skin      fexofenadine (ALLEGRA) 180 MG tablet Take 180 mg by mouth daily      FLUoxetine (PROzac) 40 MG capsule Take 1 capsule (40 mg total) by mouth daily 90 capsule 1    fluticasone (FLONASE) 50 mcg/act nasal spray 2 sprays into each nostril daily      ibuprofen (MOTRIN) 200 mg tablet Take 200 mg by mouth every 6 (six) hours as needed for mild pain   methylphenidate (CONCERTA) 36 MG ER tablet Take 1 tablet (36 mg total) by mouth dailyMax Daily Amount: 36 mg 30 tablet 0    mometasone-formoterol (DULERA) 200-5 MCG/ACT inhaler Inhale 2 puffs 2 (two) times a day Rinse mouth after use  13 g 1     No current facility-administered medications for this visit       Current Outpatient Medications on File Prior to Visit   Medication Sig    albuterol (2 5 mg/3 mL) 0 083 % nebulizer solution Inhale 1 each every 4 (four) hours as needed    albuterol (PROAIR HFA) 90 mcg/act inhaler Inhale 1-2 puffs every 6 (six) hours as needed      diazepam (VALIUM) 2 mg tablet Take 1 tablet (2 mg total) by mouth every 12 (twelve) hours as needed for anxiety (prior to MRI/procedure)    ergocalciferol (VITAMIN D2) 50,000 units Take 1 capsule (50,000 Units total) by mouth once a week    Etanercept (ENBREL SC) Inject under the skin    fexofenadine (ALLEGRA) 180 MG tablet Take 180 mg by mouth daily    FLUoxetine (PROzac) 40 MG capsule Take 1 capsule (40 mg total) by mouth daily    fluticasone (FLONASE) 50 mcg/act nasal spray 2 sprays into each nostril daily    ibuprofen (MOTRIN) 200 mg tablet Take 200 mg by mouth every 6 (six) hours as needed for mild pain   methylphenidate (CONCERTA) 36 MG ER tablet Take 1 tablet (36 mg total) by mouth dailyMax Daily Amount: 36 mg    mometasone-formoterol (DULERA) 200-5 MCG/ACT inhaler Inhale 2 puffs 2 (two) times a day Rinse mouth after use   [DISCONTINUED] Tofacitinib Citrate (Xeljanz) 5 MG TABS Take 1 tablet (5 mg total) by mouth 2 (two) times a day (Patient not taking: Reported on 7/1/2021)     No current facility-administered medications on file prior to visit  She is allergic to morphine       Review of Systems   Constitutional: Positive for activity change  Negative for chills, fatigue and fever  Cardiovascular: Positive for leg swelling  Musculoskeletal: Positive for arthralgias and myalgias  Skin:        As per HPI         Objective:      /80 (BP Location: Left arm, Patient Position: Sitting, Cuff Size: Large)   Pulse 82   Temp (!) 97 4 °F (36 3 °C)   Ht 5' 7" (1 702 m)   Wt (!) 165 kg (364 lb 1 6 oz)   SpO2 98%   BMI 57 03 kg/m²          Physical Exam  Vitals and nursing note reviewed  Constitutional:       Appearance: She is well-developed and well-groomed  She is morbidly obese     Musculoskeletal:      Comments:  Right thigh area with some swelling over the distal thigh from the medial aspect of the distal thigh to the lateral aspect and to the area just above the knee; no significant warmth   Skin:     Comments:  Slightly discolored area over the medial lateral thigh on the right where she gave her last the Enbrel shot   Neurological:      Mental Status: She is alert  Psychiatric:         Behavior: Behavior is cooperative

## 2021-09-02 ENCOUNTER — HOSPITAL ENCOUNTER (OUTPATIENT)
Dept: ULTRASOUND IMAGING | Facility: HOSPITAL | Age: 41
Discharge: HOME | End: 2021-09-02
Payer: COMMERCIAL

## 2021-09-02 DIAGNOSIS — R23.8 REDNESS AND SWELLING OF THIGH: ICD-10-CM

## 2021-09-02 DIAGNOSIS — M79.89 REDNESS AND SWELLING OF THIGH: ICD-10-CM

## 2021-09-02 DIAGNOSIS — L03.119 CELLULITIS OF LOWER EXTREMITY, UNSPECIFIED LATERALITY: Primary | ICD-10-CM

## 2021-09-02 DIAGNOSIS — F98.8 ATTENTION DEFICIT DISORDER (ADD) WITHOUT HYPERACTIVITY: ICD-10-CM

## 2021-09-02 PROCEDURE — 76882 US LMTD JT/FCL EVL NVASC XTR: CPT

## 2021-09-02 RX ORDER — CEPHALEXIN 500 MG/1
500 CAPSULE ORAL EVERY 6 HOURS SCHEDULED
Qty: 40 CAPSULE | Refills: 0 | Status: SHIPPED | OUTPATIENT
Start: 2021-09-02 | End: 2021-09-12

## 2021-09-02 NOTE — TELEPHONE ENCOUNTER
I did let patient know her results of us  She uses [facility] in [place] for short-term medications

## 2021-09-02 NOTE — TELEPHONE ENCOUNTER
----- Message from [facility] sent at 9/2/2021  9:28 AM EDT -----  Regarding: Non-Urgent Medical Question  Contact: [phone]  Can you please send in a refill on my Concerta rx? Stewarttar Revere-thank you!

## 2021-09-07 ENCOUNTER — OFFICE VISIT (OUTPATIENT)
Dept: PHYSICAL THERAPY | Facility: HOME HEALTHCARE | Age: 41
End: 2021-09-07
Payer: COMMERCIAL

## 2021-09-07 DIAGNOSIS — M25.562 CHRONIC PAIN OF BOTH KNEES: Primary | ICD-10-CM

## 2021-09-07 DIAGNOSIS — M25.561 CHRONIC PAIN OF BOTH KNEES: Primary | ICD-10-CM

## 2021-09-07 DIAGNOSIS — G89.29 CHRONIC PAIN OF BOTH KNEES: Primary | ICD-10-CM

## 2021-09-07 PROCEDURE — 97110 THERAPEUTIC EXERCISES: CPT

## 2021-09-07 RX ORDER — METHYLPHENIDATE HYDROCHLORIDE 36 MG/1
36 TABLET ORAL DAILY
Qty: 30 TABLET | Refills: 0 | Status: SHIPPED | OUTPATIENT
Start: 2021-09-07 | End: 2021-10-05 | Stop reason: SDUPTHER

## 2021-09-07 NOTE — PROGRESS NOTES
Daily Note     Today's date: 2021  Patient name: Barb Walls  : 1980  MRN: [id number]  Referring provider: Kalyn Frye MD  Dx:   Encounter Diagnosis     ICD-10-CM    1  Chronic pain of both knees  M25 561     M25 562     G89 29                   Subjective: Pt reports she has pain in her L knee today  Objective: See treatment diary below      Assessment: Tolerated treatment well  Verbal cues needed t/o exercise to perform correctly  No increased pain reported both knees t/o session or at end of session  Patient would benefit from continued PT to improve strength and overall function  Plan: Continue per plan of care        Re-eval Date:     Date 21      Visit Count 1 2      FOTO Completed             Precautions: obesity         Manuals 21                                      Neuro Re-Ed         Balance as appropriate                                                         Ther Ex        NuStep  L1 10 minutes  L1  10 min       SLR x 3   1 x 15 ea Jeremy      Standing marches   1 x 20 Jeremy      Step-ups   Fwd/Lat        Step-downs        QS  5" x 20 Jeremy       LAQ  5" x 20 Jeremy       Hip add  5" x 20       SLR  1 x 20 Jeremy       S/L SLR  1 x 10 Jeremy      Clamshells   1 x 20 Jeremy                       Ther Activity                        Gait Training                        Modalities

## 2021-09-13 ENCOUNTER — HOSPITAL ENCOUNTER (OUTPATIENT)
Dept: CT IMAGING | Facility: HOSPITAL | Age: 41
Discharge: HOME | End: 2021-09-13
Payer: COMMERCIAL

## 2021-09-13 ENCOUNTER — APPOINTMENT (OUTPATIENT)
Dept: PHYSICAL THERAPY | Facility: HOME HEALTHCARE | Age: 41
End: 2021-09-13
Payer: COMMERCIAL

## 2021-09-13 ENCOUNTER — TELEPHONE (OUTPATIENT)
Dept: INTERNAL MEDICINE CLINIC | Facility: CLINIC | Age: 41
End: 2021-09-13

## 2021-09-13 DIAGNOSIS — L03.116 CELLULITIS OF LEFT LOWER EXTREMITY: Primary | ICD-10-CM

## 2021-09-13 DIAGNOSIS — L03.116 CELLULITIS OF LEFT LOWER EXTREMITY: ICD-10-CM

## 2021-09-13 PROCEDURE — 73700 CT LOWER EXTREMITY W/O DYE: CPT

## 2021-09-13 NOTE — TELEPHONE ENCOUNTER
Pt notified, she's going to call the imaging dept herself because she's working now, to see when they can put her in

## 2021-09-13 NOTE — TELEPHONE ENCOUNTER
Received a call from Kerry Corona stating that she is on day 10 of the kflex, and she states that it's still swollen and painful  It had been getting better but within the past 4 days it started getting worse  Pt states that she hasn't started back up on her RA meds yet  Kerry Corona is wondering what she should do  Did let jason know  Please advise

## 2021-09-14 ENCOUNTER — OFFICE VISIT (OUTPATIENT)
Dept: PHYSICAL THERAPY | Facility: HOME HEALTHCARE | Age: 41
End: 2021-09-14
Payer: COMMERCIAL

## 2021-09-14 DIAGNOSIS — M25.562 CHRONIC PAIN OF BOTH KNEES: Primary | ICD-10-CM

## 2021-09-14 DIAGNOSIS — G89.29 CHRONIC PAIN OF BOTH KNEES: Primary | ICD-10-CM

## 2021-09-14 DIAGNOSIS — M25.561 CHRONIC PAIN OF BOTH KNEES: Primary | ICD-10-CM

## 2021-09-14 PROCEDURE — 97110 THERAPEUTIC EXERCISES: CPT

## 2021-09-14 NOTE — PROGRESS NOTES
Daily Note     Today's date: 2021  Patient name: Yoly Ross  : 1980  MRN: [id number]  Referring provider: Hyun Spring MD  Dx:   Encounter Diagnosis     ICD-10-CM    1  Chronic pain of both knees  M25 561     M25 562     G89 29        Start Time: 1345          Subjective: Both knees are sore but the L knee hurts more  Pain of 3/10 at L knee  Objective: See treatment diary below    Assessment: Tolerated treatment well  Pt reports average pain of 3/10 at L knee today  Pt denied R knee pain with all ex  Added step ups without incident  Pt with minimal vc's needed for correct form  Patient would benefit from continued PT    Plan: Continue per plan of care        Re-eval Date:     Date 21     Visit Count 1 2 3     FOTO Completed             Precautions: obesity         Manuals 21                     Neuro Re-Ed    21     Balance as appropriate    NV                     Ther Ex   21     NuStep  L1 10 minutes  L1  10 min  L 1  10'     SLR x 3   1 x 15 ea Jeremy 1 x 15 ea Jeremy     Standing marches   1 x 20 Jeremy 1 x 20 Jeremy     Step-ups   Fwd/Lat   C Fwd 1 x 10   Jeremy     Step-downs        QS  5" x 20 Jeremy  5" x 20 Jeremy     LAQ  5" x 20 Jeremy  5" x 20 Jeremy     Hip add  5" x 20  5" x 20     SLR  1 x 20 Jeremy  1 x 20 Jeremy     S/L SLR  1 x 10 Jeremy 1 x 10 Jeremy     Clamshells   1 x 20 Jeremy  1 x 20 Jeremy                     Ther Activity                        Gait Training                        Modalities

## 2021-09-16 ENCOUNTER — APPOINTMENT (OUTPATIENT)
Dept: PHYSICAL THERAPY | Facility: HOME HEALTHCARE | Age: 41
End: 2021-09-16
Payer: COMMERCIAL

## 2021-09-20 ENCOUNTER — OFFICE VISIT (OUTPATIENT)
Dept: PHYSICAL THERAPY | Facility: HOME HEALTHCARE | Age: 41
End: 2021-09-20
Payer: COMMERCIAL

## 2021-09-20 DIAGNOSIS — G89.29 CHRONIC PAIN OF BOTH KNEES: Primary | ICD-10-CM

## 2021-09-20 DIAGNOSIS — M25.562 CHRONIC PAIN OF BOTH KNEES: Primary | ICD-10-CM

## 2021-09-20 DIAGNOSIS — M25.561 CHRONIC PAIN OF BOTH KNEES: Primary | ICD-10-CM

## 2021-09-20 PROCEDURE — 97110 THERAPEUTIC EXERCISES: CPT

## 2021-09-20 NOTE — PROGRESS NOTES
Daily Note     Today's date: 2021  Patient name: Rajwinder Delaney  : 1980  MRN: [id number]  Referring provider: Jessica Clifton MD  Dx:   Encounter Diagnosis     ICD-10-CM    1  Chronic pain of both knees  M25 561     M25 562     G89 29               Subjective: Pt reports no pain in her knees but has cracking in her knees with going up stairs  Objective: See treatment diary below      Assessment: Tolerated treatment well  Some verbal cues needed to perform exercises correctly  Progressed to SLS and tandem stance today with unsteadiness noted  Pt with no c/o pain both knees t/o session  Patient would benefit from continued PT      Plan: Continue per plan of care        Re-eval Date:     Date 21    Visit Count 1 2 3 4    FOTO Completed             Precautions: obesity         Manuals 21                    Neuro Re-Ed    21     Balance as appropriate    NV SLS 20" x 2 Jeremy           Tandem stance   20" x 2 ea  R/L            Ther Ex   21     NuStep  L1 10 minutes  L1  10 min  L 1  10' L1  10 min     SLR x 3   1 x 15 ea Jeremy 1 x 15 ea Jeremy 1 x 20 ea Jeremy     Standing marches   1 x 20 Jeremy 1 x 20 Jeremy 1 x 20 Jeremy     Step-ups   Fwd/Lat   C Fwd 1 x 10   Jeremy C Fwd 1 x 20 Jeremy     Step-downs        QS  5" x 20 Jeremy  5" x 20 Jeremy 5" x 20 Jeremy     LAQ  5" x 20 Jereym  5" x 20 Jeremy 5" x 20 Jeremy    Hip add  5" x 20  5" x 20 5" x 20     SLR  1 x 20 Jeremy  1 x 20 Jeremy 1 x 20 Jeremy     S/L SLR  1 x 10 Jeremy 1 x 10 Jeremy 1 x 10 Jeremy     Clamshells   1 x 20 Jeremy  1 x 20 Jeremy 1 x 20 Jeremy                     Ther Activity                        Gait Training                        Modalities

## 2021-09-21 ENCOUNTER — OFFICE VISIT (OUTPATIENT)
Dept: PHYSICAL THERAPY | Facility: HOME HEALTHCARE | Age: 41
End: 2021-09-21
Payer: COMMERCIAL

## 2021-09-21 DIAGNOSIS — G89.29 CHRONIC PAIN OF BOTH KNEES: Primary | ICD-10-CM

## 2021-09-21 DIAGNOSIS — M25.561 CHRONIC PAIN OF BOTH KNEES: Primary | ICD-10-CM

## 2021-09-21 DIAGNOSIS — M25.562 CHRONIC PAIN OF BOTH KNEES: Primary | ICD-10-CM

## 2021-09-21 PROCEDURE — 97110 THERAPEUTIC EXERCISES: CPT

## 2021-09-21 NOTE — PROGRESS NOTES
Daily Note     Today's date: 2021  Patient name: Anthony Gaffney  : 1980  MRN: [id number]  Referring provider: Bala Amin MD  Dx: No diagnosis found  Start Time: 955          Subjective: I am a little sore at both knees and both thighs from yesterdays ex but it is not bad and I expected that  Objective: See treatment diary below    Assessment: Tolerated treatment well  Pt with good form with ex and was able to complete ex session with minimal vc's  Stressed importance of consistency with HEP  Pt reports supine SLR flex to be most difficult ex 2* LE weakness  Pt with strength deficits evident Jeremy LE and would benefit from continued PT    Plan: Continue per plan of care        Re-eval Date:     Date 21   Visit Count 1 2 3 4 5   FOTO Completed             Precautions: obesity         Manuals 21                   Neuro Re-Ed    21   Balance as appropriate    NV SLS 20" x 2 Jeremy    SLS 20" x 2 Jeremy       Tandem stance   20" x 2 ea  R/L Tandem   Stance  20" x 2 ea R/L           Ther Ex   21   NuStep  L1 10 minutes  L1  10 min  L 1  10' L1  10 min  L 3  10'   SLR x 3   1 x 15 ea Jeremy 1 x 15 ea Jeremy 1 x 20 ea Jeremy  1 x 20 ea Jeremy   Standing marches   1 x 20 Jeremy 1 x 20 Jeremy 1 x 20 Jeremy  1 x 20 Jeremy   Step-ups   Fwd/Lat   C Fwd 1 x 10   Jeremy C Fwd 1 x 20 Jeremy  C Fwd   1 x 20 Jeremy   Step-downs        QS  5" x 20 Jeremy  5" x 20 Jeremy 5" x 20 Jeremy  5" x 20 Jeremy   LAQ  5" x 20 Jeremy  5" x 20 Jeremy 5" x 20 Jeremy 5" x 20 Jeremy   Hip add  5" x 20  5" x 20 5" x 20  5" x 20   SLR  1 x 20 Jeremy  1 x 20 Jeremy 1 x 20 Jeremy  1 x 20 Jeremy   S/L SLR  1 x 10 Jeremy 1 x 10 Jeremy 1 x 10 Jeremy  1 x 20 Jeremy   Clamshells   1 x 20 Jeremy  1 x 20 Jeremy 1 x 20 Jeremy  1 x 20 Jeremy                   Ther Activity                        Gait Training                        Modalities

## 2021-09-27 ENCOUNTER — OFFICE VISIT (OUTPATIENT)
Dept: PHYSICAL THERAPY | Facility: HOME HEALTHCARE | Age: 41
End: 2021-09-27
Payer: COMMERCIAL

## 2021-09-27 DIAGNOSIS — M25.562 CHRONIC PAIN OF BOTH KNEES: Primary | ICD-10-CM

## 2021-09-27 DIAGNOSIS — M25.561 CHRONIC PAIN OF BOTH KNEES: Primary | ICD-10-CM

## 2021-09-27 DIAGNOSIS — G89.29 CHRONIC PAIN OF BOTH KNEES: Primary | ICD-10-CM

## 2021-09-27 PROCEDURE — 97110 THERAPEUTIC EXERCISES: CPT

## 2021-09-27 NOTE — PROGRESS NOTES
Daily Note     Today's date: 2021  Patient name: Bret Adorno  : 1980  MRN: [id number]  Referring provider: Val Cormier MD  Dx: No diagnosis found  Start Time: 1000          Subjective: I have a really hard time walking on inclines/declines and with getting up from sitting positions  I tried scrubbing my floor on my knees and it hurt  My R knee is very weak  Objective: See treatment diary below    Assessment: Tolerated treatment well  Pt report most pain and tightness at B knees R > L during step ups and supine SLR  Pt with strength limitations evident  Encourage consistency with HEP  Patient would benefit from continued PT    Plan: Continue per plan of care        Re-eval Date:     Date 21   Visit Count 6 2 3 4 5   FOTO              Precautions: obesity         Manuals 21                   Neuro Re-Ed  21   Balance as appropriate  SLS 20" x 2 Jeremy  NV SLS 20" x 2 Jeremy    SLS 20" x 2 Jeremy    Tandem   Stance  20" x 2 ea R/L   Tandem stance   20" x 2 ea  R/L Tandem   Stance  20" x 2 ea R/L           Ther Ex 21   NuStep  L3 10 minutes  L1  10 min  L 1  10' L1  10 min  L 3  10'   SLR x 3  1 x 20 ea Jeremy 1 x 15 ea Jeremy 1 x 15 ea Jeremy 1 x 20 ea Jeremy  1 x 20 ea Jeremy   Standing marches  1 x 20 Jeremy 1 x 20 Jeremy 1 x 20 Jeremy 1 x 20 Jeremy  1 x 20 Jeremy   Step-ups   Fwd/Lat C Fwd  1 x 20 Jeremy  C Fwd 1 x 10   Jeremy C Fwd 1 x 20 Jeremy  C Fwd   1 x 20 Jeremy   Step-downs        QS 5" x 20 Jeremy 5" x 20 Jeremy  5" x 20 Jeremy 5" x 20 Jeremy  5" x 20 Jeremy   LAQ 5" x 20 Jeremy 5" x 20 Jeremy  5" x 20 Jeremy 5" x 20 Jeremy 5" x 20 Jeremy   Hip add 5" x 20 5" x 20  5" x 20 5" x 20  5" x 20   SLR 1 x 20 Jeremy 1 x 20 Jeremy  1 x 20 Jeremy 1 x 20 Jeremy  1 x 20 Jeremy   S/L SLR 1 x 20 Jeremy 1 x 10 Jeremy 1 x 10 Jeremy 1 x 10 Jeremy  1 x 20 Jeremy   Clamshells  1 x 20 Jeremy 1 x 20 Jeremy  1 x 20 Jeremy 1 x 20 Jeremy  1 x 20 Jeremy                   Ther Activity Gait Training                        Modalities

## 2021-09-28 ENCOUNTER — APPOINTMENT (OUTPATIENT)
Dept: PHYSICAL THERAPY | Facility: HOME HEALTHCARE | Age: 41
End: 2021-09-28
Payer: COMMERCIAL

## 2021-10-05 ENCOUNTER — OFFICE VISIT (OUTPATIENT)
Dept: PHYSICAL THERAPY | Facility: HOME HEALTHCARE | Age: 41
End: 2021-10-05
Payer: COMMERCIAL

## 2021-10-05 DIAGNOSIS — M25.562 CHRONIC PAIN OF BOTH KNEES: Primary | ICD-10-CM

## 2021-10-05 DIAGNOSIS — G89.29 CHRONIC PAIN OF BOTH KNEES: Primary | ICD-10-CM

## 2021-10-05 DIAGNOSIS — M25.561 CHRONIC PAIN OF BOTH KNEES: Primary | ICD-10-CM

## 2021-10-05 DIAGNOSIS — F98.8 ATTENTION DEFICIT DISORDER (ADD) WITHOUT HYPERACTIVITY: ICD-10-CM

## 2021-10-05 PROCEDURE — 97110 THERAPEUTIC EXERCISES: CPT

## 2021-10-05 RX ORDER — METHYLPHENIDATE HYDROCHLORIDE 36 MG/1
36 TABLET ORAL DAILY
Qty: 30 TABLET | Refills: 0 | Status: SHIPPED | OUTPATIENT
Start: 2021-10-05 | End: 2021-11-09 | Stop reason: SDUPTHER

## 2021-10-07 ENCOUNTER — OFFICE VISIT (OUTPATIENT)
Dept: PHYSICAL THERAPY | Facility: HOME HEALTHCARE | Age: 41
End: 2021-10-07
Payer: COMMERCIAL

## 2021-10-07 DIAGNOSIS — M25.562 CHRONIC PAIN OF BOTH KNEES: Primary | ICD-10-CM

## 2021-10-07 DIAGNOSIS — G89.29 CHRONIC PAIN OF BOTH KNEES: Primary | ICD-10-CM

## 2021-10-07 DIAGNOSIS — M25.561 CHRONIC PAIN OF BOTH KNEES: Primary | ICD-10-CM

## 2021-10-07 PROCEDURE — 97110 THERAPEUTIC EXERCISES: CPT

## 2021-10-12 ENCOUNTER — OFFICE VISIT (OUTPATIENT)
Dept: PHYSICAL THERAPY | Facility: HOME HEALTHCARE | Age: 41
End: 2021-10-12
Payer: COMMERCIAL

## 2021-10-12 DIAGNOSIS — G89.29 CHRONIC PAIN OF BOTH KNEES: Primary | ICD-10-CM

## 2021-10-12 DIAGNOSIS — M25.562 CHRONIC PAIN OF BOTH KNEES: Primary | ICD-10-CM

## 2021-10-12 DIAGNOSIS — M25.561 CHRONIC PAIN OF BOTH KNEES: Primary | ICD-10-CM

## 2021-10-12 PROCEDURE — 97110 THERAPEUTIC EXERCISES: CPT

## 2021-10-18 ENCOUNTER — OFFICE VISIT (OUTPATIENT)
Dept: PHYSICAL THERAPY | Facility: HOME HEALTHCARE | Age: 41
End: 2021-10-18
Payer: COMMERCIAL

## 2021-10-18 DIAGNOSIS — G89.29 CHRONIC PAIN OF BOTH KNEES: Primary | ICD-10-CM

## 2021-10-18 DIAGNOSIS — M25.561 CHRONIC PAIN OF BOTH KNEES: Primary | ICD-10-CM

## 2021-10-18 DIAGNOSIS — M25.562 CHRONIC PAIN OF BOTH KNEES: Primary | ICD-10-CM

## 2021-10-18 PROCEDURE — 97110 THERAPEUTIC EXERCISES: CPT

## 2021-10-19 ENCOUNTER — OFFICE VISIT (OUTPATIENT)
Dept: PHYSICAL THERAPY | Facility: HOME HEALTHCARE | Age: 41
End: 2021-10-19
Payer: COMMERCIAL

## 2021-10-19 DIAGNOSIS — G89.29 CHRONIC PAIN OF BOTH KNEES: Primary | ICD-10-CM

## 2021-10-19 DIAGNOSIS — M25.561 CHRONIC PAIN OF BOTH KNEES: Primary | ICD-10-CM

## 2021-10-19 DIAGNOSIS — M25.562 CHRONIC PAIN OF BOTH KNEES: Primary | ICD-10-CM

## 2021-10-19 PROCEDURE — 97110 THERAPEUTIC EXERCISES: CPT | Performed by: PHYSICAL THERAPIST

## 2021-10-19 PROCEDURE — 97164 PT RE-EVAL EST PLAN CARE: CPT | Performed by: PHYSICAL THERAPIST

## 2021-10-22 ENCOUNTER — APPOINTMENT (OUTPATIENT)
Dept: LAB | Facility: HOSPITAL | Age: 41
End: 2021-10-22
Payer: COMMERCIAL

## 2021-10-22 DIAGNOSIS — M06.89 OTHER SPECIFIED RHEUMATOID ARTHRITIS, MULTIPLE SITES (HCC): ICD-10-CM

## 2021-10-22 LAB
CRP SERPL QL: 2.2 MG/L
ERYTHROCYTE [SEDIMENTATION RATE] IN BLOOD: 27 MM/HOUR (ref 0–19)

## 2021-10-22 PROCEDURE — 36415 COLL VENOUS BLD VENIPUNCTURE: CPT

## 2021-10-22 PROCEDURE — 86140 C-REACTIVE PROTEIN: CPT

## 2021-10-22 PROCEDURE — 85652 RBC SED RATE AUTOMATED: CPT

## 2021-11-09 DIAGNOSIS — F98.8 ATTENTION DEFICIT DISORDER (ADD) WITHOUT HYPERACTIVITY: ICD-10-CM

## 2021-11-09 RX ORDER — METHYLPHENIDATE HYDROCHLORIDE 36 MG/1
36 TABLET ORAL DAILY
Qty: 30 TABLET | Refills: 0 | Status: SHIPPED | OUTPATIENT
Start: 2021-11-09 | End: 2021-12-06 | Stop reason: SDUPTHER

## 2021-11-23 ENCOUNTER — HOSPITAL ENCOUNTER (OUTPATIENT)
Dept: RADIOLOGY | Facility: CLINIC | Age: 41
Discharge: HOME | End: 2021-11-23
Payer: COMMERCIAL

## 2021-11-23 ENCOUNTER — IMMUNIZATIONS (OUTPATIENT)
Dept: FAMILY MEDICINE CLINIC | Facility: HOSPITAL | Age: 41
End: 2021-11-23

## 2021-11-23 VITALS — WEIGHT: 293 LBS | BODY MASS INDEX: 45.99 KG/M2 | HEIGHT: 67 IN

## 2021-11-23 DIAGNOSIS — Z23 ENCOUNTER FOR IMMUNIZATION: Primary | ICD-10-CM

## 2021-11-23 DIAGNOSIS — Z12.31 VISIT FOR SCREENING MAMMOGRAM: ICD-10-CM

## 2021-11-23 PROCEDURE — 91300 COVID-19 PFIZER VACC 0.3 ML: CPT

## 2021-11-23 PROCEDURE — 77067 SCR MAMMO BI INCL CAD: CPT

## 2021-11-23 PROCEDURE — 0001A COVID-19 PFIZER VACC 0.3 ML: CPT

## 2021-11-23 PROCEDURE — 77063 BREAST TOMOSYNTHESIS BI: CPT

## 2021-12-01 ENCOUNTER — HOSPITAL ENCOUNTER (OUTPATIENT)
Dept: RADIOLOGY | Facility: CLINIC | Age: 41
Discharge: HOME | End: 2021-12-01
Payer: COMMERCIAL

## 2021-12-01 VITALS — BODY MASS INDEX: 45.99 KG/M2 | WEIGHT: 293 LBS | HEIGHT: 67 IN

## 2021-12-01 DIAGNOSIS — R92.8 ABNORMAL MAMMOGRAM: ICD-10-CM

## 2021-12-01 PROCEDURE — 77065 DX MAMMO INCL CAD UNI: CPT

## 2021-12-01 PROCEDURE — 76642 ULTRASOUND BREAST LIMITED: CPT

## 2021-12-01 PROCEDURE — G0279 TOMOSYNTHESIS, MAMMO: HCPCS

## 2021-12-06 DIAGNOSIS — F98.8 ATTENTION DEFICIT DISORDER (ADD) WITHOUT HYPERACTIVITY: ICD-10-CM

## 2021-12-06 RX ORDER — METHYLPHENIDATE HYDROCHLORIDE 36 MG/1
36 TABLET ORAL DAILY
Qty: 30 TABLET | Refills: 0 | Status: SHIPPED | OUTPATIENT
Start: 2021-12-06 | End: 2022-01-09 | Stop reason: SDUPTHER

## 2021-12-12 ENCOUNTER — OFFICE VISIT (OUTPATIENT)
Dept: URGENT CARE | Facility: CLINIC | Age: 41
End: 2021-12-12
Payer: COMMERCIAL

## 2021-12-12 VITALS — HEART RATE: 69 BPM | OXYGEN SATURATION: 98 % | RESPIRATION RATE: 18 BRPM | TEMPERATURE: 97.5 F

## 2021-12-12 DIAGNOSIS — J01.90 ACUTE SINUSITIS, RECURRENCE NOT SPECIFIED, UNSPECIFIED LOCATION: Primary | ICD-10-CM

## 2021-12-12 PROCEDURE — G0382 LEV 3 HOSP TYPE B ED VISIT: HCPCS | Performed by: PHYSICIAN ASSISTANT

## 2021-12-12 RX ORDER — PREDNISONE 10 MG/1
TABLET ORAL
Qty: 26 TABLET | Refills: 0 | Status: SHIPPED | OUTPATIENT
Start: 2021-12-12 | End: 2022-02-21

## 2021-12-12 RX ORDER — AMOXICILLIN AND CLAVULANATE POTASSIUM 875; 125 MG/1; MG/1
1 TABLET, FILM COATED ORAL EVERY 12 HOURS SCHEDULED
Qty: 20 TABLET | Refills: 0 | Status: SHIPPED | OUTPATIENT
Start: 2021-12-12 | End: 2021-12-22

## 2022-01-09 DIAGNOSIS — F98.8 ATTENTION DEFICIT DISORDER (ADD) WITHOUT HYPERACTIVITY: ICD-10-CM

## 2022-01-10 DIAGNOSIS — J45.20 MILD INTERMITTENT ASTHMA WITHOUT COMPLICATION: ICD-10-CM

## 2022-01-10 RX ORDER — METHYLPHENIDATE HYDROCHLORIDE 36 MG/1
36 TABLET ORAL DAILY
Qty: 30 TABLET | Refills: 0 | Status: SHIPPED | OUTPATIENT
Start: 2022-01-10 | End: 2022-02-10 | Stop reason: SDUPTHER

## 2022-01-13 ENCOUNTER — APPOINTMENT (OUTPATIENT)
Dept: LAB | Facility: HOSPITAL | Age: 42
End: 2022-01-13
Payer: COMMERCIAL

## 2022-01-13 DIAGNOSIS — M06.89 OTHER SPECIFIED RHEUMATOID ARTHRITIS, MULTIPLE SITES (HCC): ICD-10-CM

## 2022-01-13 LAB
CRP SERPL QL: 3.2 MG/L
ERYTHROCYTE [SEDIMENTATION RATE] IN BLOOD: 38 MM/HOUR (ref 0–19)

## 2022-01-13 PROCEDURE — 86140 C-REACTIVE PROTEIN: CPT

## 2022-01-13 PROCEDURE — 85652 RBC SED RATE AUTOMATED: CPT

## 2022-01-13 PROCEDURE — 36415 COLL VENOUS BLD VENIPUNCTURE: CPT

## 2022-02-10 DIAGNOSIS — F41.8 DEPRESSION WITH ANXIETY: ICD-10-CM

## 2022-02-10 DIAGNOSIS — F98.8 ATTENTION DEFICIT DISORDER (ADD) WITHOUT HYPERACTIVITY: ICD-10-CM

## 2022-02-11 RX ORDER — FLUOXETINE HYDROCHLORIDE 40 MG/1
40 CAPSULE ORAL DAILY
Qty: 90 CAPSULE | Refills: 0 | Status: SHIPPED | OUTPATIENT
Start: 2022-02-11 | End: 2022-04-25 | Stop reason: SDUPTHER

## 2022-02-11 RX ORDER — METHYLPHENIDATE HYDROCHLORIDE 36 MG/1
36 TABLET ORAL DAILY
Qty: 30 TABLET | Refills: 0 | Status: SHIPPED | OUTPATIENT
Start: 2022-02-11 | End: 2022-03-14 | Stop reason: SDUPTHER

## 2022-02-21 ENCOUNTER — OFFICE VISIT (OUTPATIENT)
Dept: INTERNAL MEDICINE CLINIC | Facility: CLINIC | Age: 42
End: 2022-02-21
Payer: COMMERCIAL

## 2022-02-21 VITALS
SYSTOLIC BLOOD PRESSURE: 128 MMHG | OXYGEN SATURATION: 98 % | HEART RATE: 83 BPM | TEMPERATURE: 98.4 F | HEIGHT: 67 IN | DIASTOLIC BLOOD PRESSURE: 84 MMHG | BODY MASS INDEX: 45.99 KG/M2 | WEIGHT: 293 LBS

## 2022-02-21 DIAGNOSIS — E66.01 MORBID OBESITY WITH BMI OF 50.0-59.9, ADULT (HCC): Primary | ICD-10-CM

## 2022-02-21 DIAGNOSIS — G47.33 MODERATE OBSTRUCTIVE SLEEP APNEA: Chronic | ICD-10-CM

## 2022-02-21 DIAGNOSIS — F98.8 ATTENTION DEFICIT DISORDER (ADD) WITHOUT HYPERACTIVITY: ICD-10-CM

## 2022-02-21 DIAGNOSIS — M06.9 RHEUMATOID ARTHRITIS INVOLVING MULTIPLE SITES, UNSPECIFIED WHETHER RHEUMATOID FACTOR PRESENT (HCC): ICD-10-CM

## 2022-02-21 PROCEDURE — 99214 OFFICE O/P EST MOD 30 MIN: CPT | Performed by: FAMILY MEDICINE

## 2022-02-21 RX ORDER — PREDNISONE 1 MG/1
TABLET ORAL
COMMUNITY
Start: 2022-02-14

## 2022-02-21 RX ORDER — ADALIMUMAB 40MG/0.4ML
KIT SUBCUTANEOUS
COMMUNITY
Start: 2022-01-17 | End: 2022-07-19

## 2022-02-22 NOTE — PROGRESS NOTES
Assessment/Plan:    No problem-specific Assessment & Plan notes found for this encounter  Diagnoses and all orders for this visit:    Morbid obesity with BMI of 50 0-59 9, adult ([facility] [address] )  -     Semaglutide-Weight Management (WEGOVY) 0 25 MG/0 5ML; Inject 0 5 mL (0 25 mg total) under the skin once a week    Rheumatoid arthritis involving multiple sites, unspecified whether rheumatoid factor present (HCC)    Moderate obstructive sleep apnea    Attention deficit disorder (ADD) without hyperactivity    Other orders  -     predniSONE 5 mg tablet; Taper dose   -     Humira Pen 40 MG/0 4ML PNKT      orders and recommendations as noted above  Discussed with her the options for weight loss  She wishes to avoid surgical options such as bariatric surgery  Is at a standstill with dietary changes alone  Discussed with her medication options  [facility] discussed  Potential risks, benefits, and alternatives discussed  Side effects discussed  Discussed the potential limitations of this medication  Benefits of weight loss especially with her joint symptoms discussed  Prescription given  Our office will attempt to have this approved through her insurance  Concentration has improved significantly with the Concerta  Mood has also improved with the improved ability to concentrate  Continue current dosage  Watch for any potential side effects  Continue with the Prozac as previously  Continue follow-up with Rheumatology regularly  Watch for any worsening of joint symptoms  Will have her follow up approximately 6-8 weeks after starting the would go VD  Will gradually increase the dosage  Subjective:      Patient ID: Cherry Shrestha is a 37 y o  female  She presents for follow-up and discuss weight loss options  She has been trying to watch her diet more closely  Has made some lifestyle changes  Had been able to lose some weight but now has had a standstill and has not been able to lose any more    Is limited with her exercise because of her joint symptoms and time constraints  She and her son are going to start going to the gym and she is hopeful that this will help  She would like to discuss weight loss options  Would like to avoid surgical interventions for weight loss  Is interested in possibly medications  Concentration has improved significantly with the Concerta  With the improved ability to concentrate and complete tasks, her mood has improved  Continues with the Prozac  Joint symptoms have worsened somewhat  She continues to follow-up with Rheumatology and continues on the Humira  Recently Rheumatology did place her on prednisone because of an exacerbation of her rheumatoid  Joint symptoms have worsened as her weight has increased over the years  The following portions of the patient's history were reviewed and updated as appropriate:   She  has a past medical history of Allergic rhinitis, Anxiety, Arthritis, Asthma, COVID-19 (12/17/2020), CPAP (continuous positive airway pressure) dependence, Daytime hypersomnolence, Depression, GERD (gastroesophageal reflux disease), IUD (intrauterine device) in place, Lumbar disc disease, Migraine, RA (rheumatoid arthritis) ([facility] [address] ), Rheumatoid arthritis ([facility] [address] ), Sleep apnea, Varicella, and Wears glasses    She   Patient Active Problem List    Diagnosis Date Noted    IUD (mirena) Surveillance 04/07/2021    Attention deficit disorder (ADD) without hyperactivity 03/01/2021    Morbid obesity with BMI of 50 0-59 9, adult ([facility] ) 03/01/2021    Myalgia 09/14/2020    Chronic pain of left knee 03/10/2020    Knee instability, left 03/10/2020    Mild hyperlipidemia 09/10/2019    Insufficient sleep syndrome 10/04/2018    Chronic sinusitis 08/02/2017    Moderate obstructive sleep apnea 05/23/2017    Fatigue 03/22/2017    Seasonal mood disorder ([facility] ) 03/22/2017    Allergic rhinitis 02/04/2016    Nasal polyp 02/04/2016    Vitamin D deficiency 02/04/2016    Depression with anxiety 12/17/2015    Rheumatoid arthritis ([facility] [address] ) 12/17/2015    Anxiety 08/04/2015    Asthma 08/04/2015     She  has a past surgical history that includes Cholecystectomy; Sinus surgery; Nasal septum surgery; [place] tooth extraction; and pr stereotactic comp assist proc,cranial,extradural (N/A, 1/8/2019)  Her family history includes Aneurysm in her paternal grandfather; Asthma in her mother; Autism in her son; Breast cancer in her paternal aunt; COPD in her mother; Cancer in her paternal grandmother; Celiac disease in her daughter and paternal grandfather; Chiari malformation in her brother and daughter; Diabetes in her daughter; Hypertension in her mother; Hypothyroidism in her father; Lung disease in her maternal grandfather; No Known Problems in her maternal aunt, maternal grandmother, paternal aunt, and paternal aunt  She  reports that she has quit smoking  Her smoking use included cigarettes  She smoked 0 00 packs per day for 0 00 years  She has never used smokeless tobacco  She reports current alcohol use  She reports that she does not use drugs  Current Outpatient Medications   Medication Sig Dispense Refill    albuterol (2 5 mg/3 mL) 0 083 % nebulizer solution Inhale 1 each every 4 (four) hours as needed      albuterol (PROAIR HFA) 90 mcg/act inhaler Inhale 1-2 puffs every 6 (six) hours as needed        fexofenadine (ALLEGRA) 180 MG tablet Take 180 mg by mouth daily      FLUoxetine (PROzac) 40 MG capsule Take 1 capsule (40 mg total) by mouth daily 90 capsule 0    fluticasone (FLONASE) 50 mcg/act nasal spray 2 sprays into each nostril daily      Humira Pen 40 MG/0 4ML PNKT       ibuprofen (MOTRIN) 200 mg tablet Take 200 mg by mouth every 6 (six) hours as needed for mild pain        methylphenidate (CONCERTA) 36 MG ER tablet Take 1 tablet (36 mg total) by mouth daily Max Daily Amount: 36 mg 30 tablet 0    mometasone-formoterol (DULERA) 200-5 MCG/ACT inhaler Inhale 2 puffs 2 (two) times a day Rinse mouth after use  13 g 0    predniSONE 5 mg tablet Taper dose       Semaglutide-Weight Management (WEGOVY) 0 25 MG/0 5ML Inject 0 5 mL (0 25 mg total) under the skin once a week 2 mL 0     No current facility-administered medications for this visit  Current Outpatient Medications on File Prior to Visit   Medication Sig    albuterol (2 5 mg/3 mL) 0 083 % nebulizer solution Inhale 1 each every 4 (four) hours as needed    albuterol (PROAIR HFA) 90 mcg/act inhaler Inhale 1-2 puffs every 6 (six) hours as needed      fexofenadine (ALLEGRA) 180 MG tablet Take 180 mg by mouth daily    FLUoxetine (PROzac) 40 MG capsule Take 1 capsule (40 mg total) by mouth daily    fluticasone (FLONASE) 50 mcg/act nasal spray 2 sprays into each nostril daily    Humira Pen 40 MG/0 4ML PNKT     ibuprofen (MOTRIN) 200 mg tablet Take 200 mg by mouth every 6 (six) hours as needed for mild pain   methylphenidate (CONCERTA) 36 MG ER tablet Take 1 tablet (36 mg total) by mouth daily Max Daily Amount: 36 mg    mometasone-formoterol (DULERA) 200-5 MCG/ACT inhaler Inhale 2 puffs 2 (two) times a day Rinse mouth after use   predniSONE 5 mg tablet Taper dose     [DISCONTINUED] diazepam (VALIUM) 2 mg tablet Take 1 tablet (2 mg total) by mouth every 12 (twelve) hours as needed for anxiety (prior to MRI/procedure)    [DISCONTINUED] ergocalciferol (VITAMIN D2) 50,000 units Take 1 capsule (50,000 Units total) by mouth once a week    [DISCONTINUED] Etanercept (ENBREL SC) Inject under the skin    [DISCONTINUED] predniSONE 10 mg tablet Take 3 tabs BID X 2 days, 2 tabs BID X 2 days, 1 tab BID X 2 days, 1 tab daily X 2 days     No current facility-administered medications on file prior to visit  She is allergic to morphine       Review of Systems   Constitutional: Positive for fatigue  Negative for activity change, appetite change, chills and fever  HENT: Negative for congestion and rhinorrhea      Eyes: Negative for visual disturbance  Respiratory: Negative for cough, chest tightness and shortness of breath  Cardiovascular: Negative for chest pain and palpitations  Gastrointestinal: Negative for abdominal pain, blood in stool, diarrhea, nausea and vomiting  Endocrine: Negative for polydipsia, polyphagia and polyuria  Genitourinary: Negative for dysuria, frequency and urgency  Musculoskeletal: Positive for arthralgias and joint swelling  Negative for gait problem  Skin: Negative for color change  Neurological: Negative for dizziness and headaches  Hematological: Does not bruise/bleed easily  Psychiatric/Behavioral: Negative for confusion, decreased concentration and sleep disturbance  The patient is not nervous/anxious  Objective:      /84 (BP Location: Right arm, Patient Position: Sitting, Cuff Size: Large)   Pulse 83   Temp 98 4 °F (36 9 °C)   Ht 5' 7" (1 702 m)   Wt (!) 159 kg (349 lb 14 4 oz)   SpO2 98%   BMI 54 80 kg/m²          Physical Exam  Vitals and nursing note reviewed  Constitutional:       General: She is not in acute distress  Appearance: She is well-developed and well-groomed  She is morbidly obese  HENT:      Head: Normocephalic and atraumatic  Eyes:      General:         Right eye: No discharge  Left eye: No discharge  Conjunctiva/sclera: Conjunctivae normal       Pupils: Pupils are equal, round, and reactive to light  Neck:      Thyroid: No thyromegaly  Cardiovascular:      Rate and Rhythm: Normal rate and regular rhythm  Heart sounds: Normal heart sounds  No murmur heard  No friction rub  No gallop  Pulmonary:      Effort: No respiratory distress  Breath sounds: No wheezing or rales  Abdominal:      General: Bowel sounds are normal  There is no distension  Tenderness: There is no abdominal tenderness     Musculoskeletal:      Comments: Slight degenerative changes bilateral hands and wrists; degenerative changes bilateral knees Lymphadenopathy:      Cervical: No cervical adenopathy  Skin:     General: Skin is warm and dry  Neurological:      Mental Status: She is alert and oriented to person, place, and time  Psychiatric:         Attention and Perception: Attention normal          Mood and Affect: Mood and affect normal          Speech: Speech normal          Behavior: Behavior normal  Behavior is cooperative  BMI Counseling: Body mass index is 54 8 kg/m²  The BMI is above normal  Nutrition recommendations include decreasing portion sizes, encouraging healthy choices of fruits and vegetables, decreasing fast food intake, consuming healthier snacks, limiting drinks that contain sugar, moderation in carbohydrate intake and reducing intake of cholesterol  Exercise recommendations include exercising 3-5 times per week  Rationale for BMI follow-up plan is due to patient being overweight or obese

## 2022-03-14 DIAGNOSIS — F98.8 ATTENTION DEFICIT DISORDER (ADD) WITHOUT HYPERACTIVITY: ICD-10-CM

## 2022-03-14 RX ORDER — METHYLPHENIDATE HYDROCHLORIDE 36 MG/1
36 TABLET ORAL DAILY
Qty: 30 TABLET | Refills: 0 | Status: SHIPPED | OUTPATIENT
Start: 2022-03-14 | End: 2022-04-18 | Stop reason: SDUPTHER

## 2022-03-28 DIAGNOSIS — E66.01 MORBID OBESITY WITH BMI OF 50.0-59.9, ADULT (HCC): ICD-10-CM

## 2022-04-04 ENCOUNTER — APPOINTMENT (OUTPATIENT)
Dept: LAB | Facility: HOSPITAL | Age: 42
End: 2022-04-04
Payer: COMMERCIAL

## 2022-04-04 DIAGNOSIS — M06.89 OTHER SPECIFIED RHEUMATOID ARTHRITIS, MULTIPLE SITES (HCC): ICD-10-CM

## 2022-04-04 DIAGNOSIS — M05.20 RHEUMATOID ARTERITIS (HCC): ICD-10-CM

## 2022-04-04 DIAGNOSIS — Z00.8 ENCOUNTER FOR OTHER GENERAL EXAMINATION: ICD-10-CM

## 2022-04-04 DIAGNOSIS — Z79.899 ENCOUNTER FOR LONG-TERM (CURRENT) USE OF OTHER MEDICATIONS: ICD-10-CM

## 2022-04-04 LAB
ALBUMIN SERPL BCP-MCNC: 3.8 G/DL (ref 3.5–5)
ALP SERPL-CCNC: 72 U/L (ref 46–116)
ALT SERPL W P-5'-P-CCNC: 25 U/L (ref 12–78)
ANION GAP SERPL CALCULATED.3IONS-SCNC: 10 MMOL/L (ref 4–13)
AST SERPL W P-5'-P-CCNC: 13 U/L (ref 5–45)
BASOPHILS # BLD AUTO: 0.15 THOUSANDS/ΜL (ref 0–0.1)
BASOPHILS NFR BLD AUTO: 1 % (ref 0–1)
BILIRUB SERPL-MCNC: 0.85 MG/DL (ref 0.2–1)
BUN SERPL-MCNC: 20 MG/DL (ref 5–25)
CALCIUM SERPL-MCNC: 8.8 MG/DL (ref 8.3–10.1)
CHLORIDE SERPL-SCNC: 99 MMOL/L (ref 100–108)
CHOLEST SERPL-MCNC: 171 MG/DL
CO2 SERPL-SCNC: 27 MMOL/L (ref 21–32)
CREAT SERPL-MCNC: 0.84 MG/DL (ref 0.6–1.3)
CRP SERPL QL: 1.4 MG/L
EOSINOPHIL # BLD AUTO: 0.41 THOUSAND/ΜL (ref 0–0.61)
EOSINOPHIL NFR BLD AUTO: 4 % (ref 0–6)
ERYTHROCYTE [DISTWIDTH] IN BLOOD BY AUTOMATED COUNT: 13.3 % (ref 11.6–15.1)
ERYTHROCYTE [SEDIMENTATION RATE] IN BLOOD: 34 MM/HOUR (ref 0–19)
EST. AVERAGE GLUCOSE BLD GHB EST-MCNC: 100 MG/DL
GFR SERPL CREATININE-BSD FRML MDRD: 86 ML/MIN/1.73SQ M
GLUCOSE SERPL-MCNC: 83 MG/DL (ref 65–140)
HBA1C MFR BLD: 5.1 %
HCT VFR BLD AUTO: 42 % (ref 34.8–46.1)
HDLC SERPL-MCNC: 60 MG/DL
HGB BLD-MCNC: 14.1 G/DL (ref 11.5–15.4)
IMM GRANULOCYTES # BLD AUTO: 0.05 THOUSAND/UL (ref 0–0.2)
IMM GRANULOCYTES NFR BLD AUTO: 1 % (ref 0–2)
LDLC SERPL CALC-MCNC: 92 MG/DL (ref 0–100)
LYMPHOCYTES # BLD AUTO: 2.48 THOUSANDS/ΜL (ref 0.6–4.47)
LYMPHOCYTES NFR BLD AUTO: 23 % (ref 14–44)
MCH RBC QN AUTO: 31.2 PG (ref 26.8–34.3)
MCHC RBC AUTO-ENTMCNC: 33.6 G/DL (ref 31.4–37.4)
MCV RBC AUTO: 93 FL (ref 82–98)
MONOCYTES # BLD AUTO: 0.85 THOUSAND/ΜL (ref 0.17–1.22)
MONOCYTES NFR BLD AUTO: 8 % (ref 4–12)
NEUTROPHILS # BLD AUTO: 6.73 THOUSANDS/ΜL (ref 1.85–7.62)
NEUTS SEG NFR BLD AUTO: 63 % (ref 43–75)
NONHDLC SERPL-MCNC: 111 MG/DL
NRBC BLD AUTO-RTO: 0 /100 WBCS
PLATELET # BLD AUTO: 333 THOUSANDS/UL (ref 149–390)
PMV BLD AUTO: 11.1 FL (ref 8.9–12.7)
POTASSIUM SERPL-SCNC: 3.4 MMOL/L (ref 3.5–5.3)
PROT SERPL-MCNC: 8 G/DL (ref 6.4–8.2)
RBC # BLD AUTO: 4.52 MILLION/UL (ref 3.81–5.12)
SODIUM SERPL-SCNC: 136 MMOL/L (ref 136–145)
TRIGL SERPL-MCNC: 97 MG/DL
WBC # BLD AUTO: 10.67 THOUSAND/UL (ref 4.31–10.16)

## 2022-04-04 PROCEDURE — 80061 LIPID PANEL: CPT

## 2022-04-04 PROCEDURE — 80053 COMPREHEN METABOLIC PANEL: CPT

## 2022-04-04 PROCEDURE — 36415 COLL VENOUS BLD VENIPUNCTURE: CPT

## 2022-04-04 PROCEDURE — 86140 C-REACTIVE PROTEIN: CPT

## 2022-04-04 PROCEDURE — 83036 HEMOGLOBIN GLYCOSYLATED A1C: CPT

## 2022-04-04 PROCEDURE — 85025 COMPLETE CBC W/AUTO DIFF WBC: CPT

## 2022-04-04 PROCEDURE — 85652 RBC SED RATE AUTOMATED: CPT

## 2022-04-18 DIAGNOSIS — F98.8 ATTENTION DEFICIT DISORDER (ADD) WITHOUT HYPERACTIVITY: ICD-10-CM

## 2022-04-19 RX ORDER — METHYLPHENIDATE HYDROCHLORIDE 36 MG/1
36 TABLET ORAL DAILY
Qty: 30 TABLET | Refills: 0 | Status: SHIPPED | OUTPATIENT
Start: 2022-04-19 | End: 2022-05-23 | Stop reason: SDUPTHER

## 2022-04-25 DIAGNOSIS — F41.8 DEPRESSION WITH ANXIETY: ICD-10-CM

## 2022-04-25 RX ORDER — FLUOXETINE HYDROCHLORIDE 40 MG/1
40 CAPSULE ORAL DAILY
Qty: 90 CAPSULE | Refills: 0 | Status: SHIPPED | OUTPATIENT
Start: 2022-04-25 | End: 2022-07-20 | Stop reason: SDUPTHER

## 2022-05-06 NOTE — PATIENT INSTRUCTIONS

## 2022-05-06 NOTE — PROGRESS NOTES
Assessment        Diagnoses and all orders for this visit:    Encntr for gyn exam (general) (routine) w abnormal findings    IUD (mirena) Surveillance    Counseling for birth control regarding intrauterine device (IUD)             Plan      All questions answered  Contraception: IUD  Educational material distributed  Follow up in 6 months  Follow up as needed  Mammogram as scheduled    Discussed duration of use up to 5 years if using for contraception and HMB  She requests new IUD in 2022    She was counseled on risks associated with Intrauterine device use including uterine perforation, expulsion, malpositioned IUD  She was counseled on potential side effects and complications  Patient requests to replace IUD, info given on insurance      PAP deferred    Subjective      Naveed Palacios is a 40 y o  female who presents for annual exam       Chief Complaint   Patient presents with   [facility] Gynecologic Exam     21  last pap normal  Patient reports no concerns  Mirena since 2017  She is  x 12 years  ON 3rd Mirena since delivery 15 years ago    Has a h/o heavy and painful periods and migraines  Has occasional spotting on Mirena, seldom      Last Pap: 21 NILM neg HPV  Last mammogram: 21, asymmetry, diagnostic mammo in 6 months L breast  Already scheduled for screening  No colonoscopy  No HPV vaccine    Current contraception: IUD  History of abnormal Pap smear: no  History of abnormal mammogram: no  Family history of uterine or ovarian cancer: no  Family history of breast cancer: yes - Paternal aunt 46s  Paternal cousin in 45s  Family history of colon cancer: no    TC 10 98%      Menstrual History:  OB History        2    Para   2    Term   1       1    AB   0    Living   2       SAB   0    IAB   0    Ectopic   0    Multiple   0    Live Births   2                Menarche age: 16  No LMP recorded (lmp unknown)  Patient has had an implant               Past Medical History:   Diagnosis Date    Allergic rhinitis     Anxiety     Arthritis     Asthma     COVID-19 12/17/2020    CPAP (continuous positive airway pressure) dependence     Daytime hypersomnolence     LAST ASSESSED [id number]    Depression     GERD (gastroesophageal reflux disease)     occas    IUD (intrauterine device) in place     Lumbar disc disease     Migraine     RA [facility]oid arthritis) ([facility] [address] )     Rheumatoid arthritis ([facility] [address] )     Sleep apnea     Varicella     Wears glasses      Past Surgical History:   Procedure Laterality Date    CHOLECYSTECTOMY      NASAL SEPTUM SURGERY      NASAL SEPTAL DEVIATION REPAIR-Dr Navarrete    CO STEREOTACTIC COMP ASSIST PROC,CRANIAL,EXTRADURAL N/A 1/8/2019    Procedure: FUNCTIONAL ENDOSCOPIC SINUS SURGERY (FESS) IMAGED GUIDED; PLACEMENT OF MULTIPLE PROPEL IMPLANTS;  Surgeon: Uma Dow DO;  Location: [facility];  Service: ENT    SINUS SURGERY      WISDOM TOOTH EXTRACTION       Family History   Problem Relation Age of Onset    Asthma Mother     Hypertension Mother     COPD Mother     Hypothyroidism Father     Chiari malformation Brother     Chiari malformation Daughter     Diabetes Daughter     Celiac disease Daughter     No Known Problems Maternal Grandmother     Lung disease Maternal Grandfather     Cancer Paternal Grandmother     Aneurysm Paternal Grandfather     Celiac disease Paternal Grandfather     Autism Son     No Known Problems Maternal Aunt     Breast cancer Paternal Aunt     No Known Problems Paternal Aunt     No Known Problems Paternal Aunt        Social History     Tobacco Use    Smoking status: Former Smoker     Packs/day: 0 00     Years: 0 00     Pack years: 0 00     Types: Cigarettes    Smokeless tobacco: Never Used    Tobacco comment: quit 5 years ago    Vaping Use    Vaping Use: Never used   Substance Use Topics    Alcohol use: Yes     Alcohol/week: 0 0 standard drinks     Comment: social     Drug use:  No Current Outpatient Medications:     albuterol (2 5 mg/3 mL) 0 083 % nebulizer solution, Inhale 1 each every 4 (four) hours as needed, Disp: , Rfl:     albuterol (PROAIR HFA) 90 mcg/act inhaler, Inhale 1-2 puffs every 6 (six) hours as needed  , Disp: , Rfl:     fexofenadine (ALLEGRA) 180 MG tablet, Take 180 mg by mouth daily, Disp: , Rfl:     FLUoxetine (PROzac) 40 MG capsule, Take 1 capsule (40 mg total) by mouth daily, Disp: 90 capsule, Rfl: 0    fluticasone (FLONASE) 50 mcg/act nasal spray, 2 sprays into each nostril daily, Disp: , Rfl:     Humira Pen 40 MG/0 4ML PNKT, , Disp: , Rfl:     ibuprofen (MOTRIN) 200 mg tablet, Take 200 mg by mouth every 6 (six) hours as needed for mild pain , Disp: , Rfl:     methylphenidate (CONCERTA) 36 MG ER tablet, Take 1 tablet (36 mg total) by mouth daily Max Daily Amount: 36 mg, Disp: 30 tablet, Rfl: 0    mometasone-formoterol (DULERA) 200-5 MCG/ACT inhaler, Inhale 2 puffs 2 (two) times a day Rinse mouth after use , Disp: 13 g, Rfl: 0    Semaglutide-Weight Management (WEGOVY) 1 MG/0 5ML, Inject 0 5 mL (1 mg total) under the skin once a week, Disp: 2 mL, Rfl: 1    predniSONE 5 mg tablet, Taper dose  (Patient not taking: Reported on 5/9/2022 ), Disp: , Rfl:     Allergies   Allergen Reactions    Morphine      "with epidural during pregnancy caused hypotension"           Review of Systems   Constitutional: Negative  HENT: Negative  Eyes: Negative  Respiratory: Negative  Cardiovascular: Negative  Gastrointestinal: Negative  Endocrine: Negative  Genitourinary:        As noted in HPI   Musculoskeletal: Negative  Skin: Negative  Allergic/Immunologic: Negative  Neurological: Negative  Hematological: Negative  Psychiatric/Behavioral: Negative          /72 (BP Location: Right arm, Patient Position: Sitting, Cuff Size: Adult)   Pulse 78   Temp 98 2 °F (36 8 °C) (Tympanic)   Ht 5' 7" (1 702 m)   Wt (!) 157 kg (346 lb)   LMP  (LMP Unknown)   BMI 54 19 kg/m²         Physical Exam  Constitutional:       Appearance: She is well-developed  Genitourinary:      Vulva, bladder and rectum normal       No lesions in the vagina  Genitourinary Comments:         Right Labia: No rash, tenderness, lesions, skin changes or Bartholin's cyst      Left Labia: No tenderness, lesions, skin changes, Bartholin's cyst or rash  No inguinal adenopathy present in the right or left side  No vaginal discharge, tenderness or bleeding  No vaginal prolapse present  No vaginal atrophy present  Right Adnexa: not tender, not full and no mass present  Left Adnexa: not tender, not full and no mass present  No cervical motion tenderness, friability, lesion or polyp  Uterus is not enlarged or tender  Pelvic exam was performed with patient in the lithotomy position  Rectum:      No external hemorrhoid  Breasts:      Right: No mass, nipple discharge, skin change or tenderness  Left: No mass, nipple discharge, skin change or tenderness  HENT:      Head: Normocephalic  Nose: Nose normal    Eyes:      Conjunctiva/sclera: Conjunctivae normal    Neck:      Thyroid: No thyromegaly  Cardiovascular:      Rate and Rhythm: Normal rate and regular rhythm  Heart sounds: Normal heart sounds  No murmur heard  Pulmonary:      Effort: Pulmonary effort is normal  No respiratory distress  Breath sounds: Normal breath sounds  No wheezing or rales  Abdominal:      General: There is no distension  Palpations: Abdomen is soft  There is no mass  Tenderness: There is no abdominal tenderness  There is no guarding or rebound  Musculoskeletal:         General: No tenderness  Cervical back: Neck supple  No muscular tenderness  Lymphadenopathy:      Cervical: No cervical adenopathy  Lower Body: No right inguinal adenopathy  No left inguinal adenopathy     Neurological:      Mental Status: She is alert and oriented to person, place, and time  Skin:     General: Skin is warm and dry     Psychiatric:         Mood and Affect: Mood normal          Behavior: Behavior normal

## 2022-05-09 ENCOUNTER — ANNUAL EXAM (OUTPATIENT)
Dept: OBGYN CLINIC | Facility: CLINIC | Age: 42
End: 2022-05-09
Payer: COMMERCIAL

## 2022-05-09 VITALS
SYSTOLIC BLOOD PRESSURE: 126 MMHG | DIASTOLIC BLOOD PRESSURE: 72 MMHG | HEIGHT: 67 IN | HEART RATE: 78 BPM | BODY MASS INDEX: 45.99 KG/M2 | WEIGHT: 293 LBS | TEMPERATURE: 98.2 F

## 2022-05-09 DIAGNOSIS — Z01.411 ENCNTR FOR GYN EXAM (GENERAL) (ROUTINE) W ABNORMAL FINDINGS: Primary | ICD-10-CM

## 2022-05-09 DIAGNOSIS — Z30.431 CONTRACEPTIVE, SURVEILLANCE, INTRAUTERINE DEVICE: ICD-10-CM

## 2022-05-09 DIAGNOSIS — Z30.09 COUNSELING FOR BIRTH CONTROL REGARDING INTRAUTERINE DEVICE (IUD): ICD-10-CM

## 2022-05-09 PROCEDURE — S0612 ANNUAL GYNECOLOGICAL EXAMINA: HCPCS | Performed by: OBSTETRICS & GYNECOLOGY

## 2022-05-12 ENCOUNTER — APPOINTMENT (OUTPATIENT)
Dept: LAB | Facility: HOSPITAL | Age: 42
End: 2022-05-12
Payer: COMMERCIAL

## 2022-05-12 DIAGNOSIS — M06.89 OTHER SPECIFIED RHEUMATOID ARTHRITIS, MULTIPLE SITES (HCC): ICD-10-CM

## 2022-05-12 DIAGNOSIS — Z79.899 ENCOUNTER FOR LONG-TERM (CURRENT) USE OF OTHER MEDICATIONS: ICD-10-CM

## 2022-05-12 LAB
ALBUMIN SERPL BCP-MCNC: 3.9 G/DL (ref 3.5–5)
ALP SERPL-CCNC: 74 U/L (ref 46–116)
ALT SERPL W P-5'-P-CCNC: 30 U/L (ref 12–78)
ANION GAP SERPL CALCULATED.3IONS-SCNC: 11 MMOL/L (ref 4–13)
AST SERPL W P-5'-P-CCNC: 16 U/L (ref 5–45)
BASOPHILS # BLD AUTO: 0.12 THOUSANDS/ΜL (ref 0–0.1)
BASOPHILS NFR BLD AUTO: 2 % (ref 0–1)
BILIRUB SERPL-MCNC: 1.1 MG/DL (ref 0.2–1)
BUN SERPL-MCNC: 15 MG/DL (ref 5–25)
CALCIUM SERPL-MCNC: 8.8 MG/DL (ref 8.3–10.1)
CHLORIDE SERPL-SCNC: 102 MMOL/L (ref 100–108)
CO2 SERPL-SCNC: 25 MMOL/L (ref 21–32)
CREAT SERPL-MCNC: 0.84 MG/DL (ref 0.6–1.3)
CRP SERPL QL: 5.2 MG/L
EOSINOPHIL # BLD AUTO: 0.48 THOUSAND/ΜL (ref 0–0.61)
EOSINOPHIL NFR BLD AUTO: 7 % (ref 0–6)
ERYTHROCYTE [DISTWIDTH] IN BLOOD BY AUTOMATED COUNT: 13.5 % (ref 11.6–15.1)
ERYTHROCYTE [SEDIMENTATION RATE] IN BLOOD: 28 MM/HOUR (ref 0–19)
GFR SERPL CREATININE-BSD FRML MDRD: 86 ML/MIN/1.73SQ M
GLUCOSE SERPL-MCNC: 83 MG/DL (ref 65–140)
HCT VFR BLD AUTO: 42.9 % (ref 34.8–46.1)
HGB BLD-MCNC: 13.7 G/DL (ref 11.5–15.4)
IMM GRANULOCYTES # BLD AUTO: 0.02 THOUSAND/UL (ref 0–0.2)
IMM GRANULOCYTES NFR BLD AUTO: 0 % (ref 0–2)
LYMPHOCYTES # BLD AUTO: 1.58 THOUSANDS/ΜL (ref 0.6–4.47)
LYMPHOCYTES NFR BLD AUTO: 23 % (ref 14–44)
MCH RBC QN AUTO: 29.5 PG (ref 26.8–34.3)
MCHC RBC AUTO-ENTMCNC: 31.9 G/DL (ref 31.4–37.4)
MCV RBC AUTO: 92 FL (ref 82–98)
MONOCYTES # BLD AUTO: 0.55 THOUSAND/ΜL (ref 0.17–1.22)
MONOCYTES NFR BLD AUTO: 8 % (ref 4–12)
NEUTROPHILS # BLD AUTO: 4.08 THOUSANDS/ΜL (ref 1.85–7.62)
NEUTS SEG NFR BLD AUTO: 60 % (ref 43–75)
NRBC BLD AUTO-RTO: 0 /100 WBCS
PLATELET # BLD AUTO: 308 THOUSANDS/UL (ref 149–390)
PMV BLD AUTO: 11.8 FL (ref 8.9–12.7)
POTASSIUM SERPL-SCNC: 4.1 MMOL/L (ref 3.5–5.3)
PROT SERPL-MCNC: 7.9 G/DL (ref 6.4–8.2)
RBC # BLD AUTO: 4.65 MILLION/UL (ref 3.81–5.12)
SODIUM SERPL-SCNC: 138 MMOL/L (ref 136–145)
WBC # BLD AUTO: 6.83 THOUSAND/UL (ref 4.31–10.16)

## 2022-05-12 PROCEDURE — 80053 COMPREHEN METABOLIC PANEL: CPT

## 2022-05-12 PROCEDURE — 85652 RBC SED RATE AUTOMATED: CPT

## 2022-05-12 PROCEDURE — 86140 C-REACTIVE PROTEIN: CPT

## 2022-05-12 PROCEDURE — 36415 COLL VENOUS BLD VENIPUNCTURE: CPT

## 2022-05-12 PROCEDURE — 85025 COMPLETE CBC W/AUTO DIFF WBC: CPT

## 2022-05-23 DIAGNOSIS — E66.01 MORBID OBESITY WITH BMI OF 50.0-59.9, ADULT (HCC): Primary | ICD-10-CM

## 2022-05-23 DIAGNOSIS — F98.8 ATTENTION DEFICIT DISORDER (ADD) WITHOUT HYPERACTIVITY: ICD-10-CM

## 2022-05-23 RX ORDER — METHYLPHENIDATE HYDROCHLORIDE 36 MG/1
36 TABLET ORAL DAILY
Qty: 30 TABLET | Refills: 0 | Status: SHIPPED | OUTPATIENT
Start: 2022-05-23 | End: 2022-06-16 | Stop reason: SDUPTHER

## 2022-05-23 NOTE — TELEPHONE ENCOUNTER
Karen Pagan took her last dose of the 1 0 Wegovy today  She is due for a refill and would like to know what her dosage will be  She tolerated the 1 0 well and didn't have any issues

## 2022-06-01 ENCOUNTER — HOSPITAL ENCOUNTER (OUTPATIENT)
Dept: RADIOLOGY | Facility: CLINIC | Age: 42
Discharge: HOME | End: 2022-06-01
Payer: COMMERCIAL

## 2022-06-01 VITALS — WEIGHT: 293 LBS | HEIGHT: 67 IN | BODY MASS INDEX: 45.99 KG/M2

## 2022-06-01 DIAGNOSIS — Z09 FOLLOW-UP EXAM, 3-6 MONTHS SINCE PREVIOUS EXAM: ICD-10-CM

## 2022-06-01 PROCEDURE — 77065 DX MAMMO INCL CAD UNI: CPT

## 2022-06-01 PROCEDURE — G0279 TOMOSYNTHESIS, MAMMO: HCPCS

## 2022-06-01 PROCEDURE — 76642 ULTRASOUND BREAST LIMITED: CPT

## 2022-06-01 RX ORDER — UPADACITINIB 15 MG/1
15 TABLET, EXTENDED RELEASE ORAL DAILY
COMMUNITY

## 2022-06-01 NOTE — PROGRESS NOTES
Met with patient and Dr Leia Nelson regarding recommendation for;    _____ RIGHT ____X__LEFT      ___X__Ultrasound guided  ______Stereotactic breast biopsy  __X___Verbalized understanding        Blood thinners:  No: __X___ Yes: ______ What:                 Biopsy teaching sheet given:  Yes: ___X___ No: ________    Pt given contact information and adv to call with any questions/needs

## 2022-06-15 ENCOUNTER — HOSPITAL ENCOUNTER (OUTPATIENT)
Dept: RADIOLOGY | Facility: CLINIC | Age: 42
Discharge: HOME | End: 2022-06-15

## 2022-06-15 ENCOUNTER — HOSPITAL ENCOUNTER (OUTPATIENT)
Dept: RADIOLOGY | Facility: CLINIC | Age: 42
Discharge: HOME | End: 2022-06-15
Admitting: RADIOLOGY
Payer: COMMERCIAL

## 2022-06-15 VITALS — DIASTOLIC BLOOD PRESSURE: 100 MMHG | HEART RATE: 85 BPM | SYSTOLIC BLOOD PRESSURE: 144 MMHG

## 2022-06-15 DIAGNOSIS — R92.8 ABNORMAL MAMMOGRAM: ICD-10-CM

## 2022-06-15 PROCEDURE — 88342 IMHCHEM/IMCYTCHM 1ST ANTB: CPT | Performed by: PATHOLOGY

## 2022-06-15 PROCEDURE — 88305 TISSUE EXAM BY PATHOLOGIST: CPT | Performed by: PATHOLOGY

## 2022-06-15 PROCEDURE — 19083 BX BREAST 1ST LESION US IMAG: CPT

## 2022-06-15 PROCEDURE — A4648 IMPLANTABLE TISSUE MARKER: HCPCS

## 2022-06-15 PROCEDURE — 88341 IMHCHEM/IMCYTCHM EA ADD ANTB: CPT | Performed by: PATHOLOGY

## 2022-06-15 RX ORDER — LIDOCAINE HYDROCHLORIDE 10 MG/ML
5 INJECTION, SOLUTION EPIDURAL; INFILTRATION; INTRACAUDAL; PERINEURAL ONCE
Status: COMPLETED | OUTPATIENT
Start: 2022-06-15 | End: 2022-06-15

## 2022-06-15 RX ADMIN — LIDOCAINE HYDROCHLORIDE 5 ML: 10 INJECTION, SOLUTION EPIDURAL; INFILTRATION; INTRACAUDAL; PERINEURAL at 08:18

## 2022-06-15 NOTE — PROGRESS NOTES
Ice pack given:    __X___yes _____no    Discharge instructions signed by patient:    __X___yes _____no    Discharge instructions given to patient:    __X___yes _____no    Discharged via:    __X___amulatory    _____wheelchair    _____stretcher    Stable on discharge:    __X___yes ____no    Post procedure site check without any bleeding noted  Band Aid clean, dry and intact  Pt denies complaints upon discharge

## 2022-06-15 NOTE — PROGRESS NOTES
Procedure type:    __X___ultrasound guided _____stereotactic    Breast:    ___X__Left _____Right    Location: left breast 10 oclock 7CMFN    Needle: 12G venancio    # of passes: 3 passes    Clip: Wing    Performed by: Dr Tiffanie Burch held for 5 minutes by: Sukhwinder Ozuna RN     Steri Strips:    ___X__yes _____no    Band aid:    __X___yes_____no    Tolerated procedure:    __X___yes _____no

## 2022-06-15 NOTE — DISCHARGE INSTR - OTHER ORDERS
POST LARGE CORE BREAST BIOPSY PATIENT INFORMATION      Place an ice pack inside your bra over the top of the dressing every hour for 20 minutes (20 minutes on, 60 minutes off)  Do this until bedtime  Do not shower or bathe until the following morning  You may bathe your breast carefully with the steri-strips in place  Be careful    Not to loosen them  The steri-strips will fall off in 3-5 days  You may have mild discomfort, and you may have some bruising where the   Needle entered the skin  This should clear within 5-7 days  If you need medicine for discomfort, take acetaminophen products such as   Tylenol  You may also take Advil or Motrin products  Do not participate in strenuous activities such as-tennis, aerobics, skiing,  Weight lifting, etc  for 24 hours  Refrain from swimming/soaking for 72 hours  Wearing a bra for sleeping may be more comfortable for the first 24-48 hours  Watch for continued bleeding, pain or fever over 101  If any of these symptoms occur, please contact our    breast nurse navigator at the location where your biopsy was performed  During normal business hours (7:30 am-4:00 pm) please call the nurse navigator at the site where your   procedure was performed:    [address]: [phone] or [phone] [address] [facility] Floor: [phone] or [phone]  [address]: [address] [place]: [address] [place] [address] Street: [phone]              After 4 PM - please call your physician or go to the nearest Emergency Department location  9          The final results of your biopsy are usually available within one week

## 2022-06-16 DIAGNOSIS — F98.8 ATTENTION DEFICIT DISORDER (ADD) WITHOUT HYPERACTIVITY: ICD-10-CM

## 2022-06-16 DIAGNOSIS — J45.20 MILD INTERMITTENT ASTHMA WITHOUT COMPLICATION: ICD-10-CM

## 2022-06-16 NOTE — PROGRESS NOTES
Post procedure call completed 6/16/22 at 1043     Bleeding: _____yes __X___no    Pain: _____yes ___X___no (mild soreness, utilized tylenol/motrin with relief, utilizing ice)    Redness/Swelling: ______yes ___X___no    Band aid removed: __X___yes _____no    Steri-Strips intact: ___X___yes _____no (discussed with patient to remove steri strips on day 5 if they have not come off on their own)    Pt with no questions at this time, adv will call when results available, adv to call with any questions or concerns, has name/# for contact

## 2022-06-17 RX ORDER — METHYLPHENIDATE HYDROCHLORIDE 36 MG/1
36 TABLET ORAL DAILY
Qty: 30 TABLET | Refills: 0 | Status: SHIPPED | OUTPATIENT
Start: 2022-06-17 | End: 2022-07-19 | Stop reason: SDUPTHER

## 2022-06-20 ENCOUNTER — TELEPHONE (OUTPATIENT)
Dept: MAMMOGRAPHY | Facility: CLINIC | Age: 42
End: 2022-06-20

## 2022-06-22 DIAGNOSIS — E66.01 MORBID OBESITY WITH BMI OF 50.0-59.9, ADULT (HCC): Primary | ICD-10-CM

## 2022-06-28 ENCOUNTER — APPOINTMENT (OUTPATIENT)
Dept: LAB | Facility: MEDICAL CENTER | Age: 42
End: 2022-06-28
Payer: COMMERCIAL

## 2022-06-28 ENCOUNTER — TRANSCRIBE ORDERS (OUTPATIENT)
Dept: LAB | Facility: MEDICAL CENTER | Age: 42
End: 2022-06-28

## 2022-06-28 DIAGNOSIS — Z79.899 ENCOUNTER FOR LONG-TERM (CURRENT) USE OF OTHER MEDICATIONS: Primary | ICD-10-CM

## 2022-06-28 DIAGNOSIS — E78.5 HYPERLIPIDEMIA, UNSPECIFIED HYPERLIPIDEMIA TYPE: ICD-10-CM

## 2022-06-28 DIAGNOSIS — Z79.899 ENCOUNTER FOR LONG-TERM (CURRENT) USE OF OTHER MEDICATIONS: ICD-10-CM

## 2022-06-28 LAB
ALBUMIN SERPL BCP-MCNC: 3.3 G/DL (ref 3.5–5)
ALP SERPL-CCNC: 61 U/L (ref 46–116)
ALT SERPL W P-5'-P-CCNC: 34 U/L (ref 12–78)
ANION GAP SERPL CALCULATED.3IONS-SCNC: 7 MMOL/L (ref 4–13)
AST SERPL W P-5'-P-CCNC: 25 U/L (ref 5–45)
BASOPHILS # BLD AUTO: 0.07 THOUSANDS/ΜL (ref 0–0.1)
BASOPHILS NFR BLD AUTO: 1 % (ref 0–1)
BILIRUB SERPL-MCNC: 1.08 MG/DL (ref 0.2–1)
BUN SERPL-MCNC: 15 MG/DL (ref 5–25)
CALCIUM ALBUM COR SERPL-MCNC: 9.7 MG/DL (ref 8.3–10.1)
CALCIUM SERPL-MCNC: 9.1 MG/DL (ref 8.3–10.1)
CHLORIDE SERPL-SCNC: 108 MMOL/L (ref 100–108)
CHOLEST SERPL-MCNC: 164 MG/DL
CO2 SERPL-SCNC: 24 MMOL/L (ref 21–32)
CREAT SERPL-MCNC: 0.86 MG/DL (ref 0.6–1.3)
CRP SERPL QL: <3 MG/L
EOSINOPHIL # BLD AUTO: 0.37 THOUSAND/ΜL (ref 0–0.61)
EOSINOPHIL NFR BLD AUTO: 6 % (ref 0–6)
ERYTHROCYTE [DISTWIDTH] IN BLOOD BY AUTOMATED COUNT: 13.6 % (ref 11.6–15.1)
ERYTHROCYTE [SEDIMENTATION RATE] IN BLOOD: 9 MM/HOUR (ref 0–19)
GFR SERPL CREATININE-BSD FRML MDRD: 84 ML/MIN/1.73SQ M
GLUCOSE P FAST SERPL-MCNC: 78 MG/DL (ref 65–99)
HCT VFR BLD AUTO: 39 % (ref 34.8–46.1)
HDLC SERPL-MCNC: 51 MG/DL
HGB BLD-MCNC: 12.5 G/DL (ref 11.5–15.4)
IMM GRANULOCYTES # BLD AUTO: 0.02 THOUSAND/UL (ref 0–0.2)
IMM GRANULOCYTES NFR BLD AUTO: 0 % (ref 0–2)
LDLC SERPL CALC-MCNC: 86 MG/DL (ref 0–100)
LYMPHOCYTES # BLD AUTO: 1.33 THOUSANDS/ΜL (ref 0.6–4.47)
LYMPHOCYTES NFR BLD AUTO: 20 % (ref 14–44)
MCH RBC QN AUTO: 30.3 PG (ref 26.8–34.3)
MCHC RBC AUTO-ENTMCNC: 32.1 G/DL (ref 31.4–37.4)
MCV RBC AUTO: 94 FL (ref 82–98)
MONOCYTES # BLD AUTO: 0.49 THOUSAND/ΜL (ref 0.17–1.22)
MONOCYTES NFR BLD AUTO: 7 % (ref 4–12)
NEUTROPHILS # BLD AUTO: 4.49 THOUSANDS/ΜL (ref 1.85–7.62)
NEUTS SEG NFR BLD AUTO: 66 % (ref 43–75)
NONHDLC SERPL-MCNC: 113 MG/DL
NRBC BLD AUTO-RTO: 0 /100 WBCS
PLATELET # BLD AUTO: 238 THOUSANDS/UL (ref 149–390)
PMV BLD AUTO: 11.6 FL (ref 8.9–12.7)
POTASSIUM SERPL-SCNC: 4.3 MMOL/L (ref 3.5–5.3)
PROT SERPL-MCNC: 7 G/DL (ref 6.4–8.2)
RBC # BLD AUTO: 4.13 MILLION/UL (ref 3.81–5.12)
SODIUM SERPL-SCNC: 139 MMOL/L (ref 136–145)
TRIGL SERPL-MCNC: 133 MG/DL
WBC # BLD AUTO: 6.77 THOUSAND/UL (ref 4.31–10.16)

## 2022-06-28 PROCEDURE — 80053 COMPREHEN METABOLIC PANEL: CPT

## 2022-06-28 PROCEDURE — 85652 RBC SED RATE AUTOMATED: CPT

## 2022-06-28 PROCEDURE — 86140 C-REACTIVE PROTEIN: CPT

## 2022-06-28 PROCEDURE — 36415 COLL VENOUS BLD VENIPUNCTURE: CPT

## 2022-06-28 PROCEDURE — 80061 LIPID PANEL: CPT

## 2022-06-28 PROCEDURE — 85025 COMPLETE CBC W/AUTO DIFF WBC: CPT

## 2022-07-19 DIAGNOSIS — F98.8 ATTENTION DEFICIT DISORDER (ADD) WITHOUT HYPERACTIVITY: ICD-10-CM

## 2022-07-19 RX ORDER — METHYLPHENIDATE HYDROCHLORIDE 54 MG/1
54 TABLET ORAL DAILY
Qty: 30 TABLET | Refills: 0 | Status: SHIPPED | OUTPATIENT
Start: 2022-07-19 | End: 2022-08-19 | Stop reason: SDUPTHER

## 2022-07-20 DIAGNOSIS — E66.01 MORBID OBESITY WITH BMI OF 50.0-59.9, ADULT (HCC): ICD-10-CM

## 2022-07-20 DIAGNOSIS — F41.8 DEPRESSION WITH ANXIETY: ICD-10-CM

## 2022-07-20 RX ORDER — FLUOXETINE HYDROCHLORIDE 40 MG/1
40 CAPSULE ORAL DAILY
Qty: 90 CAPSULE | Refills: 0 | Status: SHIPPED | OUTPATIENT
Start: 2022-07-20 | End: 2022-10-26 | Stop reason: SDUPTHER

## 2022-08-11 ENCOUNTER — OFFICE VISIT (OUTPATIENT)
Dept: SLEEP CENTER | Facility: CLINIC | Age: 42
End: 2022-08-11
Payer: COMMERCIAL

## 2022-08-11 ENCOUNTER — TELEPHONE (OUTPATIENT)
Dept: SLEEP CENTER | Facility: CLINIC | Age: 42
End: 2022-08-11

## 2022-08-11 VITALS
DIASTOLIC BLOOD PRESSURE: 78 MMHG | WEIGHT: 293 LBS | TEMPERATURE: 97.1 F | BODY MASS INDEX: 45.99 KG/M2 | HEIGHT: 67 IN | OXYGEN SATURATION: 98 % | HEART RATE: 75 BPM | SYSTOLIC BLOOD PRESSURE: 118 MMHG

## 2022-08-11 DIAGNOSIS — J33.9 NASAL POLYP: ICD-10-CM

## 2022-08-11 DIAGNOSIS — F90.9 ATTENTION DEFICIT HYPERACTIVITY DISORDER (ADHD), UNSPECIFIED ADHD TYPE: ICD-10-CM

## 2022-08-11 DIAGNOSIS — J30.9 ALLERGIC RHINITIS, UNSPECIFIED SEASONALITY, UNSPECIFIED TRIGGER: ICD-10-CM

## 2022-08-11 DIAGNOSIS — G47.33 OBSTRUCTIVE SLEEP APNEA: Primary | ICD-10-CM

## 2022-08-11 DIAGNOSIS — E66.01 MORBID OBESITY WITH BMI OF 40.0-44.9, ADULT (HCC): ICD-10-CM

## 2022-08-11 DIAGNOSIS — F41.8 DEPRESSION WITH ANXIETY: ICD-10-CM

## 2022-08-11 DIAGNOSIS — J45.20 MILD INTERMITTENT ASTHMA WITHOUT COMPLICATION: ICD-10-CM

## 2022-08-11 PROCEDURE — 99214 OFFICE O/P EST MOD 30 MIN: CPT | Performed by: INTERNAL MEDICINE

## 2022-08-11 NOTE — PATIENT INSTRUCTIONS

## 2022-08-11 NOTE — PROGRESS NOTES
Review of Systems      Genitourinary none   Cardiology none   Gastrointestinal none   Neurology none   Constitutional none   Integumentary none   Psychiatry anxiety and depression   Musculoskeletal none   Pulmonary none   ENT none   Endocrine none   Hematological none

## 2022-08-11 NOTE — PROGRESS NOTES
Follow-Up Note - [address]  40 y o  female  VZR:[id number]  OYE:[id number]  DOS:8/11/2022    CC: I saw this patient for follow-up in clinic today for Sleep disordered breathing, Coexisting Sleep and Medical Problems  She got a [place] version to replacement from [place] around 6 months ago    Results of prior studies in 2017:  The diagnostic study demonstrated an AHI of 11 3 per hour, considerably higher during REM at 36 per hour  Minimum oxygen saturation was 78% and 16% of the study was spent with saturations below 90%  During the subsequent therapeutic study, sleep disordered breathing was successfully remediated with nasal CPAP at 12 cm H2O  PFSH, Problem List, Medications & Allergies were reviewed in EMR  Interval changes: none reported  She  has a past medical history of Allergic rhinitis, Anxiety, Arthritis, Asthma, COVID-19 (12/17/2020), CPAP (continuous positive airway pressure) dependence, Daytime hypersomnolence, Depression, GERD (gastroesophageal reflux disease), IUD (intrauterine device) in place, Lumbar disc disease, Migraine, RA (rheumatoid arthritis) ([facility] [address] ), [facility] ), Sleep apnea, Varicella, and Wears glasses  She has a current medication list which includes the following prescription(s): albuterol, albuterol, fexofenadine, fluoxetine, fluticasone, ibuprofen, methylphenidate, mometasone-formoterol, semaglutide-weight management, rinvoq, and prednisone  PHYSIOLOGICAL DATA REVIEW AND INTERPRETATION:    using PAP > 4 hours/night 83%  Estimated RACHAEL 2 7/hour with pressure of 14 9cm H2O @90th/95th percentile; Patient has not been using ozone based devices to sanitize the machine  SUBJECTIVE: Regarding use of PAPHemalatha reports:   · some adverse effects: dry nose; has not noticed any fibres or foreign material in air line       · She is benefiting from use: sleeping better   Sleep Routine: Lizett Mohan reports she is no longer working shifts but still getting 5-7 hrs sleep because of work commitments ; she has no difficulty initiating or maintaining sleep   She arises needing an alarm and usually feels refreshed  Joeline Agent denies Excessive Daytime Sleepiness,   She rated herself at Total score: 4 /24 on the [place] Sleepiness Scale  Habits: reports that she has quit smoking  Her smoking use included cigarettes  She smoked 0 00 packs per day for 0 00 years  She has never used smokeless tobacco ,  reports current alcohol use ,  reports no history of drug use , Caffeine use:limited ; Exercise routine: regular    ROS: reviewed & as attached  Significant for weight has been stable  Allergies and asthma controlled  She is on Prozac for anxiety and depression and recently needed dose increase of Concerta for ADHD  Pepe Campbell EXAM: /78 (BP Location: Right arm, Cuff Size: Large)   Pulse 75   Temp (!) 97 1 °F (36 2 °C) (Temporal)   Ht 5' 7" (1 702 m)   Wt (!) 156 kg (344 lb)   SpO2 98%   BMI 53 88 kg/m²     Wt Readings from Last 3 Encounters:   08/11/22 (!) 156 kg (344 lb)   06/01/22 (!) 157 kg (346 lb)   05/09/22 (!) 157 kg (346 lb)      Patient is well groomed; well appearing  CNS: Alert, orientated, clear & coherent speech  Psych: cooperativeand in no distress  Mental state:appears normal   H&N: EOMI; NC/AT:no facial pressure marks, no rashes  Skin/Extrem: col & hydration normal; no edema  Resp: Respiratory effort is normal  Physical findings otherwise essentially unchanged from previous  IMPRESSION: Problem List Items & Comorbidities Addressed this Visit    1  Obstructive sleep apnea  PAP DME Resupply/Reorder    PAP DME Pressure Change   2  Allergic rhinitis, unspecified seasonality, unspecified trigger     3  Nasal polyp     4  Mild intermittent asthma without complication     5  Depression with anxiety     6  Attention deficit hyperactivity disorder (ADHD), unspecified ADHD type     7   Morbid obesity with BMI of 40 0-44 9, adult ([facility] [address] ) PLAN:  1  I reviewed results of prior studies and physiologic data with the patient  2  I discussed treatment options with risks and benefits  3  Treatment with  PAP is medically necessary and Anita Moreno is agreable to continue use  4  Care of equipment, methods to improve comfort using PAP and importance of compliance with therapy were discussed  5  Pressure setting: change 10-16 cmH2O     6  Rx provided to replace  supplies and Care coordinated with DME provider  7  Discussed strategies for weight reduction  8  Also advised allowing sufficient opportunity for sleep  9  Follow-up is advised in 1 year or sooner if needed to monitor progress, compliance and to adjust therapy  Thank you for allowing me to participate in the care of this patient  Sincerely,     Authenticated electronically on 87/31/89   Board Certified Specialist     Portions of the record may have been created with voice recognition software  Occasional wrong word or "sound a like" substitutions may have occurred due to the inherent limitations of voice recognition software  There may also be notations and random deletions of words or characters from malfunctioning software  Read the chart carefully and recognize, using context, where substitutions/deletions have occurred

## 2022-08-12 ENCOUNTER — TELEPHONE (OUTPATIENT)
Dept: SLEEP CENTER | Facility: CLINIC | Age: 42
End: 2022-08-12

## 2022-08-12 LAB
DME PARACHUTE DELIVERY DATE ACTUAL: NORMAL
DME PARACHUTE DELIVERY DATE REQUESTED: NORMAL
DME PARACHUTE ITEM DESCRIPTION: NORMAL
DME PARACHUTE ORDER STATUS: NORMAL
DME PARACHUTE SUPPLIER NAME: NORMAL
DME PARACHUTE SUPPLIER PHONE: NORMAL

## 2022-08-12 NOTE — TELEPHONE ENCOUNTER
[facility] [place]

## 2022-08-19 DIAGNOSIS — F98.8 ATTENTION DEFICIT DISORDER (ADD) WITHOUT HYPERACTIVITY: ICD-10-CM

## 2022-08-21 RX ORDER — METHYLPHENIDATE HYDROCHLORIDE 54 MG/1
54 TABLET ORAL DAILY
Qty: 30 TABLET | Refills: 0 | Status: SHIPPED | OUTPATIENT
Start: 2022-08-21 | End: 2022-09-23 | Stop reason: SDUPTHER

## 2022-09-21 ENCOUNTER — APPOINTMENT (OUTPATIENT)
Dept: LAB | Facility: HOSPITAL | Age: 42
End: 2022-09-21
Payer: COMMERCIAL

## 2022-09-21 DIAGNOSIS — M06.89 OTHER SPECIFIED RHEUMATOID ARTHRITIS, MULTIPLE SITES (HCC): ICD-10-CM

## 2022-09-21 DIAGNOSIS — E78.5 HYPERLIPIDEMIA, UNSPECIFIED HYPERLIPIDEMIA TYPE: ICD-10-CM

## 2022-09-21 DIAGNOSIS — Z79.899 ENCOUNTER FOR LONG-TERM (CURRENT) USE OF OTHER MEDICATIONS: ICD-10-CM

## 2022-09-21 LAB
ALBUMIN SERPL BCP-MCNC: 3.9 G/DL (ref 3.5–5)
ALP SERPL-CCNC: 62 U/L (ref 46–116)
ALT SERPL W P-5'-P-CCNC: 25 U/L (ref 12–78)
ANION GAP SERPL CALCULATED.3IONS-SCNC: 7 MMOL/L (ref 4–13)
AST SERPL W P-5'-P-CCNC: 30 U/L (ref 5–45)
BASOPHILS # BLD AUTO: 0.06 THOUSANDS/ΜL (ref 0–0.1)
BASOPHILS NFR BLD AUTO: 1 % (ref 0–1)
BILIRUB SERPL-MCNC: 1.63 MG/DL (ref 0.2–1)
BUN SERPL-MCNC: 16 MG/DL (ref 5–25)
CALCIUM SERPL-MCNC: 9.5 MG/DL (ref 8.3–10.1)
CHLORIDE SERPL-SCNC: 103 MMOL/L (ref 96–108)
CHOLEST SERPL-MCNC: 174 MG/DL
CO2 SERPL-SCNC: 28 MMOL/L (ref 21–32)
CREAT SERPL-MCNC: 0.89 MG/DL (ref 0.6–1.3)
CRP SERPL QL: 1.1 MG/L
EOSINOPHIL # BLD AUTO: 0.34 THOUSAND/ΜL (ref 0–0.61)
EOSINOPHIL NFR BLD AUTO: 5 % (ref 0–6)
ERYTHROCYTE [DISTWIDTH] IN BLOOD BY AUTOMATED COUNT: 13.2 % (ref 11.6–15.1)
ERYTHROCYTE [SEDIMENTATION RATE] IN BLOOD: 5 MM/HOUR (ref 0–19)
GFR SERPL CREATININE-BSD FRML MDRD: 80 ML/MIN/1.73SQ M
GLUCOSE P FAST SERPL-MCNC: 86 MG/DL (ref 65–99)
HCT VFR BLD AUTO: 40.6 % (ref 34.8–46.1)
HDLC SERPL-MCNC: 59 MG/DL
HGB BLD-MCNC: 13.4 G/DL (ref 11.5–15.4)
IMM GRANULOCYTES # BLD AUTO: 0.03 THOUSAND/UL (ref 0–0.2)
IMM GRANULOCYTES NFR BLD AUTO: 1 % (ref 0–2)
LDLC SERPL CALC-MCNC: 99 MG/DL (ref 0–100)
LYMPHOCYTES # BLD AUTO: 2.12 THOUSANDS/ΜL (ref 0.6–4.47)
LYMPHOCYTES NFR BLD AUTO: 33 % (ref 14–44)
MCH RBC QN AUTO: 31.8 PG (ref 26.8–34.3)
MCHC RBC AUTO-ENTMCNC: 33 G/DL (ref 31.4–37.4)
MCV RBC AUTO: 96 FL (ref 82–98)
MONOCYTES # BLD AUTO: 0.53 THOUSAND/ΜL (ref 0.17–1.22)
MONOCYTES NFR BLD AUTO: 8 % (ref 4–12)
NEUTROPHILS # BLD AUTO: 3.26 THOUSANDS/ΜL (ref 1.85–7.62)
NEUTS SEG NFR BLD AUTO: 52 % (ref 43–75)
NONHDLC SERPL-MCNC: 115 MG/DL
NRBC BLD AUTO-RTO: 0 /100 WBCS
PLATELET # BLD AUTO: 277 THOUSANDS/UL (ref 149–390)
PMV BLD AUTO: 10.8 FL (ref 8.9–12.7)
POTASSIUM SERPL-SCNC: 4 MMOL/L (ref 3.5–5.3)
PROT SERPL-MCNC: 7.9 G/DL (ref 6.4–8.4)
RBC # BLD AUTO: 4.21 MILLION/UL (ref 3.81–5.12)
SODIUM SERPL-SCNC: 138 MMOL/L (ref 135–147)
TRIGL SERPL-MCNC: 78 MG/DL
WBC # BLD AUTO: 6.34 THOUSAND/UL (ref 4.31–10.16)

## 2022-09-21 PROCEDURE — 36415 COLL VENOUS BLD VENIPUNCTURE: CPT

## 2022-09-21 PROCEDURE — 80053 COMPREHEN METABOLIC PANEL: CPT

## 2022-09-21 PROCEDURE — 85025 COMPLETE CBC W/AUTO DIFF WBC: CPT

## 2022-09-21 PROCEDURE — 86140 C-REACTIVE PROTEIN: CPT

## 2022-09-21 PROCEDURE — 80061 LIPID PANEL: CPT

## 2022-09-21 PROCEDURE — 85652 RBC SED RATE AUTOMATED: CPT

## 2022-09-23 DIAGNOSIS — F98.8 ATTENTION DEFICIT DISORDER (ADD) WITHOUT HYPERACTIVITY: ICD-10-CM

## 2022-09-26 RX ORDER — METHYLPHENIDATE HYDROCHLORIDE 54 MG/1
54 TABLET ORAL DAILY
Qty: 30 TABLET | Refills: 0 | Status: SHIPPED | OUTPATIENT
Start: 2022-09-26 | End: 2022-10-26 | Stop reason: SDUPTHER

## 2022-10-26 DIAGNOSIS — F41.8 DEPRESSION WITH ANXIETY: ICD-10-CM

## 2022-10-26 DIAGNOSIS — F98.8 ATTENTION DEFICIT DISORDER (ADD) WITHOUT HYPERACTIVITY: ICD-10-CM

## 2022-10-27 RX ORDER — FLUOXETINE HYDROCHLORIDE 40 MG/1
40 CAPSULE ORAL DAILY
Qty: 90 CAPSULE | Refills: 0 | Status: SHIPPED | OUTPATIENT
Start: 2022-10-27

## 2022-10-27 RX ORDER — METHYLPHENIDATE HYDROCHLORIDE 54 MG/1
54 TABLET ORAL DAILY
Qty: 30 TABLET | Refills: 0 | Status: SHIPPED | OUTPATIENT
Start: 2022-10-27

## 2022-11-04 ENCOUNTER — PROCEDURE VISIT (OUTPATIENT)
Dept: OBGYN CLINIC | Facility: CLINIC | Age: 42
End: 2022-11-04

## 2022-11-04 VITALS
DIASTOLIC BLOOD PRESSURE: 88 MMHG | BODY MASS INDEX: 45.99 KG/M2 | WEIGHT: 293 LBS | HEIGHT: 67 IN | HEART RATE: 73 BPM | SYSTOLIC BLOOD PRESSURE: 122 MMHG

## 2022-11-04 DIAGNOSIS — Z30.433 ENCOUNTER FOR IUD REMOVAL AND REINSERTION: Primary | ICD-10-CM

## 2022-11-04 NOTE — PROGRESS NOTES
Iud removal    Date/Time: 11/4/2022 12:50 PM  Performed by: Diego Arellano MD  Authorized by: Diego Arellano MD   Universal Protocol:  Consent: Verbal consent obtained  Written consent obtained    Risks and benefits: risks, benefits and alternatives were discussed  Patient understanding: patient states understanding of the procedure being performed  Patient consent: the patient's understanding of the procedure matches consent given  Procedure consent: procedure consent matches procedure scheduled  Relevant documents: relevant documents present and verified  Required items: required blood products, implants, devices, and special equipment available      Procedure:     Removed with no complications: yes    Comments:      IUD strings seen and grasped with ring forceps, IUD removed in its entirety without complication   Iud insertions    Date/Time: 11/4/2022 12:51 PM  Performed by: Diego Arellano MD  Authorized by: Diego Arellano MD     Procedure:     Pelvic exam performed: yes      Negative urine pregnancy test: deferred given IUD in situ       Cervix cleaned and prepped: yes      Speculum placed in vagina: yes      Tenaculum applied to cervix: yes      Uterus sound depth (cm):  7    IUD inserted with no complications: yes      IUD type:  Mirena    Strings trimmed: yes        Encounter for IUD removal and reinsertion  Mirena IUD removed and new IUD inserted w/o difficulty (4th lifetime Mirena)  Pain and bleeding expectations reviewed  Will f/u in 4 weeks for string check

## 2022-11-04 NOTE — ASSESSMENT & PLAN NOTE
Mirena IUD removed and new IUD inserted w/o difficulty (4th lifetime Mirena)  Pain and bleeding expectations reviewed  Will f/u in 4 weeks for string check

## 2022-11-27 DIAGNOSIS — F98.8 ATTENTION DEFICIT DISORDER (ADD) WITHOUT HYPERACTIVITY: ICD-10-CM

## 2022-11-29 ENCOUNTER — HOSPITAL ENCOUNTER (OUTPATIENT)
Dept: RADIOLOGY | Facility: CLINIC | Age: 42
Discharge: HOME | End: 2022-11-29

## 2022-11-29 VITALS — WEIGHT: 293 LBS | HEIGHT: 67 IN | BODY MASS INDEX: 45.99 KG/M2

## 2022-11-29 DIAGNOSIS — Z12.31 ENCOUNTER FOR SCREENING MAMMOGRAM FOR MALIGNANT NEOPLASM OF BREAST: ICD-10-CM

## 2022-11-30 RX ORDER — METHYLPHENIDATE HYDROCHLORIDE 54 MG/1
54 TABLET ORAL DAILY
Qty: 30 TABLET | Refills: 0 | Status: SHIPPED | OUTPATIENT
Start: 2022-11-30

## 2022-12-14 ENCOUNTER — OFFICE VISIT (OUTPATIENT)
Dept: OBGYN CLINIC | Facility: CLINIC | Age: 42
End: 2022-12-14

## 2022-12-14 VITALS
WEIGHT: 293 LBS | HEIGHT: 67 IN | BODY MASS INDEX: 45.99 KG/M2 | TEMPERATURE: 98 F | SYSTOLIC BLOOD PRESSURE: 124 MMHG | HEART RATE: 92 BPM | OXYGEN SATURATION: 99 % | DIASTOLIC BLOOD PRESSURE: 88 MMHG

## 2022-12-14 DIAGNOSIS — Z30.431 CONTRACEPTIVE, SURVEILLANCE, INTRAUTERINE DEVICE: Primary | ICD-10-CM

## 2022-12-14 PROBLEM — Z30.433 ENCOUNTER FOR IUD REMOVAL AND REINSERTION: Status: RESOLVED | Noted: 2020-03-10 | Resolved: 2022-12-14

## 2022-12-14 NOTE — PROGRESS NOTES
Subjective   Patient ID: Dariana Wiggins is a 40 y o  female  Patient is here for a problem visit  Chief Complaint   Patient presents with   • Follow-up     Mirena string check placed ; no concerns     Spotting for first few days after insertion, none since  Denies any cramping  Denies issue with partner feeling strings       Menstrual History:  OB History        3    Para   3    Term   2       1    AB   0    Living   2       SAB   0    IAB   0    Ectopic   0    Multiple   0    Live Births   2                  No LMP recorded  (Menstrual status: Birth Control)  Past Medical History:   Diagnosis Date   • Allergic rhinitis    • Anxiety    • Arthritis    • Asthma    • COVID-19 2020   • CPAP (continuous positive airway pressure) dependence    • Daytime hypersomnolence     LAST ASSESSED [id number]   • Depression    • GERD (gastroesophageal reflux disease)     occas   • IUD (intrauterine device) in place    • Lumbar disc disease    • Migraine    • RA (rheumatoid arthritis) (HCC)    • Rheumatoid arthritis (HCC)    • Sleep apnea    • Varicella    • Wears glasses        Past Surgical History:   Procedure Laterality Date   • CHOLECYSTECTOMY     • NASAL SEPTUM SURGERY      NASAL SEPTAL DEVIATION REPAIR-Dr Navarrete   • WI STEREOTACTIC COMP ASSIST PROC,CRANIAL,EXTRADURAL N/A 2019    Procedure: FUNCTIONAL ENDOSCOPIC SINUS SURGERY (FESS) IMAGED GUIDED; PLACEMENT OF MULTIPLE PROPEL IMPLANTS;  Surgeon: Ashley Guerra DO;  Location: [facility];  Service: ENT   • SINUS SURGERY     • US GUIDED BREAST BIOPSY LEFT COMPLETE Left 6/15/2022   • WISDOM TOOTH EXTRACTION         Social History     Tobacco Use   • Smoking status: Former     Packs/day: 0 00     Years: 0 00     Pack years: 0 00     Types: Cigarettes   • Smokeless tobacco: Never   • Tobacco comments:     quit 5 years ago    Vaping Use   • Vaping Use: Never used   Substance Use Topics   • Alcohol use:  Yes     Alcohol/week: 4 0 standard drinks     Types: 4 Standard drinks or equivalent per week     Comment: social    • Drug use: No        Allergies   Allergen Reactions   • Morphine      "with epidural during pregnancy caused hypotension"         Current Outpatient Medications:   •  albuterol (2 5 mg/3 mL) 0 083 % nebulizer solution, Inhale 1 each every 4 (four) hours as needed, Disp: , Rfl:   •  albuterol (PROVENTIL HFA,VENTOLIN HFA) 90 mcg/act inhaler, Inhale 1-2 puffs every 6 (six) hours as needed  , Disp: , Rfl:   •  Biotin 27228 MCG TBDP, , Disp: , Rfl:   •  fexofenadine (ALLEGRA) 180 MG tablet, Take 180 mg by mouth daily, Disp: , Rfl:   •  FLUoxetine (PROzac) 40 MG capsule, Take 1 capsule (40 mg total) by mouth daily, Disp: 90 capsule, Rfl: 0  •  fluticasone (FLONASE) 50 mcg/act nasal spray, 2 sprays into each nostril daily, Disp: , Rfl:   •  ibuprofen (MOTRIN) 200 mg tablet, Take 200 mg by mouth every 6 (six) hours as needed for mild pain , Disp: , Rfl:   •  methylphenidate (CONCERTA) 54 MG ER tablet, Take 1 tablet (54 mg total) by mouth daily Max Daily Amount: 54 mg, Disp: 30 tablet, Rfl: 0  •  mometasone-formoterol (DULERA) 200-5 MCG/ACT inhaler, Inhale 2 puffs 2 (two) times a day Rinse mouth after use , Disp: 13 g, Rfl: 0  •  Multiple Vitamins-Minerals (Multivitamin Adult, Minerals,) TABS, , Disp: , Rfl:   •  Upadacitinib ER (Rinvoq) 15 MG TB24, Take 15 mg by mouth Daily, Disp: , Rfl:   •  Wegovy 2 4 MG/0 75ML, INJECT 0 75ML (2 4MG TOTAL) UNDER THE SKIN ONCE A WEEK, Disp: 3 mL, Rfl: 2  •  predniSONE 5 mg tablet, Taper dose  (Patient not taking: Reported on 5/9/2022), Disp: , Rfl:       Review of Systems   Constitutional: Negative for appetite change, chills and fever  Eyes: Negative for visual disturbance  Respiratory: Negative for cough, chest tightness and shortness of breath  Cardiovascular: Negative for chest pain  Gastrointestinal: Negative for abdominal distention, abdominal pain, constipation, diarrhea, nausea and vomiting  Endocrine: Negative for cold intolerance and heat intolerance  Genitourinary: Negative for difficulty urinating, dyspareunia, dysuria, frequency, genital sores, pelvic pain, urgency, vaginal bleeding, vaginal discharge and vaginal pain  Musculoskeletal: Negative for arthralgias  Neurological: Negative for light-headedness and headaches  Hematological: Does not bruise/bleed easily  Psychiatric/Behavioral: Negative for behavioral problems  All other systems reviewed and are negative  /88 (BP Location: Right arm, Patient Position: Sitting, Cuff Size: Large)   Pulse 92   Temp 98 °F (36 7 °C) (Tympanic)   Ht 5' 7" (1 702 m)   Wt (!) 150 kg (330 lb 6 4 oz)   SpO2 99%   BMI 51 75 kg/m²       Physical Exam  Constitutional:       General: She is not in acute distress  Appearance: Normal appearance  She is not ill-appearing  Genitourinary:      Bladder and urethral meatus normal       Right Labia: No rash, tenderness, lesions or skin changes  Left Labia: No tenderness, lesions, skin changes or rash  No labial fusion noted  No inguinal adenopathy present in the right or left side  No vaginal discharge, erythema, tenderness, bleeding or ulceration  Right Adnexa: not tender, not full and no mass present  Left Adnexa: not tender, not full and no mass present  No cervical motion tenderness, discharge, friability, lesion, polyp or eversion  IUD strings visualized  Cervical exam comments: Strings seen 2-3 cm   Uterus is not enlarged, fixed, tender or irregular  No uterine mass detected  Uterus is anteverted  Pelvic exam was performed with patient in the lithotomy position  HENT:      Head: Normocephalic  Cardiovascular:      Rate and Rhythm: Normal rate  Heart sounds: Normal heart sounds  Pulmonary:      Effort: Pulmonary effort is normal  No accessory muscle usage or respiratory distress     Abdominal:      General: There is no distension  Palpations: Abdomen is soft  There is no mass  Tenderness: There is no abdominal tenderness  There is no guarding or rebound  Musculoskeletal:         General: Normal range of motion  Cervical back: No rigidity  Lymphadenopathy:      Lower Body: No right inguinal adenopathy  No left inguinal adenopathy  Neurological:      General: No focal deficit present  Mental Status: She is alert  Mental status is at baseline  Skin:     General: Skin is warm and dry  Psychiatric:         Mood and Affect: Mood normal          Behavior: Behavior normal    Vitals and nursing note reviewed  Exam conducted with a chaperone present                 Appropriate laboratory testing, imaging studies, and prior external records were reviewed:     Assessment/Plan:       Problem List Items Addressed This Visit        Other    IUD (mirena) Surveillance - Primary     No concerning sx, bleeding expectations reviewed  Reviewed efficacy for up to 8 years  Will f/u for annual or sooner as indicated, precautions reviewed

## 2022-12-14 NOTE — ASSESSMENT & PLAN NOTE
No concerning sx, bleeding expectations reviewed  Reviewed efficacy for up to 8 years  Will f/u for annual or sooner as indicated, precautions reviewed

## 2023-01-03 DIAGNOSIS — F98.8 ATTENTION DEFICIT DISORDER (ADD) WITHOUT HYPERACTIVITY: ICD-10-CM

## 2023-01-03 RX ORDER — METHYLPHENIDATE HYDROCHLORIDE 54 MG/1
54 TABLET ORAL DAILY
Qty: 30 TABLET | Refills: 0 | Status: SHIPPED | OUTPATIENT
Start: 2023-01-03

## 2023-01-24 ENCOUNTER — APPOINTMENT (OUTPATIENT)
Dept: LAB | Facility: HOSPITAL | Age: 43
End: 2023-01-24

## 2023-01-24 DIAGNOSIS — M06.89 OTHER SPECIFIED RHEUMATOID ARTHRITIS, MULTIPLE SITES (HCC): ICD-10-CM

## 2023-01-24 LAB
ALBUMIN SERPL BCP-MCNC: 4 G/DL (ref 3.5–5)
ALP SERPL-CCNC: 52 U/L (ref 34–104)
ALT SERPL W P-5'-P-CCNC: 19 U/L (ref 7–52)
ANION GAP SERPL CALCULATED.3IONS-SCNC: 7 MMOL/L (ref 4–13)
AST SERPL W P-5'-P-CCNC: 17 U/L (ref 13–39)
BASOPHILS # BLD AUTO: 0.1 THOUSANDS/ÂΜL (ref 0–0.1)
BASOPHILS NFR BLD AUTO: 1 % (ref 0–1)
BILIRUB SERPL-MCNC: 1.41 MG/DL (ref 0.2–1)
BUN SERPL-MCNC: 17 MG/DL (ref 5–25)
CALCIUM SERPL-MCNC: 9.3 MG/DL (ref 8.4–10.2)
CHLORIDE SERPL-SCNC: 105 MMOL/L (ref 96–108)
CO2 SERPL-SCNC: 25 MMOL/L (ref 21–32)
CREAT SERPL-MCNC: 0.69 MG/DL (ref 0.6–1.3)
CRP SERPL QL: <1 MG/L
EOSINOPHIL # BLD AUTO: 0.52 THOUSAND/ÂΜL (ref 0–0.61)
EOSINOPHIL NFR BLD AUTO: 6 % (ref 0–6)
ERYTHROCYTE [DISTWIDTH] IN BLOOD BY AUTOMATED COUNT: 13.1 % (ref 11.6–15.1)
ERYTHROCYTE [SEDIMENTATION RATE] IN BLOOD: 25 MM/HOUR (ref 0–19)
GFR SERPL CREATININE-BSD FRML MDRD: 108 ML/MIN/1.73SQ M
GLUCOSE P FAST SERPL-MCNC: 83 MG/DL (ref 65–99)
HCT VFR BLD AUTO: 40.6 % (ref 34.8–46.1)
HGB BLD-MCNC: 13.6 G/DL (ref 11.5–15.4)
IMM GRANULOCYTES # BLD AUTO: 0.03 THOUSAND/UL (ref 0–0.2)
IMM GRANULOCYTES NFR BLD AUTO: 0 % (ref 0–2)
LYMPHOCYTES # BLD AUTO: 1.73 THOUSANDS/ÂΜL (ref 0.6–4.47)
LYMPHOCYTES NFR BLD AUTO: 20 % (ref 14–44)
MCH RBC QN AUTO: 32 PG (ref 26.8–34.3)
MCHC RBC AUTO-ENTMCNC: 33.5 G/DL (ref 31.4–37.4)
MCV RBC AUTO: 96 FL (ref 82–98)
MONOCYTES # BLD AUTO: 0.78 THOUSAND/ÂΜL (ref 0.17–1.22)
MONOCYTES NFR BLD AUTO: 9 % (ref 4–12)
NEUTROPHILS # BLD AUTO: 5.56 THOUSANDS/ÂΜL (ref 1.85–7.62)
NEUTS SEG NFR BLD AUTO: 64 % (ref 43–75)
NRBC BLD AUTO-RTO: 0 /100 WBCS
PLATELET # BLD AUTO: 291 THOUSANDS/UL (ref 149–390)
PMV BLD AUTO: 10.7 FL (ref 8.9–12.7)
POTASSIUM SERPL-SCNC: 4 MMOL/L (ref 3.5–5.3)
PROT SERPL-MCNC: 7.2 G/DL (ref 6.4–8.4)
RBC # BLD AUTO: 4.25 MILLION/UL (ref 3.81–5.12)
SODIUM SERPL-SCNC: 137 MMOL/L (ref 135–147)
WBC # BLD AUTO: 8.72 THOUSAND/UL (ref 4.31–10.16)

## 2023-01-27 ENCOUNTER — HOSPITAL ENCOUNTER (OUTPATIENT)
Dept: RADIOLOGY | Facility: HOSPITAL | Age: 43
Discharge: HOME | End: 2023-01-27

## 2023-01-27 DIAGNOSIS — M79.671 PAIN IN RIGHT FOOT: ICD-10-CM

## 2023-01-31 DIAGNOSIS — F41.8 DEPRESSION WITH ANXIETY: ICD-10-CM

## 2023-01-31 DIAGNOSIS — F98.8 ATTENTION DEFICIT DISORDER (ADD) WITHOUT HYPERACTIVITY: ICD-10-CM

## 2023-02-02 RX ORDER — FLUOXETINE HYDROCHLORIDE 40 MG/1
40 CAPSULE ORAL DAILY
Qty: 90 CAPSULE | Refills: 0 | Status: SHIPPED | OUTPATIENT
Start: 2023-02-02

## 2023-02-02 RX ORDER — METHYLPHENIDATE HYDROCHLORIDE 54 MG/1
54 TABLET ORAL DAILY
Qty: 30 TABLET | Refills: 0 | Status: SHIPPED | OUTPATIENT
Start: 2023-02-02

## 2023-03-08 DIAGNOSIS — F98.8 ATTENTION DEFICIT DISORDER (ADD) WITHOUT HYPERACTIVITY: ICD-10-CM

## 2023-03-10 RX ORDER — METHYLPHENIDATE HYDROCHLORIDE 54 MG/1
54 TABLET ORAL DAILY
Qty: 30 TABLET | Refills: 0 | Status: SHIPPED | OUTPATIENT
Start: 2023-03-10

## 2023-03-31 ENCOUNTER — TELEPHONE (OUTPATIENT)
Dept: INTERNAL MEDICINE CLINIC | Facility: CLINIC | Age: 43
End: 2023-03-31

## 2023-03-31 NOTE — TELEPHONE ENCOUNTER
Patient called wanting to know if you had blood work for her to do before her physical with you next week ?

## 2023-04-03 DIAGNOSIS — E78.5 MILD HYPERLIPIDEMIA: ICD-10-CM

## 2023-04-03 DIAGNOSIS — E66.01 MORBID OBESITY WITH BMI OF 50.0-59.9, ADULT (HCC): Primary | ICD-10-CM

## 2023-04-03 DIAGNOSIS — E55.9 VITAMIN D DEFICIENCY: ICD-10-CM

## 2023-04-03 DIAGNOSIS — M06.9 RHEUMATOID ARTHRITIS INVOLVING MULTIPLE SITES, UNSPECIFIED WHETHER RHEUMATOID FACTOR PRESENT (HCC): ICD-10-CM

## 2023-04-03 NOTE — TELEPHONE ENCOUNTER
I put these in but you need to contact me if someone wants something put in quickly since I do not always get on computer over the weekend

## 2023-04-04 ENCOUNTER — OFFICE VISIT (OUTPATIENT)
Dept: INTERNAL MEDICINE CLINIC | Facility: CLINIC | Age: 43
End: 2023-04-04

## 2023-04-04 VITALS
HEIGHT: 67 IN | HEART RATE: 86 BPM | OXYGEN SATURATION: 98 % | TEMPERATURE: 98 F | SYSTOLIC BLOOD PRESSURE: 128 MMHG | BODY MASS INDEX: 45.99 KG/M2 | WEIGHT: 293 LBS | DIASTOLIC BLOOD PRESSURE: 84 MMHG

## 2023-04-04 DIAGNOSIS — Z12.31 VISIT FOR SCREENING MAMMOGRAM: ICD-10-CM

## 2023-04-04 DIAGNOSIS — E66.01 MORBID OBESITY WITH BMI OF 50.0-59.9, ADULT (HCC): ICD-10-CM

## 2023-04-04 DIAGNOSIS — Z00.00 ANNUAL PHYSICAL EXAM: Primary | ICD-10-CM

## 2023-04-04 DIAGNOSIS — M06.9 RHEUMATOID ARTHRITIS INVOLVING MULTIPLE SITES, UNSPECIFIED WHETHER RHEUMATOID FACTOR PRESENT (HCC): ICD-10-CM

## 2023-04-04 DIAGNOSIS — E55.9 VITAMIN D DEFICIENCY: ICD-10-CM

## 2023-04-04 DIAGNOSIS — E78.5 MILD HYPERLIPIDEMIA: ICD-10-CM

## 2023-04-04 DIAGNOSIS — F41.9 ANXIETY: ICD-10-CM

## 2023-04-04 DIAGNOSIS — F41.8 DEPRESSION WITH ANXIETY: ICD-10-CM

## 2023-04-04 DIAGNOSIS — F98.8 ATTENTION DEFICIT DISORDER (ADD) WITHOUT HYPERACTIVITY: ICD-10-CM

## 2023-04-04 NOTE — PROGRESS NOTES
[facility] PRIMARY CARE LO    NAME: Kimberlyn Hernandez  AGE: 44 y o  SEX: female  : 1980     DATE: 2023     Assessment and Plan:     Problem List Items Addressed This Visit        Musculoskeletal and Integument    Rheumatoid arthritis ([facility] )       Other    Anxiety    Depression with anxiety    Vitamin D deficiency    Mild hyperlipidemia    Attention deficit disorder (ADD) without hyperactivity    Morbid obesity with BMI of 50 0-59 9, adult ([facility] [address] )   Other Visit Diagnoses     Annual physical exam    -  Primary    Visit for screening mammogram        Relevant Orders    Mammo screening bilateral w 3d & cad        Orders and recommendations as noted above  Advised her to get laboratory testing done in the near future  Recent stressors discussed with her  Methods for stress relief discussed  Risks and benefits of medical marijuana discussed  Continue to follow-up with rheumatology regularly  Continue with the slow but steady weight loss  Try to increase activity level  Continue with the Leticia Gorklarissa  Cholesterol has been mildly elevated  Continue to watch diet  Increase fiber in diet  Gradually increase activity level  Slip given for laboratory testing  Mood has been relatively stable except for sporadic stressors  Continue with the Prozac as well as the Concerta for concentration  Watch for any worsening allergy symptoms  We will have her follow-up in about 6 months or sooner if needed  Immunizations and preventive care screenings were discussed with patient today  Appropriate education was printed on patient's after visit summary  Counseling:  Alcohol/drug use: discussed moderation in alcohol intake, the recommendations for healthy alcohol use, and avoidance of illicit drug use  Dental Health: discussed importance of regular tooth brushing, flossing, and dental visits    Injury prevention: discussed safety/seat belts, safety helmets, smoke detectors, carbon dioxide detectors, and smoking near bedding or upholstery  Sexual health: discussed sexually transmitted diseases, partner selection, use of condoms, avoidance of unintended pregnancy, and contraceptive alternatives  Exercise: the importance of regular exercise/physical activity was discussed  Recommend exercise 3-5 times per week for at least 30 minutes  BMI Counseling: Body mass index is 51 98 kg/m²  The BMI is above normal  Nutrition recommendations include decreasing portion sizes, encouraging healthy choices of fruits and vegetables, decreasing fast food intake, consuming healthier snacks, limiting drinks that contain sugar, moderation in carbohydrate intake and reducing intake of cholesterol  Exercise recommendations include exercising 3-5 times per week  Rationale for BMI follow-up plan is due to patient being overweight or obese  No follow-ups on file  Chief Complaint:     Chief Complaint   Patient presents with   • Follow-up      History of Present Illness:     Adult Annual Physical   Patient here for a comprehensive physical exam  The patient reports problems - See below  She presents for annual wellness visit  Has generally been doing relatively well  Has been tolerating the [facility] relatively well  Has some nausea symptoms at times  Has lost a significant amount of weight and wishes to continue on this course  Has been trying to increase activity level  Has made dietary changes  Has had a lot of stressors at home with issues at work as well as some medical issues with her parents  Has been dealing with these relatively well  Concentration has definitely improved with the Concerta  Mood has been relatively stable  Continues with the Prozac  Did not have her laboratory testing completed yet but plans to in the near future  Continues to follow-up with rheumatology regularly      Diet and Physical Activity  Diet/Nutrition: well balanced diet and limited junk food  Exercise: walking  Depression Screening  PHQ-2/9 Depression Screening         General Health  Sleep: gets 4-6 hours of sleep on average and experiences daytime hypersomnolence  Hearing: normal - bilateral   Vision: goes for regular eye exams  Dental: regular dental visits  /GYN Health  Patient is: premenopausal  Last menstrual period:    Contraceptive method:        Review of Systems:     Review of Systems   Constitutional: Positive for activity change and fatigue  Negative for appetite change, chills and fever  HENT: Negative for congestion and rhinorrhea  Eyes: Negative for visual disturbance  Respiratory: Negative for cough, chest tightness and shortness of breath  Cardiovascular: Negative for chest pain and palpitations  Gastrointestinal: Negative for abdominal pain, blood in stool, diarrhea, nausea and vomiting  Endocrine: Negative for polydipsia, polyphagia and polyuria  Genitourinary: Negative for dysuria, frequency and urgency  Musculoskeletal: Positive for arthralgias  Negative for gait problem  Skin: Negative for color change  Neurological: Negative for dizziness and headaches  Hematological: Does not bruise/bleed easily  Psychiatric/Behavioral: Positive for sleep disturbance  Negative for confusion  The patient is not nervous/anxious         Past Medical History:     Past Medical History:   Diagnosis Date   • Allergic rhinitis    • Anxiety    • Arthritis    • Asthma    • COVID-19 12/17/2020   • CPAP (continuous positive airway pressure) dependence    • Daytime hypersomnolence     LAST ASSESSED [id number]   • Depression    • GERD (gastroesophageal reflux disease)     occas   • IUD (intrauterine device) in place    • Lumbar disc disease    • Migraine    • RA (rheumatoid arthritis) ([facility])    • Rheumatoid arthritis (HCC)    • Sleep apnea    • Varicella    • Wears glasses       Past Surgical History:     Past Surgical History:   Procedure Laterality Date   • CHOLECYSTECTOMY     • NASAL SEPTUM SURGERY      NASAL SEPTAL DEVIATION REPAIR-Dr Navarrete   • RI STRTCTC CPTR ASSTD PX EXTRADURAL CRANIAL N/A 1/8/2019    Procedure: FUNCTIONAL ENDOSCOPIC SINUS SURGERY (FESS) IMAGED GUIDED; PLACEMENT OF MULTIPLE PROPEL IMPLANTS;  Surgeon: Lukas Sandoval DO;  Location: [facility];  Service: ENT   • SINUS SURGERY     • US GUIDED BREAST BIOPSY LEFT COMPLETE Left 6/15/2022   • WISDOM TOOTH EXTRACTION        Social History:     Social History     Socioeconomic History   • Marital status: /Civil Union     Spouse name: None   • Number of children: None   • Years of education: None   • Highest education level: None   Occupational History   • None   Tobacco Use   • Smoking status: Former     Packs/day: 0 00     Years: 0 00     Pack years: 0 00     Types: Cigarettes   • Smokeless tobacco: Never   • Tobacco comments:     quit 5 years ago    Vaping Use   • Vaping Use: Never used   Substance and Sexual Activity   • Alcohol use: Yes     Alcohol/week: 4 0 standard drinks     Types: 4 Standard drinks or equivalent per week     Comment: social    • Drug use: No   • Sexual activity: Yes     Partners: Male     Birth control/protection: I U D       Comment: Mirena    Other Topics Concern   • None   Social History Narrative   • None     Social Determinants of Health     Financial Resource Strain: Not on file   Food Insecurity: Not on file   Transportation Needs: Not on file   Physical Activity: Not on file   Stress: Not on file   Social Connections: Not on file   Intimate Partner Violence: Not on file   Housing Stability: Not on file      Family History:     Family History   Problem Relation Age of Onset   • Asthma Mother    • Hypertension Mother    • COPD Mother    • Hypothyroidism Father    • Chiari malformation Daughter    • Diabetes Daughter    • Celiac disease Daughter    • No Known Problems Maternal Grandmother    • Lung disease Maternal Grandfather    • Cancer Paternal "Grandmother    • Aneurysm Paternal Grandfather    • Celiac disease Paternal Grandfather    • Chiari malformation Brother    • Autism Son    • No Known Problems Maternal Aunt    • Breast cancer Paternal Aunt    • No Known Problems Paternal Aunt    • No Known Problems Paternal Aunt    • Breast cancer additional onset Neg Hx    • Endometrial cancer Neg Hx    • Colon cancer Neg Hx    • Ovarian cancer Neg Hx    • BRCA1 Positive Neg Hx    • BRCA1 Negative Neg Hx    • BRCA 1/2 Neg Hx    • BRCA2 Positive Neg Hx    • BRCA2 Negative Neg Hx       Current Medications:     Current Outpatient Medications   Medication Sig Dispense Refill   • albuterol (2 5 mg/3 mL) 0 083 % nebulizer solution Inhale 1 each every 4 (four) hours as needed     • albuterol (PROVENTIL HFA,VENTOLIN HFA) 90 mcg/act inhaler Inhale 1-2 puffs every 6 (six) hours as needed       • fexofenadine (ALLEGRA) 180 MG tablet Take 180 mg by mouth daily     • FLUoxetine (PROzac) 40 MG capsule Take 1 capsule (40 mg total) by mouth daily 90 capsule 0   • fluticasone (FLONASE) 50 mcg/act nasal spray 2 sprays into each nostril daily     • ibuprofen (MOTRIN) 200 mg tablet Take 200 mg by mouth every 6 (six) hours as needed for mild pain  • methylphenidate (CONCERTA) 54 MG ER tablet Take 1 tablet (54 mg total) by mouth daily Max Daily Amount: 54 mg 30 tablet 0   • mometasone-formoterol (DULERA) 200-5 MCG/ACT inhaler Inhale 2 puffs 2 (two) times a day Rinse mouth after use  13 g 0   • Multiple Vitamins-Minerals (Multivitamin Adult, Minerals,) TABS      • predniSONE 5 mg tablet Taper dose     • Semaglutide-Weight Management (Wegovy) 2 4 MG/0 75ML Inject 0 75 mL (2 4 mg total) under the skin once a week 3 mL 3   • Upadacitinib ER (Rinvoq) 15 MG TB24 Take 15 mg by mouth Daily       No current facility-administered medications for this visit  Allergies:      Allergies   Allergen Reactions   • Morphine      \"with epidural during pregnancy caused hypotension\"      Physical " "Exam:     /84 (BP Location: Left arm, Patient Position: Sitting, Cuff Size: Large)   Pulse 86   Temp 98 °F (36 7 °C)   Ht 5' 7\" (1 702 m)   Wt (!) 151 kg (331 lb 14 4 oz)   SpO2 98%   BMI 51 98 kg/m²     Physical Exam  Vitals and nursing note reviewed  Constitutional:       General: She is not in acute distress  Appearance: She is well-developed and well-groomed  She is morbidly obese  HENT:      Head: Normocephalic and atraumatic  Eyes:      Conjunctiva/sclera: Conjunctivae normal    Cardiovascular:      Rate and Rhythm: Normal rate and regular rhythm  Heart sounds: No murmur heard  Pulmonary:      Effort: Pulmonary effort is normal  No respiratory distress  Breath sounds: Normal breath sounds  Abdominal:      Palpations: Abdomen is soft  Tenderness: There is no abdominal tenderness  Musculoskeletal:         General: No swelling  Cervical back: Neck supple  Comments: Changes bilateral knees left greater than right   Skin:     General: Skin is warm and dry  Capillary Refill: Capillary refill takes less than 2 seconds  Neurological:      Mental Status: She is alert  Psychiatric:         Mood and Affect: Mood and affect normal          Speech: Speech normal          Behavior: Behavior is cooperative           Cognition and Memory: Cognition and memory normal           Colton Montiel MD  [address]   "

## 2023-05-08 ENCOUNTER — APPOINTMENT (OUTPATIENT)
Dept: LAB | Facility: HOSPITAL | Age: 43
End: 2023-05-08

## 2023-05-08 DIAGNOSIS — Z79.899 ENCOUNTER FOR LONG-TERM (CURRENT) USE OF OTHER MEDICATIONS: ICD-10-CM

## 2023-05-08 DIAGNOSIS — E78.5 MILD HYPERLIPIDEMIA: ICD-10-CM

## 2023-05-08 DIAGNOSIS — M06.9 RHEUMATOID ARTHRITIS INVOLVING MULTIPLE SITES, UNSPECIFIED WHETHER RHEUMATOID FACTOR PRESENT (HCC): ICD-10-CM

## 2023-05-08 DIAGNOSIS — Z00.8 HEALTH EXAMINATION IN POPULATION SURVEY: ICD-10-CM

## 2023-05-08 DIAGNOSIS — E66.01 MORBID OBESITY WITH BMI OF 50.0-59.9, ADULT (HCC): ICD-10-CM

## 2023-05-08 DIAGNOSIS — M06.89 OTHER SPECIFIED RHEUMATOID ARTHRITIS, MULTIPLE SITES (HCC): ICD-10-CM

## 2023-05-08 DIAGNOSIS — E55.9 VITAMIN D DEFICIENCY: ICD-10-CM

## 2023-05-08 LAB
25(OH)D3 SERPL-MCNC: 24.6 NG/ML (ref 30–100)
ALBUMIN SERPL BCP-MCNC: 4.2 G/DL (ref 3.5–5)
ALP SERPL-CCNC: 66 U/L (ref 34–104)
ALT SERPL W P-5'-P-CCNC: 15 U/L (ref 7–52)
ANION GAP SERPL CALCULATED.3IONS-SCNC: 8 MMOL/L (ref 4–13)
AST SERPL W P-5'-P-CCNC: 17 U/L (ref 13–39)
BASOPHILS # BLD AUTO: 0.08 THOUSANDS/ÂΜL (ref 0–0.1)
BASOPHILS NFR BLD AUTO: 1 % (ref 0–1)
BILIRUB SERPL-MCNC: 1.35 MG/DL (ref 0.2–1)
BUN SERPL-MCNC: 19 MG/DL (ref 5–25)
CALCIUM SERPL-MCNC: 9.4 MG/DL (ref 8.4–10.2)
CHLORIDE SERPL-SCNC: 103 MMOL/L (ref 96–108)
CHOLEST SERPL-MCNC: 139 MG/DL
CO2 SERPL-SCNC: 26 MMOL/L (ref 21–32)
CREAT SERPL-MCNC: 0.84 MG/DL (ref 0.6–1.3)
CRP SERPL QL: 2.7 MG/L
EOSINOPHIL # BLD AUTO: 0.3 THOUSAND/ÂΜL (ref 0–0.61)
EOSINOPHIL NFR BLD AUTO: 4 % (ref 0–6)
ERYTHROCYTE [DISTWIDTH] IN BLOOD BY AUTOMATED COUNT: 13.1 % (ref 11.6–15.1)
ERYTHROCYTE [SEDIMENTATION RATE] IN BLOOD: 35 MM/HOUR (ref 0–19)
EST. AVERAGE GLUCOSE BLD GHB EST-MCNC: 94 MG/DL
GFR SERPL CREATININE-BSD FRML MDRD: 86 ML/MIN/1.73SQ M
GLUCOSE P FAST SERPL-MCNC: 87 MG/DL (ref 65–99)
HBA1C MFR BLD: 4.9 %
HCT VFR BLD AUTO: 39.9 % (ref 34.8–46.1)
HDLC SERPL-MCNC: 53 MG/DL
HGB BLD-MCNC: 12.9 G/DL (ref 11.5–15.4)
IMM GRANULOCYTES # BLD AUTO: 0.03 THOUSAND/UL (ref 0–0.2)
IMM GRANULOCYTES NFR BLD AUTO: 0 % (ref 0–2)
LDLC SERPL CALC-MCNC: 71 MG/DL (ref 0–100)
LYMPHOCYTES # BLD AUTO: 1.08 THOUSANDS/ÂΜL (ref 0.6–4.47)
LYMPHOCYTES NFR BLD AUTO: 15 % (ref 14–44)
MCH RBC QN AUTO: 30.7 PG (ref 26.8–34.3)
MCHC RBC AUTO-ENTMCNC: 32.3 G/DL (ref 31.4–37.4)
MCV RBC AUTO: 95 FL (ref 82–98)
MONOCYTES # BLD AUTO: 0.67 THOUSAND/ÂΜL (ref 0.17–1.22)
MONOCYTES NFR BLD AUTO: 9 % (ref 4–12)
NEUTROPHILS # BLD AUTO: 5.29 THOUSANDS/ÂΜL (ref 1.85–7.62)
NEUTS SEG NFR BLD AUTO: 71 % (ref 43–75)
NONHDLC SERPL-MCNC: 86 MG/DL
NRBC BLD AUTO-RTO: 0 /100 WBCS
PLATELET # BLD AUTO: 285 THOUSANDS/UL (ref 149–390)
PMV BLD AUTO: 11 FL (ref 8.9–12.7)
POTASSIUM SERPL-SCNC: 4.2 MMOL/L (ref 3.5–5.3)
PROT SERPL-MCNC: 7.3 G/DL (ref 6.4–8.4)
RBC # BLD AUTO: 4.2 MILLION/UL (ref 3.81–5.12)
SODIUM SERPL-SCNC: 137 MMOL/L (ref 135–147)
TRIGL SERPL-MCNC: 73 MG/DL
TSH SERPL DL<=0.05 MIU/L-ACNC: 2.01 UIU/ML (ref 0.45–4.5)
WBC # BLD AUTO: 7.45 THOUSAND/UL (ref 4.31–10.16)

## 2023-05-18 DIAGNOSIS — F98.8 ATTENTION DEFICIT DISORDER (ADD) WITHOUT HYPERACTIVITY: ICD-10-CM

## 2023-05-21 RX ORDER — METHYLPHENIDATE HYDROCHLORIDE 54 MG/1
54 TABLET ORAL DAILY
Qty: 30 TABLET | Refills: 0 | Status: SHIPPED | OUTPATIENT
Start: 2023-05-21

## 2023-06-14 DIAGNOSIS — F98.8 ATTENTION DEFICIT DISORDER (ADD) WITHOUT HYPERACTIVITY: ICD-10-CM

## 2023-06-15 RX ORDER — METHYLPHENIDATE HYDROCHLORIDE 54 MG/1
54 TABLET ORAL DAILY
Qty: 30 TABLET | Refills: 0 | Status: SHIPPED | OUTPATIENT
Start: 2023-06-15

## 2023-06-23 ENCOUNTER — TELEPHONE (OUTPATIENT)
Dept: OBGYN CLINIC | Facility: HOSPITAL | Age: 43
End: 2023-06-23

## 2023-06-23 NOTE — TELEPHONE ENCOUNTER
I received a Care Request from patient to schedule for:  Where Does it Hurt? N/a  Are you considering joint replacement? No   Are you seeking a second opinion? Yes  If yes, who is your doctor? Anyi galindom to [facility] at [phone]

## 2023-06-30 DIAGNOSIS — E66.01 MORBID OBESITY WITH BMI OF 50.0-59.9, ADULT (HCC): ICD-10-CM

## 2023-06-30 RX ORDER — SEMAGLUTIDE 2.4 MG/.75ML
2.4 INJECTION, SOLUTION SUBCUTANEOUS WEEKLY
Qty: 3 ML | Refills: 0 | Status: SHIPPED | OUTPATIENT
Start: 2023-06-30

## 2023-07-06 ENCOUNTER — APPOINTMENT (OUTPATIENT)
Dept: LAB | Facility: HOSPITAL | Age: 43
End: 2023-07-06
Payer: COMMERCIAL

## 2023-07-06 ENCOUNTER — HOSPITAL ENCOUNTER (OUTPATIENT)
Dept: MRI IMAGING | Facility: HOSPITAL | Age: 43
Discharge: HOME | End: 2023-07-06
Payer: COMMERCIAL

## 2023-07-06 DIAGNOSIS — M25.471 PAIN AND SWELLING OF RIGHT ANKLE: ICD-10-CM

## 2023-07-06 DIAGNOSIS — M25.571 PAIN AND SWELLING OF RIGHT ANKLE: ICD-10-CM

## 2023-07-06 DIAGNOSIS — M06.89 OTHER SPECIFIED RHEUMATOID ARTHRITIS, MULTIPLE SITES (HCC): ICD-10-CM

## 2023-07-06 DIAGNOSIS — Z79.899 ENCOUNTER FOR LONG-TERM (CURRENT) USE OF OTHER MEDICATIONS: ICD-10-CM

## 2023-07-06 LAB
ALBUMIN SERPL BCP-MCNC: 4 G/DL (ref 3.5–5)
ALP SERPL-CCNC: 52 U/L (ref 34–104)
ALT SERPL W P-5'-P-CCNC: 15 U/L (ref 7–52)
ANION GAP SERPL CALCULATED.3IONS-SCNC: 8 MMOL/L
AST SERPL W P-5'-P-CCNC: 14 U/L (ref 13–39)
BASOPHILS # BLD AUTO: 0.05 THOUSANDS/ÂΜL (ref 0–0.1)
BASOPHILS NFR BLD AUTO: 1 % (ref 0–1)
BILIRUB SERPL-MCNC: 0.98 MG/DL (ref 0.2–1)
BUN SERPL-MCNC: 15 MG/DL (ref 5–25)
CALCIUM SERPL-MCNC: 8.8 MG/DL (ref 8.4–10.2)
CHLORIDE SERPL-SCNC: 102 MMOL/L (ref 96–108)
CO2 SERPL-SCNC: 27 MMOL/L (ref 21–32)
CREAT SERPL-MCNC: 0.83 MG/DL (ref 0.6–1.3)
CRP SERPL QL: 1.2 MG/L
EOSINOPHIL # BLD AUTO: 0.44 THOUSAND/ÂΜL (ref 0–0.61)
EOSINOPHIL NFR BLD AUTO: 6 % (ref 0–6)
ERYTHROCYTE [DISTWIDTH] IN BLOOD BY AUTOMATED COUNT: 13 % (ref 11.6–15.1)
ERYTHROCYTE [SEDIMENTATION RATE] IN BLOOD: 21 MM/HOUR (ref 0–19)
GFR SERPL CREATININE-BSD FRML MDRD: 87 ML/MIN/1.73SQ M
GLUCOSE P FAST SERPL-MCNC: 79 MG/DL (ref 65–99)
HCT VFR BLD AUTO: 40.1 % (ref 34.8–46.1)
HGB BLD-MCNC: 12.9 G/DL (ref 11.5–15.4)
IMM GRANULOCYTES # BLD AUTO: 0.04 THOUSAND/UL (ref 0–0.2)
IMM GRANULOCYTES NFR BLD AUTO: 1 % (ref 0–2)
LYMPHOCYTES # BLD AUTO: 1.8 THOUSANDS/ÂΜL (ref 0.6–4.47)
LYMPHOCYTES NFR BLD AUTO: 24 % (ref 14–44)
MCH RBC QN AUTO: 30.9 PG (ref 26.8–34.3)
MCHC RBC AUTO-ENTMCNC: 32.2 G/DL (ref 31.4–37.4)
MCV RBC AUTO: 96 FL (ref 82–98)
MONOCYTES # BLD AUTO: 0.53 THOUSAND/ÂΜL (ref 0.17–1.22)
MONOCYTES NFR BLD AUTO: 7 % (ref 4–12)
NEUTROPHILS # BLD AUTO: 4.63 THOUSANDS/ÂΜL (ref 1.85–7.62)
NEUTS SEG NFR BLD AUTO: 61 % (ref 43–75)
NRBC BLD AUTO-RTO: 0 /100 WBCS
PLATELET # BLD AUTO: 276 THOUSANDS/UL (ref 149–390)
PMV BLD AUTO: 10.6 FL (ref 8.9–12.7)
POTASSIUM SERPL-SCNC: 3.9 MMOL/L (ref 3.5–5.3)
PROT SERPL-MCNC: 6.9 G/DL (ref 6.4–8.4)
RBC # BLD AUTO: 4.17 MILLION/UL (ref 3.81–5.12)
SODIUM SERPL-SCNC: 137 MMOL/L (ref 135–147)
WBC # BLD AUTO: 7.49 THOUSAND/UL (ref 4.31–10.16)

## 2023-07-06 PROCEDURE — 36415 COLL VENOUS BLD VENIPUNCTURE: CPT

## 2023-07-06 PROCEDURE — 80053 COMPREHEN METABOLIC PANEL: CPT

## 2023-07-06 PROCEDURE — 73721 MRI JNT OF LWR EXTRE W/O DYE: CPT

## 2023-07-06 PROCEDURE — 86140 C-REACTIVE PROTEIN: CPT

## 2023-07-06 PROCEDURE — 85025 COMPLETE CBC W/AUTO DIFF WBC: CPT

## 2023-07-06 PROCEDURE — 85652 RBC SED RATE AUTOMATED: CPT

## 2023-07-14 DIAGNOSIS — E66.01 MORBID OBESITY WITH BMI OF 50.0-59.9, ADULT (HCC): Primary | ICD-10-CM

## 2023-07-23 DIAGNOSIS — F41.8 DEPRESSION WITH ANXIETY: ICD-10-CM

## 2023-07-23 DIAGNOSIS — E66.01 MORBID OBESITY WITH BMI OF 50.0-59.9, ADULT (HCC): ICD-10-CM

## 2023-07-23 DIAGNOSIS — J45.20 MILD INTERMITTENT ASTHMA WITHOUT COMPLICATION: ICD-10-CM

## 2023-07-23 DIAGNOSIS — F98.8 ATTENTION DEFICIT DISORDER (ADD) WITHOUT HYPERACTIVITY: ICD-10-CM

## 2023-07-25 RX ORDER — SEMAGLUTIDE 2.4 MG/.75ML
2.4 INJECTION, SOLUTION SUBCUTANEOUS WEEKLY
Qty: 3 ML | Refills: 0 | Status: SHIPPED | OUTPATIENT
Start: 2023-07-25

## 2023-07-25 RX ORDER — METHYLPHENIDATE HYDROCHLORIDE 54 MG/1
54 TABLET ORAL DAILY
Qty: 30 TABLET | Refills: 0 | Status: SHIPPED | OUTPATIENT
Start: 2023-07-25

## 2023-07-25 RX ORDER — FLUOXETINE HYDROCHLORIDE 40 MG/1
40 CAPSULE ORAL DAILY
Qty: 90 CAPSULE | Refills: 0 | Status: SHIPPED | OUTPATIENT
Start: 2023-07-25

## 2023-08-24 ENCOUNTER — OFFICE VISIT (OUTPATIENT)
Dept: SLEEP CENTER | Facility: CLINIC | Age: 43
End: 2023-08-24
Payer: COMMERCIAL

## 2023-08-24 VITALS
HEIGHT: 67 IN | TEMPERATURE: 97.8 F | DIASTOLIC BLOOD PRESSURE: 80 MMHG | OXYGEN SATURATION: 95 % | WEIGHT: 293 LBS | SYSTOLIC BLOOD PRESSURE: 124 MMHG | BODY MASS INDEX: 45.99 KG/M2 | HEART RATE: 80 BPM

## 2023-08-24 DIAGNOSIS — J45.20 MILD INTERMITTENT ASTHMA WITHOUT COMPLICATION: ICD-10-CM

## 2023-08-24 DIAGNOSIS — G47.33 OBSTRUCTIVE SLEEP APNEA: Primary | ICD-10-CM

## 2023-08-24 DIAGNOSIS — F41.8 DEPRESSION WITH ANXIETY: ICD-10-CM

## 2023-08-24 DIAGNOSIS — F90.9 ATTENTION DEFICIT HYPERACTIVITY DISORDER (ADHD), UNSPECIFIED ADHD TYPE: ICD-10-CM

## 2023-08-24 DIAGNOSIS — E66.01 MORBID OBESITY WITH BMI OF 40.0-44.9, ADULT (HCC): ICD-10-CM

## 2023-08-24 DIAGNOSIS — J33.9 NASAL POLYP: ICD-10-CM

## 2023-08-24 DIAGNOSIS — J30.9 ALLERGIC RHINITIS, UNSPECIFIED SEASONALITY, UNSPECIFIED TRIGGER: ICD-10-CM

## 2023-08-24 PROCEDURE — 99214 OFFICE O/P EST MOD 30 MIN: CPT | Performed by: INTERNAL MEDICINE

## 2023-08-24 RX ORDER — TOFACITINIB 11 MG/1
TABLET, FILM COATED, EXTENDED RELEASE ORAL
COMMUNITY
Start: 2023-08-11

## 2023-08-24 NOTE — PATIENT INSTRUCTIONS

## 2023-08-24 NOTE — PROGRESS NOTES
Follow-Up Note - [address]  44 y.o. female  DTJ:[birth year]  WVT:[id number]  DOS:8/24/2023    CC: I saw this patient for follow-up in clinic today for Sleep disordered breathing, Coexisting Sleep and Medical Problems. Interval changes: Patient received ot a  [employer] Version 2 machine from [place] several months ago. PFSH, Problem List, Medications & Allergies were reviewed in EMR. She  has a past medical history of Allergic rhinitis, Anxiety, Arthritis, Asthma, COVID-19 (12/17/2020), CPAP (continuous positive airway pressure) dependence, Daytime hypersomnolence, Depression, GERD (gastroesophageal reflux disease), IUD (intrauterine device) in place, Lumbar disc disease, Migraine, RA (rheumatoid arthritis) ([address]), Rheumatoid arthritis ([address]), Sleep apnea, Varicella, and Wears glasses. She has a current medication list which includes the following prescription(s): fexofenadine, fluoxetine, fluticasone, ibuprofen, methylphenidate, mometasone-formoterol, multivitamin adult (minerals), wegovy, xeljanz xr, and prednisone. PHYSIOLOGICAL DATA REVIEW : Using PAP > 4 hours/night 93%. Estimated RACHAEL 2.6/hour with pressure of 15.5cm Eneas@[employer] percentile; patient has not been using non FDA approved devices to sanitize the machine. INTERPRETATION: Compliance is Very good; Pressure setting is:optimal; ;   SUBJECTIVE: With respect to use of PAP, Murtaza Hurtado  is experiencing some adverse effects:dry mouth/throat. She derives benefit. Is satisfied with sleep and daytime function. Sleep Routine: Murtaza Hurtado reports getting 6-7 hrs sleep on workdays and more on days off; she has no difficulty initiating or maintaining sleep . She arises needing an alarm and feels more refreshed since on Rx. Murtaza Hurtado reports significantly improved excessive daytime sleepiness,. She rated [herself] at Total score: 5 /24 on the [place] Sleepiness Scale. Other issues: None reported.      Habits:[ reports that she has quit smoking. Her smoking use included cigarettes. She has never used smokeless tobacco.], [ reports current alcohol use of about 4.0 standard drinks of alcohol per week.], [ reports no history of drug use.], Caffeine use:limited; Exercise routine: sometimes. ROS: Significant for weight fluctuates in the range of a few pounds. Allergies and asthma control. She is on treatment for rheumatoid arthritis. Mood is stable on current medications. Mary Rosas EXAM: /80 (BP Location: Right arm, Cuff Size: Large)   Pulse 80   Temp 97.8 °F (36.6 °C) (Temporal)   Ht 5' 7" (1.702 m)   Wt (!) 152 kg (334 lb 12.8 oz)   SpO2 95%   BMI 52.44 kg/m²     Wt Readings from Last 3 Encounters:   08/24/23 (!) 152 kg (334 lb 12.8 oz)   04/04/23 (!) 151 kg (331 lb 14.4 oz)   12/14/22 (!) 150 kg (330 lb 6.4 oz)      Patient is well groomed; well appearing. CNS: Alert, orientated, speech clear & coherent  Psych: cooperative and in no distress. Mental state: Appears normal.  H&N: EOMI; NC/AT: No facial pressure marks, no rashes. Skin/Extrem: col & hydration normal; no edema  Resp: Respiratory effort is normal  Physical findings otherwise essentially unchanged from previous. IMPRESSION: Problem List Items & Comorbidities Addressed this Visit    1. Obstructive sleep apnea  PAP DME Resupply/Reorder      2. Allergic rhinitis, unspecified seasonality, unspecified trigger        3. Nasal polyp        4. Mild intermittent asthma without complication        5. Depression with anxiety        6. Attention deficit hyperactivity disorder (ADHD), unspecified ADHD type        7. Morbid obesity with BMI of 40.0-44.9, adult ([address])            PLAN:  1. I reviewed results of prior studies and physiologic data with the patient. 2. I discussed treatment options with risks and benefits. 3. Treatment with  PAP is medically necessary and Vergie Fortis is agreable to continue use.    4. Care of equipment, methods to improve comfort using PAP and importance of compliance with therapy were discussed. 5. Pressure setting: continue 10-16 cmH2O.    6. Rx provided to replace supplies and Care coordinated with DME provider. 7. Discussed strategies for weight reduction-she is due to see weight management shortly. 8. I also advised allowing sufficient opportunity for sleep. 9. Follow-up is advised in 1 year or sooner if needed to monitor progress, compliance and to adjust therapy. Thank you for allowing me to participate in the care of this patient. Sincerely,     Authenticated electronically on 67/19/19   Board Certified Specialist     Portions of the record may have been created with voice recognition software. Occasional wrong word or "sound a like" substitutions may have occurred due to the inherent limitations of voice recognition software. There may also be notations and random deletions of words or characters from malfunctioning software. Read the chart carefully and recognize, using context, where substitutions/deletions have occurred.

## 2023-08-25 ENCOUNTER — TELEPHONE (OUTPATIENT)
Dept: SLEEP CENTER | Facility: CLINIC | Age: 43
End: 2023-08-25

## 2023-08-28 LAB

## 2023-08-31 DIAGNOSIS — F98.8 ATTENTION DEFICIT DISORDER (ADD) WITHOUT HYPERACTIVITY: ICD-10-CM

## 2023-08-31 DIAGNOSIS — E66.01 MORBID OBESITY WITH BMI OF 50.0-59.9, ADULT (HCC): ICD-10-CM

## 2023-08-31 RX ORDER — METHYLPHENIDATE HYDROCHLORIDE 54 MG/1
54 TABLET ORAL DAILY
Qty: 30 TABLET | Refills: 0 | Status: SHIPPED | OUTPATIENT
Start: 2023-08-31

## 2023-08-31 RX ORDER — SEMAGLUTIDE 2.4 MG/.75ML
2.4 INJECTION, SOLUTION SUBCUTANEOUS WEEKLY
Qty: 3 ML | Refills: 0 | Status: SHIPPED | OUTPATIENT
Start: 2023-08-31

## 2023-09-06 ENCOUNTER — OFFICE VISIT (OUTPATIENT)
Dept: INTERNAL MEDICINE CLINIC | Facility: CLINIC | Age: 43
End: 2023-09-06
Payer: COMMERCIAL

## 2023-09-06 VITALS — TEMPERATURE: 98.7 F

## 2023-09-06 DIAGNOSIS — M54.32 SCIATICA OF LEFT SIDE: Primary | ICD-10-CM

## 2023-09-06 DIAGNOSIS — M06.9 RHEUMATOID ARTHRITIS INVOLVING MULTIPLE SITES, UNSPECIFIED WHETHER RHEUMATOID FACTOR PRESENT (HCC): ICD-10-CM

## 2023-09-06 PROCEDURE — 99213 OFFICE O/P EST LOW 20 MIN: CPT | Performed by: FAMILY MEDICINE

## 2023-09-06 RX ORDER — CYCLOBENZAPRINE HCL 5 MG
5 TABLET ORAL 3 TIMES DAILY PRN
Qty: 40 TABLET | Refills: 0 | Status: SHIPPED | OUTPATIENT
Start: 2023-09-06

## 2023-09-06 RX ORDER — PREDNISONE 10 MG/1
TABLET ORAL
Qty: 30 TABLET | Refills: 0 | Status: SHIPPED | OUTPATIENT
Start: 2023-09-06

## 2023-09-07 NOTE — PROGRESS NOTES
Assessment/Plan:    No problem-specific Assessment & Plan notes found for this encounter. Diagnoses and all orders for this visit:    Sciatica of left side  -     predniSONE 10 mg tablet; Five pills a day for 2 days, 4 pills a day for 2 days, 3 pills a day for 2 days, 2 pills a day for 2 days, 1 pill a day for 2 days  -     cyclobenzaprine (FLEXERIL) 5 mg tablet; Take 1 tablet (5 mg total) by mouth 3 (three) times a day as needed for muscle spasms  -     XR sacroiliac joints 3+ views; Future  -     XR spine lumbar minimum 4 views non injury; Future    Rheumatoid arthritis involving multiple sites, unspecified whether rheumatoid factor present ([address])  -     XR knee 3 vw right non injury; Future     Orders and recommendations as noted above. Symptoms are persistent despite over-the-counter treatment. Prednisone taper as noted above. Muscle relaxant as noted above. Potential side effects discussed. Recent issues with her right ankle discussed. Subjective:      Patient ID: Nisreen Vincent is a 44 y.o. female. She presents for acute visit. She has had worsening back symptoms radiating down the right leg. Has difficulty changing positions. Has tried Soma with minimal alleviation. Has had increasing difficulty doing her normal activities. Has had increasing issues also with her right ankle. Had followed up with podiatry. They are likely going to refer her to orthopedics. Having increasing issues with her right knee as well.       The following portions of the patient's history were reviewed and updated as appropriate:   She  has a past medical history of Allergic rhinitis, Anxiety, Arthritis, Asthma, COVID-19 (12/17/2020), CPAP (continuous positive airway pressure) dependence, Daytime hypersomnolence, Depression, GERD (gastroesophageal reflux disease), IUD (intrauterine device) in place, Lumbar disc disease, Migraine, RA (rheumatoid arthritis) ([address]), Rheumatoid arthritis ([address]), Sleep apnea, Varicella, and Wears glasses. She   Patient Active Problem List    Diagnosis Date Noted   • Sciatica of left side 09/06/2023   • Obstructive sleep apnea 08/24/2023   • IUD (mirena) Surveillance 04/07/2021   • Attention deficit disorder (ADD) without hyperactivity 03/01/2021   • Morbid obesity with BMI of 50.0-59.9, adult ([address]) 03/01/2021   • Myalgia 09/14/2020   • Chronic pain of left knee 03/10/2020   • Mild hyperlipidemia 09/10/2019   • Insufficient sleep syndrome 10/04/2018   • Chronic sinusitis 08/02/2017   • Moderate obstructive sleep apnea 05/23/2017   • Fatigue 03/22/2017   • Seasonal mood disorder ([address]) 03/22/2017   • Allergic rhinitis 02/04/2016   • Nasal polyp 02/04/2016   • Vitamin D deficiency 02/04/2016   • Depression with anxiety 12/17/2015   • Rheumatoid arthritis ([address]) 12/17/2015   • Anxiety 08/04/2015   • Asthma 08/04/2015     She  has a past surgical history that includes Cholecystectomy; Sinus surgery; Nasal septum surgery; [place] tooth extraction; pr strtctc cptr asstd px extradural cranial (N/A, 1/8/2019); and US guided breast biopsy left complete (Left, 6/15/2022). Her family history includes Aneurysm in her paternal grandfather; Asthma in her mother; Autism in her son; Breast cancer in her paternal aunt; COPD in her mother; Cancer in her paternal grandmother; Celiac disease in her daughter and paternal grandfather; Chiari malformation in her brother and daughter; Diabetes in her daughter; Hypertension in her mother; Hypothyroidism in her father; Lung disease in her maternal grandfather; No Known Problems in her maternal aunt, maternal grandmother, paternal aunt, and paternal aunt. She  reports that she has quit smoking. Her smoking use included cigarettes. She has never used smokeless tobacco. She reports current alcohol use of about 4.0 standard drinks of alcohol per week. She reports that she does not use drugs.   Current Outpatient Medications   Medication Sig Dispense Refill   • cyclobenzaprine (FLEXERIL) 5 mg tablet Take 1 tablet (5 mg total) by mouth 3 (three) times a day as needed for muscle spasms 40 tablet 0   • predniSONE 10 mg tablet Five pills a day for 2 days, 4 pills a day for 2 days, 3 pills a day for 2 days, 2 pills a day for 2 days, 1 pill a day for 2 days 30 tablet 0   • fexofenadine (ALLEGRA) 180 MG tablet Take 180 mg by mouth daily     • FLUoxetine (PROzac) 40 MG capsule Take 1 capsule (40 mg total) by mouth daily 90 capsule 0   • fluticasone (FLONASE) 50 mcg/act nasal spray 2 sprays into each nostril daily     • ibuprofen (MOTRIN) 200 mg tablet Take 200 mg by mouth every 6 (six) hours as needed for mild pain. • methylphenidate (CONCERTA) 54 MG ER tablet Take 1 tablet (54 mg total) by mouth daily Max Daily Amount: 54 mg 30 tablet 0   • mometasone-formoterol (DULERA) 200-5 MCG/ACT inhaler Inhale 2 puffs 2 (two) times a day Rinse mouth after use. 13 g 0   • Multiple Vitamins-Minerals (Multivitamin Adult, Minerals,) TABS      • Semaglutide-Weight Management [facility]) 2.4 MG/0.75ML Inject 0.75 mL (2.4 mg total) under the skin once a week 3 mL 0   • Xeljanz XR 11 MG TB24        No current facility-administered medications for this visit. Current Outpatient Medications on File Prior to Visit   Medication Sig   • fexofenadine (ALLEGRA) 180 MG tablet Take 180 mg by mouth daily   • FLUoxetine (PROzac) 40 MG capsule Take 1 capsule (40 mg total) by mouth daily   • fluticasone (FLONASE) 50 mcg/act nasal spray 2 sprays into each nostril daily   • ibuprofen (MOTRIN) 200 mg tablet Take 200 mg by mouth every 6 (six) hours as needed for mild pain. • methylphenidate (CONCERTA) 54 MG ER tablet Take 1 tablet (54 mg total) by mouth daily Max Daily Amount: 54 mg   • mometasone-formoterol (DULERA) 200-5 MCG/ACT inhaler Inhale 2 puffs 2 (two) times a day Rinse mouth after use.    • Multiple Vitamins-Minerals (Multivitamin Adult, Minerals,) TABS    • Semaglutide-Weight Management [facility]) 2.4 MG/0.75ML Inject 0.75 mL (2.4 mg total) under the skin once a week   • Xeljanz XR 11 MG TB24    • [DISCONTINUED] predniSONE 5 mg tablet Taper dose (Patient not taking: Reported on 8/24/2023)     No current facility-administered medications on file prior to visit. She is allergic to morphine. .    Review of Systems   Constitutional: Positive for activity change. Negative for chills and fatigue. Musculoskeletal: Positive for back pain and gait problem. Objective:      Temp 98.7 °F (37.1 °C)          Physical Exam  Nursing note reviewed. Constitutional:       Appearance: She is well-developed and well-groomed. She is morbidly obese. Musculoskeletal:      Comments: Diffuse tenderness across the low back area and into the sacroiliac area; positive straight leg raise on the right; significant degenerative changes right ankle   Neurological:      Mental Status: She is alert. Psychiatric:         Behavior: Behavior is cooperative.

## 2023-09-28 DIAGNOSIS — F41.8 DEPRESSION WITH ANXIETY: ICD-10-CM

## 2023-09-28 DIAGNOSIS — F98.8 ATTENTION DEFICIT DISORDER (ADD) WITHOUT HYPERACTIVITY: ICD-10-CM

## 2023-09-28 DIAGNOSIS — E66.01 MORBID OBESITY WITH BMI OF 50.0-59.9, ADULT (HCC): ICD-10-CM

## 2023-09-28 RX ORDER — METHYLPHENIDATE HYDROCHLORIDE 54 MG/1
54 TABLET ORAL DAILY
Qty: 30 TABLET | Refills: 0 | Status: SHIPPED | OUTPATIENT
Start: 2023-09-28

## 2023-09-28 RX ORDER — FLUOXETINE HYDROCHLORIDE 40 MG/1
40 CAPSULE ORAL DAILY
Qty: 90 CAPSULE | Refills: 0 | Status: SHIPPED | OUTPATIENT
Start: 2023-09-28

## 2023-09-28 RX ORDER — SEMAGLUTIDE 2.4 MG/.75ML
2.4 INJECTION, SOLUTION SUBCUTANEOUS WEEKLY
Qty: 3 ML | Refills: 0 | Status: SHIPPED | OUTPATIENT
Start: 2023-09-28

## 2023-10-11 DIAGNOSIS — M54.32 SCIATICA OF LEFT SIDE: Primary | ICD-10-CM

## 2023-10-17 ENCOUNTER — CONSULT (OUTPATIENT)
Dept: BARIATRICS | Facility: CLINIC | Age: 43
End: 2023-10-17
Payer: COMMERCIAL

## 2023-10-17 VITALS
HEIGHT: 67 IN | OXYGEN SATURATION: 97 % | RESPIRATION RATE: 20 BRPM | SYSTOLIC BLOOD PRESSURE: 126 MMHG | BODY MASS INDEX: 45.99 KG/M2 | TEMPERATURE: 97.9 F | DIASTOLIC BLOOD PRESSURE: 78 MMHG | WEIGHT: 293 LBS | HEART RATE: 80 BPM

## 2023-10-17 DIAGNOSIS — G47.33 OBSTRUCTIVE SLEEP APNEA: ICD-10-CM

## 2023-10-17 DIAGNOSIS — Z32.02 PREGNANCY EXAMINATION OR TEST, NEGATIVE RESULT: ICD-10-CM

## 2023-10-17 DIAGNOSIS — R63.5 ABNORMAL WEIGHT GAIN: ICD-10-CM

## 2023-10-17 DIAGNOSIS — F41.8 DEPRESSION WITH ANXIETY: ICD-10-CM

## 2023-10-17 DIAGNOSIS — E66.01 MORBID OBESITY WITH BMI OF 50.0-59.9, ADULT (HCC): Primary | ICD-10-CM

## 2023-10-17 PROCEDURE — 99244 OFF/OP CNSLTJ NEW/EST MOD 40: CPT | Performed by: PHYSICIAN ASSISTANT

## 2023-10-17 PROCEDURE — 81025 URINE PREGNANCY TEST: CPT | Performed by: PHYSICIAN ASSISTANT

## 2023-10-17 RX ORDER — TOPIRAMATE 25 MG/1
TABLET ORAL
Qty: 60 TABLET | Refills: 1 | Status: SHIPPED | OUTPATIENT
Start: 2023-10-17

## 2023-10-17 NOTE — PROGRESS NOTES
Assessment/Plan: Morbid obesity with BMI of 50.0-59.9, adult ([address] )  -Discussed options of HealthyCORE-Intensive Lifestyle Intervention Program, Very Low Calorie Diet-VLCD, Conservative Program, Haylee-En-Y Gastric Bypass, and Vertical Sleeve Gastrectomy and the role of weight loss medications.  -Initial weight loss goal of 5-10% weight loss for improved health  -Screening labs: reviewed CMP, Lipid panel, TSH, HgbA1c, Check fasting insulin   -Patient is interested in pursuing  HealthyCORE. [place] [facility] [place] Patient currently maintaining about a 5.5% TBW loss on Wegovy 2.4mg. SHe will continue this. Will also add Topamax. Denies hx kidney stones or glaucoma. SE profile reviewed. -medication agreement signed. Has IUD. Urine pregnancy negative.  -Patient is not currently interested in bariatric surgery.   -Patient would also benefit from seeing [facility]. Will set her up with 1 hr virtual visit    Depression with anxiety  -On Prozac  -On Concerta for ADD    Obstructive sleep apnea  -Compliant with CPAP  Can improve with weight loss      Goals:    Food log (ie.) [email or web address].   No sugary beverages. At least 64oz of water daily. Increase physical activity by 10 minutes daily. Gradually increase physical activity to a goal of 5 days per week for 30 minutes of MODERATE intensity PLUS 2 days per week of FULL BODY resistance training      Follow up in approximately 2 weeks with Non-Surgical Dietician. Diagnoses and all orders for this visit:    Morbid obesity with BMI of 50.0-59.9, adult ([address] )  -     Ambulatory Referral to Weight Management  -     Insulin, fasting; Future  -     topiramate (Topamax) 25 mg tablet; Take 1 tab po HS for 1 week and then increase to 1 tab po BID    Abnormal weight gain  -     Insulin, fasting;  Future    Depression with anxiety    Obstructive sleep apnea          Subjective:   Chief Complaint   Patient presents with    Consult Patient ID: Army Smith  is a 44 y.o. female with excess weight/obesity here to pursue weight managment. Past Medical History:   Diagnosis Date    Allergic rhinitis     Anxiety     Arthritis     Asthma     COVID-19 12/17/2020    CPAP (continuous positive airway pressure) dependence     Daytime hypersomnolence     LAST ASSESSED [id number]    Depression     GERD (gastroesophageal reflux disease)     occas    IUD (intrauterine device) in place     Lumbar disc disease     Migraine     RA (rheumatoid arthritis) ([address])     Rheumatoid arthritis (HCC)     Sleep apnea     Varicella     Wears glasses          HPI:  Obesity/Excess Weight:  Severity: Very Severe  Onset:  Past 18 years after pregnancy and after being diagnosed with RA and on steroids for 1 year. Also quit smoking and gained    Modifiers:  Weight Watchers, Noom, Crash diets, Wegovy for past 1.5 years - lost 40lbs but has regained some back  Contributing factors: Pregnancy and Steroids, quitting smoking, grabs convenient foods at work  Associated symptoms: fatigue, increased joint pain, decreased mobility, and depression    Goals: 300 lbs  Highest: 360 lbs    Hydration: water minimal to 32oz, diet soda, diet tea,1 cup coffee + sugar and whole milk or almond milk  ETOH: 1 drink per week   Exercise: was walking daily but not currently due to back pain  Sleep: 6-10 hours. Wears CPAP  Occupation: [employer] tech   Smoking: former smoker      Colonoscopy: N/A    The following portions of the patient's history were reviewed and updated as appropriate: allergies, current medications, past family history, past medical history, past social history, past surgical history, and problem list.    Review of Systems   Constitutional:  Negative for chills and fever. HENT:  Positive for sore throat. Respiratory:  Positive for shortness of breath (with exertion). Negative for cough. Cardiovascular:  Negative for chest pain and palpitations.    Gastrointestinal:  Negative for abdominal pain, nausea and vomiting. Genitourinary:  Negative for dysuria. Musculoskeletal:  Positive for arthralgias and back pain. Skin:  Negative for rash. Neurological:  Negative for dizziness and headaches. Psychiatric/Behavioral:  The patient is nervous/anxious (improved with prozac). Mood stable, feels her mood is mediocre       Objective:    /78 (BP Location: Right arm, Patient Position: Sitting, Cuff Size: Large)   Pulse 80   Temp 97.9 °F (36.6 °C)   Resp 20   Ht 5' 6.5" (1.689 m)   Wt (!) 154 kg (340 lb 9.6 oz)   SpO2 97%   BMI 54.15 kg/m²     Physical Exam  Vitals and nursing note reviewed. Constitutional   General appearance: Abnormal.  well developed and morbidly obese. Eyes No conjunctival pallor. Ears, Nose, Mouth, and Throat Oral mucosa moist.   Pulmonary   Respiratory effort: No increased work of breathing or signs of respiratory distress. Auscultation of lungs: Clear to auscultation, equal breath sounds bilaterally, no wheezes, no rales, no rhonci. Cardiovascular   Auscultation of heart: Normal rate and rhythm, normal S1 and S2, without murmurs. Examination of extremities for edema and/or varicosities: Normal.  no edema. Abdomen   Abdomen: Abnormal.  The abdomen was obese. Bowel sounds were normal. The abdomen was soft and nontender.    Musculoskeletal   Gait and station: Normal.    Psychiatric   Orientation to person, place and time: Normal.    Affect: appropriate

## 2023-10-17 NOTE — ASSESSMENT & PLAN NOTE
-Discussed options of HealthyCORE-Intensive Lifestyle Intervention Program, Very Low Calorie Diet-VLCD, Conservative Program, Haylee-En-Y Gastric Bypass, and Vertical Sleeve Gastrectomy and the role of weight loss medications.  -Initial weight loss goal of 5-10% weight loss for improved health  -Screening labs: reviewed CMP, Lipid panel, TSH, HgbA1c, Check fasting insulin   -Patient is interested in pursuing  HealthyCORE. Will start at [facility] [place]. Patient currently maintaining about a 5.5% TBW loss on Wegovy 2.4mg. SHe will continue this. Will also add Topamax. Denies hx kidney stones or glaucoma. SE profile reviewed. -medication agreement signed. Has IUD. Urine pregnancy negative.  -Patient is not currently interested in bariatric surgery.   -Patient would also benefit from seeing [facility].  Will set her up with 1 hr virtual visit

## 2023-10-23 ENCOUNTER — OFFICE VISIT (OUTPATIENT)
Dept: BARIATRICS | Facility: CLINIC | Age: 43
End: 2023-10-23
Payer: COMMERCIAL

## 2023-10-23 VITALS — HEIGHT: 67 IN | BODY MASS INDEX: 45.99 KG/M2 | WEIGHT: 293 LBS

## 2023-10-23 DIAGNOSIS — E66.01 CLASS 3 SEVERE OBESITY DUE TO EXCESS CALORIES WITH BODY MASS INDEX (BMI) OF 50.0 TO 59.9 IN ADULT, UNSPECIFIED WHETHER SERIOUS COMORBIDITY PRESENT (HCC): Primary | ICD-10-CM

## 2023-10-23 PROCEDURE — RECHECK

## 2023-10-23 PROCEDURE — WMPRO12

## 2023-10-23 NOTE — PROGRESS NOTES
Weight Management Medical Nutrition Assessment  Leidy Vizcaino presented today for new start Healthy Core program. Today on Tanita scale she weighed 338# which reflects a loss of 2.6# since consult with OMAR BARDALES 10/17/23. Will review rest of body composition results at 2-week follow-up session. On Wegovy- started in March 2022. Stated did well in the beginning going from 360-320#. Recently on prednisone, so gained to present. Reported that subsequent to having COVID, her rheumatoid arthritis came back after being in remission. Medical conditions include NATALEE with CPAP, sciatica, rheumatoid arthritis, anxiety/depression, mild HLD. Had been food-logging, but is not doing so at this time. Provided Leidy Vizcaino with and reviewed calorie-controlled, balanced meal plan. RD follow-up scheduled for 11/6/23.       Patient seen by Medical Provider in past 6 months:  yes  Requested to schedule appointment with Medical Provider: No      Anthropometric Measurements  Start Weight (#): 338#  Current Weight (#): as above  TBW % Change from start weight: n/a  Ideal Body Weight (#):132.5# (60.2 kg)  Goal Weight (#): 300#  Highest:360#  Lowest:260#     Weight Loss History  Previous weight loss attempts: Weight Watchers, Noom, crash diet, DUZLEH    Food and Nutrition Related History  Wake up: 4:30  AM or 6:00 AM   Bed Time:9-10 PM    Food Recall  Breakfast:6:30-8 AM- plant-based protein powder (20 g) in almond milk Snack:10-ish- yogurt or cottage duo   Lunch:12-1:30 PM- chicken and vegetables or sandwich (wheat bread, ham and cheese with elias, tomato) and maybe a soup or vegetable (eats in caf at work)  Snack:3-4 PM- pretzels  Dinner:7 PM- may not eat or may have pizza or grilled cheese sandwich or casserole or crock pot with chicken and rice; not usually a vegetable  Snack:8-9- cereal (Fruity Pebble, Chex) or applesauce and raymond crackers- feels she overeats these foods      Beverages: diet soda- 24 oz or diet tea 16 oz, water- 32 oz on a work day; drinks probably 16 oz water on days off  Volume of beverage intake: >=40 oz     Weekends: if at event, eats more and may have alcohol; at home, pretty much the same   Cravings: sweets usually  Trouble area of day: night    Frequency of Eating out: once weekly   Food restrictions: sauce causes indigestion, too much milk causes loose stools  Cooking: self   Food Shopping: self or daughter    Physical Activity Intake  Activity:not right now, but was walking her dog 1/4-1/2 mile daily until she tore ligament in ankle which halted that activity   Frequency:n/a  Physical limitations/barriers to exercise: ankle problem as noted above    Estimated Needs  Energy  Tanita: BMR: 2158      X 1.3 -1000 = [address] Energy Needs: BMR: 3118   1-2# loss weekly sedentary:  3642-2279             1-2# loss weekly lightly active:2083-0266  Maintenance calories for sedentary activity level: 2661  Protein:72-90 g      (1.2-1.5g/kg IBW)  Fluid: >=70 oz     (35mL/kg IBW)    Nutrition Diagnosis  Yes;     Overweight/obesity  related to Excess energy intake as evidenced by  BMI more than normative standard for age and sex (obesity-grade III 36+)       Nutrition Intervention    Nutrition Prescription  Calories:2188-7846  Protein:~70-90 g  Fluid:>=70 oz    Meal Plan (Lamine/Pro/Carb)  Breakfast:300/20-30/15-30  Snack:200/5-15/15  Lunch:400/20-30/30-45  Snack:200/5-15/15  Dinner:400/20-30/30-45  Snack:200/5-15/15    Nutrition Education:    Healthy Core Manual  Calorie controlled menu  Lean protein food choices  Healthy snack options  Food journaling tips      Nutrition Counseling:  Strategies: meal planning, portion sizes, healthy snack choices, hydration, fiber intake, protein intake, exercise, food journal      Monitoring and Evaluation:  Evaluation criteria:  Energy Intake  Meet protein needs  Maintain adequate hydration  Monitor weekly weight  Meal planning/preparation  Food journal   Decreased portions at mealtimes and snacks  Physical activity     Barriers to learning:none  Readiness to change: changing behavior  Comprehension: very good  Expected Compliance: very good

## 2023-10-24 ENCOUNTER — APPOINTMENT (OUTPATIENT)
Dept: LAB | Facility: HOSPITAL | Age: 43
End: 2023-10-24
Payer: COMMERCIAL

## 2023-10-24 DIAGNOSIS — E66.01 MORBID OBESITY WITH BMI OF 50.0-59.9, ADULT (HCC): ICD-10-CM

## 2023-10-24 DIAGNOSIS — M06.89 OTHER SPECIFIED RHEUMATOID ARTHRITIS, MULTIPLE SITES (HCC): ICD-10-CM

## 2023-10-24 DIAGNOSIS — R63.5 ABNORMAL WEIGHT GAIN: ICD-10-CM

## 2023-10-24 LAB
ALBUMIN SERPL BCP-MCNC: 4 G/DL (ref 3.5–5)
ALP SERPL-CCNC: 56 U/L (ref 34–104)
ALT SERPL W P-5'-P-CCNC: 15 U/L (ref 7–52)
ANION GAP SERPL CALCULATED.3IONS-SCNC: 9 MMOL/L
AST SERPL W P-5'-P-CCNC: 16 U/L (ref 13–39)
BASOPHILS # BLD AUTO: 0.08 THOUSANDS/ÂΜL (ref 0–0.1)
BASOPHILS NFR BLD AUTO: 1 % (ref 0–1)
BILIRUB SERPL-MCNC: 1.05 MG/DL (ref 0.2–1)
BUN SERPL-MCNC: 17 MG/DL (ref 5–25)
CALCIUM SERPL-MCNC: 9.2 MG/DL (ref 8.4–10.2)
CHLORIDE SERPL-SCNC: 106 MMOL/L (ref 96–108)
CO2 SERPL-SCNC: 24 MMOL/L (ref 21–32)
CREAT SERPL-MCNC: 0.81 MG/DL (ref 0.6–1.3)
CRP SERPL QL: 1.8 MG/L
EOSINOPHIL # BLD AUTO: 0.35 THOUSAND/ÂΜL (ref 0–0.61)
EOSINOPHIL NFR BLD AUTO: 5 % (ref 0–6)
ERYTHROCYTE [DISTWIDTH] IN BLOOD BY AUTOMATED COUNT: 13.3 % (ref 11.6–15.1)
ERYTHROCYTE [SEDIMENTATION RATE] IN BLOOD: 22 MM/HOUR (ref 0–19)
GFR SERPL CREATININE-BSD FRML MDRD: 89 ML/MIN/1.73SQ M
GLUCOSE P FAST SERPL-MCNC: 88 MG/DL (ref 65–99)
HCT VFR BLD AUTO: 41.3 % (ref 34.8–46.1)
HGB BLD-MCNC: 13.2 G/DL (ref 11.5–15.4)
IMM GRANULOCYTES # BLD AUTO: 0.04 THOUSAND/UL (ref 0–0.2)
IMM GRANULOCYTES NFR BLD AUTO: 1 % (ref 0–2)
INSULIN SERPL-ACNC: 8.93 UIU/ML (ref 1.9–23)
LYMPHOCYTES # BLD AUTO: 1.3 THOUSANDS/ÂΜL (ref 0.6–4.47)
LYMPHOCYTES NFR BLD AUTO: 19 % (ref 14–44)
MCH RBC QN AUTO: 30.6 PG (ref 26.8–34.3)
MCHC RBC AUTO-ENTMCNC: 32 G/DL (ref 31.4–37.4)
MCV RBC AUTO: 96 FL (ref 82–98)
MONOCYTES # BLD AUTO: 0.66 THOUSAND/ÂΜL (ref 0.17–1.22)
MONOCYTES NFR BLD AUTO: 10 % (ref 4–12)
NEUTROPHILS # BLD AUTO: 4.35 THOUSANDS/ÂΜL (ref 1.85–7.62)
NEUTS SEG NFR BLD AUTO: 64 % (ref 43–75)
NRBC BLD AUTO-RTO: 0 /100 WBCS
PLATELET # BLD AUTO: 271 THOUSANDS/UL (ref 149–390)
PMV BLD AUTO: 10.8 FL (ref 8.9–12.7)
POTASSIUM SERPL-SCNC: 4.2 MMOL/L (ref 3.5–5.3)
PROT SERPL-MCNC: 6.8 G/DL (ref 6.4–8.4)
RBC # BLD AUTO: 4.32 MILLION/UL (ref 3.81–5.12)
SODIUM SERPL-SCNC: 139 MMOL/L (ref 135–147)
WBC # BLD AUTO: 6.78 THOUSAND/UL (ref 4.31–10.16)

## 2023-10-24 PROCEDURE — 86140 C-REACTIVE PROTEIN: CPT

## 2023-10-24 PROCEDURE — 83525 ASSAY OF INSULIN: CPT

## 2023-10-24 PROCEDURE — 85025 COMPLETE CBC W/AUTO DIFF WBC: CPT

## 2023-10-24 PROCEDURE — 36415 COLL VENOUS BLD VENIPUNCTURE: CPT

## 2023-10-24 PROCEDURE — 85652 RBC SED RATE AUTOMATED: CPT

## 2023-10-24 PROCEDURE — 80053 COMPREHEN METABOLIC PANEL: CPT

## 2023-10-30 ENCOUNTER — CLINICAL SUPPORT (OUTPATIENT)
Dept: BARIATRICS | Facility: CLINIC | Age: 43
End: 2023-10-30

## 2023-10-30 DIAGNOSIS — E66.01 MORBID OBESITY WITH BMI OF 50.0-59.9, ADULT (HCC): ICD-10-CM

## 2023-10-30 DIAGNOSIS — R63.5 ABNORMAL WEIGHT GAIN: Primary | ICD-10-CM

## 2023-10-30 PROCEDURE — RECHECK

## 2023-10-31 VITALS — HEIGHT: 67 IN | WEIGHT: 293 LBS | BODY MASS INDEX: 45.99 KG/M2

## 2023-11-01 ENCOUNTER — HOSPITAL ENCOUNTER (OUTPATIENT)
Dept: RADIOLOGY | Facility: HOSPITAL | Age: 43
Discharge: HOME | End: 2023-11-01
Payer: COMMERCIAL

## 2023-11-01 DIAGNOSIS — M79.606 PAIN OF LOWER EXTREMITY, UNSPECIFIED LATERALITY: ICD-10-CM

## 2023-11-01 PROCEDURE — 72114 X-RAY EXAM L-S SPINE BENDING: CPT

## 2023-11-02 DIAGNOSIS — F98.8 ATTENTION DEFICIT DISORDER (ADD) WITHOUT HYPERACTIVITY: ICD-10-CM

## 2023-11-02 DIAGNOSIS — E66.01 MORBID OBESITY WITH BMI OF 50.0-59.9, ADULT (HCC): ICD-10-CM

## 2023-11-02 RX ORDER — METHYLPHENIDATE HYDROCHLORIDE 54 MG/1
54 TABLET ORAL DAILY
Qty: 30 TABLET | Refills: 0 | Status: SHIPPED | OUTPATIENT
Start: 2023-11-02

## 2023-11-02 RX ORDER — SEMAGLUTIDE 2.4 MG/.75ML
2.4 INJECTION, SOLUTION SUBCUTANEOUS WEEKLY
Qty: 3 ML | Refills: 0 | Status: SHIPPED | OUTPATIENT
Start: 2023-11-02

## 2023-11-06 ENCOUNTER — OFFICE VISIT (OUTPATIENT)
Dept: BARIATRICS | Facility: CLINIC | Age: 43
End: 2023-11-06

## 2023-11-06 ENCOUNTER — CLINICAL SUPPORT (OUTPATIENT)
Dept: BARIATRICS | Facility: CLINIC | Age: 43
End: 2023-11-06

## 2023-11-06 VITALS — HEIGHT: 67 IN | WEIGHT: 293 LBS | BODY MASS INDEX: 45.99 KG/M2

## 2023-11-06 DIAGNOSIS — R63.5 ABNORMAL WEIGHT GAIN: Primary | ICD-10-CM

## 2023-11-06 DIAGNOSIS — E66.01 MORBID OBESITY WITH BMI OF 50.0-59.9, ADULT (HCC): ICD-10-CM

## 2023-11-06 DIAGNOSIS — E66.01 OBESITY, CLASS III, BMI 40-49.9 (MORBID OBESITY) (HCC): Primary | ICD-10-CM

## 2023-11-06 PROCEDURE — RECHECK

## 2023-11-06 NOTE — PROGRESS NOTES
Weight Management Medical Nutrition Assessment  Cintia Santiago presented today for Healthy Core 2-week follow-up. Today she weighed 334. 2# which reflects a loss of 3.8# since new start visit Reviewed body composition results obtained at RD session and provided Cintia Santiago with copy of same as well as with Tanita results explanation sheet. She stated she has been trying to incorporate  healthy foods into her diet. Endorsed being more mindful. Watching portions. Food-logging with [employer]. Her goal is to plan ahead so she can enjoy more homemade meals.        Patient seen by Medical Provider in past 6 months:  yes  Requested to schedule appointment with Medical Provider: No      Anthropometric Measurements  Start Weight (#): 338#  Current Weight (#): 334.2#  TBW % Change from start weight: 1.1%  Ideal Body Weight (#):132.5# (60.2 kg)  Goal Weight (#): 300#  Highest:360#  Lowest:260#     Weight Loss History  Previous weight loss attempts: Weight Watchers, Noom, crash diet, BZRYUM    Food and Nutrition Related History  Wake up: 4:30  AM or 6:00 AM   Bed Time:9-10 PM    Food Recall  Breakfast:6:30-8 AM- plant-based protein powder (20 g) in almond milk Snack:10-ish- not really  Lunch:12-1:30 PM- sandwich or shake, fruit, vegetables, sandwich (wheat bread, ham and cheese with elias, tomato)   Snack:3 -4 PM- unsweetened applesauce or cheesestick or cottage cheese and pineapple  Dinner:7 PM- gluten free pasta (portioned) with green beans; hamburger (93% lean) BBQ without bun, small potato cakes; chicken/broccoli bake; not usually a vegetable  Snack:8-9- drinking water or tea, maybe an unsweetened applesauce      Beverages: diet soda- 24 oz or diet tea 16 oz, water- 32 oz on a work day; drinks probably 16 oz water on days off  Volume of beverage intake: >=40 oz     Weekends: if at event, eats more and may have alcohol; at home, pretty much the same   Cravings: sweets usually  Trouble area of day: night    Frequency of Eating out: once weekly   Food restrictions: sauce causes indigestion, too much milk causes loose stools  Cooking: self   Food Shopping: self or daughter    Physical Activity Intake  Activity:not right now, but was walking her dog 1/4-1/2 mile daily until she tore ligament in ankle which halted that activity   Frequency:n/a  Physical limitations/barriers to exercise: ankle problem as noted above    Estimated Needs  Energy  Tanita: BMR: 2158      X 1.3 -1000 = [address] Energy Needs: BMR: 6695   1-2# loss weekly sedentary:  8198-8531             1-2# loss weekly lightly active:8102-0301  Maintenance calories for sedentary activity level: 2661  Protein:72-90 g      (1.2-1.5g/kg IBW)  Fluid: >=70 oz     (35mL/kg IBW)    Nutrition Diagnosis  Yes;     Overweight/obesity  related to Excess energy intake as evidenced by  BMI more than normative standard for age and sex (obesity-grade III 36+)       Nutrition Intervention    Nutrition Prescription  Calories:4489-2607  Protein:~70-90 g  Fluid:>=70 oz    Meal Plan (Lamine/Pro/Carb)  Breakfast:300/20-30/15-30  Snack:200/5-15/15  Lunch:400/20-30/30-45  Snack:200/5-15/15  Dinner:400/20-30/30-45  Snack:200/5-15/15    Nutrition Education:    Healthy Core Manual  Calorie controlled menu  Lean protein food choices  Healthy snack options  Food journaling tips      Nutrition Counseling:  Strategies: meal planning, portion sizes, healthy snack choices, hydration, fiber intake, protein intake, exercise, food journal      Monitoring and Evaluation:  Evaluation criteria:  Energy Intake  Meet protein needs  Maintain adequate hydration  Monitor weekly weight  Meal planning/preparation  Food journal   Decreased portions at mealtimes and snacks  Physical activity     Barriers to learning:none  Readiness to change: changing behavior  Comprehension: very good  Expected Compliance: very good

## 2023-11-08 VITALS — WEIGHT: 293 LBS | BODY MASS INDEX: 45.99 KG/M2 | HEIGHT: 67 IN

## 2023-11-13 ENCOUNTER — CLINICAL SUPPORT (OUTPATIENT)
Dept: BARIATRICS | Facility: CLINIC | Age: 43
End: 2023-11-13

## 2023-11-13 DIAGNOSIS — E66.01 CLASS 3 SEVERE OBESITY DUE TO EXCESS CALORIES WITH BODY MASS INDEX (BMI) OF 50.0 TO 59.9 IN ADULT, UNSPECIFIED WHETHER SERIOUS COMORBIDITY PRESENT (HCC): ICD-10-CM

## 2023-11-13 DIAGNOSIS — R63.5 ABNORMAL WEIGHT GAIN: Primary | ICD-10-CM

## 2023-11-13 PROCEDURE — RECHECK

## 2023-11-15 VITALS — WEIGHT: 293 LBS | BODY MASS INDEX: 45.99 KG/M2 | HEIGHT: 67 IN

## 2023-11-20 ENCOUNTER — CLINICAL SUPPORT (OUTPATIENT)
Dept: BARIATRICS | Facility: CLINIC | Age: 43
End: 2023-11-20

## 2023-11-20 DIAGNOSIS — R63.5 ABNORMAL WEIGHT GAIN: Primary | ICD-10-CM

## 2023-11-20 DIAGNOSIS — E66.01 CLASS 3 SEVERE OBESITY DUE TO EXCESS CALORIES WITH BODY MASS INDEX (BMI) OF 50.0 TO 59.9 IN ADULT, UNSPECIFIED WHETHER SERIOUS COMORBIDITY PRESENT (HCC): ICD-10-CM

## 2023-11-20 PROCEDURE — RECHECK

## 2023-11-21 VITALS — WEIGHT: 293 LBS | BODY MASS INDEX: 45.99 KG/M2 | HEIGHT: 67 IN

## 2023-11-30 DIAGNOSIS — E66.01 MORBID OBESITY WITH BMI OF 50.0-59.9, ADULT (HCC): ICD-10-CM

## 2023-12-04 ENCOUNTER — CLINICAL SUPPORT (OUTPATIENT)
Dept: BARIATRICS | Facility: CLINIC | Age: 43
End: 2023-12-04

## 2023-12-04 ENCOUNTER — HOSPITAL ENCOUNTER (OUTPATIENT)
Dept: MAMMOGRAPHY | Facility: HOSPITAL | Age: 43
Discharge: HOME | End: 2023-12-04
Payer: COMMERCIAL

## 2023-12-04 VITALS — HEIGHT: 67 IN | BODY MASS INDEX: 45.99 KG/M2 | WEIGHT: 293 LBS

## 2023-12-04 DIAGNOSIS — R63.5 ABNORMAL WEIGHT GAIN: Primary | ICD-10-CM

## 2023-12-04 DIAGNOSIS — E66.01 MORBID OBESITY WITH BMI OF 50.0-59.9, ADULT (HCC): ICD-10-CM

## 2023-12-04 DIAGNOSIS — Z12.31 VISIT FOR SCREENING MAMMOGRAM: ICD-10-CM

## 2023-12-04 PROCEDURE — 77067 SCR MAMMO BI INCL CAD: CPT

## 2023-12-04 PROCEDURE — RECHECK

## 2023-12-04 PROCEDURE — 77063 BREAST TOMOSYNTHESIS BI: CPT

## 2023-12-04 RX ORDER — SEMAGLUTIDE 2.4 MG/.75ML
2.4 INJECTION, SOLUTION SUBCUTANEOUS WEEKLY
Qty: 3 ML | Refills: 0 | Status: SHIPPED | OUTPATIENT
Start: 2023-12-04

## 2023-12-05 VITALS — BODY MASS INDEX: 45.99 KG/M2 | WEIGHT: 293 LBS | HEIGHT: 67 IN

## 2023-12-09 DIAGNOSIS — F98.8 ATTENTION DEFICIT DISORDER (ADD) WITHOUT HYPERACTIVITY: ICD-10-CM

## 2023-12-11 ENCOUNTER — CLINICAL SUPPORT (OUTPATIENT)
Dept: BARIATRICS | Facility: CLINIC | Age: 43
End: 2023-12-11

## 2023-12-11 DIAGNOSIS — E66.01 MORBID OBESITY WITH BMI OF 50.0-59.9, ADULT (HCC): ICD-10-CM

## 2023-12-11 DIAGNOSIS — R63.5 ABNORMAL WEIGHT GAIN: Primary | ICD-10-CM

## 2023-12-11 PROCEDURE — RECHECK

## 2023-12-11 RX ORDER — METHYLPHENIDATE HYDROCHLORIDE 54 MG/1
54 TABLET ORAL DAILY
Qty: 30 TABLET | Refills: 0 | Status: SHIPPED | OUTPATIENT
Start: 2023-12-11

## 2023-12-13 VITALS — WEIGHT: 293 LBS | BODY MASS INDEX: 45.99 KG/M2 | HEIGHT: 67 IN

## 2023-12-18 ENCOUNTER — CLINICAL SUPPORT (OUTPATIENT)
Dept: BARIATRICS | Facility: CLINIC | Age: 43
End: 2023-12-18

## 2023-12-18 DIAGNOSIS — R63.5 ABNORMAL WEIGHT GAIN: Primary | ICD-10-CM

## 2023-12-18 DIAGNOSIS — E66.01 MORBID OBESITY WITH BMI OF 50.0-59.9, ADULT (HCC): ICD-10-CM

## 2023-12-18 PROCEDURE — RECHECK

## 2023-12-19 VITALS — BODY MASS INDEX: 45.99 KG/M2 | WEIGHT: 293 LBS | HEIGHT: 67 IN

## 2023-12-27 ENCOUNTER — HOSPITAL ENCOUNTER (OUTPATIENT)
Dept: MRI IMAGING | Facility: HOSPITAL | Age: 43
Discharge: HOME | End: 2023-12-27
Payer: COMMERCIAL

## 2023-12-27 DIAGNOSIS — M54.16 LUMBAR BACK PAIN WITH RADICULOPATHY AFFECTING LEFT LOWER EXTREMITY: ICD-10-CM

## 2023-12-27 PROCEDURE — 72148 MRI LUMBAR SPINE W/O DYE: CPT

## 2024-01-08 ENCOUNTER — TELEPHONE (OUTPATIENT)
Dept: INTERNAL MEDICINE CLINIC | Facility: CLINIC | Age: 44
End: 2024-01-08

## 2024-01-08 NOTE — TELEPHONE ENCOUNTER
Patient called c/o severe back pain that has her laid up on the couch. States she's been seeing Dr. Abraham but he hasn't been in the office x 3 weeks, had a recent MRI done and is asking if we can look at it and provide guidance.     Did speak with Dr. Mckeon- She advised that it looks inflammatory in nature and that patient should contact her rheumatologist and possibly see Pain Management, that it does not appear to be surgical.

## 2024-01-09 ENCOUNTER — OFFICE VISIT (OUTPATIENT)
Dept: BARIATRICS | Facility: CLINIC | Age: 44
End: 2024-01-09
Payer: COMMERCIAL

## 2024-01-09 VITALS
HEART RATE: 95 BPM | RESPIRATION RATE: 20 BRPM | HEIGHT: 67 IN | SYSTOLIC BLOOD PRESSURE: 124 MMHG | BODY MASS INDEX: 45.99 KG/M2 | TEMPERATURE: 98 F | DIASTOLIC BLOOD PRESSURE: 76 MMHG | WEIGHT: 293 LBS | OXYGEN SATURATION: 96 %

## 2024-01-09 DIAGNOSIS — E66.01 MORBID OBESITY WITH BMI OF 50.0-59.9, ADULT (HCC): Primary | ICD-10-CM

## 2024-01-09 DIAGNOSIS — G47.33 OBSTRUCTIVE SLEEP APNEA: ICD-10-CM

## 2024-01-09 PROCEDURE — 99214 OFFICE O/P EST MOD 30 MIN: CPT | Performed by: PHYSICIAN ASSISTANT

## 2024-01-09 RX ORDER — TOPIRAMATE 50 MG/1
TABLET, FILM COATED ORAL
Qty: 30 TABLET | Refills: 2 | Status: SHIPPED | OUTPATIENT
Start: 2024-01-09

## 2024-01-09 NOTE — ASSESSMENT & PLAN NOTE
-Patient is pursuing HealthyCORE  -Initial weight loss goal of 5-10% weight loss for improved health  -Screening labs: up to date  -patient not currently interested in bariatric surgery  -Dietary recall reviewed  -Currently on Wegovy 2.4 (managed by PCP). Experiencing mild constipation. Will try daily colace and increase water to goal  -CUrrently on Topamax 25mg daily. Will increase to 50mg daily. Patient requests daily dosing as has been forgetting to take it twice per day.  -medication agreement signed  -Has IUD    Initial: 340.6 lbs  Current: 322.6 lbs  Change: -18 lbs  Goal: 300lbs

## 2024-01-09 NOTE — PROGRESS NOTES
Assessment/Plan:    Morbid obesity with BMI of 50.0-59.9, adult (HCC)  -Patient is pursuing HealthyCORE  -Initial weight loss goal of 5-10% weight loss for improved health  -Screening labs: up to date  -patient not currently interested in bariatric surgery  -Dietary recall reviewed  -Currently on Wegovy 2.4 (managed by PCP). Experiencing mild constipation. Will try daily colace and increase water to goal  -CUrrently on Topamax 25mg daily. Will increase to 50mg daily. Patient requests daily dosing as has been forgetting to take it twice per day.  -medication agreement signed  -Has IUD    Initial: 340.6 lbs  Current: 322.6 lbs  Change: -18 lbs  Goal: 300lbs    Obstructive sleep apnea  -Compliant with CPAP  Can improve with weight loss    Goals:    Food log (ie.) [email or web address].   No sugary beverages. At least 64oz of water daily.  Increase physical activity by 10 minutes daily. Gradually increase physical activity to a goal of 5 days per week for 30 minutes of MODERATE intensity PLUS 2 days per week of FULL BODY resistance training    Follow up in approximately 2 months with Non-Surgical Physician/Advanced Practitioner.     Diagnoses and all orders for this visit:    Morbid obesity with BMI of 50.0-59.9, adult ([facility])  -     topiramate (Topamax) 50 MG tablet; Take 1 tab po daily in AM    Obstructive sleep apnea          Subjective:   Chief Complaint   Patient presents with    Follow-up        Patient ID: Hemalatha Hahn  is a 43 y.o. female with excess weight/obesity here to pursue weight managment.  Patient is pursuing HealthyCORE-Intensive Lifestyle Intervention Program.     HPI Patient presents for [facility] follow up. Currently in HealthyCORE and enjoying the program. Remains on Wegovy 2.4mg and doing well. Topamax 25mg was started at last visit. Has been forgetting to take evening dose    -Noticing some constipation moving bowels once every 3 days   -Hydration: 32-64oz  "of water per day, diet soda, 1 cup of coffee + 2 tsp sugar + milk  ETOH: very rarely  Exercise: leisure walking daily for 30-45 min with dog, having issue with back pain and seeing chiropractor  Sleep: wearing CPAP    B: greek yogurt Or jello + cottage cheese and fruit  S: string cheese OR [employer] bar OR animal crackers  L: (From cafeteria) Salad OR chicken + veggie  S: skips  D: Chicken, rice, broccoli casserole OR Pomaria, Dining out/take out much less  S: Jello + cottage cheese + fruit OR snack pack of crackers OR orange    Wt Readings from Last 10 Encounters:   01/09/24 (!) 146 kg (322 lb 9.6 oz)   12/18/23 (!) 151 kg (332 lb)   12/11/23 (!) 150 kg (330 lb 9.6 oz)   12/04/23 (!) 151 kg (333 lb)   12/04/23 (!) 149 kg (328 lb 3.2 oz)   11/20/23 (!) 151 kg (333 lb)   11/13/23 (!) 151 kg (332 lb)   11/06/23 (!) 152 kg (334 lb 3.2 oz)   11/06/23 (!) 152 kg (334 lb 3.2 oz)   10/30/23 (!) 152 kg (334 lb 6.4 oz)          Colonoscopy:    The following portions of the patient's history were reviewed and updated as appropriate: allergies, current medications, past family history, past medical history, past social history, past surgical history, and problem list.    Review of Systems   Gastrointestinal:  Positive for constipation. Negative for abdominal pain, nausea and vomiting.   Psychiatric/Behavioral:          Reports mood stable       Objective:    /76 (BP Location: Right arm, Patient Position: Sitting, Cuff Size: Large)   Pulse 95   Temp 98 °F (36.7 °C)   Resp 20   Ht 5' 6.5\" (1.689 m)   Wt (!) 146 kg (322 lb 9.6 oz)   SpO2 96%   BMI 51.29 kg/m²      Physical Exam  Vitals and nursing note reviewed.      Constitutional   General appearance: Abnormal.  well developed and morbidly obese.   Eyes No conjunctival pallor.   Ears, Nose, Mouth, and Throat Oral mucosa moist.   Pulmonary   Respiratory effort: No increased work of breathing or signs of respiratory distress.    Auscultation of lungs: Clear to " auscultation, equal breath sounds bilaterally, no wheezes, no rales, no rhonci.    Cardiovascular   Auscultation of heart: Normal rate and rhythm, normal S1 and S2, without murmurs.    Examination of extremities for edema and/or varicosities: Normal.  no edema.   Abdomen   Abdomen: Abnormal.  The abdomen was obese. Bowel sounds were normal. The abdomen was soft and nontender.   Musculoskeletal   Gait and station: Normal.    Psychiatric   Orientation to person, place and time: Normal.    Affect: appropriate

## 2024-01-14 DIAGNOSIS — E66.01 MORBID OBESITY WITH BMI OF 50.0-59.9, ADULT (HCC): ICD-10-CM

## 2024-01-14 DIAGNOSIS — F98.8 ATTENTION DEFICIT DISORDER (ADD) WITHOUT HYPERACTIVITY: ICD-10-CM

## 2024-01-15 ENCOUNTER — CLINICAL SUPPORT (OUTPATIENT)
Dept: BARIATRICS | Facility: CLINIC | Age: 44
End: 2024-01-15

## 2024-01-15 ENCOUNTER — OFFICE VISIT (OUTPATIENT)
Dept: BARIATRICS | Facility: CLINIC | Age: 44
End: 2024-01-15

## 2024-01-15 VITALS — BODY MASS INDEX: 45.99 KG/M2 | WEIGHT: 293 LBS | HEIGHT: 67 IN

## 2024-01-15 DIAGNOSIS — E66.01 OBESITY, CLASS III, BMI 40-49.9 (MORBID OBESITY) (HCC): Primary | ICD-10-CM

## 2024-01-15 DIAGNOSIS — R63.5 ABNORMAL WEIGHT GAIN: Primary | ICD-10-CM

## 2024-01-15 DIAGNOSIS — E66.01 MORBID OBESITY WITH BMI OF 50.0-59.9, ADULT (HCC): ICD-10-CM

## 2024-01-15 PROCEDURE — RECHECK

## 2024-01-15 NOTE — PROGRESS NOTES
Weight Management Medical Nutrition Assessment  Hemalatha presented today for Healthy Core month 2 follow-up. Today she weighed 325.8# which reflects a loss of 12.2# since program start. Has been having back problems (arthritis), but getting better (seeing chiropractor). Feels she has been doing better planning meals- at least 2x/week (which was her goal). Has been trying to keep less-healthy food out of the house. Not food-logging consistently. Feels not eating enough fruits and vegetables. Encouraged food logging as a way to plan meals; this is her goal.        Patient seen by Medical Provider in past 6 months:  yes  Requested to schedule appointment with Medical Provider: No      Anthropometric Measurements  Start Weight (#): 338#  Current Weight (#): 325.8#  TBW % Change from start weight: 3.6%  Ideal Body Weight (#):132.5# (60.2 kg)  Goal Weight (#): 300#  Highest:360#  Lowest:260#     Weight Loss History  Previous weight loss attempts: Weight Watchers, Noom, crash diet, Wegovy    Food and Nutrition Related History  Wake up: 4:30  AM or 6:00 AM   Bed Time:9-10 PM    Food Recall  Breakfast:6:30-8 AM- fluff- 2% cottage cheese with Jello, Cool Whip, pineapple, sometimes with Eng muffin OR yogurt; not having coffee lately (no desire)  Snack:10-ky- cheese stick and 2-3 crackers (whole wheat club crackers) or 100 kcal yogurt or orange   Lunch:12-1:30 PM- eating more salads with chicken, edamame at work   Snack: 3-4 PM- not usually  Dinner:7 PM- crockpot meal (chicken or pork)  or soup/sandwich or just soup; no veggies usually; does have broccoli and green beans at times; stated she needs to incorporate more veggies  Snack: 8-9- not really (not usually hungry), but may occ have some of the fluff she has for breakfast    Beverages: diet soda- 16-20 oz or diet tea 16 oz, water- 32-64 oz on a work day; drinks probably 16 oz water on days off  Volume of beverage intake: >=48 oz     Weekends: little ETOH   Cravings: not  really, occ something sweet   Trouble area of day: night    Frequency of Eating out: once weekly   Food restrictions: sauce causes indigestion, too much milk causes loose stools  Cooking: self   Food Shopping: self or daughter    Physical Activity Intake  Activity:not right now, but was walking her dog 1/4-1/2 mile daily until she tore ligament in ankle which halted that activity   Frequency:n/a  Physical limitations/barriers to exercise: ankle problem as noted above    Estimated Needs  Energy  Tanita: BMR: 2158      X 1.3 -1000 = 18[address]  [address] [employer] Energy Needs: BMR: 2162   1-2# loss weekly sedentary:  9647-7880             1-2# loss weekly lightly active: 1973-2473  Maintenance calories for sedentary activity level: 2661  Protein:72-90 g      (1.2-1.5g/kg IBW)  Fluid: >=70 oz     (35mL/kg IBW)    Nutrition Diagnosis  Yes;    Overweight/obesity  related to Excess energy intake as evidenced by  BMI more than normative standard for age and sex (obesity-grade III 40+)       Nutrition Intervention    Nutrition Prescription  Calories:0235-7974  Protein:~70-90 g  Fluid:>=70 oz    Meal Plan (Lamine/Pro/Carb)  Breakfast:300/20-30/15-30  Snack:200/5-15/15  Lunch:400/20-30/30-45  Snack:200/5-15/15  Dinner:400/20-30/30-45  Snack:200/5-15/15    Nutrition Education:    Healthy Core Manual  Calorie controlled menu  Lean protein food choices  Healthy snack options  Food journaling tips      Nutrition Counseling:  Strategies: meal planning, portion sizes, healthy snack choices, hydration, fiber intake, protein intake, exercise, food journal      Monitoring and Evaluation:  Evaluation criteria:  Energy Intake  Meet protein needs  Maintain adequate hydration  Monitor weekly weight  Meal planning/preparation  Food journal   Decreased portions at mealtimes and snacks  Physical activity     Barriers to learning:none  Readiness to change: changing behavior  Comprehension: very good  Expected Compliance: very good

## 2024-01-16 RX ORDER — METHYLPHENIDATE HYDROCHLORIDE 54 MG/1
54 TABLET ORAL DAILY
Qty: 30 TABLET | Refills: 0 | Status: SHIPPED | OUTPATIENT
Start: 2024-01-16

## 2024-01-16 RX ORDER — SEMAGLUTIDE 2.4 MG/.75ML
2.4 INJECTION, SOLUTION SUBCUTANEOUS WEEKLY
Qty: 3 ML | Refills: 0 | Status: SHIPPED | OUTPATIENT
Start: 2024-01-16

## 2024-01-17 VITALS — WEIGHT: 293 LBS | BODY MASS INDEX: 45.99 KG/M2 | HEIGHT: 67 IN

## 2024-01-22 ENCOUNTER — CLINICAL SUPPORT (OUTPATIENT)
Dept: BARIATRICS | Facility: CLINIC | Age: 44
End: 2024-01-22

## 2024-01-22 DIAGNOSIS — E66.01 MORBID OBESITY WITH BMI OF 50.0-59.9, ADULT (HCC): ICD-10-CM

## 2024-01-22 DIAGNOSIS — R63.5 ABNORMAL WEIGHT GAIN: Primary | ICD-10-CM

## 2024-01-22 PROCEDURE — RECHECK

## 2024-01-23 VITALS — HEIGHT: 67 IN | BODY MASS INDEX: 45.99 KG/M2 | WEIGHT: 293 LBS

## 2024-01-29 ENCOUNTER — CLINICAL SUPPORT (OUTPATIENT)
Dept: BARIATRICS | Facility: CLINIC | Age: 44
End: 2024-01-29

## 2024-01-29 DIAGNOSIS — E66.01 MORBID OBESITY WITH BMI OF 50.0-59.9, ADULT (HCC): ICD-10-CM

## 2024-01-29 DIAGNOSIS — R63.5 ABNORMAL WEIGHT GAIN: Primary | ICD-10-CM

## 2024-01-29 PROCEDURE — RECHECK

## 2024-01-30 VITALS — BODY MASS INDEX: 45.99 KG/M2 | WEIGHT: 293 LBS | HEIGHT: 67 IN

## 2024-02-05 ENCOUNTER — CLINICAL SUPPORT (OUTPATIENT)
Dept: BARIATRICS | Facility: CLINIC | Age: 44
End: 2024-02-05

## 2024-02-05 DIAGNOSIS — R63.5 ABNORMAL WEIGHT GAIN: Primary | ICD-10-CM

## 2024-02-05 DIAGNOSIS — E66.01 OBESITY, CLASS III, BMI 40-49.9 (MORBID OBESITY) (HCC): ICD-10-CM

## 2024-02-05 DIAGNOSIS — F41.8 DEPRESSION WITH ANXIETY: ICD-10-CM

## 2024-02-05 PROCEDURE — RECHECK

## 2024-02-06 VITALS — WEIGHT: 293 LBS | HEIGHT: 67 IN | BODY MASS INDEX: 45.99 KG/M2

## 2024-02-06 RX ORDER — FLUOXETINE HYDROCHLORIDE 40 MG/1
40 CAPSULE ORAL DAILY
Qty: 90 CAPSULE | Refills: 0 | Status: SHIPPED | OUTPATIENT
Start: 2024-02-06

## 2024-02-12 ENCOUNTER — CLINICAL SUPPORT (OUTPATIENT)
Dept: BARIATRICS | Facility: CLINIC | Age: 44
End: 2024-02-12

## 2024-02-12 DIAGNOSIS — E66.01 MORBID OBESITY WITH BMI OF 50.0-59.9, ADULT (HCC): ICD-10-CM

## 2024-02-12 DIAGNOSIS — R63.5 ABNORMAL WEIGHT GAIN: Primary | ICD-10-CM

## 2024-02-12 PROCEDURE — RECHECK

## 2024-02-13 VITALS — WEIGHT: 293 LBS | BODY MASS INDEX: 45.99 KG/M2 | HEIGHT: 67 IN

## 2024-02-26 DIAGNOSIS — F98.8 ATTENTION DEFICIT DISORDER (ADD) WITHOUT HYPERACTIVITY: ICD-10-CM

## 2024-02-27 ENCOUNTER — CLINICAL SUPPORT (OUTPATIENT)
Dept: BARIATRICS | Facility: CLINIC | Age: 44
End: 2024-02-27

## 2024-02-27 VITALS — WEIGHT: 293 LBS | BODY MASS INDEX: 45.99 KG/M2 | HEIGHT: 67 IN

## 2024-02-27 DIAGNOSIS — E66.01 OBESITY, CLASS III, BMI 40-49.9 (MORBID OBESITY) (HCC): Primary | ICD-10-CM

## 2024-02-27 PROCEDURE — RECHECK

## 2024-02-27 NOTE — PROGRESS NOTES
"Weight Management Medical Nutrition  Assessment  Hemalatha presented today for Healthy Core month 3 follow-up. Today on [place] scale she weighed 323.4# which reflects a loss of 14.6# (4.3%) since program start. Reviewed body composition results with Hemalatha and provided her with copy of same. Of note, she lost 15# of fat mass and gained 0.4# of muscle mass. Reviewed also metabolism test results and provided her with copy of these as well: Reevue indirect calorimeter revealed REE is 9% slower (normal range) compared to the predictive normal for someone her same age, weight, height, and gender.    Reevue Indirect Calorimeter REE: 1973 kcal         Weight loss without exercise: 1049-1741 kcal           Weight loss with exercise: 1394-5231 kcal                 Maintenance: 1902-2955 kcal              Has been using My Fitness Pal; stated some days more CHO-heavy than others. Usually between 5665-0379 kcal. Topamax increased, so finds craving no longer there. Considering stopping Wegovy- feels it may be making her sick; plans to discuss with PCP who prescribed. Her goal is to work out on Monday nights and continue to take \"me-time\" then as this is the day she had been attending Healthy Core class in the [place] office. Suggested she be sure to have vegetables at dinner and also to be sure to have protein source at breakfast. No RD follow-up scheduled at this time (her work scheduled does not allow her to attend [facility] Ways classes).       Patient seen by Medical Provider in past 6 months:  yes  Requested to schedule appointment with Medical Provider: No      Anthropometric Measurements  Start Weight (#): 338#  Current Weight (#): 323.4#  TBW % Change from start weight: 4.3% (see narrative above for body comp details)  Ideal Body Weight (#):132.5# (60.2 kg)  Goal Weight (#): 300#  Highest:360#  Lowest:260#     Weight Loss History  Previous weight loss attempts: Weight Watchers, Noom, crash diet, Wegovy    Food and Nutrition " Related History  Wake up: 4:30  AM or 6:00 AM   Bed Time:9-10 PM    Food Recall  Breakfast:6:30-8 AM- oatmeal bar or oatmeal or eggs or yogurt   Snack:10-ky- cheese and fruit  Lunch:12-1:30 PM- salad with chicken, egg, edamame, Craisins, sesame seeds, light balsamic vinaigrette at work   Snack: 3-4 PM- not usually  Dinner:7 PM- chicken, vegetable OR pizza; not always a vegetable   Snack: 8-9- fruit- maybe banana and PB    Beverages: diet soda- 12 oz or diet tea 16 oz, water- close to 64 oz on a work day    Volume of beverage intake: >=~88 oz     Weekends: little ETOH   Cravings: not really  Trouble area of day: night    Frequency of Eating out: once weekly   Food restrictions: sauce causes indigestion, too much milk causes loose stools  Cooking: self   Food Shopping: self or daughter    Physical Activity Intake  Activity:walking her dog 3/4 mile daily slowly   Frequency: daily   Physical limitations/barriers to exercise: recent hx of torn ligament in ankle    Estimated Needs  Energy  See narrative above for kcal need information.   Protein:72-90 g      (1.2-1.5g/kg IBW)  Fluid: >=70 oz     (35mL/kg IBW)    Nutrition Diagnosis  Yes;    Overweight/obesity  related to Excess energy intake as evidenced by  BMI more than normative standard for age and sex (obesity-grade III 40+)       Nutrition Intervention    Nutrition Prescription  Calories:~6496-0882  Protein:~70-90 g  Fluid:>=70 oz    Meal Plan (Lamine/Pro/Carb)  Breakfast:300/20-30/15-30  Snack:200/5-15/15  Lunch:400/20-30/30-45  Snack:200/5-15/15  Dinner:400/20-30/30-45  Snack:200/5-15/15    Nutrition Education:    Healthy Core Manual  Calorie controlled menu  Lean protein food choices  Healthy snack options  Food journaling tips      Nutrition Counseling:  Strategies: meal planning, portion sizes, healthy snack choices, hydration, fiber intake, protein intake, exercise, food journal      Monitoring and Evaluation:  Evaluation criteria:  Energy Intake  Meet protein  needs  Maintain adequate hydration  Monitor weekly weight  Meal planning/preparation  Food journal   Decreased portions at mealtimes and snacks  Physical activity     Barriers to learning:none  Readiness to change: changing behavior  Comprehension: very good  Expected Compliance: very good

## 2024-02-28 ENCOUNTER — TELEPHONE (OUTPATIENT)
Age: 44
End: 2024-02-28

## 2024-02-28 ENCOUNTER — EVALUATION (OUTPATIENT)
Dept: PHYSICAL THERAPY | Facility: HOME HEALTHCARE | Age: 44
End: 2024-02-28
Payer: COMMERCIAL

## 2024-02-28 DIAGNOSIS — M54.32 SCIATICA OF LEFT SIDE: Primary | ICD-10-CM

## 2024-02-28 PROCEDURE — 97140 MANUAL THERAPY 1/> REGIONS: CPT

## 2024-02-28 PROCEDURE — 97110 THERAPEUTIC EXERCISES: CPT

## 2024-02-28 PROCEDURE — 97162 PT EVAL MOD COMPLEX 30 MIN: CPT

## 2024-02-28 RX ORDER — METHYLPHENIDATE HYDROCHLORIDE 54 MG/1
54 TABLET ORAL DAILY
Qty: 30 TABLET | Refills: 0 | Status: SHIPPED | OUTPATIENT
Start: 2024-02-28

## 2024-02-28 NOTE — PROGRESS NOTES
PT Evaluation     Today's date: 2024  Patient name: Hemalatha Hahn  : 1980  MRN: [id number]  Referring provider: Meg Mckeon MD  Dx:   Encounter Diagnosis     ICD-10-CM    1. Sciatica of left side  M54.32 Ambulatory Referral to Physical Therapy          Start Time: 708  Stop Time: 07  Total time in clinic (min): 37 minutes    Assessment  Assessment details: Pt is a 42 y/o female presenting to OPPT with acute exacerbation of chronic L sided low back pain with LLE radicular symptoms consistent with diagnosis of plausible L5-S1 lumbar radiculopathy with an extension preference and lumbar paraspinal muscle spasms.  The pt is presenting with significant pain levels, ROM deficits, strength deficits, and decreased functional mobility, which is affecting her ability to perform ADLs.  The pt requires skilled PT in order to improve in pain, strength, ROM, functional mobility, quality of life, and return to PLOF.  Thank you.  Impairments: abnormal or restricted ROM, activity intolerance, impaired physical strength, lacks appropriate home exercise program, pain with function, weight-bearing intolerance and poor body mechanics  Understanding of Dx/Px/POC: good   Prognosis: good    Goals  STG: 3-4 weeks  Pt will be independent with HEP in order to continue to progress outside of PT treatment sessions.  Pt will improve in quality of life as demonstrated by worst pain levels of 5/10 or less.  Pt will improve in functional mobility as demonstrated by minimal restrictions in lumbar AROM.  LT-8 weeks  Pt will improve in quality of life as demonstrated by worst pain levels of 2/10 or less.  Pt will improve in functional mobility as demonstrated by strength levels of 4+/5 or greater.  Pt will improve in functional mobility as demonstrated by lumbar AROM WNL.  Pt will improve in functional mobility as demonstrated by full centralization of lumbar radicular symptoms.  Pt will improve in functional mobility as  demonstrated by ability to perform full ADLs without any limitations due to pain, ROM deficits, or weakness.    Plan  Patient would benefit from: skilled physical therapy  Planned modality interventions: cryotherapy, low level laser therapy and thermotherapy: hydrocollator packs  Planned therapy interventions: body mechanics training, flexibility, functional ROM exercises, home exercise program, IASTM, joint mobilization, manual therapy, massage, neuromuscular re-education, patient education, postural training, strengthening, stretching, therapeutic activities and therapeutic exercise  Frequency: 2x week  Duration in weeks: 12  Plan of Care beginning date: 2/28/2024  Plan of Care expiration date: 5/22/2024  Treatment plan discussed with: patient        Subjective Evaluation    History of Present Illness  Mechanism of injury: Pt is presenting to OPPT with acute exacerbation of chronic L sided low back pain, which flared up in October.  She reports that she started to experience significant sciatic pain into her L LE/foot.  She reports that she went to the chiropractor and that now the pain has moved to mostly on the L side of her low back/SIJ region.  She reports that she does have some residual pain down to her calf, but reports that most of the days is stays near her thigh.  She reports that sitting, driving, getting up after sleeping, etc, increases her pain and that she typically experiences relief when standing and walking.  The pt does a lot of sitting at work, which exacerbates her symptoms.  At the chiropractor, she would do traction, lumbar extension, seated lumbar flex stretch, etc.  The pt reports that she feels like she needs to do core strengthening.  Patient Goals  Patient goals for therapy: decreased pain, increased motion, increased strength, independence with ADLs/IADLs and return to sport/leisure activities  Patient goal: To get rid of the pain so she can get more physical  Pain  Current pain  ratin  At best pain ratin  At worst pain ratin  Quality: sharp  Relieving factors: medications (Prednisone while taking it)  Aggravating factors: sitting (Squatting)      Diagnostic Tests  MRI studies: abnormal  Treatments  Current treatment: chiropractic        Objective     Postural Observations  Seated posture: fair  Standing posture: fair      Palpation   Left   Hypertonic in the lumbar paraspinals.   Muscle spasm in the lumbar paraspinals.   Tenderness of the lumbar paraspinals.   Trigger point to lumbar paraspinals.     Neurological Testing     Sensation     Lumbar   Left   Intact: light touch    Right   Intact: light touch    Active Range of Motion     Lumbar   Flexion: Active lumbar flexion: 20 cm.  with pain Restriction level: minimal  Extension:  with pain Restriction level: minimal  Left lateral flexion:  with pain Restriction level: minimal  Right lateral flexion:  WFL  Left rotation:  WFL  Right rotation:  WFL    Passive Range of Motion   Left Hip   Flexion: WFL  Abduction: WFL  External rotation (90/90): WFL  Internal rotation (90/90): WFL    Right Hip   Flexion: WFL  Abduction: WFL  External rotation (90/90): WFL  Internal rotation (90/90): WFL    Additional Passive Range of Motion Details  L glute/piriformis tightness    Joint Play   Joints within functional limits: L1, L2 and L3     Hypomobile: L4, L5 and S1     Pain: L4, L5 and S1   Mechanical Assessment    Cervical      Thoracic      Lumbar    Standing flexion: repeated movements   Pain location:peripheralized  Standing extension: repeated movements  Pain location: centralized    Strength/Myotome Testing     Left Hip   Planes of Motion   Flexion: 3+  Abduction: 4  Adduction: 4+    Right Hip   Planes of Motion   Flexion: 4+  Abduction: 4  Adduction: 4+    Left Knee   Flexion: 4-  Extension: 5    Right Knee   Flexion: 5  Extension: 5    Left Ankle/Foot   Dorsiflexion: 4+  Plantar flexion: 4+    Right Ankle/Foot   Dorsiflexion: 5  Plantar  flexion: 5    Tests     Lumbar     Left   Positive passive SLR and slump test.     Left Hip   SLR: Positive.     Right Hip   SLR: Negative.     Additional Tests Details  SLR: L: 30, R: WNL      Flowsheet Rows      Flowsheet Row Most Recent Value   PT/OT G-Codes    Current Score 44   Projected Score 61               Precautions:     Re-eval Date: 03/28/2024      Manuals 2/28            Jeremy glute, hs stretch JA            Laser L lumbar paraspinals             IASTM lumbar paraspinals             Manual lumbar traction             Neuro Re-Ed             TrA activation             PPT HEP            PPT w/ marches             PPT w/ SLR             Palloff press                                       Ther Ex             Treadmill             LTR             SKTC             Self pisiformis/glute stretch HEP            Standing lumbar ext HEP            Prone press-up HEP            Bridges HEP            SLR             Clamshells             S/L hip abd HEP            Prone hip ext HEP            Squat                                       Ther Activity                                       Gait Training                                       Modalities             MHP or CP

## 2024-02-29 DIAGNOSIS — E66.01 MORBID OBESITY WITH BMI OF 50.0-59.9, ADULT (HCC): ICD-10-CM

## 2024-02-29 RX ORDER — TOPIRAMATE 50 MG/1
TABLET, FILM COATED ORAL
Qty: 30 TABLET | Refills: 0 | Status: SHIPPED | OUTPATIENT
Start: 2024-02-29

## 2024-03-04 ENCOUNTER — OFFICE VISIT (OUTPATIENT)
Dept: PHYSICAL THERAPY | Facility: HOME HEALTHCARE | Age: 44
End: 2024-03-04
Payer: COMMERCIAL

## 2024-03-04 DIAGNOSIS — M54.32 SCIATICA OF LEFT SIDE: Primary | ICD-10-CM

## 2024-03-04 PROCEDURE — 97140 MANUAL THERAPY 1/> REGIONS: CPT

## 2024-03-04 PROCEDURE — 97112 NEUROMUSCULAR REEDUCATION: CPT

## 2024-03-04 PROCEDURE — 97110 THERAPEUTIC EXERCISES: CPT

## 2024-03-04 NOTE — PROGRESS NOTES
"Daily Note     Today's date: 3/4/2024  Patient name: Hemalatha Hahn  : 1980  MRN: [id number]  Referring provider: Meg Mckeon MD  Dx:   Encounter Diagnosis     ICD-10-CM    1. Sciatica of left side  M54.32           Start Time:   Stop Time: 1802  Total time in clinic (min): 47 minutes    Subjective: Pt reports that her back is not as bad as it was in the mornings when she first gets up, but reports that her back does get worse throughout the day with prolonged sitting.      Objective: See treatment diary below      Assessment: Pt demonstrated good tolerance to treatment session.  She continues to respond well to extension based exercises and had full centralization of radicular symptoms when performing lumbar extension.  The pt also responded well to the laser and manual traction, and had an improvement in symptoms following treatment session.  The pt would benefit from continued PT.    Plan: Continue per plan of care.      Precautions: None    Re-eval Date: 2024      Manuals 2/28 3/4           Doron glute, hs stretch GELY HONG           Laser L lumbar paraspinals  20W 6000 J           IASTM lumbar paraspinals             Manual lumbar traction  JA           Neuro Re-Ed             TrA activation             PPT HEP 5\"x15           PPT w/ marches  1x5 droon           PPT w/ SLR             Palloff press  Blue 5\"x10 R/L           MTP/LTP                          Ther Ex             Treadmill  1.8 MPH 5 min           LTR             SKTC             Self pisiformis/glute stretch HEP            Standing lumbar ext HEP 5\"x10           Prone on elbows  2 min           Prone press-up HEP 1x10           Bridges HEP 1x10           SLR             Clamshells             S/L hip abd HEP 1x10 doron           Prone hip ext HEP 1x10 doron           Squat                                       Ther Activity                                       Gait Training                                       Modalities           "   MHP or CP

## 2024-03-07 ENCOUNTER — OFFICE VISIT (OUTPATIENT)
Dept: PHYSICAL THERAPY | Facility: HOME HEALTHCARE | Age: 44
End: 2024-03-07
Payer: COMMERCIAL

## 2024-03-07 DIAGNOSIS — M54.32 SCIATICA OF LEFT SIDE: Primary | ICD-10-CM

## 2024-03-07 PROCEDURE — 97140 MANUAL THERAPY 1/> REGIONS: CPT

## 2024-03-07 PROCEDURE — 97110 THERAPEUTIC EXERCISES: CPT

## 2024-03-07 PROCEDURE — 97112 NEUROMUSCULAR REEDUCATION: CPT

## 2024-03-07 NOTE — PROGRESS NOTES
"Daily Note     Today's date: 3/7/2024  Patient name: Hemalatha Hahn  : 1980  MRN: [id number]  Referring provider: Meg Mckeon MD  Dx:   Encounter Diagnosis     ICD-10-CM    1. Sciatica of left side  M54.32           Start Time: 1710  Stop Time: 1755  Total time in clinic (min): 45 minutes    Subjective: Pt reports that her back is not too bad today and that she felt a little better after Monday.      Objective: See treatment diary below      Assessment: Pt demonstrated good tolerance to treatment session.  She continues to respond well to lumbar extension based exercises and had full centralization of radicular symptoms following treatment session.  The pt would benefit from continued PT.      Plan: Continue per plan of care.      Precautions: None    Re-eval Date: 2024      Manuals 2/28 3/4 3/7          Doron glute, hs stretch GELY HONG          Laser L lumbar paraspinals  20W 6000 J 20W 6000 J          IASTM lumbar paraspinals             Manual lumbar traction  GELY HONG          Neuro Re-Ed             TrA activation             PPT HEP 5\"x15 5\"x15          PPT w/ marches  1x5 doron 1x10 doron          PPT w/ SLR             Palloff press  Blue 5\"x10 R/L Black  5\"x10 R/L          MTP/LTP                          Ther Ex             Treadmill  1.8 MPH 5 min 1.8 MPH 5 min          LTR             SKTC             Self pisiformis/glute stretch HEP            Standing lumbar ext HEP 5\"x10 5\"x10          Prone on elbows  2 min 2 min          Prone press-up HEP 1x10 1x10          Bridges HEP 1x10 1x10          SLR             Clamshells             S/L hip abd HEP 1x10 doron X15 L, x10 R          Prone hip ext HEP 1x10 doron 1x10 doron          Squat                                       Ther Activity                                       Gait Training                                       Modalities             MHP or CP                                 "

## 2024-03-11 ENCOUNTER — APPOINTMENT (OUTPATIENT)
Dept: PHYSICAL THERAPY | Facility: HOME HEALTHCARE | Age: 44
End: 2024-03-11
Payer: COMMERCIAL

## 2024-03-11 ENCOUNTER — APPOINTMENT (EMERGENCY)
Dept: RADIOLOGY | Facility: HOSPITAL | Age: 44
End: 2024-03-11
Payer: COMMERCIAL

## 2024-03-11 ENCOUNTER — HOSPITAL ENCOUNTER (EMERGENCY)
Facility: HOSPITAL | Age: 44
Discharge: HOME | End: 2024-03-11
Attending: EMERGENCY MEDICINE
Payer: COMMERCIAL

## 2024-03-11 VITALS
HEART RATE: 93 BPM | OXYGEN SATURATION: 96 % | WEIGHT: 293 LBS | SYSTOLIC BLOOD PRESSURE: 133 MMHG | RESPIRATION RATE: 17 BRPM | DIASTOLIC BLOOD PRESSURE: 92 MMHG | BODY MASS INDEX: 51.52 KG/M2 | TEMPERATURE: 99 F

## 2024-03-11 DIAGNOSIS — B33.8 RSV INFECTION: ICD-10-CM

## 2024-03-11 DIAGNOSIS — J06.9 VIRAL UPPER RESPIRATORY TRACT INFECTION: ICD-10-CM

## 2024-03-11 DIAGNOSIS — J44.1 COPD EXACERBATION (HCC): Primary | ICD-10-CM

## 2024-03-11 DIAGNOSIS — R05.1 ACUTE COUGH: ICD-10-CM

## 2024-03-11 LAB
FLUAV RNA RESP QL NAA+PROBE: NEGATIVE
FLUBV RNA RESP QL NAA+PROBE: NEGATIVE
RSV RNA RESP QL NAA+PROBE: POSITIVE
SARS-COV-2 RNA RESP QL NAA+PROBE: NEGATIVE

## 2024-03-11 PROCEDURE — 0241U HB NFCT DS VIR RESP RNA 4 TRGT: CPT | Performed by: EMERGENCY MEDICINE

## 2024-03-11 PROCEDURE — 94664 DEMO&/EVAL PT USE INHALER: CPT

## 2024-03-11 PROCEDURE — 99285 EMERGENCY DEPT VISIT HI MDM: CPT | Performed by: EMERGENCY MEDICINE

## 2024-03-11 PROCEDURE — 94760 N-INVAS EAR/PLS OXIMETRY 1: CPT

## 2024-03-11 PROCEDURE — 99285 EMERGENCY DEPT VISIT HI MDM: CPT

## 2024-03-11 PROCEDURE — 94640 AIRWAY INHALATION TREATMENT: CPT

## 2024-03-11 PROCEDURE — 93005 ELECTROCARDIOGRAM TRACING: CPT

## 2024-03-11 PROCEDURE — 71045 X-RAY EXAM CHEST 1 VIEW: CPT

## 2024-03-11 RX ORDER — PREDNISONE 20 MG/1
40 TABLET ORAL ONCE
Status: COMPLETED | OUTPATIENT
Start: 2024-03-11 | End: 2024-03-11

## 2024-03-11 RX ORDER — FLUTICASONE PROPIONATE 50 MCG
1 SPRAY, SUSPENSION (ML) NASAL DAILY
Qty: 16 G | Refills: 0 | Status: CANCELLED | OUTPATIENT
Start: 2024-03-11

## 2024-03-11 RX ORDER — GUAIFENESIN 600 MG/1
1200 TABLET, EXTENDED RELEASE ORAL EVERY 12 HOURS SCHEDULED
Qty: 30 TABLET | Refills: 0 | Status: SHIPPED | OUTPATIENT
Start: 2024-03-11

## 2024-03-11 RX ORDER — PREDNISONE 20 MG/1
40 TABLET ORAL DAILY
Qty: 10 TABLET | Refills: 0 | Status: SHIPPED | OUTPATIENT
Start: 2024-03-11 | End: 2024-03-14

## 2024-03-11 RX ORDER — ALBUTEROL SULFATE 2.5 MG/3ML
2.5 SOLUTION RESPIRATORY (INHALATION) EVERY 6 HOURS PRN
Qty: 30 ML | Refills: 0 | Status: SHIPPED | OUTPATIENT
Start: 2024-03-11 | End: 2024-03-16

## 2024-03-11 RX ORDER — SODIUM CHLORIDE FOR INHALATION 0.9 %
12 VIAL, NEBULIZER (ML) INHALATION ONCE
Status: COMPLETED | OUTPATIENT
Start: 2024-03-11 | End: 2024-03-11

## 2024-03-11 RX ADMIN — PREDNISONE 40 MG: 20 TABLET ORAL at 13:33

## 2024-03-11 RX ADMIN — Medication 12 ML: at 13:39

## 2024-03-11 RX ADMIN — IPRATROPIUM BROMIDE 1 MG: 0.5 SOLUTION RESPIRATORY (INHALATION) at 13:39

## 2024-03-11 RX ADMIN — ALBUTEROL SULFATE 10 MG: 2.5 SOLUTION RESPIRATORY (INHALATION) at 13:39

## 2024-03-11 NOTE — ED PROVIDER NOTES
History  Chief Complaint   Patient presents with    Shortness of Breath     Cough chest tightness started prednisone with no relief     Patient is a 41-year-old female with asthma,seasonal allergies, NATALEE, rheumatoid arthritis, depression and anxiety presented to the ED with shortness of breath and chest tightness.  Patient reports her symptoms started 3 days ago with cough, nasal congestion, headache associated with sore throat and change in voice. Cough is productive with clear thick mucus. Experiencing mild shortness of breath from last 3 days.  Started taking prednisone 20 mg p.o. daily at home, Day #3 along with otc cough and cold medication. She has been using her Dulera inhaler daily with albuterol inhaler for rescue. Denies using albuterol inhaler at home. Reports decreased P.O intake. Patient is not taking her Xeljanz for RA.  She works as an [employer] and [employer].  Apolinar fever, chills, nausea, vomiting, chest pain, abdominal pain, change in urination and bowel movement,             Prior to Admission Medications   Prescriptions Last Dose Informant Patient Reported? Taking?   FLUoxetine (PROzac) 40 MG capsule   No No   Sig: Take 1 capsule (40 mg total) by mouth daily   Multiple Vitamins-Minerals (Multivitamin Adult, Minerals,) TABS  Self Yes No   Xeljanz XR 11 MG TB24  Self Yes No   fexofenadine (ALLEGRA) 180 MG tablet  Self Yes No   Sig: Take 180 mg by mouth daily   fluticasone (FLONASE) 50 mcg/act nasal spray  Self Yes No   Si sprays into each nostril daily   ibuprofen (MOTRIN) 200 mg tablet  Self Yes No   Sig: Take 200 mg by mouth every 6 (six) hours as needed for mild pain.   methylphenidate (CONCERTA) 54 MG ER tablet   No No   Sig: Take 1 tablet (54 mg total) by mouth daily Max Daily Amount: 54 mg   mometasone-formoterol (DULERA) 200-5 MCG/ACT inhaler  Self No No   Sig: Inhale 2 puffs 2 (two) times a day Rinse mouth after use.   topiramate (Topamax) 50 MG tablet   No No   Sig:  Take 1 tab po daily in AM      Facility-Administered Medications: None       Past Medical History:   Diagnosis Date    Allergic rhinitis     Anxiety     Arthritis     Asthma     COVID-19 12/17/2020    CPAP (continuous positive airway pressure) dependence     Daytime hypersomnolence     LAST ASSESSED [id number]    Depression     GERD (gastroesophageal reflux disease)     occas    IUD (intrauterine device) in place     Lumbar disc disease     Migraine     RA (rheumatoid arthritis) (HCC)     Rheumatoid arthritis (HCC)     Sleep apnea     Varicella     Wears glasses        Past Surgical History:   Procedure Laterality Date    CHOLECYSTECTOMY      NASAL SEPTUM SURGERY      NASAL SEPTAL DEVIATION REPAIR-Dr. Navarrete    FL STRTCTC CPTR ASSTD PX EXTRADURAL CRANIAL N/A 01/08/2019    Procedure: FUNCTIONAL ENDOSCOPIC SINUS SURGERY (FESS) IMAGED GUIDED; PLACEMENT OF MULTIPLE PROPEL IMPLANTS;  Surgeon: South Navarrete DO;  Location: [facility];  Service: ENT    SINUS SURGERY      US GUIDED BREAST BIOPSY LEFT COMPLETE Left 06/15/2022    benign    WISDOM TOOTH EXTRACTION         Family History   Problem Relation Age of Onset    Asthma Mother     Hypertension Mother     COPD Mother     Obesity Father     Hypothyroidism Father     Chiari malformation Daughter     Diabetes Daughter         Type 1    Celiac disease Daughter     No Known Problems Maternal Grandmother     Lung disease Maternal Grandfather     Cancer Paternal Grandmother     Aneurysm Paternal Grandfather     Celiac disease Paternal Grandfather     Obesity Brother     Chiari malformation Brother     Obesity Son     Autism Son     No Known Problems Maternal Aunt     Breast cancer Paternal Aunt         postmenopausal    No Known Problems Paternal Aunt     No Known Problems Paternal Aunt     Breast cancer additional onset Neg Hx     Endometrial cancer Neg Hx     Colon cancer Neg Hx     Ovarian cancer Neg Hx     BRCA1 Positive Neg Hx     BRCA1 Negative Neg Hx     BRCA 1/2 Neg  Hx     BRCA2 Positive Neg Hx     BRCA2 Negative Neg Hx     Heart disease Neg Hx      I have reviewed and agree with the history as documented.    E-Cigarette/Vaping    E-Cigarette Use Never User      E-Cigarette/Vaping Substances    Nicotine No     THC No     CBD No     Flavoring No     Other No     Unknown No      Social History     Tobacco Use    Smoking status: Former     Current packs/day: 0.00     Types: Cigarettes    Smokeless tobacco: Never    Tobacco comments:     quit 5 years ago    Vaping Use    Vaping status: Never Used   Substance Use Topics    Alcohol use: Yes     Alcohol/week: 4.0 standard drinks of alcohol     Types: 4 Standard drinks or equivalent per week     Comment: social     Drug use: No        Review of Systems   Constitutional:  Positive for activity change. Negative for appetite change, chills, fever and unexpected weight change.   HENT:  Positive for congestion, postnasal drip, rhinorrhea, sore throat and voice change. Negative for trouble swallowing.    Eyes:  Negative for pain and visual disturbance.   Respiratory:  Positive for chest tightness and shortness of breath. Negative for cough and wheezing.    Cardiovascular:  Negative for chest pain and palpitations.   Gastrointestinal:  Negative for abdominal pain, nausea and vomiting.   Genitourinary:  Negative for difficulty urinating.   Musculoskeletal:  Negative for gait problem.   Skin:  Negative for rash.   Neurological:  Positive for headaches. Negative for light-headedness.   Psychiatric/Behavioral:  The patient is not nervous/anxious.    All other systems reviewed and are negative.      Physical Exam  ED Triage Vitals [03/11/24 1245]   Temperature Pulse Respirations Blood Pressure SpO2   99 °F (37.2 °C) 84 20 133/92 96 %      Temp Source Heart Rate Source Patient Position - Orthostatic VS BP Location FiO2 (%)   Temporal -- Sitting Right arm --      Pain Score       3             Orthostatic Vital Signs  Vitals:    03/11/24 1245  03/11/24 1445   BP: 133/92    Pulse: 84 93   Patient Position - Orthostatic VS: Sitting        Physical Exam  Vitals and nursing note reviewed.   Constitutional:       General: She is not in acute distress.     Appearance: She is well-developed. She is obese.   HENT:      Head: Normocephalic and atraumatic.      Right Ear: External ear normal.      Left Ear: External ear normal.      Nose: Congestion and rhinorrhea present.      Mouth/Throat:      Mouth: Mucous membranes are moist.      Pharynx: Posterior oropharyngeal erythema present.   Eyes:      Extraocular Movements: Extraocular movements intact.      Conjunctiva/sclera: Conjunctivae normal.   Cardiovascular:      Rate and Rhythm: Normal rate and regular rhythm.      Pulses: Normal pulses.      Heart sounds: Normal heart sounds. No murmur heard.  Pulmonary:      Effort: Pulmonary effort is normal. No respiratory distress.      Breath sounds: Examination of the right-middle field reveals wheezing. Examination of the left-middle field reveals wheezing. Wheezing present.   Abdominal:      Palpations: Abdomen is soft.      Tenderness: There is no abdominal tenderness.   Musculoskeletal:         General: No swelling.      Cervical back: Neck supple.   Skin:     General: Skin is warm and dry.      Capillary Refill: Capillary refill takes less than 2 seconds.   Neurological:      Mental Status: She is alert.   Psychiatric:         Mood and Affect: Mood normal.         ED Medications  Medications   albuterol inhalation solution 10 mg (10 mg Nebulization Given 3/11/24 1339)   ipratropium (ATROVENT) 0.02 % inhalation solution 1 mg (1 mg Nebulization Given 3/11/24 1339)   sodium chloride 0.9 % inhalation solution 12 mL (12 mL Nebulization Given 3/11/24 1339)   predniSONE tablet 40 mg (40 mg Oral Given 3/11/24 1333)       Diagnostic Studies  Results Reviewed       Procedure Component Value Units Date/Time    FLU/RSV/COVID - if FLU/RSV clinically relevant [[id number]]   (Abnormal) Collected: 03/11/24 1303    Lab Status: Final result Specimen: Nares from Nose Updated: 03/11/24 1348     SARS-CoV-2 Negative     INFLUENZA A PCR Negative     INFLUENZA B PCR Negative     RSV PCR Positive    Narrative:      FOR PEDIATRIC PATIENTS - copy/paste COVID Guidelines URL to browser: [email or web address]    SARS-CoV-2 assay is a Nucleic Acid Amplification assay intended for the  qualitative detection of nucleic acid from SARS-CoV-2 in nasopharyngeal  swabs. Results are for the presumptive identification of SARS-CoV-2 RNA.    Positive results are indicative of infection with SARS-CoV-2, the virus  causing COVID-19, but do not rule out bacterial infection or co-infection  with other viruses. Laboratories within the [place] and its  territories are required to report all positive results to the appropriate  public health authorities. Negative results do not preclude SARS-CoV-2  infection and should not be used as the sole basis for treatment or other  patient management decisions. Negative results must be combined with  clinical observations, patient history, and epidemiological information.  This test has not been FDA cleared or approved.    This test has been authorized by FDA under an Emergency Use Authorization  (EUA). This test is only authorized for the duration of time the  declaration that circumstances exist justifying the authorization of the  emergency use of an in vitro diagnostic tests for detection of SARS-CoV-2  virus and/or diagnosis of COVID-19 infection under section 564(b)(1) of  the Act, 21 U.S.C. 360bbb-3(b)(1), unless the authorization is terminated  or revoked sooner. The test has been validated but independent review by FDA  and CLIA is pending.    Test performed using [employer] GeneXpert: This RT-PCR assay targets N2,  a region unique to SARS-CoV-2. A conserved region in the E-gene was chosen  for pan-Sarbecovirus  detection which includes SARS-CoV-2.    According to CMS-2020-01-R, this platform meets the definition of high-throughput technology.                   XR chest 1 view portable   Final Result by South Toro MD (03/11 1550)      No acute cardiopulmonary disease.            Workstation performed: [id number]               Procedures  ECG 12 Lead Documentation Only    Date/Time: 3/11/2024 1:42 PM    Performed by: Sammie Huerta MD  Authorized by: Sammie Huerta MD    Indications / Diagnosis:  SOB, chest tightnes  ECG reviewed by me, the ED Provider: yes (Dr. Bolton)    Patient location:  ED  Previous ECG:     Previous ECG:  Compared to current  Interpretation:     Interpretation: abnormal    Rate:     ECG rate:  70    ECG rate assessment: normal    Rhythm:     Rhythm: sinus rhythm    Conduction:     Conduction: normal    ST segments:     ST segments:  Normal  T waves:     T waves: non-specific    Comments:      Normal sinus rhythm  Possible left atrial enlargement  Low voltage QRS complexes  Nonspecific T-wave abnormality  Abnormal EKG  When compared with EKG on 08/25/2016 Questionable changes in QRS axis  Non specific T wave abnormality now evident in Lateral leads.            ED Course  ED Course as of 03/15/24 1513   Mon Mar 11, 2024   1341 EKG interpreted by myself.  EKG dated 3/11/2024 at 1330 demonstrates normal sinus rhythm at 70 bpm, normal ME, QRS, QTc durations, no STEMI.   1358 RSV PCR(!): Positive   1546 Blood Pressure: 133/92   1546 Temperature: 99 °F (37.2 °C)   1546 Temp Source: Temporal   1546 Respirations: 20   1546 SpO2: 96 %           SBIRT 22yo+      Flowsheet Row Most Recent Value   Initial Alcohol Screen: US AUDIT-C     1. How often do you have a drink containing alcohol? 0 Filed at: 03/11/2024 1248   2. How many drinks containing alcohol do you have on a typical day you are drinking?  0 Filed at: 03/11/2024 1248   3b. FEMALE Any Age, or MALE 65+: How often do you have 4 or  more drinks on one occassion? 0 Filed at: 03/11/2024 1248   Audit-C Score 0 Filed at: 03/11/2024 1248   HUMZA: How many times in the past year have you...    Used an illegal drug or used a prescription medication for non-medical reasons? Never Filed at: 03/11/2024 1248                  Medical Decision Making  Patient is a 41-year-old female with asthma, NATALEE, morbid obesity presenting to the ED with chest tightness, wheezing associated with upper respiratory symptoms most likely secondary to asthma exacerbation.  Received albuterol and ipratropium nebulization in the ED. received Prednisone 40 mg P.O. int he ED. Tested positive for RSV. On reevaluation patient reports improvement in her symptoms with nebulization. No acute pathology on chest X-ray. Patient discharged home with albuterol nebulization, Prednisone 40 mg P.O to complete 5 days of steroid burst. Advised to use Flonase inhaler to help with pharyngeal symptoms and edema. Mucinex for cough. Nebulizer and supplies ordered. Patient demonstrated understanding and in agreement with the plan. Advised to keep distance from pregnant people, babies and small children, immunocompromised adults. Recommend RSV vaccine after recovery. Patient is clinically and hemodynamically stable at the time of discharge.      Amount and/or Complexity of Data Reviewed  External Data Reviewed: notes.  Labs: ordered. Decision-making details documented in ED Course.  Radiology: ordered.    Risk  OTC drugs.  Prescription drug management.          Disposition  Final diagnoses:   RSV infection   COPD exacerbation (HCC)   Acute cough   Viral upper respiratory tract infection     Time reflects when diagnosis was documented in both MDM as applicable and the Disposition within this note       Time User Action Codes Description Comment    3/11/2024  3:02 PM Sammie Huerta Add [B33.8] RSV infection     3/11/2024  3:03 PM Sammie Huerta Add [J44.1] COPD exacerbation (HCC)      3/11/2024  3:16 PM Sammie Huerta Add [R05.1] Acute cough     3/14/2024 10:06 AM Sammie Huerta Modify [B33.8] RSV infection     3/14/2024 10:06 AM Sammie Huerta Modify [J44.1] COPD exacerbation (HCC)     3/14/2024 10:06 AM Sammie Huerta Add [J06.9] Viral upper respiratory tract infection           ED Disposition       ED Disposition   Discharge    Condition   Stable    Date/Time   Mon Mar 11, 2024 1502    Comment   Hemalatha Hahn discharge to home/self care.                   Follow-up Information       Follow up With Specialties Details Why Contact Info Additional Information    Meg Mckeon MD Family Medicine Schedule an appointment as soon as possible for a visit   [address] [facility] [address]  [phone]       [facility] Emergency Department Emergency Medicine Go to  If symptoms worsen [address] [address]  [phone] [facility] Emergency Department, [address], [place], [address]            Discharge Medication List as of 3/11/2024  3:16 PM        START taking these medications    Details   albuterol (2.5 mg/3 mL) 0.083 % nebulizer solution Take 3 mL (2.5 mg total) by nebulization every 6 (six) hours as needed for wheezing or shortness of breath for up to 5 days, Starting Mon 3/11/2024, Until Sat 3/16/2024 at 2359, Normal      guaiFENesin (MUCINEX) 600 mg 12 hr tablet Take 2 tablets (1,200 mg total) by mouth every 12 (twelve) hours, Starting Mon 3/11/2024, Normal      !! predniSONE 20 mg tablet Take 2 tablets (40 mg total) by mouth daily for 5 days, Starting Mon 3/11/2024, Until Sat 3/16/2024, Normal       !! - Potential duplicate medications found. Please discuss with provider.        CONTINUE these medications which have NOT CHANGED    Details   fexofenadine (ALLEGRA) 180 MG tablet Take 180 mg by mouth daily, Historical Med      FLUoxetine (PROzac) 40 MG capsule Take 1 capsule (40  mg total) by mouth daily, Starting Tue 2/6/2024, Normal      fluticasone (FLONASE) 50 mcg/act nasal spray 2 sprays into each nostril daily, Historical Med      ibuprofen (MOTRIN) 200 mg tablet Take 200 mg by mouth every 6 (six) hours as needed for mild pain., Historical Med      methylphenidate (CONCERTA) 54 MG ER tablet Take 1 tablet (54 mg total) by mouth daily Max Daily Amount: 54 mg, Starting Wed 2/28/2024, Normal      mometasone-formoterol (DULERA) 200-5 MCG/ACT inhaler Inhale 2 puffs 2 (two) times a day Rinse mouth after use., Starting Tue 7/25/2023, Normal      Multiple Vitamins-Minerals (Multivitamin Adult, Minerals,) TABS Historical Med      topiramate (Topamax) 50 MG tablet Take 1 tab po daily in AM, Normal      Xeljanz XR 11 MG TB24 Starting Fri 8/11/2023, Historical Med      cyclobenzaprine (FLEXERIL) 5 mg tablet Take 1 tablet (5 mg total) by mouth 3 (three) times a day as needed for muscle spasms, Starting Wed 9/6/2023, Normal      !! predniSONE 10 mg tablet Five pills a day for 2 days, 4 pills a day for 2 days, 3 pills a day for 2 days, 2 pills a day for 2 days, 1 pill a day for 2 days, Normal      Semaglutide-Weight Management (Wegovy) 2.4 MG/0.75ML Inject 0.75 mL (2.4 mg total) under the skin once a week, Starting Tue 1/16/2024, Normal       !! - Potential duplicate medications found. Please discuss with provider.        Outpatient Discharge Orders   Nebulizer     Nebulizer Supplies       PDMP Review         Value Time User    PDMP Reviewed  Yes 2/28/2024  3:49 PM Meg Mckeon MD             ED Provider  Attending physically available and evaluated Hemalatha Hahn. I managed the patient along with the ED Attending.    Electronically Signed by           Sammie Huerta MD  03/11/24 2247       Prabhu Kirkland DO  03/15/24 8340

## 2024-03-11 NOTE — RESPIRATORY THERAPY NOTE
Called to ER 22 for 1 hr continuous neb which started at 1340    03/11/24 1341   Inhalation Therapy Tx   $ Inhalation Therapy Performed Yes   $ Pulse Oximetry Spot Check Charge Completed   SpO2 95 %  (RA)   Pre-Treatment Pulse 71   Pre-Treatment Respirations 22   Duration 60   Breath Sounds Pre-Treatment Bilateral Diminished  (very diminished posteriorly)   Delivery Source Air;UDN   Position High Torres's   Resp Comments 1 hr neb started 1340

## 2024-03-11 NOTE — ED ATTENDING ATTESTATION
3/11/2024  I, Prabhu Kirkland DO, saw and evaluated the patient. I have discussed the patient with the resident/non-physician practitioner and agree with the resident's/non-physician practitioner's findings, Plan of Care, and MDM as documented in the resident's/non-physician practitioner's note, except where noted. All available labs and Radiology studies were reviewed.  I was present for key portions of any procedure(s) performed by the resident/non-physician practitioner and I was immediately available to provide assistance.       At this point I agree with the current assessment done in the Emergency Department.  I have conducted an independent evaluation of this patient a history and physical is as follows:    43-year-old female recent sick contacts presenting with several day history of URI symptoms cough congestion postnasal drip subjective fever/chills.  Feels like she is coming down with an illness.  Does have a history of wheezing treated with bronchodilators not significant relieved with inhaler.  Does exhibit some wheezing here will treat with nebulizer.  Workup reveals RSV positive patient made some improvement after neb will discharge with similar treatment she was taking prednisone for a few days which she has chronically due to history of rheumatoid arthritis she will be provided additional prednisone burst for 5 days, guaifenesin, albuterol as needed return to ER precautions discussed.    ED Course  ED Course as of 03/11/24 1546   Mon Mar 11, 2024   1341 EKG interpreted by myself.  EKG dated 3/11/2024 at 1330 demonstrates normal sinus rhythm at 70 bpm, normal MO, QRS, QTc durations, no STEMI.   1358 RSV PCR(!): Positive   1546 Blood Pressure: 133/92   1546 Temperature: 99 °F (37.2 °C)   1546 Temp Source: Temporal   1546 Respirations: 20   1546 SpO2: 96 %     Review of Systems   Constitutional:  Positive for activity change, appetite change, chills, fatigue and fever.   HENT:  Positive for  congestion, postnasal drip, rhinorrhea and sore throat. Negative for tinnitus, trouble swallowing and voice change.    Respiratory:  Positive for cough, chest tightness, shortness of breath and wheezing. Negative for stridor.    Cardiovascular:  Negative for chest pain.   Gastrointestinal:  Positive for diarrhea. Negative for abdominal pain, nausea and vomiting.   Genitourinary:  Negative for flank pain.   Musculoskeletal:  Negative for back pain and neck pain.   Skin:  Negative for rash.   Neurological:  Positive for headaches. Negative for syncope.     Physical Exam  Vitals and nursing note reviewed.   Constitutional:       General: She is not in acute distress.     Appearance: She is well-developed. She is obese. She is ill-appearing. She is not toxic-appearing or diaphoretic.   HENT:      Head: Normocephalic and atraumatic.      Jaw: There is normal jaw occlusion.      Nose: Congestion and rhinorrhea present.      Mouth/Throat:      Lips: Pink.      Mouth: Mucous membranes are moist.      Pharynx: Oropharynx is clear. Uvula midline. Posterior oropharyngeal erythema present.      Tonsils: No tonsillar exudate or tonsillar abscesses.      Comments: Postnasal drip  Neck:      Vascular: No JVD.   Cardiovascular:      Rate and Rhythm: Normal rate and regular rhythm.   Pulmonary:      Effort: Pulmonary effort is normal.      Breath sounds: Wheezing present.   Neurological:      Mental Status: She is alert.          Critical Care Time  Procedures

## 2024-03-12 LAB
ATRIAL RATE: 70 BPM
P AXIS: 84 DEGREES
PR INTERVAL: 132 MS
QRS AXIS: 76 DEGREES
QRSD INTERVAL: 92 MS
QT INTERVAL: 398 MS
QTC INTERVAL: 429 MS
T WAVE AXIS: 148 DEGREES
VENTRICULAR RATE: 70 BPM

## 2024-03-12 PROCEDURE — 93010 ELECTROCARDIOGRAM REPORT: CPT | Performed by: INTERNAL MEDICINE

## 2024-03-14 ENCOUNTER — APPOINTMENT (OUTPATIENT)
Dept: PHYSICAL THERAPY | Facility: HOME HEALTHCARE | Age: 44
End: 2024-03-14
Payer: COMMERCIAL

## 2024-03-14 ENCOUNTER — OFFICE VISIT (OUTPATIENT)
Dept: INTERNAL MEDICINE CLINIC | Facility: CLINIC | Age: 44
End: 2024-03-14
Payer: COMMERCIAL

## 2024-03-14 VITALS — HEART RATE: 78 BPM | OXYGEN SATURATION: 95 % | TEMPERATURE: 98.6 F

## 2024-03-14 DIAGNOSIS — J45.21 MILD INTERMITTENT ASTHMA WITH ACUTE EXACERBATION: ICD-10-CM

## 2024-03-14 DIAGNOSIS — J21.0 RSV BRONCHIOLITIS: Primary | ICD-10-CM

## 2024-03-14 PROCEDURE — 99213 OFFICE O/P EST LOW 20 MIN: CPT | Performed by: FAMILY MEDICINE

## 2024-03-14 RX ORDER — PREDNISONE 10 MG/1
TABLET ORAL
Qty: 42 TABLET | Refills: 0 | Status: SHIPPED | OUTPATIENT
Start: 2024-03-14

## 2024-03-14 RX ORDER — DOXYCYCLINE 100 MG/1
100 CAPSULE ORAL 2 TIMES DAILY
Qty: 20 CAPSULE | Refills: 0 | Status: SHIPPED | OUTPATIENT
Start: 2024-03-14 | End: 2024-03-24

## 2024-03-15 NOTE — PROGRESS NOTES
Assessment/Plan:    No problem-specific Assessment & Plan notes found for this encounter.       Diagnoses and all orders for this visit:    RSV bronchiolitis  -     doxycycline monohydrate (MONODOX) 100 mg capsule; Take 1 capsule (100 mg total) by mouth 2 (two) times a day for 10 days  -     predniSONE 10 mg tablet; Six pills a day for 2 days, 5 pills a day for 2 days, 4 pills a day for 2 days, 3 pills a day for 2 days, 2 pills a day for 2 days, 1 pill a day for 2 days  -     XR chest pa & lateral; Future    Mild intermittent asthma with acute exacerbation  -     doxycycline monohydrate (MONODOX) 100 mg capsule; Take 1 capsule (100 mg total) by mouth 2 (two) times a day for 10 days  -     predniSONE 10 mg tablet; Six pills a day for 2 days, 5 pills a day for 2 days, 4 pills a day for 2 days, 3 pills a day for 2 days, 2 pills a day for 2 days, 1 pill a day for 2 days  -     XR chest pa & lateral; Future        Orders and recommendations as noted above.  Will extend her prednisone taper as noted above.  Doxycycline.  Fluids.  Rest.  Continue to use inhaler.  Slip given for repeat chest x-ray to rule out pneumonia.  Advised her to call or follow-up if symptoms worsen or persist.    Subjective:      Patient ID: Hemalatha Hahn is a 43 y.o. female.    She presents for acute visit.  Started about a week ago with some congestion and postnasal drip.  Had worsening cough.  Slight sore throat.  Hoarse voice.  Symptoms had worsened and was evaluated through the emergency room.  Was treated with prednisone.  Did have a chest x-ray that was negative.  Has not been feeling significantly better.  Still with chest congestion.  Still with a significantly hoarse voice.  Denies any fevers or chills.  Has had some wheezing.  Still feeling short of breath despite using her inhaler.  Has noticed some increasing nasal congestion.  Has a moist cough but is generally nonproductive.        The following portions of the patient's history were  reviewed and updated as appropriate: She  has a past medical history of Allergic rhinitis, Anxiety, Arthritis, Asthma, COVID-19 (12/17/2020), CPAP (continuous positive airway pressure) dependence, Daytime hypersomnolence, Depression, GERD (gastroesophageal reflux disease), IUD (intrauterine device) in place, Lumbar disc disease, Migraine, RA (rheumatoid arthritis) ([facility]), Rheumatoid arthritis ([facility]), Sleep apnea, Varicella, and Wears glasses.  She   Patient Active Problem List    Diagnosis Date Noted   • RSV bronchiolitis 03/14/2024   • COPD exacerbation ([facility]) 03/11/2024   • Sciatica of left side 09/06/2023   • Obstructive sleep apnea 08/24/2023   • IUD (mirena) Surveillance 04/07/2021   • Attention deficit disorder (ADD) without hyperactivity 03/01/2021   • Morbid obesity with BMI of 50.0-59.9, adult ([facility]) 03/01/2021   • Myalgia 09/14/2020   • Chronic pain of left knee 03/10/2020   • Mild hyperlipidemia 09/10/2019   • Insufficient sleep syndrome 10/04/2018   • Chronic sinusitis 08/02/2017   • Moderate obstructive sleep apnea 05/23/2017   • Fatigue 03/22/2017   • Seasonal mood disorder ([facility]) 03/22/2017   • Allergic rhinitis 02/04/2016   • Nasal polyp 02/04/2016   • Vitamin D deficiency 02/04/2016   • Depression with anxiety 12/17/2015   • Rheumatoid arthritis ([facility]) 12/17/2015   • Anxiety 08/04/2015   • Asthma 08/04/2015     She  has a past surgical history that includes Cholecystectomy; Sinus surgery; Nasal septum surgery; [place] tooth extraction; pr strtctc cptr asstd px extradural cranial (N/A, 01/08/2019); and US guided breast biopsy left complete (Left, 06/15/2022).  Her family history includes Aneurysm in her paternal grandfather; Asthma in her mother; Autism in her son; Breast cancer in her paternal aunt; COPD in her mother; Cancer in her paternal grandmother; Celiac disease in her daughter and paternal grandfather; Chiari malformation in her brother and daughter; Diabetes in her daughter; Hypertension in her  mother; Hypothyroidism in her father; Lung disease in her maternal grandfather; No Known Problems in her maternal aunt, maternal grandmother, paternal aunt, and paternal aunt; Obesity in her brother, father, and son.  She  reports that she has quit smoking. Her smoking use included cigarettes. She has never used smokeless tobacco. She reports current alcohol use of about 4.0 standard drinks of alcohol per week. She reports that she does not use drugs.  Current Outpatient Medications   Medication Sig Dispense Refill   • doxycycline monohydrate (MONODOX) 100 mg capsule Take 1 capsule (100 mg total) by mouth 2 (two) times a day for 10 days 20 capsule 0   • predniSONE 10 mg tablet Six pills a day for 2 days, 5 pills a day for 2 days, 4 pills a day for 2 days, 3 pills a day for 2 days, 2 pills a day for 2 days, 1 pill a day for 2 days 42 tablet 0   • albuterol (2.5 mg/3 mL) 0.083 % nebulizer solution Take 3 mL (2.5 mg total) by nebulization every 6 (six) hours as needed for wheezing or shortness of breath for up to 5 days 30 mL 0   • fexofenadine (ALLEGRA) 180 MG tablet Take 180 mg by mouth daily     • FLUoxetine (PROzac) 40 MG capsule Take 1 capsule (40 mg total) by mouth daily 90 capsule 0   • fluticasone (FLONASE) 50 mcg/act nasal spray 2 sprays into each nostril daily     • guaiFENesin (MUCINEX) 600 mg 12 hr tablet Take 2 tablets (1,200 mg total) by mouth every 12 (twelve) hours 30 tablet 0   • ibuprofen (MOTRIN) 200 mg tablet Take 200 mg by mouth every 6 (six) hours as needed for mild pain.     • methylphenidate (CONCERTA) 54 MG ER tablet Take 1 tablet (54 mg total) by mouth daily Max Daily Amount: 54 mg 30 tablet 0   • mometasone-formoterol (DULERA) 200-5 MCG/ACT inhaler Inhale 2 puffs 2 (two) times a day Rinse mouth after use. 13 g 0   • Multiple Vitamins-Minerals (Multivitamin Adult, Minerals,) TABS      • topiramate (Topamax) 50 MG tablet Take 1 tab po daily in AM 30 tablet 0   • Xeljanz XR 11 MG TB24        No  current facility-administered medications for this visit.     Current Outpatient Medications on File Prior to Visit   Medication Sig   • albuterol (2.5 mg/3 mL) 0.083 % nebulizer solution Take 3 mL (2.5 mg total) by nebulization every 6 (six) hours as needed for wheezing or shortness of breath for up to 5 days   • fexofenadine (ALLEGRA) 180 MG tablet Take 180 mg by mouth daily   • FLUoxetine (PROzac) 40 MG capsule Take 1 capsule (40 mg total) by mouth daily   • fluticasone (FLONASE) 50 mcg/act nasal spray 2 sprays into each nostril daily   • guaiFENesin (MUCINEX) 600 mg 12 hr tablet Take 2 tablets (1,200 mg total) by mouth every 12 (twelve) hours   • ibuprofen (MOTRIN) 200 mg tablet Take 200 mg by mouth every 6 (six) hours as needed for mild pain.   • methylphenidate (CONCERTA) 54 MG ER tablet Take 1 tablet (54 mg total) by mouth daily Max Daily Amount: 54 mg   • mometasone-formoterol (DULERA) 200-5 MCG/ACT inhaler Inhale 2 puffs 2 (two) times a day Rinse mouth after use.   • Multiple Vitamins-Minerals (Multivitamin Adult, Minerals,) TABS    • topiramate (Topamax) 50 MG tablet Take 1 tab po daily in AM   • Xeljanz XR 11 MG TB24      No current facility-administered medications on file prior to visit.     She is allergic to morphine..    Review of Systems   Constitutional:  Positive for fatigue. Negative for unexpected weight change.   HENT:  Positive for congestion, postnasal drip and voice change.    Respiratory:  Positive for cough and wheezing.    Cardiovascular:  Negative for chest pain and palpitations.   Gastrointestinal:  Negative for abdominal pain, diarrhea, nausea and vomiting.   Musculoskeletal:  Negative for arthralgias.   Skin:  Negative for rash.   Allergic/Immunologic: Negative for immunocompromised state.   Neurological:  Negative for light-headedness.         Objective:      Pulse 78   Temp 98.6 °F (37 °C)   SpO2 95%          Physical Exam  Vitals reviewed.   Constitutional:       Appearance: She  is well-developed and well-groomed.   HENT:      Head:      Comments: Hoarse voice; moist mucous membranes; slight posterior pharyngeal erythema; boggy nasal mucosa with purulent nasal discharge  Cardiovascular:      Rate and Rhythm: Normal rate and regular rhythm.   Pulmonary:      Breath sounds: Transmitted upper airway sounds present. Wheezing present.      Comments: Scattered rhonchi; slight expiratory wheeze  Musculoskeletal:      Cervical back: Neck supple.   Lymphadenopathy:      Cervical: No cervical adenopathy.   Neurological:      Mental Status: She is alert.   Psychiatric:         Behavior: Behavior is cooperative.

## 2024-03-18 ENCOUNTER — OFFICE VISIT (OUTPATIENT)
Dept: BARIATRICS | Facility: CLINIC | Age: 44
End: 2024-03-18
Payer: COMMERCIAL

## 2024-03-18 ENCOUNTER — APPOINTMENT (OUTPATIENT)
Dept: PHYSICAL THERAPY | Facility: HOME HEALTHCARE | Age: 44
End: 2024-03-18
Payer: COMMERCIAL

## 2024-03-18 ENCOUNTER — TELEPHONE (OUTPATIENT)
Age: 44
End: 2024-03-18

## 2024-03-18 VITALS
SYSTOLIC BLOOD PRESSURE: 130 MMHG | HEART RATE: 94 BPM | HEIGHT: 67 IN | DIASTOLIC BLOOD PRESSURE: 80 MMHG | RESPIRATION RATE: 16 BRPM | TEMPERATURE: 97.5 F | OXYGEN SATURATION: 92 % | WEIGHT: 293 LBS | BODY MASS INDEX: 45.99 KG/M2

## 2024-03-18 DIAGNOSIS — E66.01 CLASS 3 OBESITY (HCC): Primary | ICD-10-CM

## 2024-03-18 DIAGNOSIS — Z79.899 MEDICATION MANAGEMENT: ICD-10-CM

## 2024-03-18 DIAGNOSIS — E78.5 MILD HYPERLIPIDEMIA: ICD-10-CM

## 2024-03-18 DIAGNOSIS — G47.33 OBSTRUCTIVE SLEEP APNEA: ICD-10-CM

## 2024-03-18 PROBLEM — E66.813 CLASS 3 OBESITY: Status: ACTIVE | Noted: 2021-03-01

## 2024-03-18 PROCEDURE — 99214 OFFICE O/P EST MOD 30 MIN: CPT | Performed by: PHYSICIAN ASSISTANT

## 2024-03-18 RX ORDER — TIRZEPATIDE 2.5 MG/.5ML
2.5 INJECTION, SOLUTION SUBCUTANEOUS WEEKLY
Qty: 2 ML | Refills: 0 | Status: SHIPPED | OUTPATIENT
Start: 2024-03-18 | End: 2024-04-15

## 2024-03-18 RX ORDER — TIRZEPATIDE 5 MG/.5ML
5 INJECTION, SOLUTION SUBCUTANEOUS WEEKLY
Qty: 2 ML | Refills: 0 | Status: SHIPPED | OUTPATIENT
Start: 2024-03-18 | End: 2024-04-15

## 2024-03-18 RX ORDER — TIRZEPATIDE 7.5 MG/.5ML
7.5 INJECTION, SOLUTION SUBCUTANEOUS WEEKLY
Qty: 2 ML | Refills: 0 | Status: SHIPPED | OUTPATIENT
Start: 2024-03-18 | End: 2024-04-15

## 2024-03-18 NOTE — TELEPHONE ENCOUNTER
PA for Zepbound 2.5mg INJ    Submitted via    []CMM-KEY   [x][email or web address]-Case ID # [id number]   []Faxed to plan   []Other website   []Phone call Case ID #     Office notes sent, clinical questions answered. Awaiting determination    Turnaround time for your insurance to make a decision on your Prior Authorization can take 7-21 business days.

## 2024-03-18 NOTE — PATIENT INSTRUCTIONS
For Zepbound:   Inject 2.5 mg subcutaneous every week x4 weeks then  5 mg every week for 4 weeks then   7.5 mg every week for 4 weeks then 10 mg every week for 4 weeks  Then 12.5 mg every week for 4 weeks then  15 mg every week for 4 weeks       -Females should use another type of birth control for 4 weeks after you start Zepbound and for 4 weeks after each increase in your dose of Zepbound.   Visit [email or web address] for further information/injection instructions. Please eat small frequent meals to help reduce nausea. Lemon water and saltine crackers may help with this. If you experience fever, nausea/vomiting, and pain radiating to your back this may be a sign of pancreatitis. Please have ER evaluation with this occurs. Zepbound should be stopped two months prior to trying to conceive.   -Clean skin before injecting. Rotate injection site each time.

## 2024-03-18 NOTE — ASSESSMENT & PLAN NOTE
-Patient is pursuing [employer]  -Initial weight loss goal of 5-10% weight loss for improved health  -Screening labs: up to date  -patient not currently interested in bariatric surgery  -Believes she tried Wellbutrin in the past, but had negative effects on mood   -Dietary recall reviewed, suggestions provided   -Was on Wegovy, but felt effects wore off. Open to trial of Zepbound.   -Taking Topamax 50 mg  -Has IUD     Initial: 340.6 lbs  Current: 327 lbs (+4.4 lbs since last visit)   Change: -13.6 lbs  Goal: 300lbs

## 2024-03-18 NOTE — PROGRESS NOTES
Assessment/Plan:    Class 3 obesity (HCC)  -Patient is pursuing [employer]  -Initial weight loss goal of 5-10% weight loss for improved health  -Screening labs: up to date  -patient not currently interested in bariatric surgery  -Believes she tried Wellbutrin in the past, but had negative effects on mood   -Dietary recall reviewed, suggestions provided   -Was on Wegovy, but felt effects wore off. Open to trial of Zepbound.   -Taking Topamax 50 mg  -Has IUD     Initial: 340.6 lbs  Current: 327 lbs (+4.4 lbs since last visit)   Change: -13.6 lbs  Goal: 300lbs    Follow up in 2 months with RD and approximately  4 months  with Non-Surgical Physician/Advanced Practitioner.    Goals:  Food log (ie.) www.[employer].com,[email or web address],[employer].com,[employer].com,etc. baritastic  No sugary beverages. At least 64oz of water daily.  Increase physical activity by 10 minutes daily. Gradually increase physical activity to a goal of 5 days per week for 30 minutes of MODERATE intensity PLUS 2 days per week of FULL BODY resistance training  5-10 servings of fruits and vegetables per day and 25-35 grams of dietary fiber per day, gradually increasing     Diagnoses and all orders for this visit:    Class 3 obesity (HCC)  -     tirzepatide (Zepbound) 2.5 mg/0.5 mL auto-injector; Inject 0.5 mL (2.5 mg total) under the skin once a week for 28 days Then increase to 5 mg  -     tirzepatide (Zepbound) 5 mg/0.5 mL auto-injector; Inject 0.5 mL (5 mg total) under the skin once a week for 28 days Then increase to 7.5 mg  -     tirzepatide (Zepbound) 7.5 mg/0.5 mL auto-injector; Inject 0.5 mL (7.5 mg total) under the skin once a week for 28 days The increase to 10 mg    Mild hyperlipidemia  -     tirzepatide (Zepbound) 2.5 mg/0.5 mL auto-injector; Inject 0.5 mL (2.5 mg total) under the skin once a week for 28 days Then increase to 5 mg  -     tirzepatide (Zepbound) 5 mg/0.5 mL auto-injector; Inject 0.5 mL (5 mg total) under the skin once a  week for 28 days Then increase to 7.5 mg  -     tirzepatide (Zepbound) 7.5 mg/0.5 mL auto-injector; Inject 0.5 mL (7.5 mg total) under the skin once a week for 28 days The increase to 10 mg    Obstructive sleep apnea  -     tirzepatide (Zepbound) 2.5 mg/0.5 mL auto-injector; Inject 0.5 mL (2.5 mg total) under the skin once a week for 28 days Then increase to 5 mg  -     tirzepatide (Zepbound) 5 mg/0.5 mL auto-injector; Inject 0.5 mL (5 mg total) under the skin once a week for 28 days Then increase to 7.5 mg  -     tirzepatide (Zepbound) 7.5 mg/0.5 mL auto-injector; Inject 0.5 mL (7.5 mg total) under the skin once a week for 28 days The increase to 10 mg    Body mass index (BMI) of 50-59.9 in adult (HCC)        Subjective:   Chief Complaint   Patient presents with    Follow-up     Patient ID: Hemalatha Hahn  is a 43 y.o. female with excess weight/obesity here to pursue weight management.  Patient is pursuing [facility].   HPI  The patient presents for [facility] follow up.     Has had RSV and prednisone taper. Just starting to feel better now.     Stopped Wegovy about a month ago- cost, felt effects were wearing off     She has been paying attention to satiety cues. Struggles in the evenings- after work.     B: oatmeal - instant OR egg OR oatmeal bar  S: fruit OR cheese OR yogurt   L: salad + protein + light balsamic (1 packet)   S: sometimes, may have coffee  D: sandwich OR meal OR skips   S:occasional     Exercise: walking, adding yoga   Hydration: minimum of 32 z, but more likely reaching 64 oz   Alcohol: not often  Dining out: not often     The following portions of the patient's history were reviewed and updated as appropriate: allergies, current medications, past family history, past medical history, past social history, past surgical history, and problem list.    Review of Systems   Cardiovascular:  Negative for chest pain and palpitations.   Psychiatric/Behavioral: Negative.       Objective:    /80    "Pulse 94   Temp 97.5 °F (36.4 °C)   Resp 16   Ht 5' 6.5\" (1.689 m)   Wt (!) 148 kg (327 lb)   SpO2 92%   BMI 51.99 kg/m²      Physical Exam  Vitals and nursing note reviewed.     Constitutional   General appearance: Abnormal.  well developed and obese.   Pulmonary   Respiratory effort: No increased work of breathing or signs of respiratory distress.    Abdomen   Abdomen: Abnormal.  The abdomen was obese.   Musculoskeletal   Gait and station: Normal.    Psychiatric   Orientation to person, place and time: Normal.    Affect: appropriate  "

## 2024-03-19 NOTE — TELEPHONE ENCOUNTER
PA for Zepbound 2.5mg Inj Approved   Date(s) approved 03/17/2024 - 03/17/2025  Case #[id number]     Patient advised by [x] [employer] Message                      [x] Phone call       Pharmacy advised by [x]Fax                                     []Phone call    Approval letter scanned into Media No Not provided

## 2024-03-21 ENCOUNTER — OFFICE VISIT (OUTPATIENT)
Dept: PHYSICAL THERAPY | Facility: HOME HEALTHCARE | Age: 44
End: 2024-03-21
Payer: COMMERCIAL

## 2024-03-21 DIAGNOSIS — M54.32 SCIATICA OF LEFT SIDE: Primary | ICD-10-CM

## 2024-03-21 PROCEDURE — 97140 MANUAL THERAPY 1/> REGIONS: CPT

## 2024-03-21 PROCEDURE — 97110 THERAPEUTIC EXERCISES: CPT

## 2024-03-21 PROCEDURE — 97112 NEUROMUSCULAR REEDUCATION: CPT

## 2024-03-21 NOTE — PROGRESS NOTES
"Daily Note     Today's date: 3/21/2024  Patient name: Hemalatha Hahn  : 1980  MRN: [id number]  Referring provider: Meg Mckeon MD  Dx:   Encounter Diagnosis     ICD-10-CM    1. Sciatica of left side  M54.32           Start Time: 171  Stop Time: 1756  Total time in clinic (min): 45 minutes    Subjective: Pt reports that her back has been feeling pretty good and that she has no pain down her leg today.  She reports that her pain is only localized on her back now and she has been making sure she gets up frequently at work.      Objective: See treatment diary below      Assessment: Pt demonstrated good tolerance to treatment session.  She continues to respond well to lumbar extension based exercises, and had full centralization of radicular symptoms throughout treatment session.  She also continues to respond well to laser and had an improvement in symptoms following treatment session.  The pt would benefit from continued PT.      Plan: Continue per plan of care.      Precautions: None    Re-eval Date: 2024      Manuals 2/28 3/4 3/7 3/21         Doron glute, hs stretch GELY HONG         Laser L lumbar paraspinals  20W 6000 J 20W 6000 J 20W 6000 J         IASTM lumbar paraspinals             Manual lumbar traction  GELY HONG          Neuro Re-Ed             TrA activation             PPT HEP 5\"x15 5\"x15 5\"x15         PPT w/ marches  1x5 doron 1x10 doron 1x15 doron         PPT w/ SLR    1x10 doron         Palloff press  Blue 5\"x10 R/L Black  5\"x10 R/L Black 5\"x10 R/L         MTP/LTP                          Ther Ex             Treadmill  1.8 MPH 5 min 1.8 MPH 5 min 1.8 MPH 5 min         LTR             SKTC             Self pisiformis/glute stretch HEP            Standing lumbar ext HEP 5\"x10 5\"x10 5\"x10         Prone on elbows  2 min 2 min 2 min         Prone press-up HEP 1x10 1x10 1x10         Bridges HEP 1x10 1x10 1x15         SLR             Clamshells             S/L hip abd HEP 1x10 doron X15 L, x10 R X15 " doron         Prone hip ext HEP 1x10 doron 1x10 doron 1x10 doron         Squat                                       Ther Activity                                       Gait Training                                       Modalities             MHP or CP

## 2024-03-25 ENCOUNTER — OFFICE VISIT (OUTPATIENT)
Dept: PHYSICAL THERAPY | Facility: HOME HEALTHCARE | Age: 44
End: 2024-03-25
Payer: COMMERCIAL

## 2024-03-25 DIAGNOSIS — M54.32 SCIATICA OF LEFT SIDE: Primary | ICD-10-CM

## 2024-03-25 PROCEDURE — 97140 MANUAL THERAPY 1/> REGIONS: CPT

## 2024-03-25 PROCEDURE — 97112 NEUROMUSCULAR REEDUCATION: CPT

## 2024-03-25 PROCEDURE — 97110 THERAPEUTIC EXERCISES: CPT

## 2024-03-25 NOTE — PROGRESS NOTES
"Daily Note     Today's date: 3/25/2024  Patient name: Hemalatha Hahn  : 1980  MRN: [id number]  Referring provider: Meg Mckeon MD  Dx:   Encounter Diagnosis     ICD-10-CM    1. Sciatica of left side  M54.32                      Subjective: Pt reports she has no pain today.  Pt reports tightness L side of her LB.       Objective: See treatment diary below      Assessment: Tolerated treatment well. Pt reports tightness L side of her LB during exercise. Pt with no c/o pain L side LB or radicular symptoms t/o session or at end of session. Pt reports feeling good at end of session after laser with good skin integrity noted after modalities. Patient would benefit from continued PT      Plan: Continue per plan of care.      Precautions: None    Re-eval Date: 2024      Manuals  3/4 3/7 3/21 3/25        Doron glute, hs stretch GELY HONG JK        Laser L lumbar paraspinals  20W 6000 J 20W 6000 J 20W 6000 J 20W  6000 J        IASTM lumbar paraspinals             Manual lumbar traction  GELY HONG          Neuro Re-Ed             TrA activation             PPT HEP 5\"x15 5\"x15 5\"x15 5\"x15        PPT w/ marches  1x5 doron 1x10 doron 1x15 doron 1x15 Doron        PPT w/ SLR    1x10 doron 1x10 Doron        Palloff press  Blue 5\"x10 R/L Black  5\"x10 R/L Black 5\"x10 R/L Black  5\"x10 R/L        MTP/LTP                          Ther Ex             Treadmill  1.8 MPH 5 min 1.8 MPH 5 min 1.8 MPH 5 min 1.8 mph  5 min        LTR             SKTC             Self pisiformis/glute stretch HEP            Standing lumbar ext HEP 5\"x10 5\"x10 5\"x10 5\"x10        Prone on elbows  2 min 2 min 2 min 2 min         Prone press-up HEP 1x10 1x10 1x10 1x10        Bridges HEP 1x10 1x10 1x15 NV        SLR             Clamshells             S/L hip abd HEP 1x10 doron X15 L, x10 R X15 doron X 15 Doron        Prone hip ext HEP 1x10 doron 1x10 doron 1x10 doron 1x10 Doron        Squat                                       Ther Activity                                 "       Gait Training                                       Modalities             MHP or CP

## 2024-03-28 ENCOUNTER — APPOINTMENT (OUTPATIENT)
Dept: PHYSICAL THERAPY | Facility: HOME HEALTHCARE | Age: 44
End: 2024-03-28
Payer: COMMERCIAL

## 2024-04-01 ENCOUNTER — EVALUATION (OUTPATIENT)
Dept: PHYSICAL THERAPY | Facility: HOME HEALTHCARE | Age: 44
End: 2024-04-01
Payer: COMMERCIAL

## 2024-04-01 DIAGNOSIS — M54.32 SCIATICA OF LEFT SIDE: Primary | ICD-10-CM

## 2024-04-01 PROCEDURE — 97112 NEUROMUSCULAR REEDUCATION: CPT

## 2024-04-01 PROCEDURE — 97110 THERAPEUTIC EXERCISES: CPT

## 2024-04-01 PROCEDURE — 97140 MANUAL THERAPY 1/> REGIONS: CPT

## 2024-04-01 NOTE — PROGRESS NOTES
PT Re-Evaluation     Today's date: 2024  Patient name: Hemalatha Hahn  : 1980  MRN: [id number]  Referring provider: Meg Mckeon MD  Dx:   Encounter Diagnosis     ICD-10-CM    1. Sciatica of left side  M54.32             Start Time: 1718  Stop Time: 1805  Total time in clinic (min): 47 minutes    Assessment  Assessment details: The pt has made significant improvements in pain, strength, ROM, and overall functional mobility since beginning PT.  She has met her short-term goals for HEP, pain, and lumbar ROM, however, she is still progressing towards her long-term goals for pain, ROM, and overall functional mobility.  Although improving, the pt does continue to have mild LLE weakness, likely due to residual deficits from radicular symptoms.  The pt would benefit from continued PT in order to further improve in these areas and progress towards her goals.    Impairments: abnormal or restricted ROM, activity intolerance, impaired physical strength, lacks appropriate home exercise program, pain with function, weight-bearing intolerance and poor body mechanics  Understanding of Dx/Px/POC: good   Prognosis: good    Goals  STG: 3-4 weeks  Pt will be independent with HEP in order to continue to progress outside of PT treatment sessions. Met  Pt will improve in quality of life as demonstrated by worst pain levels of 5/10 or less. Met  Pt will improve in functional mobility as demonstrated by minimal restrictions in lumbar AROM. Met  LT-8 weeks  Pt will improve in quality of life as demonstrated by worst pain levels of 2/10 or less.  Pt will improve in functional mobility as demonstrated by strength levels of 4+/5 or greater.  Pt will improve in functional mobility as demonstrated by lumbar AROM WNL.  Pt will improve in functional mobility as demonstrated by full centralization of lumbar radicular symptoms.  Pt will improve in functional mobility as demonstrated by ability to perform full ADLs without any  limitations due to pain, ROM deficits, or weakness.    Plan  Patient would benefit from: skilled physical therapy  Planned modality interventions: cryotherapy, low level laser therapy and thermotherapy: hydrocollator packs  Planned therapy interventions: body mechanics training, flexibility, functional ROM exercises, home exercise program, IASTM, joint mobilization, manual therapy, massage, neuromuscular re-education, patient education, postural training, strengthening, stretching, therapeutic activities and therapeutic exercise  Frequency: 2x week  Duration in weeks: 12  Plan of Care beginning date: 2/28/2024  Plan of Care expiration date: 5/22/2024  Treatment plan discussed with: patient        Subjective Evaluation    History of Present Illness  Mechanism of injury: UPDATE 04/01/2024:  The pt reports that her back is feeling good and that she has no pain today.  She reports that her pain is only getting up to around a 4/10 now and that it's mostly when she first gets up in the morning.  The pt reports that she was taking prednisone for RSV, but that she has not been on it for a week and is still relatively pain free.    Pt is presenting to OPPT with acute exacerbation of chronic L sided low back pain, which flared up in October.  She reports that she started to experience significant sciatic pain into her L LE/foot.  She reports that she went to the chiropractor and that now the pain has moved to mostly on the L side of her low back/SIJ region.  She reports that she does have some residual pain down to her calf, but reports that most of the days is stays near her thigh.  She reports that sitting, driving, getting up after sleeping, etc, increases her pain and that she typically experiences relief when standing and walking.  The pt does a lot of sitting at work, which exacerbates her symptoms.  At the chiropractor, she would do traction, lumbar extension, seated lumbar flex stretch, etc.  The pt reports that she  feels like she needs to do core strengthening.  Patient Goals  Patient goals for therapy: decreased pain, increased motion, increased strength, independence with ADLs/IADLs and return to sport/leisure activities  Patient goal: To get rid of the pain so she can get more physical  Pain  Current pain ratin  At best pain ratin  At worst pain ratin  Quality: sharp  Relieving factors: medications (Prednisone while taking it)  Aggravating factors: sitting (Squatting)      Diagnostic Tests  MRI studies: abnormal  Treatments  Current treatment: chiropractic        Objective     Postural Observations  Seated posture: fair  Standing posture: fair      Palpation   Left   Hypertonic in the lumbar paraspinals.   Muscle spasm in the lumbar paraspinals.   Tenderness of the lumbar paraspinals.   Trigger point to lumbar paraspinals.     Neurological Testing     Sensation     Lumbar   Left   Intact: light touch    Right   Intact: light touch    Active Range of Motion     Lumbar   Flexion: Active lumbar flexion: 15 cm.  WFL  Extension:  Restriction level: minimal  Left lateral flexion:  WFL  Right lateral flexion:  WFL  Left rotation:  WFL  Right rotation:  WFL    Passive Range of Motion   Left Hip   Flexion: WFL  Abduction: WFL  External rotation (90/90): WFL  Internal rotation (90/90): WFL    Right Hip   Flexion: WFL  Abduction: WFL  External rotation (90/90): WFL  Internal rotation (90/90): WFL    Additional Passive Range of Motion Details  L glute/piriformis tightness    Joint Play   Joints within functional limits: L1, L2 and L3     Hypomobile: L4, L5 and S1     Pain: L4, L5 and S1   Mechanical Assessment    Cervical      Thoracic      Lumbar    Standing flexion: repeated movements   Pain location:peripheralized  Standing extension: repeated movements  Pain location: centralized    Strength/Myotome Testing     Left Hip   Planes of Motion   Flexion: 4+  Abduction: 4+  Adduction: 4+    Right Hip   Planes of Motion  "  Flexion: 5  Abduction: 4+  Adduction: 4+    Left Knee   Flexion: 4+  Extension: 5    Right Knee   Flexion: 5  Extension: 5    Left Ankle/Foot   Dorsiflexion: 4+  Plantar flexion: 4+    Right Ankle/Foot   Dorsiflexion: 5  Plantar flexion: 5    Tests     Lumbar     Left   Positive passive SLR and slump test.     Left Hip   SLR: Positive.     Right Hip   SLR: Negative.     Additional Tests Details  SLR: L: 62, R: WNL               Precautions:     Re-eval Date: 03/28/2024      Manuals 2/28 3/4 3/7 3/21 3/25 4/1       Jeremy glute, hs stretch GELY HONG JK JA       Laser L lumbar paraspinals  20W 6000 J 20W 6000 J 20W 6000 J 20W  6000 J 20W  6000 J       IASTM lumbar paraspinals             Manual lumbar traction  GELY HONG          Neuro Re-Ed             TrA activation             PPT HEP 5\"x15 5\"x15 5\"x15 5\"x15 5\"x15       PPT w/ marches  1x5 jeremy 1x10 jeremy 1x15 jeremy 1x15 Jeremy 1x15 jeremy       PPT w/ SLR    1x10 jeremy 1x10 Jeremy 1x10 jeremy       Palloff press  Blue 5\"x10 R/L Black  5\"x10 R/L Black 5\"x10 R/L Black  5\"x10 R/L Black  5\"x10 R/L       MTP/LTP                          Ther Ex             Treadmill  1.8 MPH 5 min 1.8 MPH 5 min 1.8 MPH 5 min 1.8 mph  5 min 1.8 mph  5 min       LTR             SKTC             Self pisiformis/glute stretch HEP            Standing lumbar ext HEP 5\"x10 5\"x10 5\"x10 5\"x10 5\"x10       Prone on elbows  2 min 2 min 2 min 2 min  2 min       Prone press-up HEP 1x10 1x10 1x10 1x10 1x10       Bridges HEP 1x10 1x10 1x15 NV 1x15       SLR             Clamshells             S/L hip abd HEP 1x10 jeremy X15 L, x10 R X15 jeremy X 15 Jeremy X 15 Jeremy       Prone hip ext HEP 1x10 jeremy 1x10 jeremy 1x10 jeremy 1x10 Jeremy 1x10 jeremy       Squat                                       Ther Activity                                       Gait Training                                       Modalities             MHP or CP                               "

## 2024-04-04 ENCOUNTER — OFFICE VISIT (OUTPATIENT)
Dept: PHYSICAL THERAPY | Facility: HOME HEALTHCARE | Age: 44
End: 2024-04-04
Payer: COMMERCIAL

## 2024-04-04 DIAGNOSIS — M54.32 SCIATICA OF LEFT SIDE: Primary | ICD-10-CM

## 2024-04-04 PROCEDURE — 97140 MANUAL THERAPY 1/> REGIONS: CPT

## 2024-04-04 PROCEDURE — 97110 THERAPEUTIC EXERCISES: CPT

## 2024-04-04 NOTE — PROGRESS NOTES
"Daily Note     Today's date: 2024  Patient name: Hemalatha Hahn  : 1980  MRN: [id number]  Referring provider: Meg Mckeon MD  Dx:   Encounter Diagnosis     ICD-10-CM    1. Sciatica of left side  M54.32           Start Time:   Stop Time: 175  Total time in clinic (min): 42 minutes    Subjective: Pt denies any pain upon arrival. She notes that she may DC next session.       Objective: See treatment diary below      Assessment: Tolerated treatment well. Pt was able to perform all Ambar well without pain. Pt was with PTA from  to . She was with PT Tato Palacios for laser from  to  Patient demonstrated fatigue post treatment, exhibited good technique with therapeutic exercises, and would benefit from continued PT      Plan: Potential discharge next visit.     Precautions:     Re-eval Date: 2024      Manuals 2/28 3/4 3/7 3/21 3/25 4/1 4/4      Doron glute, hs stretch GELY HONG JK JA       Laser L lumbar paraspinals  20W 6000 J 20W 6000 J 20W 6000 J 20W  6000 J 20W  6000 J 20W 6000 J      IASTM lumbar paraspinals             Manual lumbar traction  GELY HONG          Neuro Re-Ed             TrA activation             PPT HEP 5\"x15 5\"x15 5\"x15 5\"x15 5\"x15 5\" x15      PPT w/ marches  1x5 doron 1x10 doron 1x15 odron 1x15 Doron 1x15 doron 1x15 doron      PPT w/ SLR    1x10 doron 1x10 Doron 1x10 doron 1x10 doron      Palloff press  Blue 5\"x10 R/L Black  5\"x10 R/L Black 5\"x10 R/L Black  5\"x10 R/L Black  5\"x10 R/L Black 5\"x10 R/L       MTP/LTP                          Ther Ex             Treadmill  1.8 MPH 5 min 1.8 MPH 5 min 1.8 MPH 5 min 1.8 mph  5 min 1.8 mph  5 min 1.8 mph 5min       LTR             SKTC             Self pisiformis/glute stretch HEP            Standing lumbar ext HEP 5\"x10 5\"x10 5\"x10 5\"x10 5\"x10 5\"x10      Prone on elbows  2 min 2 min 2 min 2 min  2 min 2min       Prone press-up HEP 1x10 1x10 1x10 1x10 1x10 1x10      Bridges HEP 1x10 1x10 1x15 NV 1x15 1x15      NATO Horner  "            S/L hip abd HEP 1x10 jeremy X15 L, x10 R X15 jeremy X 15 Jeremy X 15 Jeremy X15 Jeremy       Prone hip ext HEP 1x10 jeremy 1x10 jeremy 1x10 jeremy 1x10 Jeremy 1x10 jeremy 1x10 Jeremy       Squat                                       Ther Activity                                       Gait Training                                       Modalities             MHP or CP

## 2024-04-08 ENCOUNTER — OFFICE VISIT (OUTPATIENT)
Dept: INTERNAL MEDICINE CLINIC | Facility: CLINIC | Age: 44
End: 2024-04-08
Payer: COMMERCIAL

## 2024-04-08 ENCOUNTER — OFFICE VISIT (OUTPATIENT)
Dept: PHYSICAL THERAPY | Facility: HOME HEALTHCARE | Age: 44
End: 2024-04-08
Payer: COMMERCIAL

## 2024-04-08 VITALS
HEIGHT: 67 IN | TEMPERATURE: 98.2 F | BODY MASS INDEX: 45.99 KG/M2 | SYSTOLIC BLOOD PRESSURE: 128 MMHG | WEIGHT: 293 LBS | DIASTOLIC BLOOD PRESSURE: 72 MMHG | OXYGEN SATURATION: 98 % | HEART RATE: 100 BPM

## 2024-04-08 DIAGNOSIS — F41.8 DEPRESSION WITH ANXIETY: ICD-10-CM

## 2024-04-08 DIAGNOSIS — Z00.00 ROUTINE ADULT HEALTH MAINTENANCE: Primary | ICD-10-CM

## 2024-04-08 DIAGNOSIS — M54.32 SCIATICA OF LEFT SIDE: Primary | ICD-10-CM

## 2024-04-08 DIAGNOSIS — F98.8 ATTENTION DEFICIT DISORDER (ADD) WITHOUT HYPERACTIVITY: ICD-10-CM

## 2024-04-08 DIAGNOSIS — J45.21 MILD INTERMITTENT ASTHMA WITH ACUTE EXACERBATION: ICD-10-CM

## 2024-04-08 DIAGNOSIS — M06.9 RHEUMATOID ARTHRITIS INVOLVING MULTIPLE SITES, UNSPECIFIED WHETHER RHEUMATOID FACTOR PRESENT (HCC): ICD-10-CM

## 2024-04-08 DIAGNOSIS — G47.33 OBSTRUCTIVE SLEEP APNEA: ICD-10-CM

## 2024-04-08 DIAGNOSIS — Z00.00 ROUTINE GENERAL MEDICAL EXAMINATION AT A HEALTH CARE FACILITY: ICD-10-CM

## 2024-04-08 DIAGNOSIS — E78.5 MILD HYPERLIPIDEMIA: ICD-10-CM

## 2024-04-08 PROBLEM — J21.0 RSV BRONCHIOLITIS: Status: RESOLVED | Noted: 2024-03-14 | Resolved: 2024-04-08

## 2024-04-08 PROCEDURE — 99396 PREV VISIT EST AGE 40-64: CPT | Performed by: NURSE PRACTITIONER

## 2024-04-08 PROCEDURE — 97112 NEUROMUSCULAR REEDUCATION: CPT

## 2024-04-08 NOTE — PROGRESS NOTES
"Daily Note     Today's date: 2024  Patient name: Hemalatha Hahn  : 1980  MRN: [id number]  Referring provider: Meg Mckeon MD  Dx: No diagnosis found.    Start Time: 0503          Subjective:   I sometimes have a little soreness when I wake up in the morning but other than that I am doing really well.      Objective: See treatment diary below    Assessment: Pt pipe session well and without c/o pain with all TE. Pt with good form with PPT and lumbar stabilization. A few vc's needed for correct form during SL hip abd ex. Discussed holding Laser tx 2* no pain and pt was in agreement. Pt declined resuming B LE MT. Pt states feeling well at end of Tx. Patient would benefit from continued PT    Plan: Continue per plan of care. Possible DC NV.      Precautions:     Re-eval Date: 2024      Manuals 2/28 3/4 3/7 3/21 3/25 4/1 4/4 4-8     Doron glute, hs stretch GELY HONG  Pt declined     Laser L lumbar paraspinals  20W 6000 J 20W 6000 J 20W 6000 J 20W  6000 J 20W  6000 J 20W 6000 J Held 2* no pain      IASTM lumbar paraspinals             Manual lumbar traction  GELY HONG          Neuro Re-Ed        4-8     TrA activation             PPT HEP 5\"x15 5\"x15 5\"x15 5\"x15 5\"x15 5\" x15 5\" x15     PPT w/ marches  1x5 doron 1x10 doron 1x15 doron 1x15 Doron 1x15 doron 1x15 doron 1x15  doron     PPT w/ SLR    1x10 doron 1x10 Doron 1x10 doron 1x10 doron 1x10  doron     Palloff press  Blue 5\"x10 R/L Black  5\"x10 R/L Black 5\"x10 R/L Black  5\"x10 R/L Black  5\"x10 R/L Black 5\"x10 R/L  Black  5\" x10  R/L     MTP/LTP                          Ther Ex             Treadmill  1.8 MPH 5 min 1.8 MPH 5 min 1.8 MPH 5 min 1.8 mph  5 min 1.8 mph  5 min 1.8 mph 5min  1.8 mph  5'     LTR             SKTC             Self pisiformis/glute stretch HEP            Standing lumbar ext HEP 5\"x10 5\"x10 5\"x10 5\"x10 5\"x10 5\"x10 5\" x10     Prone on elbows  2 min 2 min 2 min 2 min  2 min 2min  2'     Prone press-up HEP 1x10 1x10 1x10 1x10 1x10 1x10 1x10     Bridges " HEP 1x10 1x10 1x15 NV 1x15 1x15 1x15     SLR             Clamshells             S/L hip abd HEP 1x10 jeremy X15 L, x10 R X15 jeremy X 15 Jeremy X 15 Jeremy X15 Jeremy  X15  jeremy     Prone hip ext HEP 1x10 jeremy 1x10 jeremy 1x10 jeremy 1x10 Jeremy 1x10 jeremy 1x10 Jeremy  1x10  jeremy     Squat                                       Ther Activity                                       Gait Training                                       Modalities             MHP or CP

## 2024-04-08 NOTE — PROGRESS NOTES
Name: Hemalatha Hahn      : 1980      MRN: [id number]  Encounter Provider: LIONEL Marsh  Encounter Date: 2024   Encounter department: [facility]    Assessment & Plan Will order fasting labs. Up to date on screenings. Will call insurance about RSV vaccine. Will notify once labs are back. Will follow up in one year or sooner if need be.     1. Routine adult health maintenance  -     Comprehensive metabolic panel; Future  -     CBC and differential; Future  -     TSH, 3rd generation with Free T4 reflex; Future    2. Mild hyperlipidemia  -     Comprehensive metabolic panel; Future  -     CBC and differential; Future  -     TSH, 3rd generation with Free T4 reflex; Future  -     Lipid panel; Future    3. Depression with anxiety  -     Comprehensive metabolic panel; Future  -     CBC and differential; Future  -     TSH, 3rd generation with Free T4 reflex; Future    4. Obstructive sleep apnea  -     Comprehensive metabolic panel; Future  -     CBC and differential; Future  -     TSH, 3rd generation with Free T4 reflex; Future    5. Rheumatoid arthritis involving multiple sites, unspecified whether rheumatoid factor present (HCC)  -     Comprehensive metabolic panel; Future  -     CBC and differential; Future  -     TSH, 3rd generation with Free T4 reflex; Future    6. Routine general medical examination at a health care facility  -     Hemoglobin A1C    7. Mild intermittent asthma with acute exacerbation        Depression Screening and Follow-up Plan: Patient was screened for depression during today's encounter. They screened negative with a PHQ-9 score of 0.        Misael Penny is for a wellness. She did have RSV almost one month ago and was very sick. She is doing better now. She continues to see Weight management and is taking Zepbound. She did loose 10 lbs but stopped due to being sick and put some weight back on. She continues to see a dietician. She is up to  "date on her screenings and is calling her insurance about the RSV vaccine. She would like the pneumonia vaccine for the fall. She offers no other issues.      Review of Systems   All other systems reviewed and are negative.      Current Outpatient Medications on File Prior to Visit   Medication Sig    fexofenadine (ALLEGRA) 180 MG tablet Take 180 mg by mouth daily    FLUoxetine (PROzac) 40 MG capsule Take 1 capsule (40 mg total) by mouth daily    fluticasone (FLONASE) 50 mcg/act nasal spray 2 sprays into each nostril daily    ibuprofen (MOTRIN) 200 mg tablet Take 200 mg by mouth every 6 (six) hours as needed for mild pain.    methylphenidate (CONCERTA) 54 MG ER tablet Take 1 tablet (54 mg total) by mouth daily Max Daily Amount: 54 mg    mometasone-formoterol (DULERA) 200-5 MCG/ACT inhaler Inhale 2 puffs 2 (two) times a day Rinse mouth after use.    Multiple Vitamins-Minerals (Multivitamin Adult, Minerals,) TABS     tirzepatide (Zepbound) 5 mg/0.5 mL auto-injector Inject 0.5 mL (5 mg total) under the skin once a week for 28 days Then increase to 7.5 mg    topiramate (Topamax) 50 MG tablet Take 1 tab po daily in AM    Xeljanz XR 11 MG TB24     tirzepatide (Zepbound) 2.5 mg/0.5 mL auto-injector Inject 0.5 mL (2.5 mg total) under the skin once a week for 28 days Then increase to 5 mg (Patient not taking: Reported on 4/8/2024)    tirzepatide (Zepbound) 7.5 mg/0.5 mL auto-injector Inject 0.5 mL (7.5 mg total) under the skin once a week for 28 days The increase to 10 mg    [DISCONTINUED] guaiFENesin (MUCINEX) 600 mg 12 hr tablet Take 2 tablets (1,200 mg total) by mouth every 12 (twelve) hours    [DISCONTINUED] predniSONE 10 mg tablet Six pills a day for 2 days, 5 pills a day for 2 days, 4 pills a day for 2 days, 3 pills a day for 2 days, 2 pills a day for 2 days, 1 pill a day for 2 days       Objective     /72   Pulse 100   Temp 98.2 °F (36.8 °C) (Temporal)   Ht 5' 7\" (1.702 m)   Wt (!) 151 kg (332 lb 8 oz)   " SpO2 98%   BMI 52.08 kg/m²     Physical Exam  Vitals reviewed.   Constitutional:       Appearance: Normal appearance. She is obese.   HENT:      Head: Normocephalic and atraumatic.      Right Ear: Tympanic membrane, ear canal and external ear normal.      Left Ear: Tympanic membrane, ear canal and external ear normal.      Nose: Nose normal.      Mouth/Throat:      Mouth: Mucous membranes are moist.      Pharynx: Oropharynx is clear.   Eyes:      Extraocular Movements: Extraocular movements intact.      Conjunctiva/sclera: Conjunctivae normal.      Pupils: Pupils are equal, round, and reactive to light.   Cardiovascular:      Rate and Rhythm: Normal rate and regular rhythm.      Pulses: Normal pulses.      Heart sounds: Normal heart sounds.   Pulmonary:      Effort: Pulmonary effort is normal.      Breath sounds: Normal breath sounds.   Abdominal:      General: Abdomen is flat. Bowel sounds are normal.      Palpations: Abdomen is soft.   Musculoskeletal:         General: Normal range of motion.      Cervical back: Normal range of motion and neck supple.   Skin:     General: Skin is warm and dry.      Capillary Refill: Capillary refill takes less than 2 seconds.   Neurological:      General: No focal deficit present.      Mental Status: She is alert and oriented to person, place, and time. Mental status is at baseline.   Psychiatric:         Mood and Affect: Mood normal.         Behavior: Behavior normal.         Thought Content: Thought content normal.         Judgment: Judgment normal.       LIONEL Marsh

## 2024-04-09 ENCOUNTER — APPOINTMENT (OUTPATIENT)
Dept: LAB | Facility: HOSPITAL | Age: 44
End: 2024-04-09
Payer: COMMERCIAL

## 2024-04-09 DIAGNOSIS — M06.9 RHEUMATOID ARTHRITIS INVOLVING MULTIPLE SITES, UNSPECIFIED WHETHER RHEUMATOID FACTOR PRESENT (HCC): ICD-10-CM

## 2024-04-09 DIAGNOSIS — E78.5 MILD HYPERLIPIDEMIA: ICD-10-CM

## 2024-04-09 DIAGNOSIS — G47.33 OBSTRUCTIVE SLEEP APNEA: ICD-10-CM

## 2024-04-09 DIAGNOSIS — Z79.899 ENCOUNTER FOR LONG-TERM (CURRENT) USE OF OTHER MEDICATIONS: ICD-10-CM

## 2024-04-09 DIAGNOSIS — M06.89 OTHER SPECIFIED RHEUMATOID ARTHRITIS, MULTIPLE SITES (HCC): ICD-10-CM

## 2024-04-09 DIAGNOSIS — F41.8 DEPRESSION WITH ANXIETY: ICD-10-CM

## 2024-04-09 DIAGNOSIS — Z00.00 ROUTINE ADULT HEALTH MAINTENANCE: ICD-10-CM

## 2024-04-09 LAB
ALBUMIN SERPL BCP-MCNC: 4 G/DL (ref 3.5–5)
ALP SERPL-CCNC: 57 U/L (ref 34–104)
ALT SERPL W P-5'-P-CCNC: 25 U/L (ref 7–52)
ANION GAP SERPL CALCULATED.3IONS-SCNC: 7 MMOL/L (ref 4–13)
AST SERPL W P-5'-P-CCNC: 19 U/L (ref 13–39)
BASOPHILS # BLD AUTO: 0.09 THOUSANDS/ÂΜL (ref 0–0.1)
BASOPHILS NFR BLD AUTO: 2 % (ref 0–1)
BILIRUB SERPL-MCNC: 1.1 MG/DL (ref 0.2–1)
BUN SERPL-MCNC: 21 MG/DL (ref 5–25)
CALCIUM SERPL-MCNC: 9.3 MG/DL (ref 8.4–10.2)
CHLORIDE SERPL-SCNC: 104 MMOL/L (ref 96–108)
CHOLEST SERPL-MCNC: 194 MG/DL
CO2 SERPL-SCNC: 25 MMOL/L (ref 21–32)
CREAT SERPL-MCNC: 1.03 MG/DL (ref 0.6–1.3)
CRP SERPL QL: 2.4 MG/L
EOSINOPHIL # BLD AUTO: 0.43 THOUSAND/ÂΜL (ref 0–0.61)
EOSINOPHIL NFR BLD AUTO: 8 % (ref 0–6)
ERYTHROCYTE [DISTWIDTH] IN BLOOD BY AUTOMATED COUNT: 13.5 % (ref 11.6–15.1)
ERYTHROCYTE [SEDIMENTATION RATE] IN BLOOD: 29 MM/HOUR (ref 0–19)
EST. AVERAGE GLUCOSE BLD GHB EST-MCNC: 105 MG/DL
GFR SERPL CREATININE-BSD FRML MDRD: 66 ML/MIN/1.73SQ M
GLUCOSE P FAST SERPL-MCNC: 83 MG/DL (ref 65–99)
HBA1C MFR BLD: 5.3 %
HCT VFR BLD AUTO: 43.6 % (ref 34.8–46.1)
HDLC SERPL-MCNC: 48 MG/DL
HGB BLD-MCNC: 13.7 G/DL (ref 11.5–15.4)
IMM GRANULOCYTES # BLD AUTO: 0.02 THOUSAND/UL (ref 0–0.2)
IMM GRANULOCYTES NFR BLD AUTO: 0 % (ref 0–2)
LDLC SERPL CALC-MCNC: 115 MG/DL (ref 0–100)
LYMPHOCYTES # BLD AUTO: 1.43 THOUSANDS/ÂΜL (ref 0.6–4.47)
LYMPHOCYTES NFR BLD AUTO: 25 % (ref 14–44)
MCH RBC QN AUTO: 30.2 PG (ref 26.8–34.3)
MCHC RBC AUTO-ENTMCNC: 31.4 G/DL (ref 31.4–37.4)
MCV RBC AUTO: 96 FL (ref 82–98)
MONOCYTES # BLD AUTO: 0.51 THOUSAND/ÂΜL (ref 0.17–1.22)
MONOCYTES NFR BLD AUTO: 9 % (ref 4–12)
NEUTROPHILS # BLD AUTO: 3.24 THOUSANDS/ÂΜL (ref 1.85–7.62)
NEUTS SEG NFR BLD AUTO: 56 % (ref 43–75)
NONHDLC SERPL-MCNC: 146 MG/DL
NRBC BLD AUTO-RTO: 0 /100 WBCS
PLATELET # BLD AUTO: 277 THOUSANDS/UL (ref 149–390)
PMV BLD AUTO: 10.6 FL (ref 8.9–12.7)
POTASSIUM SERPL-SCNC: 4.1 MMOL/L (ref 3.5–5.3)
PROT SERPL-MCNC: 7.3 G/DL (ref 6.4–8.4)
RBC # BLD AUTO: 4.54 MILLION/UL (ref 3.81–5.12)
SODIUM SERPL-SCNC: 136 MMOL/L (ref 135–147)
TRIGL SERPL-MCNC: 157 MG/DL
TSH SERPL DL<=0.05 MIU/L-ACNC: 2.1 UIU/ML (ref 0.45–4.5)
WBC # BLD AUTO: 5.72 THOUSAND/UL (ref 4.31–10.16)

## 2024-04-09 PROCEDURE — 86140 C-REACTIVE PROTEIN: CPT

## 2024-04-09 PROCEDURE — 80061 LIPID PANEL: CPT

## 2024-04-09 PROCEDURE — 85025 COMPLETE CBC W/AUTO DIFF WBC: CPT

## 2024-04-09 PROCEDURE — 80053 COMPREHEN METABOLIC PANEL: CPT

## 2024-04-09 PROCEDURE — 36415 COLL VENOUS BLD VENIPUNCTURE: CPT

## 2024-04-09 PROCEDURE — 83036 HEMOGLOBIN GLYCOSYLATED A1C: CPT | Performed by: NURSE PRACTITIONER

## 2024-04-09 PROCEDURE — 84443 ASSAY THYROID STIM HORMONE: CPT

## 2024-04-09 PROCEDURE — 85652 RBC SED RATE AUTOMATED: CPT

## 2024-04-09 RX ORDER — METHYLPHENIDATE HYDROCHLORIDE 54 MG/1
54 TABLET ORAL DAILY
Qty: 30 TABLET | Refills: 0 | Status: SHIPPED | OUTPATIENT
Start: 2024-04-09

## 2024-04-09 RX ORDER — FLUOXETINE HYDROCHLORIDE 40 MG/1
40 CAPSULE ORAL DAILY
Qty: 90 CAPSULE | Refills: 0 | Status: SHIPPED | OUTPATIENT
Start: 2024-04-09

## 2024-04-10 DIAGNOSIS — E78.5 MILD HYPERLIPIDEMIA: ICD-10-CM

## 2024-04-10 DIAGNOSIS — G47.33 OBSTRUCTIVE SLEEP APNEA: ICD-10-CM

## 2024-04-10 DIAGNOSIS — E66.01 CLASS 3 OBESITY (HCC): ICD-10-CM

## 2024-04-10 RX ORDER — TIRZEPATIDE 10 MG/.5ML
10 INJECTION, SOLUTION SUBCUTANEOUS WEEKLY
Qty: 2 ML | Refills: 0 | Status: SHIPPED | OUTPATIENT
Start: 2024-04-10 | End: 2024-05-08

## 2024-04-10 RX ORDER — TIRZEPATIDE 7.5 MG/.5ML
7.5 INJECTION, SOLUTION SUBCUTANEOUS WEEKLY
Qty: 2 ML | Refills: 0 | Status: SHIPPED | OUTPATIENT
Start: 2024-04-10 | End: 2024-05-08

## 2024-04-11 ENCOUNTER — OFFICE VISIT (OUTPATIENT)
Dept: PHYSICAL THERAPY | Facility: HOME HEALTHCARE | Age: 44
End: 2024-04-11
Payer: COMMERCIAL

## 2024-04-11 DIAGNOSIS — M54.32 SCIATICA OF LEFT SIDE: Primary | ICD-10-CM

## 2024-04-11 DIAGNOSIS — E66.01 MORBID OBESITY WITH BMI OF 50.0-59.9, ADULT (HCC): ICD-10-CM

## 2024-04-11 PROCEDURE — 97112 NEUROMUSCULAR REEDUCATION: CPT

## 2024-04-11 PROCEDURE — 97110 THERAPEUTIC EXERCISES: CPT

## 2024-04-11 NOTE — PROGRESS NOTES
PT Discharge    Today's date: 2024  Patient name: Hemalatha Hahn  : 1980  MRN: [id number]  Referring provider: Meg Mckeon MD  Dx:   Encounter Diagnosis     ICD-10-CM    1. Sciatica of left side  M54.32               Start Time: 171  Stop Time: 1749  Total time in clinic (min): 34 minutes    Assessment  Assessment details: The pt has made significant improvements in pain, strength, ROM, and overall functional mobility since beginning PT and she has either met or is progressing towards all of her goals.  She has demonstrated independence with her HEP and she is confident in her ability to adhere to her HEP and further progress on her own.  The pt will be D/C to her HEP at this time.  Understanding of Dx/Px/POC: good   Prognosis: good    Goals  STG: 3-4 weeks  Pt will be independent with HEP in order to continue to progress outside of PT treatment sessions. Met  Pt will improve in quality of life as demonstrated by worst pain levels of 5/10 or less. Met  Pt will improve in functional mobility as demonstrated by minimal restrictions in lumbar AROM. Met  LT-8 weeks  Pt will improve in quality of life as demonstrated by worst pain levels of 2/10 or less.  Pt will improve in functional mobility as demonstrated by strength levels of 4+/5 or greater.  Pt will improve in functional mobility as demonstrated by lumbar AROM WNL.  Pt will improve in functional mobility as demonstrated by full centralization of lumbar radicular symptoms.  Pt will improve in functional mobility as demonstrated by ability to perform full ADLs without any limitations due to pain, ROM deficits, or weakness.    Plan  Plan details: D/C to HEP  Treatment plan discussed with: patient        Subjective Evaluation    History of Present Illness  Mechanism of injury: UPDATE 2024:  The pt reports that her back is feeling good today and does not have any pain.  She reports that her back only gets up to about a 3/10 at the most in  the morning, but reports that the pain is not consistent.  The pt also denies any recent radicular symptoms.  She reports good compliance with her HEP and feels she is ready for D/C to her HEP.    UPDATE 2024:  The pt reports that her back is feeling good and that she has no pain today.  She reports that her pain is only getting up to around a 4/10 now and that it's mostly when she first gets up in the morning.  The pt reports that she was taking prednisone for RSV, but that she has not been on it for a week and is still relatively pain free.    Pt is presenting to OPPT with acute exacerbation of chronic L sided low back pain, which flared up in October.  She reports that she started to experience significant sciatic pain into her L LE/foot.  She reports that she went to the chiropractor and that now the pain has moved to mostly on the L side of her low back/SIJ region.  She reports that she does have some residual pain down to her calf, but reports that most of the days is stays near her thigh.  She reports that sitting, driving, getting up after sleeping, etc, increases her pain and that she typically experiences relief when standing and walking.  The pt does a lot of sitting at work, which exacerbates her symptoms.  At the chiropractor, she would do traction, lumbar extension, seated lumbar flex stretch, etc.  The pt reports that she feels like she needs to do core strengthening.  Patient Goals  Patient goals for therapy: decreased pain, increased motion, increased strength, independence with ADLs/IADLs and return to sport/leisure activities  Patient goal: To get rid of the pain so she can get more physical  Pain  Current pain ratin  At best pain ratin  At worst pain rating: 3  Quality: sharp  Relieving factors: medications (Prednisone while taking it)  Aggravating factors: sitting (Squatting)      Diagnostic Tests  MRI studies: abnormal  Treatments  Current treatment: chiropractic        Objective  "    Postural Observations  Seated posture: fair  Standing posture: fair      Neurological Testing     Sensation     Lumbar   Left   Intact: light touch    Right   Intact: light touch    Active Range of Motion     Lumbar   Flexion: Active lumbar flexion: 15 cm.  WFL  Extension:  WFL  Left lateral flexion:  WFL  Right lateral flexion:  WFL  Left rotation:  WFL  Right rotation:  WFL    Passive Range of Motion   Left Hip   Flexion: WFL  Abduction: WFL  External rotation (90/90): WFL  Internal rotation (90/90): WFL    Right Hip   Flexion: WFL  Abduction: WFL  External rotation (90/90): WFL  Internal rotation (90/90): WFL    Additional Passive Range of Motion Details  L glute/piriformis tightness    Joint Play   Joints within functional limits: L1, L2, L3, L4, L5 and S1   Mechanical Assessment    Cervical      Thoracic      Lumbar    Standing flexion: repeated movements   Pain location:peripheralized  Standing extension: repeated movements  Pain location: centralized    Strength/Myotome Testing     Left Hip   Planes of Motion   Flexion: 4+  Abduction: 4+  Adduction: 4+    Right Hip   Planes of Motion   Flexion: 5  Abduction: 4+  Adduction: 4+    Left Knee   Flexion: 4+  Extension: 5    Right Knee   Flexion: 5  Extension: 5    Left Ankle/Foot   Dorsiflexion: 4+  Plantar flexion: 4+    Right Ankle/Foot   Dorsiflexion: 5  Plantar flexion: 5    Tests     Left Hip   SLR: Positive.     Right Hip   SLR: Negative.     Additional Tests Details  SLR: L: 62, R: WNL               Precautions:     Re-eval Date: 03/28/2024      Manuals 2/28 3/4 3/7 3/21 3/25 4/1 4/4 4-8 4/11    Jeremy glute, hs stretch GELY MATHEW JA  Pt declined     Laser L lumbar paraspinals  20W 6000 J 20W 6000 J 20W 6000 J 20W  6000 J 20W  6000 J 20W 6000 J Held 2* no pain      IASTM lumbar paraspinals             Manual lumbar traction  GELY HONG          Neuro Re-Ed        4-8     TrA activation             PPT HEP 5\"x15 5\"x15 5\"x15 5\"x15 5\"x15 5\" x15 5\" x15 5\"x15  " "  PPT w/ marches  1x5 jeremy 1x10 jeremy 1x15 jeremy 1x15 Jeremy 1x15 jeremy 1x15 jeremy 1x15  jeremy 1x15 jeremy    PPT w/ SLR    1x10 jeremy 1x10 Jeremy 1x10 jeremy 1x10 jeremy 1x10  jeremy 1x10 jeremy    Palloff press  Blue 5\"x10 R/L Black  5\"x10 R/L Black 5\"x10 R/L Black  5\"x10 R/L Black  5\"x10 R/L Black 5\"x10 R/L  Black  5\" x10  R/L Black  5\" x10  R/L    MTP/LTP                          Ther Ex             Treadmill  1.8 MPH 5 min 1.8 MPH 5 min 1.8 MPH 5 min 1.8 mph  5 min 1.8 mph  5 min 1.8 mph 5min  1.8 mph  5' 1.8 mph 5'    LTR             SKTC             Self pisiformis/glute stretch HEP            Standing lumbar ext HEP 5\"x10 5\"x10 5\"x10 5\"x10 5\"x10 5\"x10 5\" x10 5\"x10    Prone on elbows  2 min 2 min 2 min 2 min  2 min 2min  2' 2'    Prone press-up HEP 1x10 1x10 1x10 1x10 1x10 1x10 1x10 1x10    Bridges HEP 1x10 1x10 1x15 NV 1x15 1x15 1x15 1x15    SLR             Clamshells             S/L hip abd HEP 1x10 jeremy X15 L, x10 R X15 jeremy X 15 Jeremy X 15 Jeremy X15 Jeremy  X15  jeremy x15    Prone hip ext HEP 1x10 jeremy 1x10 jeremy 1x10 jeremy 1x10 Jeremy 1x10 jeremy 1x10 Jeremy  1x10  jeremy 1x10 jeremy    Squat                                       Ther Activity                                       Gait Training                                       Modalities             MHP or CP                               "

## 2024-04-12 RX ORDER — TOPIRAMATE 50 MG/1
TABLET, FILM COATED ORAL
Qty: 30 TABLET | Refills: 2 | Status: SHIPPED | OUTPATIENT
Start: 2024-04-12

## 2024-05-06 ENCOUNTER — HOSPITAL ENCOUNTER (EMERGENCY)
Facility: HOSPITAL | Age: 44
Discharge: HOME | End: 2024-05-06
Attending: EMERGENCY MEDICINE
Payer: COMMERCIAL

## 2024-05-06 ENCOUNTER — CLINICAL SUPPORT (OUTPATIENT)
Dept: BARIATRICS | Facility: CLINIC | Age: 44
End: 2024-05-06
Payer: COMMERCIAL

## 2024-05-06 ENCOUNTER — APPOINTMENT (EMERGENCY)
Dept: CT IMAGING | Facility: HOSPITAL | Age: 44
End: 2024-05-06
Payer: COMMERCIAL

## 2024-05-06 VITALS
RESPIRATION RATE: 9 BRPM | TEMPERATURE: 98.3 F | DIASTOLIC BLOOD PRESSURE: 76 MMHG | BODY MASS INDEX: 49.65 KG/M2 | HEART RATE: 91 BPM | OXYGEN SATURATION: 99 % | SYSTOLIC BLOOD PRESSURE: 137 MMHG | WEIGHT: 293 LBS

## 2024-05-06 VITALS
HEART RATE: 78 BPM | OXYGEN SATURATION: 99 % | BODY MASS INDEX: 45.99 KG/M2 | DIASTOLIC BLOOD PRESSURE: 70 MMHG | SYSTOLIC BLOOD PRESSURE: 96 MMHG | HEIGHT: 67 IN | WEIGHT: 293 LBS

## 2024-05-06 DIAGNOSIS — E66.01 OBESITY, CLASS III, BMI 40-49.9 (MORBID OBESITY) (HCC): Primary | ICD-10-CM

## 2024-05-06 DIAGNOSIS — I47.10 SVT (SUPRAVENTRICULAR TACHYCARDIA): Primary | ICD-10-CM

## 2024-05-06 DIAGNOSIS — E66.01 CLASS 3 SEVERE OBESITY DUE TO EXCESS CALORIES WITH BODY MASS INDEX (BMI) OF 50.0 TO 59.9 IN ADULT, UNSPECIFIED WHETHER SERIOUS COMORBIDITY PRESENT (HCC): ICD-10-CM

## 2024-05-06 LAB
ALBUMIN SERPL BCP-MCNC: 4.2 G/DL (ref 3.5–5)
ALP SERPL-CCNC: 50 U/L (ref 34–104)
ALT SERPL W P-5'-P-CCNC: 13 U/L (ref 7–52)
ANION GAP SERPL CALCULATED.3IONS-SCNC: 9 MMOL/L (ref 4–13)
AST SERPL W P-5'-P-CCNC: 16 U/L (ref 13–39)
BASOPHILS # BLD AUTO: 0.1 THOUSANDS/ÂΜL (ref 0–0.1)
BASOPHILS NFR BLD AUTO: 1 % (ref 0–1)
BILIRUB SERPL-MCNC: 1.28 MG/DL (ref 0.2–1)
BUN SERPL-MCNC: 17 MG/DL (ref 5–25)
CALCIUM SERPL-MCNC: 9.2 MG/DL (ref 8.4–10.2)
CHLORIDE SERPL-SCNC: 105 MMOL/L (ref 96–108)
CO2 SERPL-SCNC: 22 MMOL/L (ref 21–32)
CREAT SERPL-MCNC: 1.07 MG/DL (ref 0.6–1.3)
D DIMER PPP FEU-MCNC: 0.64 UG/ML FEU
EOSINOPHIL # BLD AUTO: 0.57 THOUSAND/ÂΜL (ref 0–0.61)
EOSINOPHIL NFR BLD AUTO: 8 % (ref 0–6)
ERYTHROCYTE [DISTWIDTH] IN BLOOD BY AUTOMATED COUNT: 13.2 % (ref 11.6–15.1)
EXT PREGNANCY TEST URINE: NEGATIVE
EXT. CONTROL: NORMAL
GFR SERPL CREATININE-BSD FRML MDRD: 63 ML/MIN/1.73SQ M
GLUCOSE SERPL-MCNC: 90 MG/DL (ref 65–140)
HCT VFR BLD AUTO: 42.9 % (ref 34.8–46.1)
HGB BLD-MCNC: 13.8 G/DL (ref 11.5–15.4)
IMM GRANULOCYTES # BLD AUTO: 0.02 THOUSAND/UL (ref 0–0.2)
IMM GRANULOCYTES NFR BLD AUTO: 0 % (ref 0–2)
LYMPHOCYTES # BLD AUTO: 1.56 THOUSANDS/ÂΜL (ref 0.6–4.47)
LYMPHOCYTES NFR BLD AUTO: 22 % (ref 14–44)
MAGNESIUM SERPL-MCNC: 1.9 MG/DL (ref 1.9–2.7)
MCH RBC QN AUTO: 30.4 PG (ref 26.8–34.3)
MCHC RBC AUTO-ENTMCNC: 32.2 G/DL (ref 31.4–37.4)
MCV RBC AUTO: 95 FL (ref 82–98)
MONOCYTES # BLD AUTO: 0.57 THOUSAND/ÂΜL (ref 0.17–1.22)
MONOCYTES NFR BLD AUTO: 8 % (ref 4–12)
NEUTROPHILS # BLD AUTO: 4.45 THOUSANDS/ÂΜL (ref 1.85–7.62)
NEUTS SEG NFR BLD AUTO: 61 % (ref 43–75)
NRBC BLD AUTO-RTO: 0 /100 WBCS
PLATELET # BLD AUTO: 230 THOUSANDS/UL (ref 149–390)
PMV BLD AUTO: 10.5 FL (ref 8.9–12.7)
POTASSIUM SERPL-SCNC: 3.7 MMOL/L (ref 3.5–5.3)
PROT SERPL-MCNC: 7.4 G/DL (ref 6.4–8.4)
RBC # BLD AUTO: 4.54 MILLION/UL (ref 3.81–5.12)
SODIUM SERPL-SCNC: 136 MMOL/L (ref 135–147)
WBC # BLD AUTO: 7.27 THOUSAND/UL (ref 4.31–10.16)

## 2024-05-06 PROCEDURE — 81025 URINE PREGNANCY TEST: CPT

## 2024-05-06 PROCEDURE — S9470 NUTRITIONAL COUNSELING, DIET: HCPCS

## 2024-05-06 PROCEDURE — 93005 ELECTROCARDIOGRAM TRACING: CPT

## 2024-05-06 PROCEDURE — 83735 ASSAY OF MAGNESIUM: CPT

## 2024-05-06 PROCEDURE — 99285 EMERGENCY DEPT VISIT HI MDM: CPT

## 2024-05-06 PROCEDURE — 85025 COMPLETE CBC W/AUTO DIFF WBC: CPT

## 2024-05-06 PROCEDURE — 96360 HYDRATION IV INFUSION INIT: CPT

## 2024-05-06 PROCEDURE — RECHECK

## 2024-05-06 PROCEDURE — 71275 CT ANGIOGRAPHY CHEST: CPT

## 2024-05-06 PROCEDURE — 36415 COLL VENOUS BLD VENIPUNCTURE: CPT

## 2024-05-06 PROCEDURE — 85379 FIBRIN DEGRADATION QUANT: CPT

## 2024-05-06 PROCEDURE — 99285 EMERGENCY DEPT VISIT HI MDM: CPT | Performed by: EMERGENCY MEDICINE

## 2024-05-06 PROCEDURE — 80053 COMPREHEN METABOLIC PANEL: CPT

## 2024-05-06 RX ORDER — ADENOSINE 3 MG/ML
6 INJECTION INTRAVENOUS ONCE
Status: DISCONTINUED | OUTPATIENT
Start: 2024-05-06 | End: 2024-05-07 | Stop reason: HOSPADM

## 2024-05-06 RX ORDER — ADENOSINE 3 MG/ML
12 INJECTION INTRAVENOUS ONCE
Status: DISCONTINUED | OUTPATIENT
Start: 2024-05-06 | End: 2024-05-07 | Stop reason: HOSPADM

## 2024-05-06 RX ADMIN — METOPROLOL TARTRATE 25 MG: 25 TABLET, FILM COATED ORAL at 21:11

## 2024-05-06 RX ADMIN — IOHEXOL 85 ML: 350 INJECTION, SOLUTION INTRAVENOUS at 20:48

## 2024-05-06 RX ADMIN — SODIUM CHLORIDE 1000 ML: 0.9 INJECTION, SOLUTION INTRAVENOUS at 19:11

## 2024-05-06 NOTE — Clinical Note
Hemalatha Hahn was seen and treated in our emergency department on 5/6/2024.                Diagnosis:     Hemalatha  .    She may return on this date:     May return to work anytime between 5/8-5/9 depending on progression of illness.     If you have any questions or concerns, please don't hesitate to call.      Noel Mata MD    ______________________________           _______________          _______________  Hospital Representative                              Date                                Time

## 2024-05-06 NOTE — PROGRESS NOTES
Weight Management Medical Nutrition  Assessment  Hemalatha presented today for meal-planning session. Today she weighed 324.8# which reflects a loss of 13.2# since beginning [facility]. MA obtained vitals today per provider request; RD will notify provider of same. Hemalatha stated she feels better on Zepbound compared to Wegovy. Just finished last dose today of 7.5 mg. Feels it is helping with appetite control. Feels satisfied, but not sick. Still onT opamax. Had been on Prednisone in March which caused her weight to increase, now coming back down. Eating salad for lunch. Trying to have more fiber to help control cholesterol levels. Walking dog for 30-45 minutes daily. Still dealing with back problems, but did PT and it is now improved. Suggested increased protein intake (which is her goal), options discuss, meal plan provided.        Patient seen by Medical Provider in past 6 months:  yes  Requested to schedule appointment with Medical Provider: No      Anthropometric Measurements  Start Weight (#): 338#  Current Weight (#): 324.8#  TBW % Change from start weight: 3.9%   Ideal Body Weight (#):132.5# (60.2 kg)  Goal Weight (#): 300#  Highest:360#  Lowest:260#     Weight Loss History  Previous weight loss attempts: Weight Watchers, Noom, crash diet, Wegovy    Food and Nutrition Related History  Wake up: 4:30  AM or 6:00 AM   Bed Time:9-10 PM    Food Recall  Breakfast:6:30-8 AM- cereal (Frosted mini wheats) with almond milk OR oatmeal (1 packet flavored with added plain quick oats) made with water OR Eng muffin with PB   Snack:10-ky- string cheese and/or fruit  Lunch:12-1:30 PM- salad with chicken, egg, edamame, sometimes Craisins, sunflower seeds, light balsamic vinaigrette at work   Snack: 3-4 PM- not usually, but may have a coffee at times  Dinner:7 PM- light lately- toast and/or fruit or small container of rice; not usually hungry   Snack: none    Beverages: diet soda- 12-24 oz possibly or diet tea green tea,  coffee- not everyday;  16 oz water- close to 80 oz on a work day; ETOH rarely    Volume of beverage intake: >=~88 oz     Weekends: brunch, snack, dinner; eats a little more on the weekends   Cravings: not really  Trouble area of day: not anymore    Frequency of Eating out: once weekly   Food restrictions: sauce causes indigestion, too much milk causes loose stools  Cooking: self   Food Shopping: self or daughter    Physical Activity Intake  Activity:walking her dog 3/4 mile daily slowly   Frequency: daily   Physical limitations/barriers to exercise: recent hx of torn ligament in ankle    Estimated Needs  Energy  ~7909-7520 kcal (as per REE testing 2/27/24)  Protein:72-90 g      (1.2-1.5g/kg IBW)  Fluid: >=70 oz     (35mL/kg IBW)    Nutrition Diagnosis  Yes;    Overweight/obesity  related to Excess energy intake as evidenced by  BMI more than normative standard for age and sex (obesity-grade III 40+)       Nutrition Intervention    Nutrition Prescription  Calories:~0211-4062  Protein:~70-90 g  Fluid:>=70 oz    Meal Plan (Lamine/Pro/Carb)  Breakfast:300/20-30/15-30  Snack:200/5-15/15  Lunch:400/20-30/30-45  Snack:200/5-15/15  Dinner:400/20-30/30-45  Snack:200/5-15/15    Nutrition Education:    Healthy Core Manual  Calorie controlled menu  Lean protein food choices  Healthy snack options  Food journaling tips      Nutrition Counseling:  Strategies: meal planning, portion sizes, healthy snack choices, hydration, fiber intake, protein intake, exercise, food journal      Monitoring and Evaluation:  Evaluation criteria:  Energy Intake  Meet protein needs  Maintain adequate hydration  Monitor weekly weight  Meal planning/preparation  Food journal   Decreased portions at mealtimes and snacks  Physical activity     Barriers to learning:none  Readiness to change: changing behavior  Comprehension: very good  Expected Compliance: very good

## 2024-05-06 NOTE — ED PROVIDER NOTES
History  Chief Complaint   Patient presents with    Irregular Heart Beat     Pt states she has been having a irregular heartbeat, pale, dizzy, lightheaded since this morning     43yo F presenting for palpitations. Earlier today while Pt was at the supermarket she felt palpitations and became lightheaded and pale. She went home to check her HR which was elevated to 180. It self resolved after a few minutes, but then her lightheadedness and palpitations would return. Denies CP, SOB, cough, diarrhea, abdominal pain, unusual weight gain, PE risk factors, leg pain/swelling, nausea. She admittedly has not had much to eat or drink today.      History provided by:  Patient      Prior to Admission Medications   Prescriptions Last Dose Informant Patient Reported? Taking?   FLUoxetine (PROzac) 40 MG capsule   No No   Sig: Take 1 capsule (40 mg total) by mouth daily   Multiple Vitamins-Minerals (Multivitamin Adult, Minerals,) TABS  Self Yes No   Xeljanz XR 11 MG TB24  Self Yes No   fexofenadine (ALLEGRA) 180 MG tablet  Self Yes No   Sig: Take 180 mg by mouth daily   fluticasone (FLONASE) 50 mcg/act nasal spray  Self Yes No   Si sprays into each nostril daily   ibuprofen (MOTRIN) 200 mg tablet  Self Yes No   Sig: Take 200 mg by mouth every 6 (six) hours as needed for mild pain.   methylphenidate (CONCERTA) 54 MG ER tablet   No No   Sig: Take 1 tablet (54 mg total) by mouth daily Max Daily Amount: 54 mg   mometasone-formoterol (DULERA) 200-5 MCG/ACT inhaler  Self No No   Sig: Inhale 2 puffs 2 (two) times a day Rinse mouth after use.   tirzepatide (Zepbound) 10 mg/0.5 mL auto-injector   No No   Sig: Inject 0.5 mL (10 mg total) under the skin once a week for 28 days   tirzepatide (Zepbound) 7.5 mg/0.5 mL auto-injector   No No   Sig: Inject 0.5 mL (7.5 mg total) under the skin once a week for 28 days The increase to 10 mg   topiramate (Topamax) 50 MG tablet   No No   Sig: Take 1 tab po daily in AM      Facility-Administered  Medications: None       Past Medical History:   Diagnosis Date    Allergic rhinitis     Anxiety     Arthritis     Asthma     COVID-19 12/17/2020    CPAP (continuous positive airway pressure) dependence     Daytime hypersomnolence     LAST ASSESSED [id number]    Depression     GERD (gastroesophageal reflux disease)     occas    IUD (intrauterine device) in place     Lumbar disc disease     Migraine     RA (rheumatoid arthritis) (HCC)     Rheumatoid arthritis (HCC)     Sleep apnea     Varicella     Wears glasses        Past Surgical History:   Procedure Laterality Date    CHOLECYSTECTOMY      NASAL SEPTUM SURGERY      NASAL SEPTAL DEVIATION REPAIR-Dr. Navarrete    TX STRTCTC CPTR ASSTD PX EXTRADURAL CRANIAL N/A 01/08/2019    Procedure: FUNCTIONAL ENDOSCOPIC SINUS SURGERY (FESS) IMAGED GUIDED; PLACEMENT OF MULTIPLE PROPEL IMPLANTS;  Surgeon: South Navarrete DO;  Location: [facility];  Service: ENT    SINUS SURGERY      US GUIDED BREAST BIOPSY LEFT COMPLETE Left 06/15/2022    benign    WISDOM TOOTH EXTRACTION         Family History   Problem Relation Age of Onset    Asthma Mother     Hypertension Mother     COPD Mother     Obesity Father     Hypothyroidism Father     Chiari malformation Daughter     Diabetes Daughter         Type 1    Celiac disease Daughter     No Known Problems Maternal Grandmother     Lung disease Maternal Grandfather     Cancer Paternal Grandmother     Aneurysm Paternal Grandfather     Celiac disease Paternal Grandfather     Obesity Brother     Chiari malformation Brother     Obesity Son     Autism Son     No Known Problems Maternal Aunt     Breast cancer Paternal Aunt         postmenopausal    No Known Problems Paternal Aunt     No Known Problems Paternal Aunt     Breast cancer additional onset Neg Hx     Endometrial cancer Neg Hx     Colon cancer Neg Hx     Ovarian cancer Neg Hx     BRCA1 Positive Neg Hx     BRCA1 Negative Neg Hx     BRCA 1/2 Neg Hx     BRCA2 Positive Neg Hx     BRCA2 Negative Neg Hx      Heart disease Neg Hx      I have reviewed and agree with the history as documented.    E-Cigarette/Vaping    E-Cigarette Use Never User      E-Cigarette/Vaping Substances    Nicotine No     THC No     CBD No     Flavoring No     Other No     Unknown No      Social History     Tobacco Use    Smoking status: Former     Current packs/day: 0.00     Types: Cigarettes    Smokeless tobacco: Never    Tobacco comments:     quit 5 years ago    Vaping Use    Vaping status: Never Used   Substance Use Topics    Alcohol use: Yes     Alcohol/week: 4.0 standard drinks of alcohol     Types: 4 Standard drinks or equivalent per week     Comment: social     Drug use: No        Review of Systems   Constitutional:  Negative for activity change, appetite change and fever.   Respiratory:  Negative for cough and shortness of breath.    Cardiovascular:  Positive for palpitations. Negative for chest pain and leg swelling.   Gastrointestinal:  Negative for abdominal pain, nausea and vomiting.   Skin:  Positive for pallor.   Neurological:  Positive for light-headedness. Negative for headaches.       Physical Exam  ED Triage Vitals [05/06/24 1852]   Temperature Pulse Respirations Blood Pressure SpO2   (!) 97.3 °F (36.3 °C) (!) 120 14 123/66 100 %      Temp Source Heart Rate Source Patient Position - Orthostatic VS BP Location FiO2 (%)   Temporal Monitor Lying Right arm --      Pain Score       No Pain             Orthostatic Vital Signs  Vitals:    05/06/24 1852 05/06/24 1928 05/06/24 1958 05/06/24 2100   BP: 123/66 134/77 127/84 137/76   Pulse: (!) 120 88 85 91   Patient Position - Orthostatic VS: Lying          Physical Exam  Constitutional:       General: She is in acute distress.      Appearance: Normal appearance.   HENT:      Mouth/Throat:      Mouth: Mucous membranes are dry.      Pharynx: Oropharynx is clear.   Eyes:      Extraocular Movements: Extraocular movements intact.      Conjunctiva/sclera: Conjunctivae normal.    Cardiovascular:      Rate and Rhythm: Regular rhythm. Tachycardia present.      Pulses: Normal pulses.      Heart sounds: Normal heart sounds.   Pulmonary:      Effort: Pulmonary effort is normal.      Breath sounds: Normal breath sounds.   Abdominal:      General: Abdomen is flat.      Palpations: Abdomen is soft.   Musculoskeletal:         General: No tenderness.      Right lower leg: No edema.      Left lower leg: No edema.   Skin:     General: Skin is warm and dry.      Capillary Refill: Capillary refill takes 2 to 3 seconds.   Neurological:      General: No focal deficit present.      Mental Status: She is alert and oriented to person, place, and time.         ED Medications  Medications   adenosine (ADENOCARD) injection 12 mg (0 mg Intravenous Hold 5/6/24 2111)   adenosine (ADENOCARD) injection 6 mg (0 mg Intravenous Hold 5/6/24 2111)   sodium chloride 0.9 % bolus 1,000 mL (0 mL Intravenous Stopped 5/6/24 2034)   metoprolol tartrate (LOPRESSOR) tablet 25 mg (25 mg Oral Given 5/6/24 2111)   iohexol (OMNIPAQUE) 350 MG/ML injection (MULTI-DOSE) 85 mL (85 mL Intravenous Given 5/6/24 2048)       Diagnostic Studies  Results Reviewed       Procedure Component Value Units Date/Time    POCT pregnancy, urine [[id number]]  (Normal) Resulted: 05/06/24 2020    Lab Status: Final result Updated: 05/06/24 2022     EXT Preg Test, Ur Negative     Control Valid    D-Dimer [[id number]]  (Abnormal) Collected: 05/06/24 1933    Lab Status: Final result Specimen: Blood from Arm, Right Updated: 05/06/24 1957     D-Dimer, Quant 0.64 ug/ml FEU     Comprehensive metabolic panel [[id number]]  (Abnormal) Collected: 05/06/24 1912    Lab Status: Final result Specimen: Blood from Arm, Left Updated: 05/06/24 1938     Sodium 136 mmol/L      Potassium 3.7 mmol/L      Chloride 105 mmol/L      CO2 22 mmol/L      ANION GAP 9 mmol/L      BUN 17 mg/dL      Creatinine 1.07 mg/dL      Glucose 90 mg/dL      Calcium 9.2 mg/dL      AST 16 U/L      ALT 13  U/L      Alkaline Phosphatase 50 U/L      Total Protein 7.4 g/dL      Albumin 4.2 g/dL      Total Bilirubin 1.28 mg/dL      eGFR 63 ml/min/1.73sq m     Narrative:      National Kidney Disease Foundation guidelines for Chronic Kidney Disease (CKD):     Stage 1 with normal or high GFR (GFR > 90 mL/min/1.73 square meters)    Stage 2 Mild CKD (GFR = 60-89 mL/min/1.73 square meters)    Stage 3A Moderate CKD (GFR = 45-59 mL/min/1.73 square meters)    Stage 3B Moderate CKD (GFR = 30-44 mL/min/1.73 square meters)    Stage 4 Severe CKD (GFR = 15-29 mL/min/1.73 square meters)    Stage 5 End Stage CKD (GFR <15 mL/min/1.73 square meters)  Note: GFR calculation is accurate only with a steady state creatinine    Magnesium [[id number]]  (Normal) Collected: 05/06/24 1912    Lab Status: Final result Specimen: Blood from Arm, Left Updated: 05/06/24 1938     Magnesium 1.9 mg/dL     CBC and differential [[id number]]  (Abnormal) Collected: 05/06/24 1912    Lab Status: Final result Specimen: Blood from Arm, Left Updated: 05/06/24 1923     WBC 7.27 Thousand/uL      RBC 4.54 Million/uL      Hemoglobin 13.8 g/dL      Hematocrit 42.9 %      MCV 95 fL      MCH 30.4 pg      MCHC 32.2 g/dL      RDW 13.2 %      MPV 10.5 fL      Platelets 230 Thousands/uL      nRBC 0 /100 WBCs      Segmented % 61 %      Immature Grans % 0 %      Lymphocytes % 22 %      Monocytes % 8 %      Eosinophils Relative 8 %      Basophils Relative 1 %      Absolute Neutrophils 4.45 Thousands/µL      Absolute Immature Grans 0.02 Thousand/uL      Absolute Lymphocytes 1.56 Thousands/µL      Absolute Monocytes 0.57 Thousand/µL      Eosinophils Absolute 0.57 Thousand/µL      Basophils Absolute 0.10 Thousands/µL                    CTA ED chest PE Study   Final Result by Janak Edwards DO (05/06 2139)      No evidence of pulmonary embolism.      No nodule or mass.            Workstation performed: [id number]               Procedures  ECG 12 Lead Documentation  Only    Date/Time: 5/6/2024 7:58 PM    Performed by: Noel Mata MD  Authorized by: Noel Mata MD    ECG reviewed by me, the ED Provider: yes    Patient location:  ED  Interpretation:     Interpretation: abnormal    Quality:     Tracing quality:  Limited by artifact  Rate:     ECG rate:  87    ECG rate assessment: normal    Rhythm:     Rhythm: sinus rhythm    Ectopy:     Ectopy: PAC    QRS:     QRS axis:  Normal    QRS intervals:  Normal  Conduction:     Conduction: normal    ST segments:     ST segments:  Normal  T waves:     T waves: normal          ED Course  ED Course as of 05/06/24 2213   Mon May 06, 2024   1937 Hemoglobin: 13.8   2058 D-Dimer, Quant(!): 0.64  CT PE study ordered and pending     2058 Pt continued to go into SVT that would resolve with vagal maneuvers. Given the persistent SVT I will cover her with PO lopressor. Pt will require cardiology follow up for further management.   2143 CTA ED chest PE Study  IMPRESSION:     No evidence of pulmonary embolism.     No nodule or mass.                                  SBIRT 20yo+      Flowsheet Row Most Recent Value   Initial Alcohol Screen: US AUDIT-C     1. How often do you have a drink containing alcohol? 0 Filed at: 05/06/2024 1850   2. How many drinks containing alcohol do you have on a typical day you are drinking?  0 Filed at: 05/06/2024 1850   3a. Male UNDER 65: How often do you have five or more drinks on one occasion? 0 Filed at: 05/06/2024 1850   3b. FEMALE Any Age, or MALE 65+: How often do you have 4 or more drinks on one occassion? 0 Filed at: 05/06/2024 1850   Audit-C Score 0 Filed at: 05/06/2024 1850   HUMZA: How many times in the past year have you...    Used an illegal drug or used a prescription medication for non-medical reasons? Never Filed at: 05/06/2024 1850            Wells' Criteria for PE      Flowsheet Row Most Recent Value   Wells' Criteria for PE    Clinical signs and symptoms of DVT 0 Filed at: 05/06/2024 1958   PE is  primary diagnosis or equally likely 3 Filed at: 05/06/2024 1958   HR >100 1.5 Filed at: 05/06/2024 1958   Immobilization at least 3 days or Surgery in the previous 4 weeks 0 Filed at: 05/06/2024 1958   Previous, objectively diagnosed PE or DVT 0 Filed at: 05/06/2024 1958   Hemoptysis 0 Filed at: 05/06/2024 1958   Malignancy with treatment within 6 months or palliative 0 Filed at: 05/06/2024 1958   Wells' Criteria Total 4.5 Filed at: 05/06/2024 1958              Medical Decision Making  See ED course for additional MDM. Ddx includes but not limited to dehydration, PE (CT PE study negative), anemia (Hgb wnl),stimulant use (Pt denies), cardiac function related. Despite fluid bolus, Pt went into SVT and would occasionally have PVCs, thus necessitating metoprolol administration. Her HR remained stable. I recommend Pt follow up with cardiology for further investigation of her arrhythmia. Strict return precautions discussed.    Amount and/or Complexity of Data Reviewed  Labs: ordered. Decision-making details documented in ED Course.  Radiology: ordered. Decision-making details documented in ED Course.    Risk  Prescription drug management.          Disposition  Final diagnoses:   SVT (supraventricular tachycardia)     Time reflects when diagnosis was documented in both MDM as applicable and the Disposition within this note       Time User Action Codes Description Comment    5/6/2024  8:42 PM Noel Mata Add [I47.10] SVT (supraventricular tachycardia)           ED Disposition       ED Disposition   Discharge    Condition   Stable    Date/Time   Mon May 6, 2024 2140    Comment   Hemalatha Hahn discharge to home/self care.                   Follow-up Information       Follow up With Specialties Details Why Contact Info Additional Information    [facility] Emergency Medicine Go to   [address] [facility] [address] [facility] [address] [phone] [facility]  Department, [address], [place], [address]    [facility] Schedule an appointment as soon as possible for a visit   [address] [place] [address]  [phone] [facility], [address] [place] [address], [address]   [phone]            Discharge Medication List as of 5/6/2024  9:54 PM        START taking these medications    Details   metoprolol tartrate (LOPRESSOR) 25 mg tablet Take 1 tablet (25 mg total) by mouth every 12 (twelve) hours for 14 days, Starting Mon 5/6/2024, Until Mon 5/20/2024, Normal           CONTINUE these medications which have NOT CHANGED    Details   fexofenadine (ALLEGRA) 180 MG tablet Take 180 mg by mouth daily, Historical Med      FLUoxetine (PROzac) 40 MG capsule Take 1 capsule (40 mg total) by mouth daily, Starting Tue 4/9/2024, Normal      fluticasone (FLONASE) 50 mcg/act nasal spray 2 sprays into each nostril daily, Historical Med      ibuprofen (MOTRIN) 200 mg tablet Take 200 mg by mouth every 6 (six) hours as needed for mild pain., Historical Med      methylphenidate (CONCERTA) 54 MG ER tablet Take 1 tablet (54 mg total) by mouth daily Max Daily Amount: 54 mg, Starting Tue 4/9/2024, Normal      mometasone-formoterol (DULERA) 200-5 MCG/ACT inhaler Inhale 2 puffs 2 (two) times a day Rinse mouth after use., Starting Tue 7/25/2023, Normal      Multiple Vitamins-Minerals (Multivitamin Adult, Minerals,) TABS Historical Med      tirzepatide (Zepbound) 10 mg/0.5 mL auto-injector Inject 0.5 mL (10 mg total) under the skin once a week for 28 days, Starting Wed 4/10/2024, Until Wed 5/8/2024, Normal      tirzepatide (Zepbound) 7.5 mg/0.5 mL auto-injector Inject 0.5 mL (7.5 mg total) under the skin once a week for 28 days The increase to 10 mg, Starting Wed 4/10/2024, Until Wed 5/8/2024, Normal      topiramate (Topamax) 50 MG tablet Take 1 tab po  daily in AM, Normal      Xeljanz XR 11 MG TB24 Starting Fri 8/11/2023, Historical Med               PDMP Review         Value Time User    PDMP Reviewed  Yes 4/9/2024  1:08 PM Meg Mckeon MD             ED Provider  Attending physically available and evaluated Hemalatha Hahn. I managed the patient along with the ED Attending.    Electronically Signed by           Noel Mata MD  05/06/24 2988

## 2024-05-07 ENCOUNTER — OFFICE VISIT (OUTPATIENT)
Dept: INTERNAL MEDICINE CLINIC | Facility: CLINIC | Age: 44
End: 2024-05-07
Payer: COMMERCIAL

## 2024-05-07 ENCOUNTER — TELEPHONE (OUTPATIENT)
Dept: BARIATRICS | Facility: CLINIC | Age: 44
End: 2024-05-07

## 2024-05-07 VITALS
HEIGHT: 67 IN | SYSTOLIC BLOOD PRESSURE: 128 MMHG | OXYGEN SATURATION: 98 % | WEIGHT: 293 LBS | TEMPERATURE: 98.2 F | HEART RATE: 75 BPM | BODY MASS INDEX: 45.99 KG/M2 | DIASTOLIC BLOOD PRESSURE: 76 MMHG

## 2024-05-07 DIAGNOSIS — I47.10 SVT (SUPRAVENTRICULAR TACHYCARDIA): Primary | ICD-10-CM

## 2024-05-07 LAB
ATRIAL RATE: 202 BPM
ATRIAL RATE: 87 BPM
ATRIAL RATE: 93 BPM
P AXIS: 29 DEGREES
P AXIS: 37 DEGREES
PR INTERVAL: 132 MS
PR INTERVAL: 136 MS
QRS AXIS: 29 DEGREES
QRS AXIS: 37 DEGREES
QRS AXIS: 48 DEGREES
QRSD INTERVAL: 102 MS
QRSD INTERVAL: 86 MS
QRSD INTERVAL: 88 MS
QT INTERVAL: 260 MS
QT INTERVAL: 356 MS
QT INTERVAL: 358 MS
QTC INTERVAL: 428 MS
QTC INTERVAL: 445 MS
QTC INTERVAL: 451 MS
T WAVE AXIS: 231 DEGREES
T WAVE AXIS: 73 DEGREES
T WAVE AXIS: 73 DEGREES
VENTRICULAR RATE: 181 BPM
VENTRICULAR RATE: 87 BPM
VENTRICULAR RATE: 93 BPM

## 2024-05-07 PROCEDURE — 99213 OFFICE O/P EST LOW 20 MIN: CPT | Performed by: NURSE PRACTITIONER

## 2024-05-07 PROCEDURE — 93010 ELECTROCARDIOGRAM REPORT: CPT | Performed by: INTERNAL MEDICINE

## 2024-05-07 NOTE — DISCHARGE INSTRUCTIONS
Return to the ER if you are unable to control your heart rate with the metoprolol or vagal maneuvers like bearing down, blowing into a syringe, etc.    Follow up with cardiology asap.

## 2024-05-07 NOTE — PROGRESS NOTES
Name: Hemalatha Hahn      : 1980      MRN: [id number]  Encounter Provider: LIONEL Marsh  Encounter Date: 2024   Encounter department: [facility]    Assessment & Plan Continue on Lopressor. Continue follow up with Cardiology. Will follow up if any worsening of symptoms.      1. SVT (supraventricular tachycardia)  -     metoprolol tartrate (LOPRESSOR) 25 mg tablet; Take 1 tablet (25 mg total) by mouth every 12 (twelve) hours           Subjective      Hemalatha is for an ER follow up. She is doing well today but did have several episodes of SVT. She is now on Lopressor 25 mg BID. She is doing well just tired and has had no more episodes since. She does see Cardiology in . She is taking Concerta but has been on this for some time. She offers no other issues.      Review of Systems   Constitutional:  Positive for fatigue.   All other systems reviewed and are negative.      Current Outpatient Medications on File Prior to Visit   Medication Sig    fexofenadine (ALLEGRA) 180 MG tablet Take 180 mg by mouth daily    FLUoxetine (PROzac) 40 MG capsule Take 1 capsule (40 mg total) by mouth daily    fluticasone (FLONASE) 50 mcg/act nasal spray 2 sprays into each nostril daily    ibuprofen (MOTRIN) 200 mg tablet Take 200 mg by mouth every 6 (six) hours as needed for mild pain.    methylphenidate (CONCERTA) 54 MG ER tablet Take 1 tablet (54 mg total) by mouth daily Max Daily Amount: 54 mg    mometasone-formoterol (DULERA) 200-5 MCG/ACT inhaler Inhale 2 puffs 2 (two) times a day Rinse mouth after use.    Multiple Vitamins-Minerals (Multivitamin Adult, Minerals,) TABS     tirzepatide (Zepbound) 10 mg/0.5 mL auto-injector Inject 0.5 mL (10 mg total) under the skin once a week for 28 days    tirzepatide (Zepbound) 7.5 mg/0.5 mL auto-injector Inject 0.5 mL (7.5 mg total) under the skin once a week for 28 days The increase to 10 mg    topiramate (Topamax) 50 MG tablet Take 1 tab po  "daily in AM    Xeljanz XR 11 MG TB24     [DISCONTINUED] metoprolol tartrate (LOPRESSOR) 25 mg tablet Take 1 tablet (25 mg total) by mouth every 12 (twelve) hours for 14 days       Objective     /76   Pulse 75   Temp 98.2 °F (36.8 °C) (Temporal)   Ht 5' 6.5\" (1.689 m)   Wt (!) 149 kg (328 lb 6.4 oz)   LMP  (LMP Unknown)   SpO2 98%   BMI 52.21 kg/m²     Physical Exam  Vitals reviewed.   Constitutional:       Appearance: Normal appearance. She is obese.   Cardiovascular:      Rate and Rhythm: Normal rate and regular rhythm.      Pulses: Normal pulses.      Heart sounds: Normal heart sounds.   Pulmonary:      Effort: Pulmonary effort is normal.      Breath sounds: Normal breath sounds.   Musculoskeletal:         General: Normal range of motion.   Skin:     General: Skin is warm and dry.      Capillary Refill: Capillary refill takes less than 2 seconds.   Neurological:      General: No focal deficit present.      Mental Status: She is alert and oriented to person, place, and time. Mental status is at baseline.   Psychiatric:         Mood and Affect: Mood normal.         Behavior: Behavior normal.         Thought Content: Thought content normal.         Judgment: Judgment normal.       LIONEL Marsh    "

## 2024-05-07 NOTE — TELEPHONE ENCOUNTER
----- Message from Susan Bower sent at 5/7/2024 10:49 AM EDT -----  Regarding: Vitals  Falguni,    Here are Hemalatha's vitals as MA obtained at yesterday's meal-planning session:    Temp 98.4 degrees Fahrenheit  HR 78  O2 99  BP (right arm, sitting) 96/70    Thank you!    Susan

## 2024-05-07 NOTE — ED ATTENDING ATTESTATION
5/6/2024  IHali DO, saw and evaluated the patient. I have discussed the patient with the resident/non-physician practitioner and agree with the resident's/non-physician practitioner's findings, Plan of Care, and MDM as documented in the resident's/non-physician practitioner's note, except where noted. All available labs and Radiology studies were reviewed.  I was present for key portions of any procedure(s) performed by the resident/non-physician practitioner and I was immediately available to provide assistance.       At this point I agree with the current assessment done in the Emergency Department.  I have conducted an independent evaluation of this patient a history and physical is as follows:    44-year-old female presents for evaluation of palpitations.  Patient states earlier today while out shopping she felt palpitations to become lightheaded.  She checked her heart rate was found to be elevated however normalized after few minutes.  Patient states her symptoms returned and she presents to the emergency department.  Patient denies chest pain or dyspnea.  She has no cardiac history that she knows about.  On arrival patient is initially a sinus arrhythmia on the monitor however then goes into SVT.  Patient was ordered adenosine however converted to sinus on her own without medication.  Shortly thereafter patient went into SVT again however again converted on her own.  Patient was dosed with Lopressor and had no other events in the emergency department.  Will assess for possible source including electrolyte abnormality.  Will anticipate placing patient on Lopressor on discharge as well as make referral for cardiology.    Physical Exam  Vitals reviewed.   Constitutional:       General: She is not in acute distress.     Appearance: Normal appearance. She is not ill-appearing or toxic-appearing.   HENT:      Head: Normocephalic and atraumatic.      Right Ear: External ear normal.      Left Ear:  External ear normal.      Mouth/Throat:      Mouth: Mucous membranes are moist.   Eyes:      General: No scleral icterus.        Right eye: No discharge.         Left eye: No discharge.   Cardiovascular:      Rate and Rhythm: Normal rate and regular rhythm.   Pulmonary:      Effort: Pulmonary effort is normal. No respiratory distress.   Musculoskeletal:         General: No deformity or signs of injury.      Right lower leg: No edema.      Left lower leg: No edema.   Skin:     General: Skin is warm.      Coloration: Skin is not jaundiced or pale.   Neurological:      General: No focal deficit present.      Mental Status: She is alert. Mental status is at baseline.           ED Course         Critical Care Time  Procedures

## 2024-05-08 PROBLEM — Z00.00 ROUTINE GENERAL MEDICAL EXAMINATION AT A HEALTH CARE FACILITY: Status: RESOLVED | Noted: 2024-04-08 | Resolved: 2024-05-08

## 2024-05-08 PROBLEM — Z00.00 ROUTINE ADULT HEALTH MAINTENANCE: Status: RESOLVED | Noted: 2024-04-08 | Resolved: 2024-05-08

## 2024-05-16 DIAGNOSIS — F98.8 ATTENTION DEFICIT DISORDER (ADD) WITHOUT HYPERACTIVITY: ICD-10-CM

## 2024-05-16 DIAGNOSIS — E66.01 MORBID OBESITY WITH BMI OF 50.0-59.9, ADULT (HCC): ICD-10-CM

## 2024-05-17 RX ORDER — TOPIRAMATE 50 MG/1
TABLET, FILM COATED ORAL
Qty: 30 TABLET | Refills: 2 | Status: SHIPPED | OUTPATIENT
Start: 2024-05-17

## 2024-05-21 RX ORDER — METHYLPHENIDATE HYDROCHLORIDE 54 MG/1
54 TABLET ORAL DAILY
Qty: 30 TABLET | Refills: 0 | Status: SHIPPED | OUTPATIENT
Start: 2024-05-21

## 2024-06-06 DIAGNOSIS — M62.830 BACK SPASM: Primary | ICD-10-CM

## 2024-06-06 RX ORDER — METHOCARBAMOL 500 MG/1
500 TABLET, FILM COATED ORAL 4 TIMES DAILY PRN
Qty: 60 TABLET | Refills: 1 | Status: SHIPPED | OUTPATIENT
Start: 2024-06-06

## 2024-06-07 ENCOUNTER — TELEPHONE (OUTPATIENT)
Dept: BARIATRICS | Facility: CLINIC | Age: 44
End: 2024-06-07

## 2024-06-07 DIAGNOSIS — E78.5 MILD HYPERLIPIDEMIA: ICD-10-CM

## 2024-06-07 DIAGNOSIS — G47.33 OBSTRUCTIVE SLEEP APNEA: ICD-10-CM

## 2024-06-07 DIAGNOSIS — E66.01 CLASS 3 OBESITY (HCC): Primary | ICD-10-CM

## 2024-06-07 RX ORDER — TIRZEPATIDE 10 MG/.5ML
10 INJECTION, SOLUTION SUBCUTANEOUS WEEKLY
Qty: 2 ML | Refills: 1 | Status: SHIPPED | OUTPATIENT
Start: 2024-06-07 | End: 2024-07-05

## 2024-06-19 ENCOUNTER — CONSULT (OUTPATIENT)
Dept: CARDIOLOGY CLINIC | Facility: CLINIC | Age: 44
End: 2024-06-19
Payer: COMMERCIAL

## 2024-06-19 VITALS
DIASTOLIC BLOOD PRESSURE: 70 MMHG | BODY MASS INDEX: 47.09 KG/M2 | HEART RATE: 68 BPM | SYSTOLIC BLOOD PRESSURE: 110 MMHG | HEIGHT: 66 IN | WEIGHT: 293 LBS | RESPIRATION RATE: 16 BRPM | OXYGEN SATURATION: 98 %

## 2024-06-19 DIAGNOSIS — I47.10 SVT (SUPRAVENTRICULAR TACHYCARDIA): ICD-10-CM

## 2024-06-19 PROCEDURE — 99214 OFFICE O/P EST MOD 30 MIN: CPT | Performed by: PHYSICIAN ASSISTANT

## 2024-06-19 NOTE — ASSESSMENT & PLAN NOTE
Single episode that led to ER visit  Now controlled on metoprolol 25 mg twice daily with no recurrence    EKG in the ER showed supraventricular tachycardia with marked ST abnormality possible inferior subendocardial injury    Will check exercise stress echo    If recurrence will refer to EP

## 2024-06-19 NOTE — PROGRESS NOTES
[facility]   Outpatient Note  Hemalatha Hahn  1980  [id number]  [facility] [place] [address] [address] [facility] [address] [phone]    Hemalatha Hahn is a 44 y.o. female    Assessment and Plan:   SVT (supraventricular tachycardia)  Single episode that led to ER visit  Now controlled on metoprolol 25 mg twice daily with no recurrence    EKG in the ER showed supraventricular tachycardia with marked ST abnormality possible inferior subendocardial injury    Will check exercise stress echo    If recurrence will refer to EP      Additional Plan:   Medications as detailed above.    Pertinent testing orders as outlined.      Available lab and test results are reviewed with the patient and any additional required labs are ordered as noted.     Return visit will be in one month or earlier if there are problems.     The patient is encouraged to call in the meantime if there are questions or concerns.      Subjective:   The patient is seen in the office today for an episode of SVT that led to an ER visit.  Patient was seeing her ophthalmologist that day and had eyedrops placed for numbing.  She did not feel well and was lightheaded.  She continued on with her day but continued to feel ill.  She later took her blood pressure and pulse and realized her pulse was 180 bpm.  It was fluctuating between 180 and 60 and patient went to the ER for further workup.  Patient was noted to have SVT and metoprolol was started.  EKG showed marked ST abnormalities otherwise workup was negative.  Since then she has had no recurrence.    She states that she is able to walk up an incline and climb stairs without chest pain.  She does have shortness of breath but she relates this to her weight.  This has not changed in the past several years.  She is working with weight management and has lost 30 pounds in the past year or so.  She recently had an RSV  infection and was on prednisone and has gained some weight back.    She does have a PMH of RA and is immunocompromised.  She does not have any history of hypertension diabetes mellitus or cholesterol.  She did have preeclampsia with both her pregnancies but this resolved post pregnancy.        Social History  Social History     Tobacco Use   Smoking Status Former    Current packs/day: 0.00    Types: Cigarettes   Smokeless Tobacco Never   Tobacco Comments    quit 5 years ago    ,   Social History     Substance and Sexual Activity   Alcohol Use Yes    Alcohol/week: 4.0 standard drinks of alcohol    Types: 4 Standard drinks or equivalent per week    Comment: social    ,   Social History     Substance and Sexual Activity   Drug Use No     Family History   Problem Relation Age of Onset    Asthma Mother     Hypertension Mother     COPD Mother     Obesity Father     Hypothyroidism Father     Chiari malformation Daughter     Diabetes Daughter         Type 1    Celiac disease Daughter     No Known Problems Maternal Grandmother     Lung disease Maternal Grandfather     Cancer Paternal Grandmother     Aneurysm Paternal Grandfather     Celiac disease Paternal Grandfather     Obesity Brother     Chiari malformation Brother     Obesity Son     Autism Son     No Known Problems Maternal Aunt     Breast cancer Paternal Aunt         postmenopausal    No Known Problems Paternal Aunt     No Known Problems Paternal Aunt     Breast cancer additional onset Neg Hx     Endometrial cancer Neg Hx     Colon cancer Neg Hx     Ovarian cancer Neg Hx     BRCA1 Positive Neg Hx     BRCA1 Negative Neg Hx     BRCA 1/2 Neg Hx     BRCA2 Positive Neg Hx     BRCA2 Negative Neg Hx     Heart disease Neg Hx        Medical and Surgical History  Past Medical History:   Diagnosis Date    Allergic rhinitis     Anxiety     Arthritis     Asthma     COVID-19 12/17/2020    CPAP (continuous positive airway pressure) dependence     Daytime hypersomnolence     LAST  ASSESSED [id number]    Depression     GERD (gastroesophageal reflux disease)     occas    IUD (intrauterine device) in place     Lumbar disc disease     Migraine     RA (rheumatoid arthritis) (HCC)     Rheumatoid arthritis (HCC)     Sleep apnea     SVT (supraventricular tachycardia)     Varicella     Wears glasses      Past Surgical History:   Procedure Laterality Date    CHOLECYSTECTOMY      NASAL SEPTUM SURGERY      NASAL SEPTAL DEVIATION REPAIR-Dr. Navarrete    [facility]    Procedure: FUNCTIONAL ENDOSCOPIC SINUS SURGERY (FESS) IMAGED GUIDED; PLACEMENT OF MULTIPLE PROPEL IMPLANTS;  Surgeon: [place] Navarrete DO;  Location: [facility];  Service: ENT    SINUS SURGERY      US GUIDED BREAST BIOPSY LEFT COMPLETE Left 06/15/2022    benign    WISDOM TOOTH EXTRACTION           Current Outpatient Medications:     fexofenadine (ALLEGRA) 180 MG tablet, Take 180 mg by mouth daily, Disp: , Rfl:     FLUoxetine (PROzac) 40 MG capsule, Take 1 capsule (40 mg total) by mouth daily, Disp: 90 capsule, Rfl: 0    fluticasone (FLONASE) 50 mcg/act nasal spray, 2 sprays into each nostril daily, Disp: , Rfl:     ibuprofen (MOTRIN) 200 mg tablet, Take 200 mg by mouth every 6 (six) hours as needed for mild pain., Disp: , Rfl:     methocarbamol (ROBAXIN) 500 mg tablet, Take 1 tablet (500 mg total) by mouth 4 (four) times a day as needed for muscle spasms, Disp: 60 tablet, Rfl: 1    methylphenidate (CONCERTA) 54 MG ER tablet, Take 1 tablet (54 mg total) by mouth daily Max Daily Amount: 54 mg, Disp: 30 tablet, Rfl: 0    metoprolol tartrate (LOPRESSOR) 25 mg tablet, Take 1 tablet (25 mg total) by mouth every 12 (twelve) hours, Disp: 90 tablet, Rfl: 3    mometasone-formoterol (DULERA) 200-5 MCG/ACT inhaler, Inhale 2 puffs 2 (two) times a day Rinse mouth after use., Disp: 13 g, Rfl: 0    tirzepatide (Zepbound) 10 mg/0.5 mL auto-injector, Inject 0.5 mL (10 mg total) under the skin once a week for 28 days,  "Disp: 2 mL, Rfl: 1    topiramate (Topamax) 50 MG tablet, Take 1 tab po daily in AM, Disp: 30 tablet, Rfl: 2    Xeljanz XR 11 MG TB24, , Disp: , Rfl:     Multiple Vitamins-Minerals (Multivitamin Adult, Minerals,) TABS, , Disp: , Rfl:   No Known Allergies    ROS    Objective:   /70 (BP Location: Left arm, Patient Position: Sitting, Cuff Size: Large)   Pulse 68   Resp 16   Ht 5' 6\" (1.676 m)   Wt (!) 147 kg (325 lb)   SpO2 98%   BMI 52.46 kg/m²   Physical Exam    Lab Review:   Lab Results   Component Value Date    CHOL 152 08/12/2015     Lab Results   Component Value Date    HDL 48 (L) 04/09/2024     Lab Results   Component Value Date    LDLCALC 115 (H) 04/09/2024     Lab Results   Component Value Date    TRIG 157 (H) 04/09/2024     Results Reviewed       None          Results Reviewed       None          Results Reviewed       None            Recent Cardiovascular Testing:   Exercise stress test is ordered    ECG Review:   5/6/2024 sinus rhythm with PAC nonspecific ST T wave abnormality    5/6/2024: Supraventricular tachycardia, marked ST abnormality possible inferior subendocardial injury        "

## 2024-06-19 NOTE — PATIENT INSTRUCTIONS
No changes in medication     Check stress echo       Return in one month     Call in the meantime if you have any concerns    Patient informed of lab results

## 2024-06-26 ENCOUNTER — TELEPHONE (OUTPATIENT)
Dept: RADIOLOGY | Facility: MEDICAL CENTER | Age: 44
End: 2024-06-26

## 2024-06-26 ENCOUNTER — CONSULT (OUTPATIENT)
Dept: PAIN MEDICINE | Facility: CLINIC | Age: 44
End: 2024-06-26
Payer: COMMERCIAL

## 2024-06-26 VITALS
WEIGHT: 293 LBS | HEIGHT: 66 IN | RESPIRATION RATE: 16 BRPM | DIASTOLIC BLOOD PRESSURE: 76 MMHG | HEART RATE: 78 BPM | BODY MASS INDEX: 47.09 KG/M2 | SYSTOLIC BLOOD PRESSURE: 115 MMHG

## 2024-06-26 DIAGNOSIS — M51.16 LUMBAR DISC DISEASE WITH RADICULOPATHY: Primary | ICD-10-CM

## 2024-06-26 DIAGNOSIS — G89.4 CHRONIC PAIN SYNDROME: ICD-10-CM

## 2024-06-26 PROCEDURE — 99244 OFF/OP CNSLTJ NEW/EST MOD 40: CPT | Performed by: ANESTHESIOLOGY

## 2024-06-26 NOTE — PATIENT INSTRUCTIONS
Cervical Thoracic & Lumbosacral Nerve Root Block / Transforaminal Epidural Steroid Injection    What is a nerve root and why is a selective nerve root or transformaminal epidural steroid injection helpful?  Nerve roots exit your spinal cord and form nerves that travel into your arms or legs. These nerves allow you to move your arms, chest wall and legs. Inflammation of these nerve roots may cause pain in your arms or legs. These nerve roots may become inflamed and painful due to irritation, for example, from a damaged disc or a bone spur. An injection at the level of the nerve root provides important information to your physician and may provide long term relief. It serves to prove which nerve is causing your pain by placing temporary numbing medicine over the nerve root of concern. If your main pain complaint improves after the injection, that nerve is most likely causing your pain. If your pain remains unchanged, that nerve probably is not the cause of your pain. By confirming or denying your exact source of pain, it provides information allowing for proper treatment, which may include limited surgery at a specific location.  The procedure serves both diagnostic and therapeutic purposes.    What happens during the procedure?  While lying on a table, the skin over your spine will be well cleaned. The physician will numb a small area of skin with numbing medicine which stings for a few seconds. Next, the physician will use X-ray guidance to direct a very small needle just next to the nerve root without injuring the nerve root. He will then inject contrast dye to confirm that the medicine flows around the nerve root. This may temporarily increase your usual pain. Then, anti-inflammatory steroid (with or without local anesthetic) will be injected along the nerve root to improve your pain, if that nerve is the source of your pain.    What happens after the procedure?  A dressing may be applied to the injection site.  You will remain in the office for about 15-20 minutes and the nurse will monitor your blood pressure and pulse. The nurse will review your discharge instructions and you will be able to go home. You may experience numbness or weakness to the affected limb for a few hours after the procedure. If this happens do not walk without assistance. Your physician may refer you to a physical therapist while the anti-inflammatory steroid is still working.    General Pre/Post Instructions  Eating:  You may eat a light, but not full meal at least one hour before the procedure, unless receiving intravenous sedation. If you are an insulin dependent diabetic do not alter your normal food intake.    Medications:  Take your routine medications before the procedure (such as high blood pressure and diabetes medications) except for those that need to be discontinued five days before the procedure such as aspirin and all anti-inflammatory medications (e.g. Motrin/Ibuprofen, Aleve, Relafen, Daypro). These medicines may be re-started the day after the procedure. You may take your regular pain medicine as needed before/after the procedure. If you are taking Coumadin, Heparin, Lovenox, Plavix or Ticlid you must notify the office so that the timing of stopping these medications can be explained.    Exercise: You must bring a  with you. You may return to your current level of activities the next day including return to work.    Things that may Delay the Procedure  If you are on antibiotics please notify our office; we may delay the procedure. If you have an active infection or fever we will not do the procedure.

## 2024-06-26 NOTE — TELEPHONE ENCOUNTER
----- Message from Lacey ARMSTRONG sent at 6/26/2024  9:00 AM EDT -----  Please schedule a procedure for Dr Cailin Mast

## 2024-06-26 NOTE — PROGRESS NOTES
Assessment:  1. Lumbar disc disease with radiculopathy    2. Chronic pain syndrome        Plan:  Patient is a 44-year-old female complains of left leg pain with chronic pain syndrome secondary to lumbar spondylosis, lumbar radiculopathy presents to office for initial consultation.  MRI of the lumbar spine shows multilevel diffuse disc bulges and facet arthropathy with mild bilateral foraminal narrowing at L5-S1 and L4-L5.  There are also tiny synovial cyst in the left anterior lateral epidural space at L4-L5 in the left L5-S1 facet joint.  1.  We will schedule patient for a left L5-S1 and S1 transforaminal epidural steroid injection  2.  We will f/u 1 month after injection      Complete risks and benefits including bleeding, infection, tissue reaction, nerve injury and allergic reaction were discussed. The approach was demonstrated using models and literature was provided. Verbal and written consent was obtained.      History of Present Illness:    The patient is a 44 y.o. female who presents for consultation in regards to Back Pain.  Symptoms have been present for 8 months. Symptoms began without any precipitating injury or trauma. Pain is reported to be 7 on the numeric rating scale.  Symptoms are felt nearly constantly and worst in the morning.  Symptoms are characterized as burning and sharp.  Symptoms are associated with left leg weakness.  Aggravating factors include lying down and sitting.  Relieving factors include standing, walking, and exercise.  No change in symptoms with kneeling, bending, leaning forward, leaning bckward, turning the head, relaxation, coughing/sneezing, and bowel movements.  Treatments that have been helpful include physical therapy, chiropractic manipulation, home exercise, and heat/ice. Medications to relieve symptoms include Voltaren gel, Robaxin, ibuprofen.    Review of Systems:    Review of Systems   Musculoskeletal:  Positive for back pain.   All other systems reviewed and are  negative.          Past Medical History:   Diagnosis Date    Allergic rhinitis     Anxiety     Arthritis     Asthma     COVID-19 12/17/2020    CPAP (continuous positive airway pressure) dependence     Daytime hypersomnolence     LAST ASSESSED [id number]    Depression     GERD (gastroesophageal reflux disease)     occas    IUD (intrauterine device) in place     Lumbar disc disease     Migraine     RA (rheumatoid arthritis) (HCC)     Rheumatoid arthritis (HCC)     Sleep apnea     SVT (supraventricular tachycardia)     Varicella     Wears glasses        Past Surgical History:   Procedure Laterality Date    CHOLECYSTECTOMY      NASAL SEPTUM SURGERY      NASAL SEPTAL DEVIATION REPAIR-Dr. Navarrete    [place] [facility] CPTR ASSTD PX EXTRADURAL CRANIAL N/A 01/08/2019    Procedure: FUNCTIONAL ENDOSCOPIC SINUS SURGERY (FESS) IMAGED GUIDED; PLACEMENT OF MULTIPLE PROPEL IMPLANTS;  Surgeon: [place] Navarrete DO;  Location: [facility];  Service: ENT    SINUS SURGERY      US GUIDED BREAST BIOPSY LEFT COMPLETE Left 06/15/2022    benign    WISDOM TOOTH EXTRACTION         Family History   Problem Relation Age of Onset    Asthma Mother     Hypertension Mother     COPD Mother     Obesity Father     Hypothyroidism Father     Chiari malformation Daughter     Diabetes Daughter         Type 1    Celiac disease Daughter     No Known Problems Maternal Grandmother     Lung disease Maternal Grandfather     Cancer Paternal Grandmother     Aneurysm Paternal Grandfather     Celiac disease Paternal Grandfather     Obesity Brother     Chiari malformation Brother     Obesity Son     Autism Son     No Known Problems Maternal Aunt     Breast cancer Paternal Aunt         postmenopausal    No Known Problems Paternal Aunt     No Known Problems Paternal Aunt     Breast cancer additional onset Neg Hx     Endometrial cancer Neg Hx     Colon cancer Neg Hx     Ovarian cancer Neg Hx     BRCA1 Positive Neg Hx     BRCA1 Negative Neg Hx     BRCA 1/2 Neg Hx     BRCA2  Positive Neg Hx     BRCA2 Negative Neg Hx     Heart disease Neg Hx        Social History     Occupational History    Not on file   Tobacco Use    Smoking status: Former     Current packs/day: 0.00     Types: Cigarettes    Smokeless tobacco: Never    Tobacco comments:     quit 5 years ago    Vaping Use    Vaping status: Never Used   Substance and Sexual Activity    Alcohol use: Yes     Alcohol/week: 4.0 standard drinks of alcohol     Types: 4 Standard drinks or equivalent per week     Comment: social     Drug use: No    Sexual activity: Yes     Partners: Male     Birth control/protection: I.U.D.     Comment: Mirena          Current Outpatient Medications:     fexofenadine (ALLEGRA) 180 MG tablet, Take 180 mg by mouth daily, Disp: , Rfl:     FLUoxetine (PROzac) 40 MG capsule, Take 1 capsule (40 mg total) by mouth daily, Disp: 90 capsule, Rfl: 0    fluticasone (FLONASE) 50 mcg/act nasal spray, 2 sprays into each nostril daily, Disp: , Rfl:     ibuprofen (MOTRIN) 200 mg tablet, Take 200 mg by mouth every 6 (six) hours as needed for mild pain., Disp: , Rfl:     methocarbamol (ROBAXIN) 500 mg tablet, Take 1 tablet (500 mg total) by mouth 4 (four) times a day as needed for muscle spasms, Disp: 60 tablet, Rfl: 1    methylphenidate (CONCERTA) 54 MG ER tablet, Take 1 tablet (54 mg total) by mouth daily Max Daily Amount: 54 mg, Disp: 30 tablet, Rfl: 0    metoprolol tartrate (LOPRESSOR) 25 mg tablet, Take 1 tablet (25 mg total) by mouth every 12 (twelve) hours, Disp: 90 tablet, Rfl: 3    mometasone-formoterol (DULERA) 200-5 MCG/ACT inhaler, Inhale 2 puffs 2 (two) times a day Rinse mouth after use., Disp: 13 g, Rfl: 0    tirzepatide (Zepbound) 10 mg/0.5 mL auto-injector, Inject 0.5 mL (10 mg total) under the skin once a week for 28 days, Disp: 2 mL, Rfl: 1    topiramate (Topamax) 50 MG tablet, Take 1 tab po daily in AM, Disp: 30 tablet, Rfl: 2    Xeljanz XR 11 MG TB24, , Disp: , Rfl:     No Known Allergies    Physical  "Exam:    /76   Pulse 78   Resp 16   Ht 5' 6\" (1.676 m)   Wt (!) 148 kg (326 lb)   BMI 52.62 kg/m²     Constitutional: normal, well developed, well nourished, alert, in no distress and non-toxic and no overt pain behavior. and obese  Eyes: anicteric  HEENT: grossly intact  Neck: supple, symmetric, trachea midline and no masses   Pulmonary:even and unlabored  Cardiovascular:No edema or pitting edema present  Skin:Normal without rashes or lesions and well hydrated  Psychiatric:Mood and affect appropriate  Neurologic:Cranial Nerves II-XII grossly intact  Musculoskeletal:antalgic    Lumbar/Sacral Spine examination demonstrates.  Decreased range of motion lumbar spine with pain upon: flexion, lateral rotation to the left/right, and bending to the left/right.  Bilateral lumbar paraspinals tender to palpation. Muscle spasms noted in the lumbar area bilaterally. 4/5 left lower extremity strength in all muscle groups. Positive seated straight leg raise for bilateral lower extremities.  Sensitivity to light touch intact bilateral lower extremities. 2+ reflexes in the patella and Achilles.  No ankle clonus    Imaging    Study Result    Narrative & Impression   MRI LUMBAR SPINE WITHOUT CONTRAST     INDICATION: M54.16: Radiculopathy, lumbar region.    MRI LUMBAR SPINE WO CONTRAST,LBP, Lumbar back pain with radiculopathy affecting left lower extremity        COMPARISON: CT lower extremity right without contrast 9/13/2021. CT abdomen pelvis without contrast 12/20/2010. MRI lumbar spine without contrast 11/25/2008.     TECHNIQUE:  Multiplanar, multisequence imaging of the lumbar spine was performed. .        IMAGE QUALITY:  Diagnostic     FINDINGS:     VERTEBRAL BODIES:  There are 5 lumbar type vertebral bodies. Grade 1 retrolisthesis L2-L3. No scoliosis.  No compression fracture.     Type I Modic endplate change L2-L3 and L4-L5. Type II Modic endplate change L5-S1. Small focal areas of bone marrow edema in left L4 and " bilateral L5 pedicles (left worse than right), likely stress reaction. Otherwise, normal bone marrow signal.     SACRUM:  Normal signal within the sacrum. No evidence of insufficiency or stress fracture.     DISTAL CORD AND CONUS:  Normal size and signal within the distal cord and conus.     PARASPINAL SOFT TISSUES: Slightly edematous soft tissue changes adjacent to left L4-L5 and bilateral L5-S1 facet joints, likely due to active facet arthritis.     LOWER THORACIC DISC SPACES:  Normal disc height and signal.  No disc herniation, canal stenosis or foraminal narrowing.     LUMBAR DISC SPACES: Multilevel osteophytes, disc height loss, and facet arthropathy.     L1-L2: Facet arthropathy, trace facet joint effusions. No significant canal stenosis or foraminal narrowing.     L2-L3: Mild diffuse disc bulge. Facet arthropathy, small bilateral facet joint effusions (right worse than left). No significant canal stenosis or foraminal narrowing.     L3-L4: Mild diffuse disc bulge, small right subarticular disc protrusion. Facet arthropathy, trace facet joint effusions. No significant canal stenosis or foraminal narrowing.     L4-L5: Mild diffuse disc bulge. Facet arthropathy, ligamentum flavum thickening, small right facet joint effusion, tiny synovial cyst in left anterolateral epidural space. Mild canal stenosis. Mild bilateral foraminal narrowing.     L5-S1: Mild diffuse disc bulge, posterior marginal osteophytes. Facet arthropathy, small synovial cysts posterior to left L5-S1 facet joint. No significant canal stenosis. Mild bilateral foraminal narrowing.     Multilevel degenerative changes have worsened since MRI lumbar spine 11/25/2008.     OTHER FINDINGS: Partially imaged 2.8 cm simple right ovarian cyst (1:25), benign.     IMPRESSION:     Findings suggestive of active facet arthritis at left L4-L5 and bilateral L5-S1 facet joints.     Small focal areas of bone marrow edema in left L4 and bilateral L5 pedicles (left  worse than right), likely stress reaction.     Tiny synovial cyst in left anterolateral epidural space at L4-L5. Small synovial cysts posterior to left L5-S1 facet joint.     Worsened multilevel degenerative changes of lumbar spine with varying degrees of canal stenosis (mild L4-L5) and foraminal narrowing (mild bilateral L4-5 and bilateral L5-S1), as detailed above.     The study was marked in EPIC for significant notification.                             Workstation performed: [id number]            No orders to display       No orders of the defined types were placed in this encounter.

## 2024-06-26 NOTE — TELEPHONE ENCOUNTER
TFESI procedure scheduled 7/23/24    Reviewed instructions: , NPO 1 hour prior, loose-fitting/comfortable pants, if ill/fever/infx/abx to call and reschedule.  No immunizations or vaccinations 2w prior/after steroid injections. Patient stated verbal understanding.

## 2024-06-27 DIAGNOSIS — F98.8 ATTENTION DEFICIT DISORDER (ADD) WITHOUT HYPERACTIVITY: ICD-10-CM

## 2024-06-28 RX ORDER — METHYLPHENIDATE HYDROCHLORIDE 54 MG/1
TABLET, EXTENDED RELEASE ORAL
Qty: 30 TABLET | Refills: 0 | Status: SHIPPED | OUTPATIENT
Start: 2024-06-28

## 2024-06-30 ENCOUNTER — TELEPHONE (OUTPATIENT)
Dept: BARIATRICS | Facility: CLINIC | Age: 44
End: 2024-06-30

## 2024-06-30 DIAGNOSIS — E66.01 MORBID OBESITY WITH BMI OF 50.0-59.9, ADULT (HCC): ICD-10-CM

## 2024-06-30 DIAGNOSIS — Z79.899 MEDICATION MANAGEMENT: Primary | ICD-10-CM

## 2024-06-30 DIAGNOSIS — E78.5 MILD HYPERLIPIDEMIA: ICD-10-CM

## 2024-06-30 RX ORDER — TOPIRAMATE 50 MG/1
50 TABLET, FILM COATED ORAL 2 TIMES DAILY
Qty: 60 TABLET | Refills: 2 | Status: SHIPPED | OUTPATIENT
Start: 2024-06-30

## 2024-07-02 ENCOUNTER — HOSPITAL ENCOUNTER (OUTPATIENT)
Dept: NON INVASIVE DIAGNOSTICS | Facility: HOSPITAL | Age: 44
Discharge: HOME | End: 2024-07-02
Payer: COMMERCIAL

## 2024-07-02 ENCOUNTER — HOSPITAL ENCOUNTER (EMERGENCY)
Facility: HOSPITAL | Age: 44
Discharge: HOME | End: 2024-07-02
Attending: EMERGENCY MEDICINE | Admitting: EMERGENCY MEDICINE
Payer: COMMERCIAL

## 2024-07-02 VITALS
BODY MASS INDEX: 47.09 KG/M2 | DIASTOLIC BLOOD PRESSURE: 62 MMHG | SYSTOLIC BLOOD PRESSURE: 90 MMHG | WEIGHT: 293 LBS | HEIGHT: 66 IN

## 2024-07-02 VITALS
TEMPERATURE: 98.1 F | RESPIRATION RATE: 20 BRPM | HEART RATE: 58 BPM | SYSTOLIC BLOOD PRESSURE: 117 MMHG | HEIGHT: 66 IN | BODY MASS INDEX: 47.09 KG/M2 | DIASTOLIC BLOOD PRESSURE: 76 MMHG | WEIGHT: 293 LBS | OXYGEN SATURATION: 99 %

## 2024-07-02 DIAGNOSIS — I47.10 SVT (SUPRAVENTRICULAR TACHYCARDIA): ICD-10-CM

## 2024-07-02 DIAGNOSIS — R42 LIGHTHEADEDNESS: ICD-10-CM

## 2024-07-02 DIAGNOSIS — R00.1 BRADYCARDIA: ICD-10-CM

## 2024-07-02 DIAGNOSIS — R11.0 NAUSEA: ICD-10-CM

## 2024-07-02 DIAGNOSIS — E83.42 HYPOMAGNESEMIA: ICD-10-CM

## 2024-07-02 DIAGNOSIS — R53.83 MALAISE AND FATIGUE: Primary | ICD-10-CM

## 2024-07-02 DIAGNOSIS — R07.9 CHEST PAIN: ICD-10-CM

## 2024-07-02 DIAGNOSIS — R51.9 HEADACHE: ICD-10-CM

## 2024-07-02 DIAGNOSIS — R53.81 MALAISE AND FATIGUE: Primary | ICD-10-CM

## 2024-07-02 LAB
ALBUMIN SERPL BCG-MCNC: 3.7 G/DL (ref 3.5–5)
ALP SERPL-CCNC: 52 U/L (ref 34–104)
ALT SERPL W P-5'-P-CCNC: 14 U/L (ref 7–52)
ANION GAP SERPL CALCULATED.3IONS-SCNC: 7 MMOL/L (ref 4–13)
AORTIC ROOT: 3.5 CM
AST SERPL W P-5'-P-CCNC: 14 U/L (ref 13–39)
BASOPHILS # BLD AUTO: 0.09 THOUSANDS/ÂΜL (ref 0–0.1)
BASOPHILS NFR BLD AUTO: 1 % (ref 0–1)
BILIRUB SERPL-MCNC: 1.06 MG/DL (ref 0.2–1)
BILIRUB UR QL STRIP: NEGATIVE
BUN SERPL-MCNC: 13 MG/DL (ref 5–25)
CALCIUM SERPL-MCNC: 8.9 MG/DL (ref 8.4–10.2)
CARDIAC TROPONIN I PNL SERPL HS: <2 NG/L
CHLORIDE SERPL-SCNC: 107 MMOL/L (ref 96–108)
CLARITY UR: CLEAR
CO2 SERPL-SCNC: 24 MMOL/L (ref 21–32)
COLOR UR: COLORLESS
CREAT SERPL-MCNC: 0.83 MG/DL (ref 0.6–1.3)
E WAVE DECELERATION TIME: 180 MS
E/A RATIO: 1.34
EOSINOPHIL # BLD AUTO: 0.39 THOUSAND/ÂΜL (ref 0–0.61)
EOSINOPHIL NFR BLD AUTO: 6 % (ref 0–6)
ERYTHROCYTE [DISTWIDTH] IN BLOOD BY AUTOMATED COUNT: 13.6 % (ref 11.6–15.1)
EXT PREGNANCY TEST URINE: NEGATIVE
EXT. CONTROL: NORMAL
FRACTIONAL SHORTENING: 38 (ref 28–44)
GFR SERPL CREATININE-BSD FRML MDRD: 86 ML/MIN/1.73SQ M
GLUCOSE SERPL-MCNC: 93 MG/DL (ref 65–140)
GLUCOSE UR STRIP-MCNC: NEGATIVE MG/DL
HCT VFR BLD AUTO: 38 % (ref 34.8–46.1)
HGB BLD-MCNC: 12.3 G/DL (ref 11.5–15.4)
HGB UR QL STRIP.AUTO: NEGATIVE
IMM GRANULOCYTES # BLD AUTO: 0.02 THOUSAND/UL (ref 0–0.2)
IMM GRANULOCYTES NFR BLD AUTO: 0 % (ref 0–2)
INTERVENTRICULAR SEPTUM IN DIASTOLE (PARASTERNAL SHORT AXIS VIEW): 0.8 CM
INTERVENTRICULAR SEPTUM: 0.8 CM (ref 0.6–1.1)
KETONES UR STRIP-MCNC: NEGATIVE MG/DL
LEFT ATRIUM SIZE: 4.1 CM
LEFT INTERNAL DIMENSION IN SYSTOLE: 3.3 CM (ref 2.1–4)
LEFT VENTRICULAR INTERNAL DIMENSION IN DIASTOLE: 5.3 CM (ref 3.5–6)
LEFT VENTRICULAR POSTERIOR WALL IN END DIASTOLE: 0.8 CM
LEFT VENTRICULAR STROKE VOLUME: 91 ML
LEUKOCYTE ESTERASE UR QL STRIP: NEGATIVE
LIPASE SERPL-CCNC: 21 U/L (ref 11–82)
LVSV (TEICH): 91 ML
LYMPHOCYTES # BLD AUTO: 1.22 THOUSANDS/ÂΜL (ref 0.6–4.47)
LYMPHOCYTES NFR BLD AUTO: 19 % (ref 14–44)
MAGNESIUM SERPL-MCNC: 1.8 MG/DL (ref 1.9–2.7)
MCH RBC QN AUTO: 30.8 PG (ref 26.8–34.3)
MCHC RBC AUTO-ENTMCNC: 32.4 G/DL (ref 31.4–37.4)
MCV RBC AUTO: 95 FL (ref 82–98)
MONOCYTES # BLD AUTO: 0.6 THOUSAND/ÂΜL (ref 0.17–1.22)
MONOCYTES NFR BLD AUTO: 9 % (ref 4–12)
MV PEAK A VEL: 0.82 M/S
MV PEAK E VEL: 110 CM/S
MV STENOSIS PRESSURE HALF TIME: 52 MS
MV VALVE AREA P 1/2 METHOD: 4.2
NEUTROPHILS # BLD AUTO: 4.21 THOUSANDS/ÂΜL (ref 1.85–7.62)
NEUTS SEG NFR BLD AUTO: 65 % (ref 43–75)
NITRITE UR QL STRIP: NEGATIVE
NRBC BLD AUTO-RTO: 0 /100 WBCS
PH UR STRIP.AUTO: 7 [PH]
PHOSPHATE SERPL-MCNC: 3 MG/DL (ref 2.7–4.5)
PLATELET # BLD AUTO: 233 THOUSANDS/UL (ref 149–390)
PMV BLD AUTO: 11 FL (ref 8.9–12.7)
POTASSIUM SERPL-SCNC: 3.8 MMOL/L (ref 3.5–5.3)
PROT SERPL-MCNC: 6.3 G/DL (ref 6.4–8.4)
PROT UR STRIP-MCNC: NEGATIVE MG/DL
RBC # BLD AUTO: 4 MILLION/UL (ref 3.81–5.12)
SL CV PED ECHO LEFT VENTRICLE DIASTOLIC VOLUME (MOD BIPLANE) 2D: 136 ML
SL CV PED ECHO LEFT VENTRICLE SYSTOLIC VOLUME (MOD BIPLANE) 2D: 44 ML
SODIUM SERPL-SCNC: 138 MMOL/L (ref 135–147)
SP GR UR STRIP.AUTO: 1.01 (ref 1–1.03)
TR MAX PG: 25 MMHG
TR PEAK VELOCITY: 2.5 M/S
TRICUSPID VALVE PEAK REGURGITATION VELOCITY: 2.48 M/S
UROBILINOGEN UR STRIP-ACNC: <2 MG/DL
WBC # BLD AUTO: 6.53 THOUSAND/UL (ref 4.31–10.16)

## 2024-07-02 PROCEDURE — 64450 NJX AA&/STRD OTHER PN/BRANCH: CPT | Performed by: EMERGENCY MEDICINE

## 2024-07-02 PROCEDURE — 93005 ELECTROCARDIOGRAM TRACING: CPT

## 2024-07-02 PROCEDURE — 99285 EMERGENCY DEPT VISIT HI MDM: CPT

## 2024-07-02 PROCEDURE — 84100 ASSAY OF PHOSPHORUS: CPT | Performed by: EMERGENCY MEDICINE

## 2024-07-02 PROCEDURE — 96368 THER/DIAG CONCURRENT INF: CPT

## 2024-07-02 PROCEDURE — 80053 COMPREHEN METABOLIC PANEL: CPT | Performed by: EMERGENCY MEDICINE

## 2024-07-02 PROCEDURE — 85025 COMPLETE CBC W/AUTO DIFF WBC: CPT | Performed by: EMERGENCY MEDICINE

## 2024-07-02 PROCEDURE — 96375 TX/PRO/DX INJ NEW DRUG ADDON: CPT

## 2024-07-02 PROCEDURE — 81003 URINALYSIS AUTO W/O SCOPE: CPT | Performed by: EMERGENCY MEDICINE

## 2024-07-02 PROCEDURE — 83690 ASSAY OF LIPASE: CPT | Performed by: EMERGENCY MEDICINE

## 2024-07-02 PROCEDURE — 96365 THER/PROPH/DIAG IV INF INIT: CPT

## 2024-07-02 PROCEDURE — 81025 URINE PREGNANCY TEST: CPT | Performed by: EMERGENCY MEDICINE

## 2024-07-02 PROCEDURE — 84484 ASSAY OF TROPONIN QUANT: CPT | Performed by: EMERGENCY MEDICINE

## 2024-07-02 PROCEDURE — 83735 ASSAY OF MAGNESIUM: CPT | Performed by: EMERGENCY MEDICINE

## 2024-07-02 PROCEDURE — 36415 COLL VENOUS BLD VENIPUNCTURE: CPT | Performed by: EMERGENCY MEDICINE

## 2024-07-02 PROCEDURE — 99284 EMERGENCY DEPT VISIT MOD MDM: CPT | Performed by: EMERGENCY MEDICINE

## 2024-07-02 RX ORDER — MAGNESIUM SULFATE HEPTAHYDRATE 40 MG/ML
2 INJECTION, SOLUTION INTRAVENOUS ONCE
Status: COMPLETED | OUTPATIENT
Start: 2024-07-02 | End: 2024-07-02

## 2024-07-02 RX ORDER — SODIUM CHLORIDE, SODIUM GLUCONATE, SODIUM ACETATE, POTASSIUM CHLORIDE, MAGNESIUM CHLORIDE, SODIUM PHOSPHATE, DIBASIC, AND POTASSIUM PHOSPHATE .53; .5; .37; .037; .03; .012; .00082 G/100ML; G/100ML; G/100ML; G/100ML; G/100ML; G/100ML; G/100ML
1000 INJECTION, SOLUTION INTRAVENOUS ONCE
Status: COMPLETED | OUTPATIENT
Start: 2024-07-02 | End: 2024-07-02

## 2024-07-02 RX ORDER — LIDOCAINE HYDROCHLORIDE 10 MG/ML
5 INJECTION, SOLUTION EPIDURAL; INFILTRATION; INTRACAUDAL; PERINEURAL ONCE
Status: COMPLETED | OUTPATIENT
Start: 2024-07-02 | End: 2024-07-02

## 2024-07-02 RX ORDER — POTASSIUM CHLORIDE 20MEQ/15ML
40 LIQUID (ML) ORAL ONCE
Status: COMPLETED | OUTPATIENT
Start: 2024-07-02 | End: 2024-07-02

## 2024-07-02 RX ORDER — SUCRALFATE 1 G/1
1 TABLET ORAL ONCE
Status: COMPLETED | OUTPATIENT
Start: 2024-07-02 | End: 2024-07-02

## 2024-07-02 RX ORDER — PANTOPRAZOLE SODIUM 40 MG/10ML
40 INJECTION, POWDER, LYOPHILIZED, FOR SOLUTION INTRAVENOUS ONCE
Status: COMPLETED | OUTPATIENT
Start: 2024-07-02 | End: 2024-07-02

## 2024-07-02 RX ORDER — TRIAMCINOLONE ACETONIDE 40 MG/ML
40 INJECTION, SUSPENSION INTRA-ARTICULAR; INTRAMUSCULAR ONCE
Status: COMPLETED | OUTPATIENT
Start: 2024-07-02 | End: 2024-07-02

## 2024-07-02 RX ORDER — BUPIVACAINE HYDROCHLORIDE 5 MG/ML
10 INJECTION, SOLUTION EPIDURAL; INTRACAUDAL ONCE
Status: COMPLETED | OUTPATIENT
Start: 2024-07-02 | End: 2024-07-02

## 2024-07-02 RX ADMIN — LIDOCAINE HYDROCHLORIDE 5 ML: 10 INJECTION, SOLUTION EPIDURAL; INFILTRATION; INTRACAUDAL; PERINEURAL at 16:12

## 2024-07-02 RX ADMIN — MAGNESIUM SULFATE HEPTAHYDRATE 2 G: 40 INJECTION, SOLUTION INTRAVENOUS at 12:59

## 2024-07-02 RX ADMIN — TRIAMCINOLONE ACETONIDE 40 MG: 40 INJECTION, SUSPENSION INTRA-ARTICULAR; INTRAMUSCULAR at 16:12

## 2024-07-02 RX ADMIN — PANTOPRAZOLE SODIUM 40 MG: 40 INJECTION, POWDER, FOR SOLUTION INTRAVENOUS at 12:54

## 2024-07-02 RX ADMIN — SUCRALFATE 1 G: 1 TABLET ORAL at 12:54

## 2024-07-02 RX ADMIN — POTASSIUM CHLORIDE 40 MEQ: 20 SOLUTION ORAL at 13:00

## 2024-07-02 RX ADMIN — BUPIVACAINE HYDROCHLORIDE 10 ML: 5 INJECTION, SOLUTION EPIDURAL; INTRACAUDAL; PERINEURAL at 16:12

## 2024-07-02 RX ADMIN — SODIUM CHLORIDE, SODIUM GLUCONATE, SODIUM ACETATE, POTASSIUM CHLORIDE, MAGNESIUM CHLORIDE, SODIUM PHOSPHATE, DIBASIC, AND POTASSIUM PHOSPHATE 1000 ML: .53; .5; .37; .037; .03; .012; .00082 INJECTION, SOLUTION INTRAVENOUS at 12:49

## 2024-07-02 NOTE — Clinical Note
Hemalatha Hahn was seen and treated in our emergency department on 7/2/2024.                Diagnosis:     Hemalatha  may return to work on return date.    She may return on this date: 07/05/2024         If you have any questions or concerns, please don't hesitate to call.      Jovanni Moncada, DO    ______________________________           _______________          _______________  Hospital Representative                              Date                                Time

## 2024-07-02 NOTE — ED PROVIDER NOTES
"History  Chief Complaint   Patient presents with    Dizziness     Patient reports that she feels \"off\". States that she has been lightheaded and abdominal pain.      44 presents for evaluation of malaise, fatigue with associated lightheadedness.  Patient states symptoms onset approximately 3 days ago and have been relatively persistent.  Patient did drive herself to her stress echo appointment today but was unable to complete the testing due to her symptoms.  Patient states that attempts to take her blood pressure in the cardiology office failed as they were unable to auscultate or obtain a noninvasive blood pressure.  Patient denies any recent illness, fever or chills.  No urinary symptoms or flank pain.  No focal neurological deficits.  No visual deficits.  Patient does have a mild generalized frontal pressure type headache which was gradual in onset, not maximal in onset and not dissimilar from prior similar type headaches.  No neck pain.  Patient also complaining of epigastric pain with radiation up into the chest along the course of the esophagus but without any other radiation.  No ripping or tearing type pain.  No shortness of breath.  Patient does affirm mild nausea without vomiting.  Patient was started on metoprolol 25 mg twice daily approxi-1/2 months ago after presentation to the ED with SVT.  Patient does follow with cardiology for this.  Patient states she has been eating well, hydrating and otherwise having normal bowel and bladder function.  No menses due to IUD.  No other abnormal blood loss.  No new medications.  On exam, patient appears fatigued but is not ill or toxic appearing and not in acute distress.          Prior to Admission Medications   Prescriptions Last Dose Informant Patient Reported? Taking?   Concerta 54 MG ER tablet   No No   Sig: Take 1 tablet (54 mg total) by mouth daily Max Daily Amount: 54 mg   FLUoxetine (PROzac) 40 MG capsule  Self No No   Sig: Take 1 capsule (40 mg total) by " mouth daily   Xeljanz XR 11 MG TB24  Self Yes No   fexofenadine (ALLEGRA) 180 MG tablet  Self Yes No   Sig: Take 180 mg by mouth daily   fluticasone (FLONASE) 50 mcg/act nasal spray  Self Yes No   Si sprays into each nostril daily   ibuprofen (MOTRIN) 200 mg tablet  Self Yes No   Sig: Take 200 mg by mouth every 6 (six) hours as needed for mild pain.   methocarbamol (ROBAXIN) 500 mg tablet  Self No No   Sig: Take 1 tablet (500 mg total) by mouth 4 (four) times a day as needed for muscle spasms   metoprolol tartrate (LOPRESSOR) 25 mg tablet  Self No No   Sig: Take 1 tablet (25 mg total) by mouth every 12 (twelve) hours   mometasone-formoterol (DULERA) 200-5 MCG/ACT inhaler  Self No No   Sig: Inhale 2 puffs 2 (two) times a day Rinse mouth after use.   tirzepatide (Zepbound) 10 mg/0.5 mL auto-injector  Self No No   Sig: Inject 0.5 mL (10 mg total) under the skin once a week for 28 days   topiramate (Topamax) 50 MG tablet   No No   Sig: Take 1 tablet (50 mg total) by mouth 2 (two) times a day Take 1 tab po daily in AM      Facility-Administered Medications: None       Past Medical History:   Diagnosis Date    Allergic rhinitis     Anxiety     Arthritis     Asthma     COVID-19 2020    CPAP (continuous positive airway pressure) dependence     Daytime hypersomnolence     LAST ASSESSED [id number]    Depression     GERD (gastroesophageal reflux disease)     occas    IUD (intrauterine device) in place     Lumbar disc disease     Migraine     RA (rheumatoid arthritis) (HCC)     Rheumatoid arthritis (HCC)     Sleep apnea     SVT (supraventricular tachycardia)     Varicella     Wears glasses        Past Surgical History:   Procedure Laterality Date    CHOLECYSTECTOMY      NASAL SEPTUM SURGERY      NASAL SEPTAL DEVIATION REPAIR-Dr. Navarrete    [place] STRTCTC CPTR ASSTD PX EXTRADURAL CRANIAL N/A 2019    Procedure: FUNCTIONAL ENDOSCOPIC SINUS SURGERY (FESS) IMAGED GUIDED; PLACEMENT OF MULTIPLE PROPEL IMPLANTS;  Surgeon:  South Navarrete, ;  Location: [facility] OR;  Service: ENT    SINUS SURGERY      US GUIDED BREAST BIOPSY LEFT COMPLETE Left 06/15/2022    benign    WISDOM TOOTH EXTRACTION         Family History   Problem Relation Age of Onset    Asthma Mother     Hypertension Mother     COPD Mother     Obesity Father     Hypothyroidism Father     Chiari malformation Daughter     Diabetes Daughter         Type 1    Celiac disease Daughter     No Known Problems Maternal Grandmother     Lung disease Maternal Grandfather     Cancer Paternal Grandmother     Aneurysm Paternal Grandfather     Celiac disease Paternal Grandfather     Obesity Brother     Chiari malformation Brother     Obesity Son     Autism Son     No Known Problems Maternal Aunt     Breast cancer Paternal Aunt         postmenopausal    No Known Problems Paternal Aunt     No Known Problems Paternal Aunt     Breast cancer additional onset Neg Hx     Endometrial cancer Neg Hx     Colon cancer Neg Hx     Ovarian cancer Neg Hx     BRCA1 Positive Neg Hx     BRCA1 Negative Neg Hx     BRCA 1/2 Neg Hx     BRCA2 Positive Neg Hx     BRCA2 Negative Neg Hx     Heart disease Neg Hx      I have reviewed and agree with the history as documented.    E-Cigarette/Vaping    E-Cigarette Use Never User      E-Cigarette/Vaping Substances    Nicotine No     THC No     CBD No     Flavoring No     Other No     Unknown No      Social History     Tobacco Use    Smoking status: Former     Current packs/day: 0.00     Types: Cigarettes    Smokeless tobacco: Never    Tobacco comments:     quit 5 years ago    Vaping Use    Vaping status: Never Used   Substance Use Topics    Alcohol use: Yes     Alcohol/week: 4.0 standard drinks of alcohol     Types: 4 Standard drinks or equivalent per week     Comment: social     Drug use: No       Review of Systems   Constitutional:  Positive for fatigue. Negative for activity change, appetite change, chills, diaphoresis and fever.   HENT:  Negative for dental problem, ear  pain, sore throat, trouble swallowing and voice change.    Eyes:  Negative for pain and visual disturbance.   Respiratory:  Negative for cough, chest tightness, shortness of breath and wheezing.    Cardiovascular:  Positive for chest pain. Negative for palpitations and leg swelling.   Gastrointestinal:  Negative for abdominal pain, anal bleeding, blood in stool, diarrhea, nausea, rectal pain and vomiting.   Endocrine: Negative for polydipsia, polyphagia and polyuria.   Genitourinary:  Negative for difficulty urinating, dysuria, flank pain, frequency, hematuria and urgency.   Musculoskeletal:  Negative for back pain, joint swelling, myalgias, neck pain and neck stiffness.   Skin:  Negative for pallor, rash and wound.   Neurological:  Positive for headaches. Negative for dizziness, facial asymmetry, speech difficulty, weakness, light-headedness and numbness.   Hematological:  Negative for adenopathy.   Psychiatric/Behavioral:  Negative for agitation. The patient is not nervous/anxious.    All other systems reviewed and are negative.      Physical Exam  Physical Exam  Vitals and nursing note reviewed.   Constitutional:       General: She is not in acute distress.     Appearance: She is well-developed. She is not ill-appearing, toxic-appearing or diaphoretic.   HENT:      Head: Normocephalic and atraumatic.      Jaw: There is normal jaw occlusion. Tenderness present. No trismus, pain on movement or malocclusion.      Salivary Glands: Right salivary gland is not diffusely enlarged or tender. Left salivary gland is not diffusely enlarged or tender.      Comments: Tenderness over bilateral masseter and temporalis muscles.     Right Ear: External ear normal.      Left Ear: External ear normal.      Nose: Nose normal. No congestion or rhinorrhea.      Mouth/Throat:      Mouth: Mucous membranes are moist.      Pharynx: No oropharyngeal exudate or posterior oropharyngeal erythema.   Eyes:      General: Lids are normal. Vision  grossly intact. Gaze aligned appropriately. No visual field deficit or scleral icterus.        Right eye: No discharge.         Left eye: No discharge.      Extraocular Movements: Extraocular movements intact.      Right eye: Normal extraocular motion and no nystagmus.      Left eye: Normal extraocular motion and no nystagmus.      Conjunctiva/sclera: Conjunctivae normal.      Right eye: Right conjunctiva is not injected. No chemosis, exudate or hemorrhage.     Left eye: Left conjunctiva is not injected. No chemosis, exudate or hemorrhage.     Pupils: Pupils are equal, round, and reactive to light.      Visual Fields: Right eye visual fields normal and left eye visual fields normal.   Neck:      Thyroid: No thyroid mass or thyromegaly.      Vascular: No JVD.      Trachea: Trachea and phonation normal. No tracheal deviation.      Meningeal: Brudzinski's sign and Kernig's sign absent.   Cardiovascular:      Rate and Rhythm: Normal rate and regular rhythm.      Pulses: Normal pulses.      Heart sounds: Normal heart sounds, S1 normal and S2 normal. No murmur heard.     No friction rub. No gallop.   Pulmonary:      Effort: Pulmonary effort is normal. No accessory muscle usage, respiratory distress or retractions.      Breath sounds: Normal breath sounds and air entry. No stridor or decreased air movement. No decreased breath sounds, wheezing, rhonchi or rales.   Chest:      Chest wall: No tenderness.   Abdominal:      General: There is no distension.      Palpations: Abdomen is soft. There is no mass.      Tenderness: There is abdominal tenderness in the epigastric area. There is no guarding or rebound.      Hernia: No hernia is present.   Musculoskeletal:         General: No swelling, tenderness or deformity. Normal range of motion.      Cervical back: Full passive range of motion without pain, normal range of motion and neck supple. No rigidity or tenderness. No pain with movement or muscular tenderness. Normal range  of motion.      Right lower leg: No edema.      Left lower leg: No edema.   Lymphadenopathy:      Cervical: No cervical adenopathy.   Skin:     General: Skin is warm and dry.      Capillary Refill: Capillary refill takes less than 2 seconds.      Coloration: Skin is not jaundiced or pale.      Findings: No bruising, erythema, lesion or rash.   Neurological:      General: No focal deficit present.      Mental Status: She is alert and oriented to person, place, and time.      GCS: GCS eye subscore is 4. GCS verbal subscore is 5. GCS motor subscore is 6.      Cranial Nerves: Cranial nerves 2-12 are intact. No cranial nerve deficit, dysarthria or facial asymmetry.      Sensory: Sensation is intact. No sensory deficit.      Motor: Motor function is intact. No weakness, tremor, atrophy, abnormal muscle tone, seizure activity or pronator drift.      Coordination: Coordination is intact. Coordination normal. Finger-Nose-Finger Test and Heel to Shin Test normal.      Gait: Gait is intact. Gait normal.      Deep Tendon Reflexes: Reflexes are normal and symmetric. Reflexes normal.   Psychiatric:         Mood and Affect: Mood normal.         Behavior: Behavior normal.         Vital Signs  ED Triage Vitals [07/02/24 1201]   Temperature Pulse Respirations Blood Pressure SpO2   98.1 °F (36.7 °C) 61 18 155/95 100 %      Temp Source Heart Rate Source Patient Position - Orthostatic VS BP Location FiO2 (%)   Temporal Monitor Sitting Right arm --      Pain Score       6           Vitals:    07/02/24 1201 07/02/24 1236 07/02/24 1402 07/02/24 1438   BP: 155/95 115/60 123/72 117/76   Pulse: 61 58 60 58   Patient Position - Orthostatic VS: Sitting            Visual Acuity      ED Medications  Medications   multi-electrolyte (ISOLYTE-S PH 7.4) bolus 1,000 mL (0 mL Intravenous Stopped 7/2/24 1349)   pantoprazole (PROTONIX) injection 40 mg (40 mg Intravenous Given 7/2/24 1254)   sucralfate (CARAFATE) tablet 1 g (1 g Oral Given 7/2/24 1254)    potassium chloride oral solution 40 mEq (40 mEq Oral Given 7/2/24 1300)   magnesium sulfate 2 g/50 mL IVPB (premix) 2 g (0 g Intravenous Stopped 7/2/24 1359)   bupivacaine (PF) (MARCAINE) 0.5 % injection 10 mL (10 mL Injection Given 7/2/24 1612)   lidocaine (PF) (XYLOCAINE-MPF) 1 % injection 5 mL (5 mL Infiltration Given 7/2/24 1612)   triamcinolone acetonide (KENALOG-40) 40 mg/mL injection 40 mg (40 mg Infiltration Given 7/2/24 1612)       Diagnostic Studies  Results Reviewed       Procedure Component Value Units Date/Time    UA w Reflex to Microscopic w Reflex to Culture [[id number]] Collected: 07/02/24 1401    Lab Status: Final result Specimen: Urine, Clean Catch Updated: 07/02/24 1406     Color, UA Colorless     Clarity, UA Clear     Specific Gravity, UA 1.015     pH, UA 7.0     Leukocytes, UA Negative     Nitrite, UA Negative     Protein, UA Negative mg/dl      Glucose, UA Negative mg/dl      Ketones, UA Negative mg/dl      Urobilinogen, UA <2.0 mg/dl      Bilirubin, UA Negative     Occult Blood, UA Negative    POCT pregnancy, urine [[id number]]  (Normal) Resulted: 07/02/24 1400    Lab Status: Final result Updated: 07/02/24 1400     EXT Preg Test, Ur Negative     Control Valid    HS Troponin 0hr (reflex protocol) [[id number]]  (Normal) Collected: 07/02/24 1230    Lab Status: Final result Specimen: Blood from Arm, Left Updated: 07/02/24 1259     hs TnI 0hr <2 ng/L     Lipase [[id number]]  (Normal) Collected: 07/02/24 1230    Lab Status: Final result Specimen: Blood from Arm, Left Updated: 07/02/24 1253     Lipase 21 u/L     Comprehensive metabolic panel [[id number]]  (Abnormal) Collected: 07/02/24 1230    Lab Status: Final result Specimen: Blood from Arm, Left Updated: 07/02/24 1253     Sodium 138 mmol/L      Potassium 3.8 mmol/L      Chloride 107 mmol/L      CO2 24 mmol/L      ANION GAP 7 mmol/L      BUN 13 mg/dL      Creatinine 0.83 mg/dL      Glucose 93 mg/dL      Calcium 8.9 mg/dL      AST 14 U/L      ALT 14  U/L      Alkaline Phosphatase 52 U/L      Total Protein 6.3 g/dL      Albumin 3.7 g/dL      Total Bilirubin 1.06 mg/dL      eGFR 86 ml/min/1.73sq m     Narrative:      National Kidney Disease Foundation guidelines for Chronic Kidney Disease (CKD):     Stage 1 with normal or high GFR (GFR > 90 mL/min/1.73 square meters)    Stage 2 Mild CKD (GFR = 60-89 mL/min/1.73 square meters)    Stage 3A Moderate CKD (GFR = 45-59 mL/min/1.73 square meters)    Stage 3B Moderate CKD (GFR = 30-44 mL/min/1.73 square meters)    Stage 4 Severe CKD (GFR = 15-29 mL/min/1.73 square meters)    Stage 5 End Stage CKD (GFR <15 mL/min/1.73 square meters)  Note: GFR calculation is accurate only with a steady state creatinine    Magnesium [[id number]]  (Abnormal) Collected: 07/02/24 1230    Lab Status: Final result Specimen: Blood from Arm, Left Updated: 07/02/24 1253     Magnesium 1.8 mg/dL     Phosphorus [[id number]]  (Normal) Collected: 07/02/24 1230    Lab Status: Final result Specimen: Blood from Arm, Left Updated: 07/02/24 1253     Phosphorus 3.0 mg/dL     CBC and differential [[id number]] Collected: 07/02/24 1230    Lab Status: Final result Specimen: Blood from Arm, Left Updated: 07/02/24 1238     WBC 6.53 Thousand/uL      RBC 4.00 Million/uL      Hemoglobin 12.3 g/dL      Hematocrit 38.0 %      MCV 95 fL      MCH 30.8 pg      MCHC 32.4 g/dL      RDW 13.6 %      MPV 11.0 fL      Platelets 233 Thousands/uL      nRBC 0 /100 WBCs      Segmented % 65 %      Immature Grans % 0 %      Lymphocytes % 19 %      Monocytes % 9 %      Eosinophils Relative 6 %      Basophils Relative 1 %      Absolute Neutrophils 4.21 Thousands/µL      Absolute Immature Grans 0.02 Thousand/uL      Absolute Lymphocytes 1.22 Thousands/µL      Absolute Monocytes 0.60 Thousand/µL      Eosinophils Absolute 0.39 Thousand/µL      Basophils Absolute 0.09 Thousands/µL                    No orders to display              Procedures  ECG 12 Lead Documentation Only    Date/Time:  7/2/2024 12:32 PM    Performed by: Jovanni Moncada DO  Authorized by: Jovanni Moncada DO    Indications / Diagnosis:  Malaise, fatigue  ECG reviewed by me, the ED Provider: yes    Patient location:  ED  Previous ECG:     Previous ECG:  Compared to current    Comparison ECG info:  5/6/24    Similarity:  Changes noted    Comparison to cardiac monitor: Yes    Interpretation:     Interpretation: normal    Rate:     ECG rate:  60    ECG rate assessment: normal    Rhythm:     Rhythm: sinus rhythm    Ectopy:     Ectopy: none    QRS:     QRS axis:  Normal    QRS intervals:  Normal  Conduction:     Conduction: normal    ST segments:     ST segments:  Normal  T waves:     T waves: normal    Nerve block    Date/Time: 7/2/2024 4:06 PM    Performed by: Jovanni Moncada DO  Authorized by: Jovanni Moncada DO    Patient location:  ED  [place] Protocol:  Procedure performed by:  Consent: Verbal consent obtained.  Risks and benefits: risks, benefits and alternatives were discussed  Consent given by: patient  Patient understanding: patient states understanding of the procedure being performed  Patient identity confirmed: verbally with patient and arm band    Indications:     Indications:  Pain relief  Location:     Body area:  Head    Head nerve blocked: supraorbital.    Nerve type:  Peripheral    Laterality:  Bilateral  Pre-procedure details:     Skin preparation:  2% chlorhexidine  Procedure details (see MAR for exact dosages):     Block needle gauge:  25 G    Anesthetic injected:  Bupivacaine 0.5% w/o epi and lidocaine 1% w/o epi    Steroid injected:  Triamcinolone    Additive injected:  None    Injection procedure:  Anatomic landmarks identified, incremental injection, negative aspiration for blood, introduced needle and anatomic landmarks palpated  Post-procedure details:     Dressing:  None    Outcome:  Pain relieved    Patient tolerance of procedure:  Tolerated well, no immediate complications            ED Course         1430 hrs.: Patient reassessed.  States she is overall feeling better.  Was able to ambulate to the bathroom without any difficulty.  Patient still feels pressure around the front of her head and will provide supraorbital nerve injections for symptom management.  Disposition to be determined.     HEART Risk Score      Flowsheet Row Most Recent Value   Heart Score Risk Calculator    History 0 Filed at: 07/02/2024 1657   ECG 0 Filed at: 07/02/2024 1657   Age 0 Filed at: 07/02/2024 1657   Risk Factors 1 Filed at: 07/02/2024 1657   Troponin 0 Filed at: 07/02/2024 1657   HEART Score 1 Filed at: 07/02/2024 1657              SBIRT 22yo+      Flowsheet Row Most Recent Value   Initial Alcohol Screen: US AUDIT-C     1. How often do you have a drink containing alcohol? 0 Filed at: 07/02/2024 1203   2. How many drinks containing alcohol do you have on a typical day you are drinking?  0 Filed at: 07/02/2024 1203   3a. Male UNDER 65: How often do you have five or more drinks on one occasion? 0 Filed at: 07/02/2024 1203   3b. FEMALE Any Age, or MALE 65+: How often do you have 4 or more drinks on one occassion? 0 Filed at: 07/02/2024 1203   Audit-C Score 0 Filed at: 07/02/2024 1203   HUMZA: How many times in the past year have you...    Used an illegal drug or used a prescription medication for non-medical reasons? Never Filed at: 07/02/2024 1203                      Medical Decision Making  44 presents for evaluation of malaise, fatigue with associated lightheadedness.  Patient states symptoms onset approximately 3 days ago and have been relatively persistent.  Patient did drive herself to her stress echo appointment today but was unable to complete the testing due to her symptoms.  Patient states that attempts to take her blood pressure in the cardiology office failed as they were unable to auscultate or obtain a noninvasive blood pressure.  Patient denies any recent illness, fever or chills.  No urinary  symptoms or flank pain.  No focal neurological deficits.  No visual deficits.  Patient does have a mild generalized frontal pressure type headache which was gradual in onset, not maximal in onset and not dissimilar from prior similar type headaches.  No neck pain.  Patient also complaining of epigastric pain with radiation up into the chest along the course of the esophagus but without any other radiation.  No ripping or tearing type pain.  No shortness of breath.  Patient does affirm mild nausea without vomiting.  Patient was started on metoprolol 25 mg twice daily approxi-1/2 months ago after presentation to the ED with SVT.  Patient does follow with cardiology for this.  Patient states she has been eating well, hydrating and otherwise having normal bowel and bladder function.  No menses due to IUD.  No other abnormal blood loss.  No new medications.  On exam, patient appears fatigued but is not ill or toxic appearing and not in acute distress.      Will check basic labs, provide symptom management, fluids, EKG, imaging as needed, and disposition as appropriate.    Amount and/or Complexity of Data Reviewed  External Data Reviewed: notes.     Details: Preliminary echo results show EF 60%.  Labs: ordered. Decision-making details documented in ED Course.  ECG/medicine tests: ordered and independent interpretation performed. Decision-making details documented in ED Course.    Risk  Prescription drug management.             Disposition  Final diagnoses:   Lightheadedness   Nausea   Headache   Hypomagnesemia   Malaise and fatigue   Bradycardia   Chest pain     Time reflects when diagnosis was documented in both MDM as applicable and the Disposition within this note       Time User Action Codes Description Comment    7/2/2024 12:53 PM Jovanni Moncada [R42] Lightheadedness     7/2/2024 12:53 PM Jovanni Moncada [R11.0] Nausea     7/2/2024 12:53 PM Jovanni Moncada [R51.9] Headache     7/2/2024  1:29 PM  Jovanni Moncada Add [E83.42] Hypomagnesemia     7/2/2024  1:30 PM Jovanni Moncada Add [R53.81,  R53.83] Malaise and fatigue     7/2/2024  1:30 PM Jovanni Moncada Modify [R42] Lightheadedness     7/2/2024  1:30 PM Jovanni Moncada Modify [R53.81,  R53.83] Malaise and fatigue     7/2/2024  2:57 PM Jovanni Moncada Add [R00.1] Bradycardia     7/2/2024  4:05 PM Jovanni Moncada Add [R07.9] Chest pain           ED Disposition       ED Disposition   Discharge    Condition   Stable    Date/Time   Tue Jul 2, 2024 1603    Comment   Hemalatha [place]ma discharge to home/self care.                   Follow-up Information       Follow up With Specialties Details Why Contact Info Additional Information    Meg Mckeon MD Family Medicine   [address] [place] [id number]  [phone]       Meg Mckeon MD Family Medicine   [address] [place] [address]  [phone]       [facility] Cardiology Call in 1 day Follow up [address] [place] [id number]  [phone] [facility], [address] [place] [address], [phone]   [phone]            Discharge Medication List as of 7/2/2024  4:06 PM        CONTINUE these medications which have NOT CHANGED    Details   Concerta 54 MG ER tablet Take 1 tablet (54 mg total) by mouth daily Max Daily Amount: 54 mg, Normal      fexofenadine (ALLEGRA) 180 MG tablet Take 180 mg by mouth daily, Historical Med      FLUoxetine (PROzac) 40 MG capsule Take 1 capsule (40 mg total) by mouth daily, Starting Tue 4/9/2024, Normal      fluticasone (FLONASE) 50 mcg/act nasal spray 2 sprays into each nostril daily, Historical Med      ibuprofen (MOTRIN) 200 mg tablet Take 200 mg by mouth every 6 (six) hours as needed for mild pain., Historical Med      methocarbamol (ROBAXIN) 500 mg tablet Take 1 tablet (500 mg total) by mouth 4 (four) times a day as needed for  muscle spasms, Starting Thu 6/6/2024, Normal      metoprolol tartrate (LOPRESSOR) 25 mg tablet Take 1 tablet (25 mg total) by mouth every 12 (twelve) hours, Starting Tue 5/7/2024, Normal      mometasone-formoterol (DULERA) 200-5 MCG/ACT inhaler Inhale 2 puffs 2 (two) times a day Rinse mouth after use., Starting Tue 7/25/2023, Normal      tirzepatide (Zepbound) 10 mg/0.5 mL auto-injector Inject 0.5 mL (10 mg total) under the skin once a week for 28 days, Starting Fri 6/7/2024, Until Fri 7/5/2024, Normal      topiramate (Topamax) 50 MG tablet Take 1 tablet (50 mg total) by mouth 2 (two) times a day Take 1 tab po daily in AM, Starting Sun 6/30/2024, Normal      Xeljanz XR 11 MG TB24 Starting Fri 8/11/2023, Historical Med             No discharge procedures on file.    PDMP Review         Value Time User    PDMP Reviewed  Yes 6/28/2024  7:20 AM Meg Mckeon MD            ED Provider  Electronically Signed by             Jovanni Moncada, DO  07/02/24 [id number]       Jovanni Moncada, DO  07/02/24 [id number]

## 2024-07-03 LAB
ATRIAL RATE: 60 BPM
P AXIS: 28 DEGREES
PR INTERVAL: 136 MS
QRS AXIS: 28 DEGREES
QRSD INTERVAL: 88 MS
QT INTERVAL: 424 MS
QTC INTERVAL: 424 MS
T WAVE AXIS: 68 DEGREES
VENTRICULAR RATE: 60 BPM

## 2024-07-03 PROCEDURE — 93010 ELECTROCARDIOGRAM REPORT: CPT | Performed by: INTERNAL MEDICINE

## 2024-07-23 ENCOUNTER — HOSPITAL ENCOUNTER (OUTPATIENT)
Dept: RADIOLOGY | Facility: MEDICAL CENTER | Age: 44
Discharge: HOME | End: 2024-07-23
Payer: COMMERCIAL

## 2024-07-23 VITALS
HEART RATE: 78 BPM | SYSTOLIC BLOOD PRESSURE: 129 MMHG | TEMPERATURE: 98 F | OXYGEN SATURATION: 97 % | DIASTOLIC BLOOD PRESSURE: 82 MMHG | RESPIRATION RATE: 20 BRPM

## 2024-07-23 DIAGNOSIS — G89.4 CHRONIC PAIN SYNDROME: ICD-10-CM

## 2024-07-23 DIAGNOSIS — M51.16 LUMBAR DISC DISEASE WITH RADICULOPATHY: ICD-10-CM

## 2024-07-23 PROCEDURE — 64483 NJX AA&/STRD TFRM EPI L/S 1: CPT | Performed by: PHYSICAL MEDICINE & REHABILITATION

## 2024-07-23 PROCEDURE — 64484 NJX AA&/STRD TFRM EPI L/S EA: CPT | Performed by: PHYSICAL MEDICINE & REHABILITATION

## 2024-07-23 RX ORDER — PAPAVERINE HCL 150 MG
10 CAPSULE, EXTENDED RELEASE ORAL ONCE
Status: COMPLETED | OUTPATIENT
Start: 2024-07-23 | End: 2024-07-23

## 2024-07-23 RX ADMIN — IOHEXOL 2 ML: 300 INJECTION, SOLUTION INTRAVENOUS at 13:29

## 2024-07-23 RX ADMIN — Medication 10 MG: at 13:30

## 2024-07-23 NOTE — DISCHARGE INSTR - LAB
Epidural Steroid Injection   WHAT YOU NEED TO KNOW:   An epidural steroid injection (EZIO) is a procedure to inject steroid medicine into the epidural space. The epidural space is between your spinal cord and vertebrae. Steroids reduce inflammation and fluid buildup in your spine that may be causing pain. You may be given pain medicine along with the steroids.          ACTIVITY  Do not drive or operate machinery today.  No strenuous activity today - bending, lifting, etc.  You may resume normal activites starting tomorrow - start slowly and as tolerated.  You may shower today, but no tub baths or hot tubs.  You may have numbness for several hours from the local anesthetic. Please use caution and common sense, especially with weight-bearing activities.    CARE OF THE INJECTION SITE  If you have soreness or pain, apply ice to the area today (20 minutes on/20 minutes off).  Starting tomorrow, you may use warm, moist heat or ice if needed.  You may have an increase or change in your discomfort for 36-48 hours after your treatment.  Apply ice and continue with any pain medication you have been prescribed.  Notify the [facility] if you have any of the following: redness, drainage, swelling, headache, stiff neck or fever above 100°F.    SPECIAL INSTRUCTIONS  Our office will contact you in approximately 14 days for a progress report.    MEDICATIONS  Continue to take all routine medications.  Our office may have instructed you to hold some medications.    As no general anesthesia was used in today's procedure, you should not experience any side effects related to anesthesia.     If you are diabetic, the steroids used in today's injection may temporarily increase your blood sugar levels after the first few days after your injection. Please keep a close eye on your sugars and alert the doctor who manages your diabetes if your sugars are significantly high from your baseline or you are symptomatic.     If you have a  problem specifically related to your procedure, please call our office at [phone].  Problems not related to your procedure should be directed to your primary care physician.

## 2024-07-23 NOTE — H&P
History of Present Illness: The patient is a 44 y.o. female who presents with complaints of back and left leg pain    Past Medical History:   Diagnosis Date    Allergic rhinitis     Anxiety     Arthritis     Asthma     COVID-19 12/17/2020    CPAP (continuous positive airway pressure) dependence     Daytime hypersomnolence     LAST ASSESSED [id number]    Depression     GERD (gastroesophageal reflux disease)     occas    IUD (intrauterine device) in place     Lumbar disc disease     Migraine     RA (rheumatoid arthritis) (HCC)     Rheumatoid arthritis (HCC)     Sleep apnea     SVT (supraventricular tachycardia)     Varicella     Wears glasses        Past Surgical History:   Procedure Laterality Date    CHOLECYSTECTOMY      NASAL SEPTUM SURGERY      NASAL SEPTAL DEVIATION REPAIR-Dr. Navarrete    [facility]A [facility]    Procedure: FUNCTIONAL ENDOSCOPIC SINUS SURGERY (FESS) IMAGED GUIDED; PLACEMENT OF MULTIPLE PROPEL IMPLANTS;  Surgeon: South Navarrete DO;  Location: [facility];  Service: ENT    SINUS SURGERY      US GUIDED BREAST BIOPSY LEFT COMPLETE Left 06/15/2022    benign    WISDOM TOOTH EXTRACTION           Current Outpatient Medications:     Concerta 54 MG ER tablet, Take 1 tablet (54 mg total) by mouth daily Max Daily Amount: 54 mg, Disp: 30 tablet, Rfl: 0    fexofenadine (ALLEGRA) 180 MG tablet, Take 180 mg by mouth daily, Disp: , Rfl:     FLUoxetine (PROzac) 40 MG capsule, Take 1 capsule (40 mg total) by mouth daily, Disp: 90 capsule, Rfl: 0    fluticasone (FLONASE) 50 mcg/act nasal spray, 2 sprays into each nostril daily, Disp: , Rfl:     ibuprofen (MOTRIN) 200 mg tablet, Take 200 mg by mouth every 6 (six) hours as needed for mild pain., Disp: , Rfl:     methocarbamol (ROBAXIN) 500 mg tablet, Take 1 tablet (500 mg total) by mouth 4 (four) times a day as needed for muscle spasms, Disp: 60 tablet, Rfl: 1    metoprolol tartrate (LOPRESSOR) 25 mg tablet, Take 1 tablet (25 mg  total) by mouth every 12 (twelve) hours, Disp: 90 tablet, Rfl: 3    mometasone-formoterol (DULERA) 200-5 MCG/ACT inhaler, Inhale 2 puffs 2 (two) times a day Rinse mouth after use., Disp: 13 g, Rfl: 0    topiramate (Topamax) 50 MG tablet, Take 1 tablet (50 mg total) by mouth 2 (two) times a day Take 1 tab po daily in AM, Disp: 60 tablet, Rfl: 2    Xeljanz XR 11 MG TB24, , Disp: , Rfl:     No Known Allergies    Physical Exam:   Vitals:    07/23/24 1315   Pulse: 61   Resp: 20   Temp: 98 °F (36.7 °C)   SpO2: 98%     General: Awake, Alert, Oriented x 3, Mood and affect appropriate  Respiratory: Respirations even and unlabored  Cardiovascular: Peripheral pulses intact; no edema  Musculoskeletal Exam: back and left leg pain    ASA Score: 3    Patient/Chart Verification  Patient ID Verified: Verbal  ID Band Applied: No  Consents Confirmed: Procedural, To be obtained in the Pre-Procedure area  Interval H&P(within 24 hr) Complete (required for Outpatients and Surgery Admit only): To be obtained in the Pre-Procedure area  Allergies Reviewed: Yes  Anticoag/NSAID held?: NA  Currently on antibiotics?: No  Pregnancy denied?: Yes    Assessment:   1. Lumbar disc disease with radiculopathy    2. Chronic pain syndrome        Plan: Left L5-S1 and S1 TFESI

## 2024-07-26 ENCOUNTER — APPOINTMENT (OUTPATIENT)
Dept: LAB | Facility: HOSPITAL | Age: 44
End: 2024-07-26
Payer: COMMERCIAL

## 2024-07-26 ENCOUNTER — TELEPHONE (OUTPATIENT)
Dept: BARIATRICS | Facility: CLINIC | Age: 44
End: 2024-07-26

## 2024-07-26 DIAGNOSIS — G47.33 OBSTRUCTIVE SLEEP APNEA: ICD-10-CM

## 2024-07-26 DIAGNOSIS — E66.01 CLASS 3 OBESITY (HCC): ICD-10-CM

## 2024-07-26 DIAGNOSIS — M05.89 OTHER RHEUMATOID ARTHRITIS WITH RHEUMATOID FACTOR OF MULTIPLE SITES (HCC): ICD-10-CM

## 2024-07-26 DIAGNOSIS — E66.01 CLASS 3 OBESITY (HCC): Primary | ICD-10-CM

## 2024-07-26 DIAGNOSIS — E78.5 MILD HYPERLIPIDEMIA: ICD-10-CM

## 2024-07-26 DIAGNOSIS — Z79.899 MEDICATION MANAGEMENT: ICD-10-CM

## 2024-07-26 DIAGNOSIS — Z79.899 ENCOUNTER FOR LONG-TERM (CURRENT) USE OF OTHER MEDICATIONS: ICD-10-CM

## 2024-07-26 DIAGNOSIS — E66.01 MORBID OBESITY WITH BMI OF 50.0-59.9, ADULT (HCC): ICD-10-CM

## 2024-07-26 LAB
ALBUMIN SERPL BCG-MCNC: 4 G/DL (ref 3.5–5)
ALP SERPL-CCNC: 60 U/L (ref 34–104)
ALT SERPL W P-5'-P-CCNC: 16 U/L (ref 7–52)
ANION GAP SERPL CALCULATED.3IONS-SCNC: 7 MMOL/L (ref 4–13)
AST SERPL W P-5'-P-CCNC: 13 U/L (ref 13–39)
BASOPHILS # BLD AUTO: 0.08 THOUSANDS/ÂΜL (ref 0–0.1)
BASOPHILS NFR BLD AUTO: 1 % (ref 0–1)
BILIRUB SERPL-MCNC: 0.72 MG/DL (ref 0.2–1)
BUN SERPL-MCNC: 20 MG/DL (ref 5–25)
CALCIUM SERPL-MCNC: 9.2 MG/DL (ref 8.4–10.2)
CHLORIDE SERPL-SCNC: 106 MMOL/L (ref 96–108)
CO2 SERPL-SCNC: 27 MMOL/L (ref 21–32)
CREAT SERPL-MCNC: 0.89 MG/DL (ref 0.6–1.3)
CRP SERPL QL: 1.4 MG/L
EOSINOPHIL # BLD AUTO: 0.26 THOUSAND/ÂΜL (ref 0–0.61)
EOSINOPHIL NFR BLD AUTO: 4 % (ref 0–6)
ERYTHROCYTE [DISTWIDTH] IN BLOOD BY AUTOMATED COUNT: 13.6 % (ref 11.6–15.1)
ERYTHROCYTE [SEDIMENTATION RATE] IN BLOOD: 15 MM/HOUR (ref 0–19)
GFR SERPL CREATININE-BSD FRML MDRD: 79 ML/MIN/1.73SQ M
GLUCOSE P FAST SERPL-MCNC: 89 MG/DL (ref 65–99)
HCT VFR BLD AUTO: 41.6 % (ref 34.8–46.1)
HGB BLD-MCNC: 13.4 G/DL (ref 11.5–15.4)
IMM GRANULOCYTES # BLD AUTO: 0.04 THOUSAND/UL (ref 0–0.2)
IMM GRANULOCYTES NFR BLD AUTO: 1 % (ref 0–2)
LYMPHOCYTES # BLD AUTO: 1.42 THOUSANDS/ÂΜL (ref 0.6–4.47)
LYMPHOCYTES NFR BLD AUTO: 21 % (ref 14–44)
MCH RBC QN AUTO: 31 PG (ref 26.8–34.3)
MCHC RBC AUTO-ENTMCNC: 32.2 G/DL (ref 31.4–37.4)
MCV RBC AUTO: 96 FL (ref 82–98)
MONOCYTES # BLD AUTO: 0.62 THOUSAND/ÂΜL (ref 0.17–1.22)
MONOCYTES NFR BLD AUTO: 9 % (ref 4–12)
NEUTROPHILS # BLD AUTO: 4.41 THOUSANDS/ÂΜL (ref 1.85–7.62)
NEUTS SEG NFR BLD AUTO: 64 % (ref 43–75)
NRBC BLD AUTO-RTO: 0 /100 WBCS
PLATELET # BLD AUTO: 265 THOUSANDS/UL (ref 149–390)
PMV BLD AUTO: 11.4 FL (ref 8.9–12.7)
POTASSIUM SERPL-SCNC: 4.3 MMOL/L (ref 3.5–5.3)
PROT SERPL-MCNC: 6.9 G/DL (ref 6.4–8.4)
RBC # BLD AUTO: 4.32 MILLION/UL (ref 3.81–5.12)
SODIUM SERPL-SCNC: 140 MMOL/L (ref 135–147)
WBC # BLD AUTO: 6.83 THOUSAND/UL (ref 4.31–10.16)

## 2024-07-26 PROCEDURE — 85652 RBC SED RATE AUTOMATED: CPT

## 2024-07-26 PROCEDURE — 36415 COLL VENOUS BLD VENIPUNCTURE: CPT

## 2024-07-26 PROCEDURE — 80053 COMPREHEN METABOLIC PANEL: CPT

## 2024-07-26 PROCEDURE — 85025 COMPLETE CBC W/AUTO DIFF WBC: CPT

## 2024-07-26 PROCEDURE — 86140 C-REACTIVE PROTEIN: CPT

## 2024-07-26 RX ORDER — TIRZEPATIDE 5 MG/.5ML
5 INJECTION, SOLUTION SUBCUTANEOUS WEEKLY
Qty: 2 ML | Refills: 0 | Status: SHIPPED | OUTPATIENT
Start: 2024-07-26 | End: 2024-08-23

## 2024-07-26 RX ORDER — TIRZEPATIDE 7.5 MG/.5ML
7.5 INJECTION, SOLUTION SUBCUTANEOUS WEEKLY
Qty: 2 ML | Refills: 0 | Status: SHIPPED | OUTPATIENT
Start: 2024-07-26 | End: 2024-08-23

## 2024-07-26 RX ORDER — TIRZEPATIDE 2.5 MG/.5ML
2.5 INJECTION, SOLUTION SUBCUTANEOUS WEEKLY
Qty: 2 ML | Refills: 0 | Status: SHIPPED | OUTPATIENT
Start: 2024-07-26 | End: 2024-08-23

## 2024-07-26 NOTE — TELEPHONE ENCOUNTER
Zepbound was unavailable and she was off for 6 weeks. She will need to restart at lowest dose. This may require another PA.   Thanks!

## 2024-07-29 ENCOUNTER — OFFICE VISIT (OUTPATIENT)
Dept: BARIATRICS | Facility: CLINIC | Age: 44
End: 2024-07-29
Payer: COMMERCIAL

## 2024-07-29 VITALS
WEIGHT: 293 LBS | SYSTOLIC BLOOD PRESSURE: 110 MMHG | RESPIRATION RATE: 16 BRPM | HEIGHT: 66 IN | OXYGEN SATURATION: 97 % | BODY MASS INDEX: 47.09 KG/M2 | DIASTOLIC BLOOD PRESSURE: 76 MMHG | HEART RATE: 68 BPM | TEMPERATURE: 98.2 F

## 2024-07-29 DIAGNOSIS — K59.09 OTHER CONSTIPATION: ICD-10-CM

## 2024-07-29 DIAGNOSIS — E66.01 CLASS 3 OBESITY (HCC): Primary | ICD-10-CM

## 2024-07-29 DIAGNOSIS — Z79.899 MEDICATION MANAGEMENT: ICD-10-CM

## 2024-07-29 DIAGNOSIS — E66.01 MORBID OBESITY WITH BMI OF 50.0-59.9, ADULT (HCC): ICD-10-CM

## 2024-07-29 PROCEDURE — 99214 OFFICE O/P EST MOD 30 MIN: CPT | Performed by: PHYSICIAN ASSISTANT

## 2024-07-29 RX ORDER — TOPIRAMATE 50 MG/1
50 TABLET, FILM COATED ORAL 2 TIMES DAILY
Qty: 60 TABLET | Refills: 2 | Status: SHIPPED | OUTPATIENT
Start: 2024-07-29

## 2024-07-29 NOTE — PROGRESS NOTES
Assessment/Plan:    Class 3 obesity (HCC)  -Patient is pursuing [employer]  -Initial weight loss goal of 5-10% weight loss for improved health  -Screening labs: CMP reviewed from 7/26/24, TSH and A1c reviewed from 4/9/24  -patient not currently interested in bariatric surgery  -AVOID phentermine- taking Concerta and has hx of SVT  -Believes she tried Wellbutrin in the past, but had negative effects on mood   -Dietary recall reviewed, suggestions provided   -Was on Wegovy, but felt effects wore off. Zepbound started 3/18/24 (327)- had + effects, but had localized site reaction   -Taking Topamax 50 mg. Increase to bid   -Has IUD  -Dietary recall reviewed, suggestions provided. Add fruit to bfast, try adding additional veg to dinner.      Initial: 340.6 lbs  Current: 333 lbs (+5 lbs since last visit)   Change: -7.6 lbs  Goal: 300lbs    Follow up in approximately 6 months with Non-Surgical Physician/Advanced Practitioner.    Goals:  Food log (ie.) [email or web address],etc. baritastic  No sugary beverages. At least 64oz of water daily.  Increase physical activity by 10 minutes daily. Gradually increase physical activity to a goal of 5 days per week for 30 minutes of MODERATE intensity PLUS 2 days per week of FULL BODY resistance training  5-10 servings of fruits and vegetables per day and 25-35 grams of dietary fiber per day, gradually increasing       Diagnoses and all orders for this visit:    Class 3 obesity (HCC)  -     Basic metabolic panel; Future    Body mass index 50.0-59.9, adult (HCC)  -     Basic metabolic panel; Future    Morbid obesity with BMI of 50.0-59.9, adult (HCC)  -     topiramate (Topamax) 50 MG tablet; Take 1 tablet (50 mg total) by mouth 2 (two) times a day  -     Basic metabolic panel; Future    Medication management  -     Basic metabolic panel; Future    Other constipation        Subjective:   Chief Complaint   Patient presents with    Follow-up      "Follow up     Patient ID: Hemalatha Hahn  is a 44 y.o. female with excess weight/obesity here to pursue weight management.  Patient is pursuing [facility].     HPI  The patient presents for [facility] follow up.     Was on Zepbound, but due to supply needed to stop. Did note she was getting localized rxn- small area of hives. However she previously developed cellulitis from one of her RA injectables so is apprehensive to stay on injectable    +constipation    Food logging:    B: yogurt or eggs   S: dried chickpeas  L: salad with chicken or edamame (balsamic) or sandwich   S: may- cheese and crackers   D: trying to work to improve this- frozen meal or buys at work (caf)- choosing healthier options   S: no    Exercise: had steroid injection in her back 1 week ago and has been able to walk more. Hoping to increase exercise.   Hydration: 64 oz of water, trying to get above   Sleep: 8 hours    Topamax- tolerating + effects on appetite/satiety. She has not increase to bid. Will start this     The following portions of the patient's history were reviewed and updated as appropriate: allergies, current medications, past family history, past medical history, past social history, past surgical history, and problem list.    Review of Systems   Gastrointestinal:  Positive for constipation (recommend seeing PCP/GI if sx persist/worsen).     Objective:    /76 (BP Location: Right arm, Patient Position: Sitting, Cuff Size: Large)   Pulse 68   Temp 98.2 °F (36.8 °C) (Temporal)   Resp 16   Ht 5' 6\" (1.676 m)   Wt (!) 151 kg (333 lb 3.2 oz)   SpO2 97%   BMI 53.78 kg/m²      Physical Exam  Vitals and nursing note reviewed.     Constitutional   General appearance: Abnormal.  well developed and obese.   Pulmonary   Respiratory effort: No increased work of breathing or signs of respiratory distress.    Abdomen   Abdomen: Abnormal.  The abdomen was obese.   Musculoskeletal   Gait and station: Normal.    Psychiatric "   Orientation to person, place and time: Normal.    Affect: appropriate

## 2024-07-29 NOTE — ASSESSMENT & PLAN NOTE
-Patient is pursuing [employer]  -Initial weight loss goal of 5-10% weight loss for improved health  -Screening labs: CMP reviewed from 7/26/24, TSH and A1c reviewed from 4/9/24  -patient not currently interested in bariatric surgery  -AVOID phentermine- taking Concerta and has hx of SVT  -Believes she tried Wellbutrin in the past, but had negative effects on mood   -Dietary recall reviewed, suggestions provided   -Was on Wegovy, but felt effects wore off. Zepbound started 3/18/24 (327)- had + effects, but had localized site reaction   -Taking Topamax 50 mg. Increase to bid   -Has IUD  -Dietary recall reviewed, suggestions provided. Add fruit to bfast, try adding additional veg to dinner.      Initial: 340.6 lbs  Current: 333 lbs (+5 lbs since last visit)   Change: -7.6 lbs  Goal: 300lbs   See HPI See HPI See HPI See HPI See HPI

## 2024-07-30 ENCOUNTER — OFFICE VISIT (OUTPATIENT)
Age: 44
End: 2024-07-30
Payer: COMMERCIAL

## 2024-07-30 VITALS
HEART RATE: 79 BPM | SYSTOLIC BLOOD PRESSURE: 118 MMHG | HEIGHT: 67 IN | WEIGHT: 293 LBS | BODY MASS INDEX: 45.99 KG/M2 | OXYGEN SATURATION: 99 % | DIASTOLIC BLOOD PRESSURE: 72 MMHG | TEMPERATURE: 98.4 F

## 2024-07-30 DIAGNOSIS — Z79.899 ENCOUNTER FOR LONG-TERM (CURRENT) USE OF OTHER MEDICATIONS: ICD-10-CM

## 2024-07-30 DIAGNOSIS — M05.79 RHEUMATOID ARTHRITIS INVOLVING MULTIPLE SITES WITH POSITIVE RHEUMATOID FACTOR (HCC): Primary | ICD-10-CM

## 2024-07-30 DIAGNOSIS — E55.9 VITAMIN D DEFICIENCY: ICD-10-CM

## 2024-07-30 DIAGNOSIS — M51.16 LUMBAR DISC DISEASE WITH RADICULOPATHY: ICD-10-CM

## 2024-07-30 PROCEDURE — 99204 OFFICE O/P NEW MOD 45 MIN: CPT | Performed by: PHYSICIAN ASSISTANT

## 2024-07-30 NOTE — ASSESSMENT & PLAN NOTE
Her rheumatoid arthritis symptoms are well-controlled with Xeljanz.  No signs of active inflammation or synovitis on exam today.  We will continue to monitor her response to treatment.  Labs as ordered to monitor for medication side effects and toxicity.  Plan to follow-up in 3 to 4 months or sooner if needed.

## 2024-07-30 NOTE — ASSESSMENT & PLAN NOTE
Lower back pain is much improved after recent epidural injection.  Continue following with pain management.

## 2024-07-30 NOTE — PROGRESS NOTES
Ambulatory Visit  Name: Hemalatha Hahn      : 1980      MRN: [id number]  Encounter Provider: Susan Lincoln PA-C  Encounter Date: 2024   Encounter department: [facility]    Assessment & Plan   1. Rheumatoid arthritis involving multiple sites with positive rheumatoid factor (HCC)  Assessment & Plan:  Her rheumatoid arthritis symptoms are well-controlled with Xeljanz.  No signs of active inflammation or synovitis on exam today.  We will continue to monitor her response to treatment.  Labs as ordered to monitor for medication side effects and toxicity.  Plan to follow-up in 3 to 4 months or sooner if needed.  Orders:  -     CBC and differential; Future  -     Comprehensive metabolic panel; Future  -     Sedimentation rate, automated; Future  -     C-reactive protein; Future  -     Quantiferon TB Gold Plus Assay; Future  2. Lumbar disc disease with radiculopathy  Assessment & Plan:  Lower back pain is much improved after recent epidural injection.  Continue following with pain management.  3. Vitamin D deficiency  Assessment & Plan:  Will plan to check vitamin D with next labs.  Orders:  -     Vitamin D 25 hydroxy; Future  4. Encounter for long-term (current) use of other medications  -     CBC and differential; Future  -     Comprehensive metabolic panel; Future  -     Sedimentation rate, automated; Future  -     C-reactive protein; Future  -     Quantiferon TB Gold Plus Assay; Future      History of Present Illness     Hemalatha Hahn is a 44 y.o. female.  She is here for new patient consultation for the management of rheumatoid arthritis.  She has previously been treated with oral methotrexate, Enbrel, Humira, and Rinvoq.  She is now on Xeljanz, which she started in .  She was initially diagnosed in  and started on oral methotrexate.  This was discontinued after she developed shortness of breath and difficulty breathing.  She was off of all medications  for several years and her symptoms are quiescent however she had a significant flareup in 2020 after a COVID infection.    She is doing well now and her rheumatoid arthritis symptoms are well-controlled with Xeljanz.  She has not noticed.  She has no swelling in her joints.    She has a history of lumbar degenerative disc disease and follows with pain management.  She had an injection done earlier this month which has provided significant relief for her.    She follows with cardiology for SVT.  This is well-controlled with metoprolol.    Reviewed labs done on 7/26/2024.  CBC, CMP unremarkable.  ESR, CRP within normal limits.    Review of Systems   Constitutional:  Negative for chills, fatigue and fever.   HENT:  Negative for hearing loss, sore throat and tinnitus.    Eyes:  Negative for pain and visual disturbance.   Respiratory:  Negative for cough and shortness of breath.    Cardiovascular:  Negative for chest pain and palpitations.   Gastrointestinal:  Negative for abdominal pain, nausea and vomiting.   Genitourinary:  Negative for difficulty urinating.   Musculoskeletal:  Negative for arthralgias, back pain, gait problem, joint swelling, myalgias, neck pain and neck stiffness.   Skin:  Negative for rash.   Neurological:  Negative for dizziness, seizures, weakness, numbness and headaches.   Psychiatric/Behavioral:  Negative for confusion, decreased concentration and sleep disturbance.      Current Outpatient Medications on File Prior to Visit   Medication Sig Dispense Refill    Concerta 54 MG ER tablet Take 1 tablet (54 mg total) by mouth daily Max Daily Amount: 54 mg 30 tablet 0    fexofenadine (ALLEGRA) 180 MG tablet Take 180 mg by mouth daily      FLUoxetine (PROzac) 40 MG capsule Take 1 capsule (40 mg total) by mouth daily 90 capsule 0    fluticasone (FLONASE) 50 mcg/act nasal spray 2 sprays into each nostril daily      ibuprofen (MOTRIN) 200 mg tablet Take 200 mg by mouth every 6 (six) hours as needed for  "mild pain.      methocarbamol (ROBAXIN) 500 mg tablet Take 1 tablet (500 mg total) by mouth 4 (four) times a day as needed for muscle spasms 60 tablet 1    metoprolol tartrate (LOPRESSOR) 25 mg tablet Take 1 tablet (25 mg total) by mouth every 12 (twelve) hours (Patient taking differently: Take 25 mg by mouth every 12 (twelve) hours Currently taking 12.5 mg twice a day) 90 tablet 3    mometasone-formoterol (DULERA) 200-5 MCG/ACT inhaler Inhale 2 puffs 2 (two) times a day Rinse mouth after use. 13 g 0    topiramate (Topamax) 50 MG tablet Take 1 tablet (50 mg total) by mouth 2 (two) times a day 60 tablet 2    Xeljanz XR 11 MG TB24       tirzepatide (Zepbound) 2.5 mg/0.5 mL auto-injector Inject 0.5 mL (2.5 mg total) under the skin once a week for 28 days Then increase to 5 mg (Patient not taking: Reported on 7/29/2024) 2 mL 0    tirzepatide (Zepbound) 5 mg/0.5 mL auto-injector Inject 0.5 mL (5 mg total) under the skin once a week for 28 days Then increase to 7.5 mg (Patient not taking: Reported on 7/29/2024) 2 mL 0    tirzepatide (Zepbound) 7.5 mg/0.5 mL auto-injector Inject 0.5 mL (7.5 mg total) under the skin once a week for 28 days Then increase to 10 mg (Patient not taking: Reported on 7/29/2024) 2 mL 0     No current facility-administered medications on file prior to visit.      Objective     /72 (BP Location: Left arm, Patient Position: Sitting, Cuff Size: Adult)   Pulse 79   Temp 98.4 °F (36.9 °C) (Temporal)   Ht 5' 7.25\" (1.708 m)   Wt (!) 150 kg (331 lb 6.4 oz)   SpO2 99%   BMI 51.52 kg/m²     Physical Exam  Constitutional:       Appearance: Normal appearance.   Cardiovascular:      Rate and Rhythm: Normal rate and regular rhythm.   Pulmonary:      Breath sounds: Normal breath sounds.   Musculoskeletal:         General: No swelling or tenderness.   Skin:     General: Skin is warm and dry.   Neurological:      General: No focal deficit present.      Mental Status: She is alert.     Administrative " Statements         Dragon Dictation software was used to dictate this note. It may contain errors with dictating incorrect words/spelling. Please contact provider directly for any questions.

## 2024-07-31 DIAGNOSIS — F98.8 ATTENTION DEFICIT DISORDER (ADD) WITHOUT HYPERACTIVITY: ICD-10-CM

## 2024-07-31 DIAGNOSIS — F41.8 DEPRESSION WITH ANXIETY: ICD-10-CM

## 2024-08-01 RX ORDER — METHYLPHENIDATE HYDROCHLORIDE 54 MG/1
54 TABLET ORAL DAILY
Qty: 30 TABLET | Refills: 0 | Status: SHIPPED | OUTPATIENT
Start: 2024-08-01

## 2024-08-01 RX ORDER — FLUOXETINE HYDROCHLORIDE 40 MG/1
40 CAPSULE ORAL DAILY
Qty: 100 CAPSULE | Refills: 1 | Status: SHIPPED | OUTPATIENT
Start: 2024-08-01

## 2024-08-05 NOTE — TELEPHONE ENCOUNTER
Called [facility].  The 0.25 does not need a new PA, Patient picked up the 5mg on July 26th.

## 2024-08-06 ENCOUNTER — TELEPHONE (OUTPATIENT)
Dept: PAIN MEDICINE | Facility: CLINIC | Age: 44
End: 2024-08-06

## 2024-08-19 RX ORDER — METHYLPHENIDATE HYDROCHLORIDE 54 MG/1
TABLET, EXTENDED RELEASE ORAL
COMMUNITY
Start: 2024-08-01 | End: 2024-08-29

## 2024-08-20 ENCOUNTER — OFFICE VISIT (OUTPATIENT)
Dept: PAIN MEDICINE | Facility: CLINIC | Age: 44
End: 2024-08-20
Payer: COMMERCIAL

## 2024-08-20 VITALS
BODY MASS INDEX: 45.99 KG/M2 | OXYGEN SATURATION: 99 % | TEMPERATURE: 98 F | RESPIRATION RATE: 16 BRPM | HEIGHT: 67 IN | HEART RATE: 70 BPM | WEIGHT: 293 LBS | DIASTOLIC BLOOD PRESSURE: 67 MMHG | SYSTOLIC BLOOD PRESSURE: 121 MMHG

## 2024-08-20 DIAGNOSIS — M51.16 LUMBAR DISC DISEASE WITH RADICULOPATHY: Primary | ICD-10-CM

## 2024-08-20 PROCEDURE — 99213 OFFICE O/P EST LOW 20 MIN: CPT | Performed by: NURSE PRACTITIONER

## 2024-08-20 NOTE — PROGRESS NOTES
Assessment:  1. Lumbar disc disease with radiculopathy        Plan:  While the patient was in the office today, I did have a thorough conversation regarding their chronic pain syndrome, medication management, and treatment plan options.  Patient is being seen for a follow-up visit.  She underwent a left-sided L5-S1 and S1 transforaminal epidural steroid injection on 7/23/2024.  Pain was 100% improved for the first 3 weeks.  She is still maintaining about 90% improvement.  We will repeat left-sided L5-S1 and S1 transforaminal epidural steroid injection in the future if needed.    Continue Motrin as needed.    Patient previously participated in physical therapy and continues with home exercises.    She is participating in weight management.    I discussed with the patient that at this point time she can follow up with our office on an as-needed basis. I did review the patient that if her pain symptoms should change, worsen, and/or if she would experience any new symptoms as she would like to be evaluated for, she should give our office a call. The patient was agreeable and verbalized an understanding.      History of Present Illness:  The patient is a 44 y.o. female who presents for a follow up office visit in regards to Back Pain and Leg Pain (left).   The patient’s current symptoms include complaints of low back pain that can radiate to the posterior left thigh.  Current pain level is a 3/10.  Quality pain is described as burning.    Current pain medications includes: Motrin as needed.     I have personally reviewed and/or updated the patient's past medical history, past surgical history, family history, social history, current medications, allergies, and vital signs today.         Review of Systems  Review of Systems   Musculoskeletal:  Positive for back pain.        Left hip pain   All other systems reviewed and are negative.          Past Medical History:   Diagnosis Date    Allergic rhinitis     Anxiety      Arthritis     Asthma     COVID-19 12/17/2020    CPAP (continuous positive airway pressure) dependence     Daytime hypersomnolence     LAST ASSESSED [id number]    Depression     GERD (gastroesophageal reflux disease)     occas    IUD (intrauterine device) in place     Lumbar disc disease     Migraine     RA (rheumatoid arthritis) (HCC)     Rheumatoid arthritis (HCC)     Sleep apnea     SVT (supraventricular tachycardia)     Varicella     Wears glasses        Past Surgical History:   Procedure Laterality Date    CHOLECYSTECTOMY      LUMBAR EPIDURAL INJECTION  07/2024    NASAL SEPTUM SURGERY      NASAL SEPTAL DEVIATION REPAIR-Dr. Navarrete    RI STRTCTC CPTR ASSTD PX EXTRADURAL CRANIAL N/A 01/08/2019    Procedure: FUNCTIONAL ENDOSCOPIC SINUS SURGERY (FESS) IMAGED GUIDED; PLACEMENT OF MULTIPLE PROPEL IMPLANTS;  Surgeon: South Navarrete DO;  Location: [facility];  Service: ENT    SINUS SURGERY      US GUIDED BREAST BIOPSY LEFT COMPLETE Left 06/15/2022    benign    WISDOM TOOTH EXTRACTION         Family History   Problem Relation Age of Onset    Asthma Mother     Hypertension Mother     COPD Mother     Obesity Father     Hypothyroidism Father     Chiari malformation Daughter     Diabetes Daughter         Type 1    Celiac disease Daughter     No Known Problems Maternal Grandmother     Lung disease Maternal Grandfather     Cancer Paternal Grandmother     Aneurysm Paternal Grandfather     Celiac disease Paternal Grandfather     Obesity Brother     Chiari malformation Brother     Obesity Son     Autism Son     No Known Problems Maternal Aunt     Breast cancer Paternal Aunt         postmenopausal    No Known Problems Paternal Aunt     No Known Problems Paternal Aunt     Breast cancer additional onset Neg Hx     Endometrial cancer Neg Hx     Colon cancer Neg Hx     Ovarian cancer Neg Hx     BRCA1 Positive Neg Hx     BRCA1 Negative Neg Hx     BRCA 1/2 Neg Hx     BRCA2 Positive Neg Hx     BRCA2 Negative Neg Hx     Heart disease Neg  Hx        Social History     Occupational History    Not on file   Tobacco Use    Smoking status: Former     Current packs/day: 0.00     Types: Cigarettes    Smokeless tobacco: Never    Tobacco comments:     quit 5 years ago    Vaping Use    Vaping status: Never Used   Substance and Sexual Activity    Alcohol use: Yes     Alcohol/week: 4.0 standard drinks of alcohol     Types: 4 Standard drinks or equivalent per week     Comment: social     Drug use: No    Sexual activity: Yes     Partners: Male     Birth control/protection: I.U.D.     Comment: Mirena          Current Outpatient Medications:     fexofenadine (ALLEGRA) 180 MG tablet, Take 180 mg by mouth daily, Disp: , Rfl:     FLUoxetine (PROzac) 40 MG capsule, Take 1 capsule (40 mg total) by mouth daily, Disp: 100 capsule, Rfl: 1    fluticasone (FLONASE) 50 mcg/act nasal spray, 2 sprays into each nostril daily, Disp: , Rfl:     ibuprofen (MOTRIN) 200 mg tablet, Take 200 mg by mouth every 6 (six) hours as needed for mild pain., Disp: , Rfl:     methocarbamol (ROBAXIN) 500 mg tablet, Take 1 tablet (500 mg total) by mouth 4 (four) times a day as needed for muscle spasms, Disp: 60 tablet, Rfl: 1    methylphenidate (Concerta) 54 MG ER tablet, Take 1 tablet (54 mg total) by mouth daily Max Daily Amount: 54 mg, Disp: 30 tablet, Rfl: 0    Methylphenidate HCl ER 54 MG TB24, , Disp: , Rfl:     metoprolol tartrate (LOPRESSOR) 25 mg tablet, Take 1 tablet (25 mg total) by mouth every 12 (twelve) hours (Patient taking differently: Take 25 mg by mouth every 12 (twelve) hours Currently taking 12.5 mg twice a day), Disp: 90 tablet, Rfl: 3    mometasone-formoterol (DULERA) 200-5 MCG/ACT inhaler, Inhale 2 puffs 2 (two) times a day Rinse mouth after use., Disp: 13 g, Rfl: 0    topiramate (Topamax) 50 MG tablet, Take 1 tablet (50 mg total) by mouth 2 (two) times a day, Disp: 60 tablet, Rfl: 2    Xeljanz XR 11 MG TB24, , Disp: , Rfl:     No Known Allergies    Physical Exam:    /67  "  Pulse 70   Temp 98 °F (36.7 °C)   Resp 16   Ht 5' 7.25\" (1.708 m)   Wt (!) 154 kg (338 lb 9.6 oz)   SpO2 99%   BMI 52.64 kg/m²     Constitutional:normal, well developed, well nourished, alert, in no distress and non-toxic and no overt pain behavior. and obese  Eyes:anicteric  HEENT:grossly intact  Neck:supple, symmetric, trachea midline and no masses   Pulmonary:even and unlabored  Cardiovascular:No edema or pitting edema present  Skin:Normal without rashes or lesions and well hydrated  Psychiatric:Mood and affect appropriate  Neurologic:Cranial Nerves II-XII grossly intact  Musculoskeletal:normal    Imaging        Study Result    Narrative & Impression   MRI LUMBAR SPINE WITHOUT CONTRAST     INDICATION: M54.16: Radiculopathy, lumbar region.    MRI LUMBAR SPINE WO CONTRAST,LBP, Lumbar back pain with radiculopathy affecting left lower extremity        COMPARISON: CT lower extremity right without contrast 9/13/2021. CT abdomen pelvis without contrast 12/20/2010. MRI lumbar spine without contrast 11/25/2008.     TECHNIQUE:  Multiplanar, multisequence imaging of the lumbar spine was performed. .        IMAGE QUALITY:  Diagnostic     FINDINGS:     VERTEBRAL BODIES:  There are 5 lumbar type vertebral bodies. Grade 1 retrolisthesis L2-L3. No scoliosis.  No compression fracture.     Type I Modic endplate change L2-L3 and L4-L5. Type II Modic endplate change L5-S1. Small focal areas of bone marrow edema in left L4 and bilateral L5 pedicles (left worse than right), likely stress reaction. Otherwise, normal bone marrow signal.     SACRUM:  Normal signal within the sacrum. No evidence of insufficiency or stress fracture.     DISTAL CORD AND CONUS:  Normal size and signal within the distal cord and conus.     PARASPINAL SOFT TISSUES: Slightly edematous soft tissue changes adjacent to left L4-L5 and bilateral L5-S1 facet joints, likely due to active facet arthritis.     LOWER THORACIC DISC SPACES:  Normal disc height and " signal.  No disc herniation, canal stenosis or foraminal narrowing.     LUMBAR DISC SPACES: Multilevel osteophytes, disc height loss, and facet arthropathy.     L1-L2: Facet arthropathy, trace facet joint effusions. No significant canal stenosis or foraminal narrowing.     L2-L3: Mild diffuse disc bulge. Facet arthropathy, small bilateral facet joint effusions (right worse than left). No significant canal stenosis or foraminal narrowing.     L3-L4: Mild diffuse disc bulge, small right subarticular disc protrusion. Facet arthropathy, trace facet joint effusions. No significant canal stenosis or foraminal narrowing.     L4-L5: Mild diffuse disc bulge. Facet arthropathy, ligamentum flavum thickening, small right facet joint effusion, tiny synovial cyst in left anterolateral epidural space. Mild canal stenosis. Mild bilateral foraminal narrowing.     L5-S1: Mild diffuse disc bulge, posterior marginal osteophytes. Facet arthropathy, small synovial cysts posterior to left L5-S1 facet joint. No significant canal stenosis. Mild bilateral foraminal narrowing.     Multilevel degenerative changes have worsened since MRI lumbar spine 11/25/2008.     OTHER FINDINGS: Partially imaged 2.8 cm simple right ovarian cyst (1:25), benign.     IMPRESSION:     Findings suggestive of active facet arthritis at left L4-L5 and bilateral L5-S1 facet joints.     Small focal areas of bone marrow edema in left L4 and bilateral L5 pedicles (left worse than right), likely stress reaction.     Tiny synovial cyst in left anterolateral epidural space at L4-L5. Small synovial cysts posterior to left L5-S1 facet joint.     Worsened multilevel degenerative changes of lumbar spine with varying degrees of canal stenosis (mild L4-L5) and foraminal narrowing (mild bilateral L4-5 and bilateral L5-S1), as detailed above.     The study was marked in EPIC for significant notification.                             Workstation performed: [id number]         No  orders to display       No orders of the defined types were placed in this encounter.

## 2024-08-26 ENCOUNTER — OFFICE VISIT (OUTPATIENT)
Dept: SLEEP CENTER | Facility: CLINIC | Age: 44
End: 2024-08-26
Payer: COMMERCIAL

## 2024-08-26 VITALS
HEART RATE: 64 BPM | OXYGEN SATURATION: 97 % | HEIGHT: 68 IN | RESPIRATION RATE: 16 BRPM | BODY MASS INDEX: 44.41 KG/M2 | SYSTOLIC BLOOD PRESSURE: 110 MMHG | WEIGHT: 293 LBS | TEMPERATURE: 97.9 F | DIASTOLIC BLOOD PRESSURE: 74 MMHG

## 2024-08-26 DIAGNOSIS — E66.01 MORBID OBESITY WITH BMI OF 40.0-44.9, ADULT (HCC): ICD-10-CM

## 2024-08-26 DIAGNOSIS — J45.20 MILD INTERMITTENT ASTHMA WITHOUT COMPLICATION: ICD-10-CM

## 2024-08-26 DIAGNOSIS — J30.9 ALLERGIC RHINITIS, UNSPECIFIED SEASONALITY, UNSPECIFIED TRIGGER: ICD-10-CM

## 2024-08-26 DIAGNOSIS — J33.9 NASAL POLYP: ICD-10-CM

## 2024-08-26 DIAGNOSIS — G47.33 OBSTRUCTIVE SLEEP APNEA: Primary | ICD-10-CM

## 2024-08-26 DIAGNOSIS — F90.9 ATTENTION DEFICIT HYPERACTIVITY DISORDER (ADHD), UNSPECIFIED ADHD TYPE: ICD-10-CM

## 2024-08-26 DIAGNOSIS — F41.8 DEPRESSION WITH ANXIETY: ICD-10-CM

## 2024-08-26 PROCEDURE — 99214 OFFICE O/P EST MOD 30 MIN: CPT | Performed by: INTERNAL MEDICINE

## 2024-08-26 NOTE — PROGRESS NOTES
[facility] Note - [facility]   Hemalatha Hahn  44 y.o. female  :1980  MRN:[id number]  DOS:2024    CC: I saw this patient for follow-up in clinic today for Sleep disordered breathing, Coexisting Sleep and Medical Problems.  Patient received ot a  Dream Station Version 2 machine from [employer] over a year ago.. Interval changes: None reported.      Results of prior studies in 2017:  The diagnostic study demonstrated an AHI of 11.3 per hour, considerably higher during REM at 36 per hour.  Minimum oxygen saturation was 78% and 16% of the study was spent with saturations below 90%.  During the subsequent therapeutic study, sleep disordered breathing was successfully remediated with nasal CPAP at 12 cm H2O.     PFSH, Problem List, Medications & Allergies were reviewed in EMR.   She  has a past medical history of Allergic rhinitis, Anxiety, Arthritis, Asthma, COVID-19 (2020), CPAP (continuous positive airway pressure) dependence, Daytime hypersomnolence, Depression, GERD (gastroesophageal reflux disease), IUD (intrauterine device) in place, Lumbar disc disease, Migraine, RA (rheumatoid arthritis) (HCC), Rheumatoid arthritis (HCC), Sleep apnea, SVT (supraventricular tachycardia), Varicella, and Wears glasses.    She has a current medication list which includes the following prescription(s): fexofenadine, fluoxetine, fluticasone, ibuprofen, methocarbamol, methylphenidate, methylphenidate hcl er, metoprolol tartrate, mometasone-formoterol, topiramate, and xeljanz xr.    PHYSIOLOGICAL DATA REVIEW : Using PAP > 4 hours/night 100%. Estimated RACHAEL 1.5/hour with pressure of 13.4cm H2O@90th/95th percentile; patient has not been using non FDA approved devices to sanitize the machine.  INTERPRETATION: Compliance is excellent; Pressure setting is:optimal; ;   SUBJECTIVE: With respect to use of PAP, Hemalatha  is experiencing minimal:dry mouth/throat.She derives benefit.  Is satisfied with sleep and daytime function.  "  Sleep Routine: Hemalatha reports getting 8-10 hrs sleep; she has no difficulty initiating or maintaining sleep . She arises needing an alarm and feels refreshed.Hemalatha denies excessive daytime sleepiness,.  She rated herself at Total score: (P) 5 /24 on the Evangeline Sleepiness Scale.   Other issues: None reported.     Habits: Reports that she has quit smoking. Her smoking use included cigarettes. She has never used smokeless tobacco.,  Reports current alcohol use of about 4.0 standard drinks of alcohol per week.,  Reports no history of drug use., Caffeine use:limited until  ; Exercise routine: irregular limited by back pain..      ROS: Significant for weight is stable within a few pounds..  Allergies and asthma control.  Psychiatric conditions are stable on current medications.    EXAM: /74 (BP Location: Left arm, Patient Position: Sitting, Cuff Size: Large)   Pulse 64   Temp 97.9 °F (36.6 °C) (Temporal)   Resp 16   Ht 5' 7.75\" (1.721 m)   Wt (!) 155 kg (342 lb 12.8 oz)   SpO2 97%   BMI 52.51 kg/m²     Wt Readings from Last 3 Encounters:   08/26/24 (!) 155 kg (342 lb 12.8 oz)   08/20/24 (!) 154 kg (338 lb 9.6 oz)   07/30/24 (!) 150 kg (331 lb 6.4 oz)      Patient is well groomed; well appearing.   CNS: Alert, orientated, speech clear & coherent  Psych: cooperative and in no distress. Mental state: Appears normal.  H&N: EOMI; NC/AT: No facial pressure marks, no rashes.    Skin/Extrem: col & hydration normal; no edema  Resp: Respiratory effort is normal  Physical findings otherwise essentially unchanged from previous.    IMPRESSION: Problem List Items & Comorbidities Addressed this Visit    1. Obstructive sleep apnea  PAP DME Resupply/Reorder      2. Allergic rhinitis, unspecified seasonality, unspecified trigger        3. Mild intermittent asthma without complication        4. Nasal polyp        5. Depression with anxiety        6. Attention deficit hyperactivity disorder (ADHD), unspecified ADHD type      " "  7. Morbid obesity with BMI of 40.0-44.9, adult ([facility])            PLAN:  I reviewed results of prior studies and physiologic data with the patient.   I discussed treatment options with risks and benefits.  Treatment with  PAP is medically necessary and Hemalatha is agreable to continue use.   Care of equipment, methods to improve comfort using PAP and importance of compliance with therapy were discussed.  Pressure setting: continue 10-16 cmH2O.    Rx provided to replace supplies and Care coordinated with DME provider.   Discussed strategies for weight reduction.  She was taking Wegovy and had some weight loss but had to stop because of short supply.  She was just started on Zepbound.  Follow-up is advised in 1 year or sooner if needed to monitor progress, compliance and to adjust therapy.     Thank you for allowing me to participate in the care of this patient.    Sincerely,     Authenticated electronically on 08/26/24   Board Certified Specialist     Portions of the record may have been created with voice recognition software. Occasional wrong word or \"sound a like\" substitutions may have occurred due to the inherent limitations of voice recognition software. There may also be notations and random deletions of words or characters from malfunctioning software. Read the chart carefully and recognize, using context, where substitutions/deletions have occurred.    "

## 2024-08-28 ENCOUNTER — TELEPHONE (OUTPATIENT)
Dept: SLEEP CENTER | Facility: CLINIC | Age: 44
End: 2024-08-28

## 2024-08-29 ENCOUNTER — OFFICE VISIT (OUTPATIENT)
Dept: CARDIOLOGY CLINIC | Facility: CLINIC | Age: 44
End: 2024-08-29
Payer: COMMERCIAL

## 2024-08-29 VITALS
DIASTOLIC BLOOD PRESSURE: 66 MMHG | WEIGHT: 293 LBS | HEIGHT: 68 IN | HEART RATE: 60 BPM | BODY MASS INDEX: 44.41 KG/M2 | SYSTOLIC BLOOD PRESSURE: 114 MMHG

## 2024-08-29 DIAGNOSIS — I47.10 SVT (SUPRAVENTRICULAR TACHYCARDIA): Primary | ICD-10-CM

## 2024-08-29 DIAGNOSIS — F98.8 ATTENTION DEFICIT DISORDER (ADD) WITHOUT HYPERACTIVITY: ICD-10-CM

## 2024-08-29 LAB

## 2024-08-29 PROCEDURE — 99213 OFFICE O/P EST LOW 20 MIN: CPT | Performed by: PHYSICIAN ASSISTANT

## 2024-08-29 RX ORDER — METHYLPHENIDATE HYDROCHLORIDE 54 MG/1
54 TABLET ORAL DAILY
Qty: 30 TABLET | Refills: 0 | Status: SHIPPED | OUTPATIENT
Start: 2024-08-29

## 2024-08-29 NOTE — PROGRESS NOTES
[facility]   Outpatient Note  Hemalatha Hahn  1980  [id number]  [facility] [place] [address] [address] [facility] [address] [phone]    Hemalatha Hahn is a 44 y.o. female    Assessment and Plan:   SVT (supraventricular tachycardia)  Single episode that led to ER visit  Now controlled on metoprolol 12.5 mg twice daily with no recurrence    Patient did not tolerate metoprolol 25 mg twice daily as she became hypotensive requiring emergency room visit and aborting of stress test attempt.    EKG in the ER showed supraventricular tachycardia with marked ST abnormality possible inferior subendocardial injury    Will check exercise stress echo    If recurrence will refer to EP      Additional Plan:   Medications as detailed above.    Pertinent testing orders as outlined.      Available lab and test results are reviewed with the patient and any additional required labs are ordered as noted.     Return visit will be in one month or earlier if there are problems.     The patient is encouraged to call in the meantime if there are questions or concerns.      Subjective:   The patient is seen in the office today for an episode of SVT that led to an ER visit.  Patient was seeing her ophthalmologist that day and had eyedrops placed for numbing.  She did not feel well and was lightheaded.  She continued on with her day but continued to feel ill.  She later took her blood pressure and pulse and realized her pulse was 180 bpm.  It was fluctuating between 180 and 60 and patient went to the ER for further workup.  Patient was noted to have SVT and metoprolol was started.  EKG showed marked ST abnormalities otherwise workup was negative.  Since then she has had no recurrence.    She states that she is able to walk up an incline and climb stairs without chest pain.  She does have shortness of breath but she relates this to her weight.  This has not  changed in the past several years.  She is working with weight management and has lost 30 pounds in the past year or so.  She recently had an RSV infection and was on prednisone and has gained some weight back.    She does have a PMH of RA and is immunocompromised.  She does not have any history of hypertension diabetes mellitus or cholesterol.  She did have preeclampsia with both her pregnancies but this resolved post pregnancy.    Patient did not tolerate metoprolol 25 mg twice daily and this was reduced to metoprolol 12.5 mg twice daily.  She became hypotensive and required aborting of stress test attempt.  She also was seen in the emergency room for this.  Patient is much improved currently and has been tolerating the the lower dose of metoprolol well.  She has had no hypertensive episodes or fatigue.  She has had no syncope or presyncope.  She has had no lightheadedness or other symptoms on lower dose of metoprolol.        Social History  Social History     Tobacco Use   Smoking Status Former    Current packs/day: 0.00    Types: Cigarettes   Smokeless Tobacco Never   Tobacco Comments    quit 5 years ago    ,   Social History     Substance and Sexual Activity   Alcohol Use Yes    Alcohol/week: 4.0 standard drinks of alcohol    Types: 4 Standard drinks or equivalent per week    Comment: social    ,   Social History     Substance and Sexual Activity   Drug Use No     Family History   Problem Relation Age of Onset    Asthma Mother     Hypertension Mother     COPD Mother     Obesity Father     Hypothyroidism Father     Chiari malformation Daughter     Diabetes Daughter         Type 1    Celiac disease Daughter     No Known Problems Maternal Grandmother     Lung disease Maternal Grandfather     Cancer Paternal Grandmother     Aneurysm Paternal Grandfather     Celiac disease Paternal Grandfather     Obesity Brother     Chiari malformation Brother     Obesity Son     Autism Son     No Known Problems Maternal Aunt      Breast cancer Paternal Aunt         postmenopausal    No Known Problems Paternal Aunt     No Known Problems Paternal Aunt     Breast cancer additional onset Neg Hx     Endometrial cancer Neg Hx     Colon cancer Neg Hx     Ovarian cancer Neg Hx     BRCA1 Positive Neg Hx     BRCA1 Negative Neg Hx     BRCA 1/2 Neg Hx     BRCA2 Positive Neg Hx     BRCA2 Negative Neg Hx     Heart disease Neg Hx        Medical and Surgical History  Past Medical History:   Diagnosis Date    Allergic rhinitis     Anxiety     Arthritis     Asthma     COVID-19 12/17/2020    CPAP (continuous positive airway pressure) dependence     Daytime hypersomnolence     LAST ASSESSED [id number]    Depression     GERD (gastroesophageal reflux disease)     occas    IUD (intrauterine device) in place     Lumbar disc disease     Migraine     RA (rheumatoid arthritis) (HCC)     Rheumatoid arthritis (HCC)     Sleep apnea     SVT (supraventricular tachycardia)     Varicella     Wears glasses      Past Surgical History:   Procedure Laterality Date    CHOLECYSTECTOMY      LUMBAR EPIDURAL INJECTION  07/2024    NASAL SEPTUM SURGERY      NASAL SEPTAL DEVIATION REPAIR-Dr. Navarrete    [place] STRTCTC CPTR ASSTD PX EXTRADURAL CRANIAL N/A 01/08/2019    Procedure: FUNCTIONAL ENDOSCOPIC SINUS SURGERY (FESS) IMAGED GUIDED; PLACEMENT OF MULTIPLE PROPEL IMPLANTS;  Surgeon: South Navarrete DO;  Location: [facility];  Service: ENT    SINUS SURGERY      US GUIDED BREAST BIOPSY LEFT COMPLETE Left 06/15/2022    benign    WISDOM TOOTH EXTRACTION           Current Outpatient Medications:     fexofenadine (ALLEGRA) 180 MG tablet, Take 180 mg by mouth daily, Disp: , Rfl:     FLUoxetine (PROzac) 40 MG capsule, Take 1 capsule (40 mg total) by mouth daily, Disp: 100 capsule, Rfl: 1    fluticasone (FLONASE) 50 mcg/act nasal spray, 2 sprays into each nostril daily, Disp: , Rfl:     ibuprofen (MOTRIN) 200 mg tablet, Take 200 mg by mouth every 6 (six) hours as needed for mild pain., Disp: , Rfl:  "    methocarbamol (ROBAXIN) 500 mg tablet, Take 1 tablet (500 mg total) by mouth 4 (four) times a day as needed for muscle spasms, Disp: 60 tablet, Rfl: 1    methylphenidate (Concerta) 54 MG ER tablet, Take 1 tablet (54 mg total) by mouth daily Max Daily Amount: 54 mg, Disp: 30 tablet, Rfl: 0    Methylphenidate HCl ER 54 MG TB24, , Disp: , Rfl:     metoprolol tartrate (LOPRESSOR) 25 mg tablet, Take 1 tablet (25 mg total) by mouth every 12 (twelve) hours (Patient taking differently: Take 25 mg by mouth every 12 (twelve) hours Currently taking 12.5 mg twice a day), Disp: 90 tablet, Rfl: 3    mometasone-formoterol (DULERA) 200-5 MCG/ACT inhaler, Inhale 2 puffs 2 (two) times a day Rinse mouth after use., Disp: 13 g, Rfl: 0    topiramate (Topamax) 50 MG tablet, Take 1 tablet (50 mg total) by mouth 2 (two) times a day, Disp: 60 tablet, Rfl: 2    Xeljanz XR 11 MG TB24, , Disp: , Rfl:   No Known Allergies    Review of Systems   Constitutional: Negative.   HENT: Negative.     Eyes: Negative.    Cardiovascular: Negative.  Negative for chest pain, claudication, cyanosis, dyspnea on exertion, irregular heartbeat, leg swelling, near-syncope, orthopnea, palpitations, paroxysmal nocturnal dyspnea and syncope.   Respiratory: Negative.  Negative for cough, hemoptysis, shortness of breath, sleep disturbances due to breathing, snoring, sputum production and wheezing.    Endocrine: Negative.    Hematologic/Lymphatic: Negative.    Skin: Negative.    Musculoskeletal: Negative.    Gastrointestinal: Negative.    Genitourinary: Negative.    Neurological: Negative.    Psychiatric/Behavioral: Negative.     Allergic/Immunologic: Negative.        Objective:   /66   Pulse 60   Ht 5' 7.75\" (1.721 m)   Wt (!) 155 kg (341 lb)   BMI 52.23 kg/m²   Physical Exam  Vitals and nursing note reviewed.   Constitutional:       Appearance: She is well-developed. She is obese.   HENT:      Head: Normocephalic and atraumatic.      Mouth/Throat:      " Mouth: Mucous membranes are moist.   Eyes:      General: No scleral icterus.     Conjunctiva/sclera: Conjunctivae normal.   Neck:      Vascular: No JVD.      Trachea: No tracheal deviation.   Cardiovascular:      Rate and Rhythm: Normal rate and regular rhythm.      Pulses: Intact distal pulses.      Heart sounds: Normal heart sounds, S1 normal and S2 normal. No murmur heard.     No friction rub. No gallop.   Pulmonary:      Effort: Pulmonary effort is normal. No respiratory distress.      Breath sounds: Normal breath sounds. No wheezing or rales.   Chest:      Chest wall: No tenderness.   Abdominal:      General: Bowel sounds are normal. There is no distension.      Palpations: Abdomen is soft.      Tenderness: There is no abdominal tenderness.      Comments: Aorta not palpable    Musculoskeletal:         General: No tenderness. Normal range of motion.      Cervical back: Normal range of motion and neck supple.      Right lower leg: No edema.      Left lower leg: No edema.   Skin:     General: Skin is warm and dry.      Coloration: Skin is not pale.      Findings: No erythema or rash.   Neurological:      General: No focal deficit present.      Mental Status: She is alert and oriented to person, place, and time.   Psychiatric:         Mood and Affect: Mood normal.         Behavior: Behavior normal.         Judgment: Judgment normal.         Lab Review:   Lab Results   Component Value Date    CHOL 152 08/12/2015     Lab Results   Component Value Date    HDL 48 (L) 04/09/2024     Lab Results   Component Value Date    LDLCALC 115 (H) 04/09/2024     Lab Results   Component Value Date    TRIG 157 (H) 04/09/2024     Results Reviewed       None          Results Reviewed       None          Results Reviewed       None            Recent Cardiovascular Testing:   Exercise stress test is ordered    ECG Review:   5/6/2024 sinus rhythm with PAC nonspecific ST T wave abnormality    5/6/2024: Supraventricular tachycardia, marked  ST abnormality possible inferior subendocardial injury

## 2024-08-29 NOTE — ASSESSMENT & PLAN NOTE
Single episode that led to ER visit  Now controlled on metoprolol 12.5 mg twice daily with no recurrence    Patient did not tolerate metoprolol 25 mg twice daily as she became hypotensive requiring emergency room visit and aborting of stress test attempt.    EKG in the ER showed supraventricular tachycardia with marked ST abnormality possible inferior subendocardial injury    Will check exercise stress echo    If recurrence will refer to EP

## 2024-08-30 DIAGNOSIS — F98.8 ATTENTION DEFICIT DISORDER (ADD) WITHOUT HYPERACTIVITY: ICD-10-CM

## 2024-09-04 RX ORDER — METHYLPHENIDATE HYDROCHLORIDE 54 MG/1
54 TABLET ORAL DAILY
Qty: 30 TABLET | Refills: 0 | Status: SHIPPED | OUTPATIENT
Start: 2024-09-04

## 2024-09-10 ENCOUNTER — HOSPITAL ENCOUNTER (OUTPATIENT)
Dept: NON INVASIVE DIAGNOSTICS | Facility: HOSPITAL | Age: 44
Discharge: HOME | End: 2024-09-10
Payer: COMMERCIAL

## 2024-09-10 VITALS
BODY MASS INDEX: 47.09 KG/M2 | SYSTOLIC BLOOD PRESSURE: 100 MMHG | HEART RATE: 75 BPM | WEIGHT: 293 LBS | HEIGHT: 66 IN | DIASTOLIC BLOOD PRESSURE: 60 MMHG

## 2024-09-10 DIAGNOSIS — I47.10 SVT (SUPRAVENTRICULAR TACHYCARDIA): ICD-10-CM

## 2024-09-10 LAB
CHEST PAIN STATEMENT: NORMAL
E WAVE DECELERATION TIME: 245 MS
E/A RATIO: 1.66
MAX DIASTOLIC BP: 74 MMHG
MAX HR PERCENT: 80 %
MAX HR: 142 BPM
MAX PREDICTED HEART RATE: 177 BPM
MV PEAK A VEL: 0.47 M/S
MV PEAK E VEL: 78 CM/S
MV STENOSIS PRESSURE HALF TIME: 71 MS
MV VALVE AREA P 1/2 METHOD: 3.1
PROTOCOL NAME: NORMAL
RATE PRESSURE PRODUCT: NORMAL
REASON FOR TERMINATION: NORMAL
SL CV LV EF: 60
SL CV STRESS RECOVERY BP: NORMAL MMHG
SL CV STRESS RECOVERY HR: 81 BPM
SL CV STRESS RECOVERY O2 SAT: 99 %
SL CV STRESS STAGE REACHED: 2
STRESS BASELINE BP: NORMAL MMHG
STRESS BASELINE HR: 75 BPM
STRESS O2 SAT REST: 98 %
STRESS PEAK HR: 142 BPM
STRESS POST ESTIMATED WORKLOAD: 7 METS
STRESS POST EXERCISE DUR MIN: 6 MIN
STRESS POST EXERCISE DUR MIN: 6 MIN
STRESS POST EXERCISE DUR SEC: 0 SEC
STRESS POST EXERCISE DUR SEC: 1 SEC
STRESS POST O2 SAT PEAK: 99 %
STRESS POST PEAK BP: 130 MMHG
STRESS POST PEAK HR: 142 BPM
STRESS POST PEAK SYSTOLIC BP: 130 MMHG
TARGET HR FORMULA: NORMAL
TEST INDICATION: NORMAL

## 2024-09-10 PROCEDURE — 93350 STRESS TTE ONLY: CPT

## 2024-09-10 PROCEDURE — 93350 STRESS TTE ONLY: CPT | Performed by: INTERNAL MEDICINE

## 2024-09-19 LAB
CHEST PAIN STATEMENT: NORMAL
MAX DIASTOLIC BP: 74 MMHG
MAX PREDICTED HEART RATE: 177 BPM
PROTOCOL NAME: NORMAL
REASON FOR TERMINATION: NORMAL
STRESS POST EXERCISE DUR MIN: 6 MIN
STRESS POST EXERCISE DUR SEC: 1 SEC
STRESS POST PEAK HR: 142 BPM
STRESS POST PEAK SYSTOLIC BP: 130 MMHG
TARGET HR FORMULA: NORMAL
TEST INDICATION: NORMAL

## 2024-10-09 DIAGNOSIS — F98.8 ATTENTION DEFICIT DISORDER (ADD) WITHOUT HYPERACTIVITY: ICD-10-CM

## 2024-10-11 RX ORDER — METHYLPHENIDATE HYDROCHLORIDE 54 MG/1
54 TABLET ORAL DAILY
Qty: 30 TABLET | Refills: 0 | Status: SHIPPED | OUTPATIENT
Start: 2024-10-11

## 2024-11-04 ENCOUNTER — TELEPHONE (OUTPATIENT)
Age: 44
End: 2024-11-04

## 2024-11-04 ENCOUNTER — NURSE TRIAGE (OUTPATIENT)
Age: 44
End: 2024-11-04

## 2024-11-04 NOTE — TELEPHONE ENCOUNTER
"Patient call:  Pt stated provider: CATIA Lincoln    Actionable item: Appointment rescheduled to sooner    What is the reason for the call/chief complaint?    Reports 8/10 pain with some swelling to ankles, knees, hips, hands, and wrists. Has had familial stressors recently and believes this is contributing.   Has tried taking 20mg of Prednisone daily since Tuesday with minimal relief.     Looking for provider recommendations.     Dispo: Appointment rescheduled to soonest available. Routing to provider for review and recommendation.   [facility]  Agrees with plan.   All questions answered.     Reason for Disposition   SEVERE pain (e.g., excruciating, unable to walk) and not improved after 2 hours of pain medicine    Answer Assessment - Initial Assessment Questions  1. ONSET: \"When did the pain start?\"       1 week ago  2. LOCATION: \"Where is the pain located?\"       Ankles, knees, hips, hands, and wrists  3. PAIN: \"How bad is the pain?\"  (Scale 1-10; or mild, moderate, severe)      8  4. WORK OR EXERCISE: \"Has there been any recent work or exercise that involved this part of the body?\"       stressors  5. CAUSE: \"What do you think is causing the ankle pain?\"      RA flare  6. OTHER SYMPTOMS: \"Do you have any other symptoms?\" (e.g., calf pain, rash, fever, swelling)      Slight swelling    Protocols used: Ankle Pain-Adult-[place]    "

## 2024-11-04 NOTE — TELEPHONE ENCOUNTER
Caller: Patient     Doctor: Cailin Kimball for procedure)    Reason for call: Patient indicating her pain has returned and would like to proceed with another injection.     Please assist.     Call back#: [phone]

## 2024-11-06 ENCOUNTER — OFFICE VISIT (OUTPATIENT)
Age: 44
End: 2024-11-06
Payer: COMMERCIAL

## 2024-11-06 ENCOUNTER — APPOINTMENT (OUTPATIENT)
Dept: LAB | Facility: HOSPITAL | Age: 44
End: 2024-11-06
Payer: COMMERCIAL

## 2024-11-06 VITALS
DIASTOLIC BLOOD PRESSURE: 84 MMHG | BODY MASS INDEX: 45.99 KG/M2 | SYSTOLIC BLOOD PRESSURE: 116 MMHG | TEMPERATURE: 97.8 F | HEIGHT: 67 IN | WEIGHT: 293 LBS | HEART RATE: 76 BPM | OXYGEN SATURATION: 99 %

## 2024-11-06 DIAGNOSIS — E55.9 VITAMIN D DEFICIENCY: ICD-10-CM

## 2024-11-06 DIAGNOSIS — M05.79 RHEUMATOID ARTHRITIS INVOLVING MULTIPLE SITES WITH POSITIVE RHEUMATOID FACTOR (HCC): ICD-10-CM

## 2024-11-06 DIAGNOSIS — M79.18 MYOFASCIAL PAIN: ICD-10-CM

## 2024-11-06 DIAGNOSIS — M05.79 RHEUMATOID ARTHRITIS INVOLVING MULTIPLE SITES WITH POSITIVE RHEUMATOID FACTOR (HCC): Primary | ICD-10-CM

## 2024-11-06 DIAGNOSIS — Z79.899 ENCOUNTER FOR LONG-TERM (CURRENT) USE OF MEDICATIONS: ICD-10-CM

## 2024-11-06 DIAGNOSIS — Z79.899 ENCOUNTER FOR LONG-TERM (CURRENT) USE OF OTHER MEDICATIONS: ICD-10-CM

## 2024-11-06 LAB
25(OH)D3 SERPL-MCNC: 16.4 NG/ML (ref 30–100)
ALBUMIN SERPL BCG-MCNC: 4 G/DL (ref 3.5–5)
ALP SERPL-CCNC: 61 U/L (ref 34–104)
ALT SERPL W P-5'-P-CCNC: 13 U/L (ref 7–52)
ANION GAP SERPL CALCULATED.3IONS-SCNC: 9 MMOL/L (ref 4–13)
AST SERPL W P-5'-P-CCNC: 16 U/L (ref 13–39)
BASOPHILS # BLD AUTO: 0.07 THOUSANDS/ÂΜL (ref 0–0.1)
BASOPHILS NFR BLD AUTO: 1 % (ref 0–1)
BILIRUB SERPL-MCNC: 0.9 MG/DL (ref 0.2–1)
BUN SERPL-MCNC: 22 MG/DL (ref 5–25)
CALCIUM SERPL-MCNC: 8.9 MG/DL (ref 8.4–10.2)
CHLORIDE SERPL-SCNC: 107 MMOL/L (ref 96–108)
CO2 SERPL-SCNC: 22 MMOL/L (ref 21–32)
CREAT SERPL-MCNC: 1 MG/DL (ref 0.6–1.3)
CRP SERPL QL: 2.1 MG/L
EOSINOPHIL # BLD AUTO: 0.34 THOUSAND/ÂΜL (ref 0–0.61)
EOSINOPHIL NFR BLD AUTO: 6 % (ref 0–6)
ERYTHROCYTE [DISTWIDTH] IN BLOOD BY AUTOMATED COUNT: 13.2 % (ref 11.6–15.1)
ERYTHROCYTE [SEDIMENTATION RATE] IN BLOOD: 19 MM/HOUR (ref 0–19)
GFR SERPL CREATININE-BSD FRML MDRD: 69 ML/MIN/1.73SQ M
GLUCOSE P FAST SERPL-MCNC: 89 MG/DL (ref 65–99)
HCT VFR BLD AUTO: 40.2 % (ref 34.8–46.1)
HGB BLD-MCNC: 13.1 G/DL (ref 11.5–15.4)
IMM GRANULOCYTES # BLD AUTO: 0.03 THOUSAND/UL (ref 0–0.2)
IMM GRANULOCYTES NFR BLD AUTO: 1 % (ref 0–2)
LYMPHOCYTES # BLD AUTO: 1.49 THOUSANDS/ÂΜL (ref 0.6–4.47)
LYMPHOCYTES NFR BLD AUTO: 25 % (ref 14–44)
MCH RBC QN AUTO: 31.2 PG (ref 26.8–34.3)
MCHC RBC AUTO-ENTMCNC: 32.6 G/DL (ref 31.4–37.4)
MCV RBC AUTO: 96 FL (ref 82–98)
MONOCYTES # BLD AUTO: 0.54 THOUSAND/ÂΜL (ref 0.17–1.22)
MONOCYTES NFR BLD AUTO: 9 % (ref 4–12)
NEUTROPHILS # BLD AUTO: 3.46 THOUSANDS/ÂΜL (ref 1.85–7.62)
NEUTS SEG NFR BLD AUTO: 58 % (ref 43–75)
NRBC BLD AUTO-RTO: 0 /100 WBCS
PLATELET # BLD AUTO: 308 THOUSANDS/UL (ref 149–390)
PMV BLD AUTO: 10.7 FL (ref 8.9–12.7)
POTASSIUM SERPL-SCNC: 3.7 MMOL/L (ref 3.5–5.3)
PROT SERPL-MCNC: 7.1 G/DL (ref 6.4–8.4)
RBC # BLD AUTO: 4.2 MILLION/UL (ref 3.81–5.12)
SODIUM SERPL-SCNC: 138 MMOL/L (ref 135–147)
WBC # BLD AUTO: 5.93 THOUSAND/UL (ref 4.31–10.16)

## 2024-11-06 PROCEDURE — 85025 COMPLETE CBC W/AUTO DIFF WBC: CPT

## 2024-11-06 PROCEDURE — 80053 COMPREHEN METABOLIC PANEL: CPT

## 2024-11-06 PROCEDURE — 86480 TB TEST CELL IMMUN MEASURE: CPT

## 2024-11-06 PROCEDURE — 86140 C-REACTIVE PROTEIN: CPT

## 2024-11-06 PROCEDURE — 36415 COLL VENOUS BLD VENIPUNCTURE: CPT

## 2024-11-06 PROCEDURE — 85652 RBC SED RATE AUTOMATED: CPT

## 2024-11-06 PROCEDURE — 82306 VITAMIN D 25 HYDROXY: CPT

## 2024-11-06 PROCEDURE — 99214 OFFICE O/P EST MOD 30 MIN: CPT | Performed by: PHYSICIAN ASSISTANT

## 2024-11-06 RX ORDER — ERGOCALCIFEROL 1.25 MG/1
50000 CAPSULE, LIQUID FILLED ORAL WEEKLY
Qty: 12 CAPSULE | Refills: 0 | Status: SHIPPED | OUTPATIENT
Start: 2024-11-06 | End: 2025-01-23

## 2024-11-06 RX ORDER — PREDNISONE 5 MG/1
TABLET ORAL
Qty: 100 TABLET | Refills: 1 | Status: SHIPPED | OUTPATIENT
Start: 2024-11-06

## 2024-11-06 NOTE — ASSESSMENT & PLAN NOTE
As noted above, I do believe some of her symptoms are myofascial in nature.  She does have multiple trigger points noted on exam.  I do believe she will get some relief from the prednisone taper for her RA flare however if she continues to have persistent myofascial symptoms could also consider adding additional medication.  I have also recommended adding a magnesium malate supplement which can be helpful for joint and myofascial pain.  Encouraged regular exercise as well.

## 2024-11-06 NOTE — ASSESSMENT & PLAN NOTE
Very low vitamin D on recent labs.  Recommend adding over-the-counter vitamin D3 with K2 daily.  I have also given her prescription for weekly vitamin D to 50,000 IU for a total of 12 weeks.  Will plan to check vitamin D with next labs.    Orders:    Vitamin D 25 hydroxy; Future    ergocalciferol (VITAMIN D2) 50,000 units; Take 1 capsule (50,000 Units total) by mouth once a week for 12 doses

## 2024-11-06 NOTE — ASSESSMENT & PLAN NOTE
Rheumatoid arthritis generally well-controlled with Xeljanz however she is currently experiencing a flare.  I did give her a prescription for a prednisone taper to help calm down her flare symptoms.  I do believe some of her symptoms are more myofascial in nature as well.  Will continue to monitor symptoms closely.  Labs as ordered to monitor for medication side effects and toxicity.  Plan follow-up in 2 months or sooner if needed.    Orders:    predniSONE 5 mg tablet; Take 20 mg po daily x 1 week, then 15 mg po daily x 1 week, then 10 mg po daily x 1 week, then 7.5 mg daily x 1 week, then 5 mg po daily x 1 week then 2.5 mg daily thereafter    CBC and differential; Future    Comprehensive metabolic panel; Future    Sedimentation rate, automated; Future    C-reactive protein; Future

## 2024-11-06 NOTE — PROGRESS NOTES
Ambulatory Visit  Name: Hemalatha Hahn      : 1980      MRN: [id number]  Encounter Provider: Susan Lincoln PA-C  Encounter Date: 2024   Encounter department: [facility]    Assessment & Plan  Rheumatoid arthritis involving multiple sites with positive rheumatoid factor (HCC)  Rheumatoid arthritis generally well-controlled with Xeljanz however she is currently experiencing a flare.  I did give her a prescription for a prednisone taper to help calm down her flare symptoms.  I do believe some of her symptoms are more myofascial in nature as well.  Will continue to monitor symptoms closely.  Labs as ordered to monitor for medication side effects and toxicity.  Plan follow-up in 2 months or sooner if needed.    Orders:    predniSONE 5 mg tablet; Take 20 mg po daily x 1 week, then 15 mg po daily x 1 week, then 10 mg po daily x 1 week, then 7.5 mg daily x 1 week, then 5 mg po daily x 1 week then 2.5 mg daily thereafter    CBC and differential; Future    Comprehensive metabolic panel; Future    Sedimentation rate, automated; Future    C-reactive protein; Future    Myofascial pain  As noted above, I do believe some of her symptoms are myofascial in nature.  She does have multiple trigger points noted on exam.  I do believe she will get some relief from the prednisone taper for her RA flare however if she continues to have persistent myofascial symptoms could also consider adding additional medication.  I have also recommended adding a magnesium malate supplement which can be helpful for joint and myofascial pain.  Encouraged regular exercise as well.       Vitamin D deficiency  Very low vitamin D on recent labs.  Recommend adding over-the-counter vitamin D3 with K2 daily.  I have also given her prescription for weekly vitamin D to 50,000 IU for a total of 12 weeks.  Will plan to check vitamin D with next labs.    Orders:    Vitamin D 25 hydroxy; Future     ergocalciferol (VITAMIN D2) 50,000 units; Take 1 capsule (50,000 Units total) by mouth once a week for 12 doses    Encounter for long-term (current) use of medications    Orders:    CBC and differential; Future    Comprehensive metabolic panel; Future    Sedimentation rate, automated; Future    C-reactive protein; Future      History of Present Illness     Hemalatha Hahn is a 44 y.o. female.  She is here for follow up of rheumatoid arthritis.  She is currently having a flare of her symptoms.  She notes generalized achiness and fatigue.  She has been under a great deal of stress and also notices worsening of symptoms with weather/seasonal changes.    She has previously been treated with oral methotrexate, Enbrel, Humira, and Rinvoq.  She is now on Xeljanz, which she started in 2023.  She was initially diagnosed in 2006 and started on oral methotrexate.  This was discontinued after she developed shortness of breath and difficulty breathing.  She was off of all medications for several years and her symptoms are quiescent however she had a significant flareup in 2020 after a COVID infection.      She has a history of lumbar degenerative disc disease and follows with pain management.       She follows with cardiology for SVT.  This is well-controlled with metoprolol.    She had labs done on 11/6/2024.  CBC unremarkable.  Creatinine 1.00, GFR 69.  ESR, CRP within normal limits.  Vitamin D 16.4.  QuantiFERON gold pending.      Review of Systems   Constitutional:  Negative for chills, fatigue and fever.   HENT:  Negative for hearing loss, sore throat and tinnitus.    Eyes:  Negative for pain and visual disturbance.   Respiratory:  Negative for cough and shortness of breath.    Cardiovascular:  Negative for chest pain and palpitations.   Gastrointestinal:  Negative for abdominal pain, nausea and vomiting.   Genitourinary:  Negative for difficulty urinating.   Musculoskeletal:  Negative for arthralgias, back pain, gait  "problem, joint swelling, myalgias, neck pain and neck stiffness.   Skin:  Negative for rash.   Neurological:  Negative for dizziness, seizures, weakness, numbness and headaches.   Psychiatric/Behavioral:  Negative for confusion, decreased concentration and sleep disturbance.      Current Outpatient Medications on File Prior to Visit   Medication Sig Dispense Refill    fexofenadine (ALLEGRA) 180 MG tablet Take 180 mg by mouth daily      FLUoxetine (PROzac) 40 MG capsule Take 1 capsule (40 mg total) by mouth daily 100 capsule 1    fluticasone (FLONASE) 50 mcg/act nasal spray 2 sprays into each nostril daily      ibuprofen (MOTRIN) 200 mg tablet Take 200 mg by mouth every 6 (six) hours as needed for mild pain.      methylphenidate (Concerta) 54 MG ER tablet Take 1 tablet (54 mg total) by mouth daily Max Daily Amount: 54 mg 30 tablet 0    metoprolol tartrate (LOPRESSOR) 25 mg tablet Take 1 tablet (25 mg total) by mouth every 12 (twelve) hours (Patient taking differently: Take 25 mg by mouth every 12 (twelve) hours Currently taking 12.5 mg twice a day) 90 tablet 3    mometasone-formoterol (DULERA) 200-5 MCG/ACT inhaler Inhale 2 puffs 2 (two) times a day Rinse mouth after use. 13 g 0    topiramate (Topamax) 50 MG tablet Take 1 tablet (50 mg total) by mouth 2 (two) times a day 60 tablet 2    Xeljanz XR 11 MG TB24       methocarbamol (ROBAXIN) 500 mg tablet Take 1 tablet (500 mg total) by mouth 4 (four) times a day as needed for muscle spasms 60 tablet 1     No current facility-administered medications on file prior to visit.          Objective     /84 (BP Location: Right arm, Patient Position: Sitting, Cuff Size: Large)   Pulse 76   Temp 97.8 °F (36.6 °C) (Temporal)   Ht 5' 7\" (1.702 m)   Wt (!) 154 kg (340 lb 3.2 oz)   SpO2 99%   BMI 53.28 kg/m²     Physical Exam  Constitutional:       Appearance: Normal appearance.   Cardiovascular:      Rate and Rhythm: Normal rate and regular rhythm.   Pulmonary:      " Breath sounds: Normal breath sounds.   Musculoskeletal:         General: Tenderness (Tenderness noted bilateral wrists;  Trigger points noted bilateral elbows, shoulders, hips, knees) present. No swelling.   Skin:     General: Skin is warm and dry.   Neurological:      General: No focal deficit present.      Mental Status: She is alert.           Dragon Dictation software was used to dictate this note. It may contain errors with dictating incorrect words/spelling. Please contact provider directly for any questions.

## 2024-11-07 LAB
GAMMA INTERFERON BACKGROUND BLD IA-ACNC: 0.01 IU/ML
M TB IFN-G BLD-IMP: NEGATIVE
M TB IFN-G CD4+ BCKGRND COR BLD-ACNC: -0.01 IU/ML
M TB IFN-G CD4+ BCKGRND COR BLD-ACNC: 0 IU/ML
MITOGEN IGNF BCKGRD COR BLD-ACNC: 9.99 IU/ML

## 2024-11-13 DIAGNOSIS — F98.8 ATTENTION DEFICIT DISORDER (ADD) WITHOUT HYPERACTIVITY: ICD-10-CM

## 2024-11-15 RX ORDER — METHYLPHENIDATE HYDROCHLORIDE 54 MG/1
54 TABLET ORAL DAILY
Qty: 30 TABLET | Refills: 0 | Status: SHIPPED | OUTPATIENT
Start: 2024-11-15

## 2024-12-12 ENCOUNTER — HOSPITAL ENCOUNTER (OUTPATIENT)
Dept: MAMMOGRAPHY | Facility: HOSPITAL | Age: 44
Discharge: HOME | End: 2024-12-12
Payer: COMMERCIAL

## 2024-12-12 VITALS — BODY MASS INDEX: 45.99 KG/M2 | HEIGHT: 67 IN | WEIGHT: 293 LBS

## 2024-12-12 DIAGNOSIS — Z12.31 VISIT FOR SCREENING MAMMOGRAM: ICD-10-CM

## 2024-12-12 PROCEDURE — 77063 BREAST TOMOSYNTHESIS BI: CPT

## 2024-12-12 PROCEDURE — 77067 SCR MAMMO BI INCL CAD: CPT

## 2024-12-19 ENCOUNTER — TELEPHONE (OUTPATIENT)
Age: 44
End: 2024-12-19

## 2024-12-19 ENCOUNTER — OFFICE VISIT (OUTPATIENT)
Dept: URGENT CARE | Facility: MEDICAL CENTER | Age: 44
End: 2024-12-19
Payer: COMMERCIAL

## 2024-12-19 VITALS
HEIGHT: 67 IN | WEIGHT: 293 LBS | RESPIRATION RATE: 20 BRPM | HEART RATE: 64 BPM | OXYGEN SATURATION: 99 % | TEMPERATURE: 98.1 F | DIASTOLIC BLOOD PRESSURE: 80 MMHG | SYSTOLIC BLOOD PRESSURE: 122 MMHG | BODY MASS INDEX: 45.99 KG/M2

## 2024-12-19 DIAGNOSIS — L60.0 INGROWN NAIL OF GREAT TOE OF RIGHT FOOT: Primary | ICD-10-CM

## 2024-12-19 DIAGNOSIS — F98.8 ATTENTION DEFICIT DISORDER (ADD) WITHOUT HYPERACTIVITY: ICD-10-CM

## 2024-12-19 PROCEDURE — 99213 OFFICE O/P EST LOW 20 MIN: CPT

## 2024-12-19 NOTE — PROGRESS NOTES
[facility]        NAME: Hemalatha Hahn is a 44 y.o. female  : 1980    MRN: [id number]  DATE: 2024  TIME: 6:35 PM    Assessment and Plan   Ingrown nail of great toe of right foot [L60.0]  1. Ingrown nail of great toe of right foot  Ambulatory Referral to Podiatry            Patient Instructions       Follow up with PCP in 3-5 days.  Proceed to  ER if symptoms worsen.    If tests are performed, our office will contact you with results only if changes need to made to the care plan discussed with you at the visit. You can review your full results on [facility].    Chief Complaint     Chief Complaint   Patient presents with   • Toe Pain     Right great toe red and painful pulled out a in grown toe nail          History of Present Illness       Patient here with ingrown nail of the right great toe. She had attempted to remove it herself about 1 week ago, she started with soreness about 2 days afterwards. She has had significant pain, and redness/swelling of the nailbed. She has been cleaning it with peroxide and alcohol. She has done some epsom salt soaks. She has been taking motrin. She has not had any fever or chills.     Prior history of ingrown nails saw Dr. Heard in the past for same.  Attempted to call office however they were closed and unable to be seen today        Review of Systems   Review of Systems   Constitutional:  Negative for chills, fatigue and fever.   Skin:  Negative for color change and rash.   Neurological:  Negative for dizziness, light-headedness and headaches.         Current Medications       Current Outpatient Medications:   •  ergocalciferol (VITAMIN D2) 50,000 units, Take 1 capsule (50,000 Units total) by mouth once a week for 12 doses, Disp: 12 capsule, Rfl: 0  •  fexofenadine (ALLEGRA) 180 MG tablet, Take 180 mg by mouth daily, Disp: , Rfl:   •  FLUoxetine (PROzac) 40 MG capsule, Take 1 capsule (40 mg total) by mouth daily, Disp: 100 capsule, Rfl:  1  •  fluticasone (FLONASE) 50 mcg/act nasal spray, 2 sprays into each nostril daily, Disp: , Rfl:   •  ibuprofen (MOTRIN) 200 mg tablet, Take 200 mg by mouth every 6 (six) hours as needed for mild pain., Disp: , Rfl:   •  methocarbamol (ROBAXIN) 500 mg tablet, Take 1 tablet (500 mg total) by mouth 4 (four) times a day as needed for muscle spasms, Disp: 60 tablet, Rfl: 1  •  methylphenidate (Concerta) 54 MG ER tablet, Take 1 tablet (54 mg total) by mouth daily Max Daily Amount: 54 mg, Disp: 30 tablet, Rfl: 0  •  metoprolol tartrate (LOPRESSOR) 25 mg tablet, Take 1 tablet (25 mg total) by mouth every 12 (twelve) hours, Disp: 90 tablet, Rfl: 3  •  mometasone-formoterol (DULERA) 200-5 MCG/ACT inhaler, Inhale 2 puffs 2 (two) times a day Rinse mouth after use., Disp: 13 g, Rfl: 0  •  topiramate (Topamax) 50 MG tablet, Take 1 tablet (50 mg total) by mouth 2 (two) times a day, Disp: 60 tablet, Rfl: 2  •  Xeljanz XR 11 MG TB24, , Disp: , Rfl:   •  predniSONE 5 mg tablet, Take 20 mg po daily x 1 week, then 15 mg po daily x 1 week, then 10 mg po daily x 1 week, then 7.5 mg daily x 1 week, then 5 mg po daily x 1 week then 2.5 mg daily thereafter (Patient not taking: Reported on 12/19/2024), Disp: 100 tablet, Rfl: 1    Current Allergies     Allergies as of 12/19/2024   • (No Known Allergies)            The following portions of the patient's history were reviewed and updated as appropriate: allergies, current medications, past family history, past medical history, past social history, past surgical history and problem list.     Past Medical History:   Diagnosis Date   • Allergic rhinitis    • Anxiety    • Arthritis    • Asthma    • COVID-19 12/17/2020   • CPAP (continuous positive airway pressure) dependence    • Daytime hypersomnolence     LAST ASSESSED [id number]   • Depression    • GERD (gastroesophageal reflux disease)     occas   • IUD (intrauterine device) in place    • Lumbar disc disease    • Migraine    • RA (rheumatoid  "arthritis) (HCC)    • Rheumatoid arthritis (HCC)    • Sleep apnea    • SVT (supraventricular tachycardia) (HCC)    • Varicella    • Wears glasses        Past Surgical History:   Procedure Laterality Date   • CHOLECYSTECTOMY     • LUMBAR EPIDURAL INJECTION  07/2024   • NASAL SEPTUM SURGERY      NASAL SEPTAL DEVIATION REPAIR-Dr. Navarrete   • VT STRTCTC CPTR ASSTD PX EXTRADURAL CRANIAL N/A 01/08/2019    Procedure: FUNCTIONAL ENDOSCOPIC SINUS SURGERY (FESS) IMAGED GUIDED; PLACEMENT OF MULTIPLE PROPEL IMPLANTS;  Surgeon: [place] Navarrete DO;  Location: [facility];  Service: ENT   • SINUS SURGERY     • US GUIDED BREAST BIOPSY LEFT COMPLETE Left 06/15/2022    benign   • WISDOM TOOTH EXTRACTION         Family History   Problem Relation Age of Onset   • Asthma Mother    • Hypertension Mother    • COPD Mother    • Obesity Father    • Hypothyroidism Father    • Chiari malformation Daughter    • Diabetes Daughter         Type 1   • Celiac disease Daughter    • No Known Problems Maternal Grandmother    • Lung disease Maternal Grandfather    • Cancer Paternal Grandmother    • Aneurysm Paternal Grandfather    • Celiac disease Paternal Grandfather    • Obesity Brother    • Chiari malformation Brother    • Obesity Son    • Autism Son    • No Known Problems Maternal Aunt    • Breast cancer Paternal Aunt         postmenopausal   • No Known Problems Paternal Aunt    • No Known Problems Paternal Aunt    • Breast cancer additional onset Neg Hx    • Endometrial cancer Neg Hx    • Colon cancer Neg Hx    • Ovarian cancer Neg Hx    • BRCA1 Positive Neg Hx    • BRCA1 Negative Neg Hx    • BRCA 1/2 Neg Hx    • BRCA2 Positive Neg Hx    • BRCA2 Negative Neg Hx    • Heart disease Neg Hx          Medications have been verified.        Objective   /80   Pulse 64   Temp 98.1 °F (36.7 °C)   Resp 20   Ht 5' 7\" (1.702 m)   Wt (!) 161 kg (356 lb)   SpO2 99%   BMI 55.76 kg/m²        Physical Exam     Physical Exam  Vitals and nursing note " reviewed.   Constitutional:       General: She is not in acute distress.     Appearance: Normal appearance. She is obese. She is not ill-appearing.   Cardiovascular:      Rate and Rhythm: Normal rate.      Pulses: Normal pulses.   Musculoskeletal:      Cervical back: Normal range of motion.        Feet:    Feet:      Comments: Right great toe noted to have redness around the nail bed and up the medial nail bed.   Skin:     General: Skin is warm and dry.      Capillary Refill: Capillary refill takes less than 2 seconds.   Neurological:      Mental Status: She is alert.

## 2024-12-20 ENCOUNTER — RESULTS FOLLOW-UP (OUTPATIENT)
Dept: INTERNAL MEDICINE CLINIC | Facility: CLINIC | Age: 44
End: 2024-12-20

## 2024-12-20 RX ORDER — METHYLPHENIDATE HYDROCHLORIDE 54 MG/1
54 TABLET ORAL DAILY
Qty: 30 TABLET | Refills: 0 | Status: SHIPPED | OUTPATIENT
Start: 2024-12-20

## 2024-12-23 ENCOUNTER — PREP FOR PROCEDURE (OUTPATIENT)
Dept: PAIN MEDICINE | Facility: CLINIC | Age: 44
End: 2024-12-23

## 2024-12-23 DIAGNOSIS — M51.16 LUMBAR DISC DISEASE WITH RADICULOPATHY: Primary | ICD-10-CM

## 2024-12-27 ENCOUNTER — OFFICE VISIT (OUTPATIENT)
Dept: PODIATRY | Facility: CLINIC | Age: 44
End: 2024-12-27
Payer: COMMERCIAL

## 2024-12-27 VITALS
HEIGHT: 67 IN | TEMPERATURE: 97.8 F | BODY MASS INDEX: 45.99 KG/M2 | WEIGHT: 293 LBS | RESPIRATION RATE: 16 BRPM | OXYGEN SATURATION: 98 % | HEART RATE: 73 BPM

## 2024-12-27 DIAGNOSIS — L60.0 INGROWN NAIL OF GREAT TOE OF RIGHT FOOT: ICD-10-CM

## 2024-12-27 DIAGNOSIS — L03.031 CELLULITIS OF TOE OF RIGHT FOOT: Primary | ICD-10-CM

## 2024-12-27 PROCEDURE — 99203 OFFICE O/P NEW LOW 30 MIN: CPT | Performed by: PODIATRIST

## 2024-12-27 PROCEDURE — 11719 TRIM NAIL(S) ANY NUMBER: CPT | Performed by: PODIATRIST

## 2024-12-27 PROCEDURE — 11730 AVULSION NAIL PLATE SIMPLE 1: CPT | Performed by: PODIATRIST

## 2024-12-27 RX ORDER — MUPIROCIN 20 MG/G
OINTMENT TOPICAL 3 TIMES DAILY
Qty: 22 G | Refills: 1 | Status: SHIPPED | OUTPATIENT
Start: 2024-12-27

## 2024-12-27 RX ORDER — CEPHALEXIN 500 MG/1
500 CAPSULE ORAL EVERY 8 HOURS SCHEDULED
Qty: 21 CAPSULE | Refills: 0 | Status: SHIPPED | OUTPATIENT
Start: 2024-12-27 | End: 2025-01-03

## 2024-12-27 NOTE — PROGRESS NOTES
Name: Hemalatha Hahn      : 1980      MRN: [id number]  Encounter Provider: Meg Ojeda DPM  Encounter Date: 2024   Enc[facility]  :  Assessment & Plan  Ingrown nail of great toe of right foot    Orders:    Ambulatory Referral to Podiatry    cephalexin (KEFLEX) 500 mg capsule; Take 1 capsule (500 mg total) by mouth every 8 (eight) hours for 7 days    mupirocin (BACTROBAN) 2 % ointment; Apply topically 3 (three) times a day        IMPRESSION:  RLE GT lateral nail border ingrown toenail with SOI    PLAN:  Patient counseled on ingrown toenails.  Procedures reviewed such as slant back, partial nail avulsion, and partial matrixectomy.  Due to clinical signs of infection partial nail avulsion of lateral nail border was performed.  Will refrain from permanent procedure due to SOI.  Risks and benefits were reviewed at length prior to procedure  Keep dressing intact for 24 hours with foot elevated.  Soak in Epsom salts 15 minutes daily and apply mupirocin ointment and Band-Aid for 2 weeks  Rx Keflex 500 mg 3 times daily  Rx mupirocin 2% ointment for application daily  Patient counseled on clinical signs of infection will contact the office and present to the ED for further evaluation and management if these occur.  Patient will follow-up in 2 weeks for further evaluation and management.     Nail removal    Date/Time: 2024 8:45 AM    Performed by: Meg Ojeda DPM  Authorized by: Meg Ojeda DPM    Patient location:  [facility] Protocol:  procedure performed by consultantConsent: Verbal consent obtained.  Risks and benefits: risks, benefits and alternatives were discussed  Consent given by: patient  Patient understanding: patient states understanding of the procedure being performed    Location:     Foot:  R big toe  Pre-procedure details:     Skin preparation:  Betadine and alcohol  Anesthesia (see MAR for exact dosages):     Anesthesia method:   "Nerve block    Block needle gauge:  25 G    Block anesthetic:  Lidocaine 1% w/o epi    Block injection procedure:  Anatomic landmarks identified, anatomic landmarks palpated and negative aspiration for blood    Block outcome:  Anesthesia achieved  Nail Removal:     Nail removed:  Partial    Nail side:  Lateral    Nail bed sutured: no    Ingrown nail:     Wedge excision of skin: no      Nail matrix removed or ablated:  None  Nails trimmed:     Number of nails trimmed:  1  Post-procedure details:     Dressing: Betadine, 4 x 4 gauze, Coban.    Patient tolerance of procedure:  Tolerated well, no immediate complications        History of Present Illness   HPI  Hemalatha Hahn is a 44 y.o. female who presents patient presents for evaluation and management of recurrent right great toe lateral nail border ingrown.  Patient went to urgent care for evaluation and was told to follow-up with podiatry for further evaluation and management.  She did not receive antibiotics at that time.  She has been trying to soak in Epsom salts and keep the area clean.  She states there is localized redness and swelling in the area with clear yellow drainage.  She denies any purulence.  She denies any nausea, vomiting, fever, chills, shortness of breath.          Review of Systems   Constitutional:  Negative for chills and fever.   Respiratory:  Negative for chest tightness and shortness of breath.    Cardiovascular:  Negative for leg swelling.   Skin:  Positive for wound.          Objective   Pulse 73   Temp 97.8 °F (36.6 °C)   Resp 16   Ht 5' 7\" (1.702 m)   Wt (!) 161 kg (356 lb)   SpO2 98%   BMI 55.76 kg/m²      Physical Exam  Constitutional:       General: She is not in acute distress.     Appearance: She is obese. She is not ill-appearing.   Cardiovascular:      Comments: RLE DP/PT pulse 1/4 bilaterally  CRT less than 3 seconds to distal digits  Skin temperature gradient even from knees to digits RLE  Musculoskeletal:         General: " Swelling and tenderness present.      Comments: Right great toe lateral nail border tenderness with palpation   Skin:     Capillary Refill: Capillary refill takes less than 2 seconds.      Findings: Lesion present.      Comments: Right great toe lateral nail border with mild erythema, edema, and serous drainage.  No purulence expressed, no crepitus, no fluctuance, no malodor.    Incurvated right great toenail with wound along lateral nail bed

## 2024-12-30 ENCOUNTER — TELEPHONE (OUTPATIENT)
Dept: PAIN MEDICINE | Facility: CLINIC | Age: 44
End: 2024-12-30

## 2024-12-30 DIAGNOSIS — M51.16 LUMBAR DISC DISEASE WITH RADICULOPATHY: Primary | ICD-10-CM

## 2024-12-30 RX ORDER — GABAPENTIN 100 MG/1
CAPSULE ORAL
Qty: 90 CAPSULE | Refills: 1 | Status: SHIPPED | OUTPATIENT
Start: 2024-12-30

## 2024-12-30 NOTE — TELEPHONE ENCOUNTER
I would not recommend additional steroids at this time.  It looks like she was prescribed a 5-week tapering course of prednisone by rheumatology in November.  We could try something like gabapentin if she is interested.  If she is, let me know and I will send a prescription to her pharmacy.

## 2024-12-30 NOTE — TELEPHONE ENCOUNTER
Gabapentin prescription sent to patient's pharmacy.  Please advise her to follow titration schedule.

## 2025-01-08 ENCOUNTER — OFFICE VISIT (OUTPATIENT)
Age: 45
End: 2025-01-08

## 2025-01-08 ENCOUNTER — APPOINTMENT (OUTPATIENT)
Dept: LAB | Facility: HOSPITAL | Age: 45
End: 2025-01-08
Payer: COMMERCIAL

## 2025-01-08 VITALS
SYSTOLIC BLOOD PRESSURE: 130 MMHG | HEIGHT: 66 IN | OXYGEN SATURATION: 98 % | WEIGHT: 293 LBS | BODY MASS INDEX: 47.09 KG/M2 | HEART RATE: 70 BPM | DIASTOLIC BLOOD PRESSURE: 80 MMHG

## 2025-01-08 DIAGNOSIS — M79.18 MYOFASCIAL PAIN: ICD-10-CM

## 2025-01-08 DIAGNOSIS — E55.9 VITAMIN D DEFICIENCY: ICD-10-CM

## 2025-01-08 DIAGNOSIS — Z79.899 ENCOUNTER FOR LONG-TERM (CURRENT) USE OF MEDICATIONS: ICD-10-CM

## 2025-01-08 DIAGNOSIS — M05.79 RHEUMATOID ARTHRITIS INVOLVING MULTIPLE SITES WITH POSITIVE RHEUMATOID FACTOR (HCC): Primary | ICD-10-CM

## 2025-01-08 DIAGNOSIS — E66.01 MORBID OBESITY WITH BMI OF 50.0-59.9, ADULT (HCC): ICD-10-CM

## 2025-01-08 DIAGNOSIS — E66.813 CLASS 3 OBESITY: ICD-10-CM

## 2025-01-08 DIAGNOSIS — Z79.899 MEDICATION MANAGEMENT: ICD-10-CM

## 2025-01-08 DIAGNOSIS — M05.79 RHEUMATOID ARTHRITIS INVOLVING MULTIPLE SITES WITH POSITIVE RHEUMATOID FACTOR (HCC): ICD-10-CM

## 2025-01-08 DIAGNOSIS — M51.16 LUMBAR DISC DISEASE WITH RADICULOPATHY: ICD-10-CM

## 2025-01-08 LAB
25(OH)D3 SERPL-MCNC: 29.3 NG/ML (ref 30–100)
ALBUMIN SERPL BCG-MCNC: 4.2 G/DL (ref 3.5–5)
ALP SERPL-CCNC: 69 U/L (ref 34–104)
ALT SERPL W P-5'-P-CCNC: 14 U/L (ref 7–52)
ANION GAP SERPL CALCULATED.3IONS-SCNC: 7 MMOL/L (ref 4–13)
AST SERPL W P-5'-P-CCNC: 14 U/L (ref 13–39)
BASOPHILS # BLD AUTO: 0.11 THOUSANDS/ΜL (ref 0–0.1)
BASOPHILS NFR BLD AUTO: 1 % (ref 0–1)
BILIRUB SERPL-MCNC: 0.49 MG/DL (ref 0.2–1)
BUN SERPL-MCNC: 19 MG/DL (ref 5–25)
CALCIUM SERPL-MCNC: 9.2 MG/DL (ref 8.4–10.2)
CHLORIDE SERPL-SCNC: 105 MMOL/L (ref 96–108)
CO2 SERPL-SCNC: 25 MMOL/L (ref 21–32)
CREAT SERPL-MCNC: 0.87 MG/DL (ref 0.6–1.3)
CRP SERPL QL: 9.5 MG/L
EOSINOPHIL # BLD AUTO: 0.58 THOUSAND/ΜL (ref 0–0.61)
EOSINOPHIL NFR BLD AUTO: 7 % (ref 0–6)
ERYTHROCYTE [DISTWIDTH] IN BLOOD BY AUTOMATED COUNT: 13.6 % (ref 11.6–15.1)
ERYTHROCYTE [SEDIMENTATION RATE] IN BLOOD: 33 MM/HOUR (ref 0–19)
GFR SERPL CREATININE-BSD FRML MDRD: 81 ML/MIN/1.73SQ M
GLUCOSE P FAST SERPL-MCNC: 92 MG/DL (ref 65–99)
HCT VFR BLD AUTO: 40.1 % (ref 34.8–46.1)
HGB BLD-MCNC: 12.8 G/DL (ref 11.5–15.4)
IMM GRANULOCYTES # BLD AUTO: 0.07 THOUSAND/UL (ref 0–0.2)
IMM GRANULOCYTES NFR BLD AUTO: 1 % (ref 0–2)
LYMPHOCYTES # BLD AUTO: 1.46 THOUSANDS/ΜL (ref 0.6–4.47)
LYMPHOCYTES NFR BLD AUTO: 18 % (ref 14–44)
MCH RBC QN AUTO: 31.2 PG (ref 26.8–34.3)
MCHC RBC AUTO-ENTMCNC: 31.9 G/DL (ref 31.4–37.4)
MCV RBC AUTO: 98 FL (ref 82–98)
MONOCYTES # BLD AUTO: 0.69 THOUSAND/ΜL (ref 0.17–1.22)
MONOCYTES NFR BLD AUTO: 8 % (ref 4–12)
NEUTROPHILS # BLD AUTO: 5.45 THOUSANDS/ΜL (ref 1.85–7.62)
NEUTS SEG NFR BLD AUTO: 65 % (ref 43–75)
NRBC BLD AUTO-RTO: 0 /100 WBCS
PLATELET # BLD AUTO: 340 THOUSANDS/UL (ref 149–390)
PMV BLD AUTO: 11.1 FL (ref 8.9–12.7)
POTASSIUM SERPL-SCNC: 4.2 MMOL/L (ref 3.5–5.3)
PROT SERPL-MCNC: 7.5 G/DL (ref 6.4–8.4)
RBC # BLD AUTO: 4.1 MILLION/UL (ref 3.81–5.12)
SODIUM SERPL-SCNC: 137 MMOL/L (ref 135–147)
WBC # BLD AUTO: 8.36 THOUSAND/UL (ref 4.31–10.16)

## 2025-01-08 PROCEDURE — 80053 COMPREHEN METABOLIC PANEL: CPT

## 2025-01-08 PROCEDURE — 85652 RBC SED RATE AUTOMATED: CPT

## 2025-01-08 PROCEDURE — 85025 COMPLETE CBC W/AUTO DIFF WBC: CPT

## 2025-01-08 PROCEDURE — 82306 VITAMIN D 25 HYDROXY: CPT

## 2025-01-08 PROCEDURE — 36415 COLL VENOUS BLD VENIPUNCTURE: CPT

## 2025-01-08 PROCEDURE — 86140 C-REACTIVE PROTEIN: CPT

## 2025-01-08 NOTE — ASSESSMENT & PLAN NOTE
Her rheumatoid arthritis is stable with Xeljanz.  No signs of active inflammation or synovitis on exam today.  We will continue to monitor her response to treatment.  Labs ordered to monitor for medication side effects and toxicity.  She is up-to-date with her QuantiFERON gold testing.  Follow-up in 3 to 4 months or sooner if needed.    Orders:    CBC and differential; Future    Comprehensive metabolic panel; Future    Sedimentation rate, automated; Future    C-reactive protein; Future

## 2025-01-08 NOTE — ASSESSMENT & PLAN NOTE
Continue vitamin D3 supplement daily in addition to weekly vitamin D2 supplement (total of 12 weeks).  Will plan to check vitamin D with next labs.    Orders:    Vitamin D 25 hydroxy; Future     Recent Visits  Date Type Provider Dept   11/07/23 Office Visit Pedro Adams MD [id number]   08/03/23 Office Visit Pedro Adams MD [id number]   Showing recent visits within past 540 days and meeting all other requirements  Future Appointments  Date Type Provider Dept   05/06/24 Appointment Pedro Adams MD [id number]   Showing future appointments within next 180 days and meeting all other requirements

## 2025-01-08 NOTE — PROGRESS NOTES
Name: Hemalatha Hahn      : 1980      MRN: [id number]  Encounter Provider: Susan Lincoln PA-C  Encounter Date: 2025   Encounter department: [facility] RHEUMATOLOGY [facility]  :  Assessment & Plan  Rheumatoid arthritis involving multiple sites with positive rheumatoid factor (HCC)  Her rheumatoid arthritis is stable with Xeljanz.  No signs of active inflammation or synovitis on exam today.  We will continue to monitor her response to treatment.  Labs ordered to monitor for medication side effects and toxicity.  She is up-to-date with her QuantiFERON gold testing.  Follow-up in 3 to 4 months or sooner if needed.    Orders:    CBC and differential; Future    Comprehensive metabolic panel; Future    Sedimentation rate, automated; Future    C-reactive protein; Future    Myofascial pain  Myofascial symptoms are much improved with the addition of magnesium malate supplement.  Encouraged regular home exercise as well.       Vitamin D deficiency  Continue vitamin D3 supplement daily in addition to weekly vitamin D2 supplement (total of 12 weeks).  Will plan to check vitamin D with next labs.    Orders:    Vitamin D 25 hydroxy; Future    Lumbar disc disease with radiculopathy  Lower back pain with left lower extremity sciatica symptoms.  She recently started on gabapentin.  She is following with pain management and is scheduled for injections in February.       Encounter for long-term (current) use of medications    Orders:    CBC and differential; Future    Comprehensive metabolic panel; Future    Sedimentation rate, automated; Future    C-reactive protein; Future        History of Present Illness   Hemalatha Hahn is a 44 y.o. female.  She is here for follow up of seropositive rheumatoid arthritis.  She has previously been treated with oral methotrexate, Enbrel, Humira, and Rinvoq.  She is now on Xeljanz, which she started in .  She was initially diagnosed in  and started on oral  methotrexate.  This was discontinued after she developed shortness of breath and difficulty breathing.  She was off of all medications for several years and her symptoms are quiescent however she had a significant flareup in 2020 after a COVID infection.  In November she had a flare of her rheumatoid arthritis symptoms with generalized joint pain and myofascial pain as well.  She was given a prednisone taper and her symptoms are now much improved and back to her baseline.  She has minimal stiffness in the morning.  She has no swelling in her joints.    She has a history of myofascial pain.  She has been taking magnesium malate and has found that to be helpful.  She has a history of lumbar degenerative disc disease and follows with pain management.  She was recently started on gabapentin for her worsening lower back pain with left lower extremity sciatica symptoms.  She is scheduled for injections in February.     She follows with cardiology for SVT.  This is well-controlled with metoprolol.    She had labs done on 1/8/2025.  CBC essentially unremarkable.  Creatinine 0.7, GFR 81.  ESR 33, CRP 9.5.  She is up-to-date with her QuantiFERON gold testing and had a negative test on 11/6/2024.          Review of Systems   Constitutional:  Negative for chills, fatigue and fever.   HENT:  Negative for hearing loss, sore throat and tinnitus.    Eyes:  Negative for pain and visual disturbance.   Respiratory:  Negative for cough and shortness of breath.    Cardiovascular:  Negative for chest pain and palpitations.   Gastrointestinal:  Negative for abdominal pain, nausea and vomiting.   Genitourinary:  Negative for difficulty urinating.   Musculoskeletal:  Negative for arthralgias, back pain, gait problem, joint swelling, myalgias, neck pain and neck stiffness.   Skin:  Negative for rash.   Neurological:  Negative for dizziness, seizures, weakness, numbness and headaches.   Psychiatric/Behavioral:  Negative for confusion,  "decreased concentration and sleep disturbance.      Current Outpatient Medications on File Prior to Visit   Medication Sig Dispense Refill    ergocalciferol (VITAMIN D2) 50,000 units Take 1 capsule (50,000 Units total) by mouth once a week for 12 doses 12 capsule 0    fexofenadine (ALLEGRA) 180 MG tablet Take 180 mg by mouth daily      FLUoxetine (PROzac) 40 MG capsule Take 1 capsule (40 mg total) by mouth daily 100 capsule 1    fluticasone (FLONASE) 50 mcg/act nasal spray 2 sprays into each nostril daily      gabapentin (NEURONTIN) 100 mg capsule 1 PO QHS x 1 day, then 1 PO BID x 1 day, then 1 PO TID 90 capsule 1    ibuprofen (MOTRIN) 200 mg tablet Take 200 mg by mouth every 6 (six) hours as needed for mild pain.      methylphenidate (Concerta) 54 MG ER tablet Take 1 tablet (54 mg total) by mouth daily Max Daily Amount: 54 mg 30 tablet 0    metoprolol tartrate (LOPRESSOR) 25 mg tablet Take 1 tablet (25 mg total) by mouth every 12 (twelve) hours 90 tablet 3    mometasone-formoterol (DULERA) 200-5 MCG/ACT inhaler Inhale 2 puffs 2 (two) times a day Rinse mouth after use. 13 g 0    topiramate (Topamax) 50 MG tablet Take 1 tablet (50 mg total) by mouth 2 (two) times a day 60 tablet 2    Xeljanz XR 11 MG TB24       methocarbamol (ROBAXIN) 500 mg tablet Take 1 tablet (500 mg total) by mouth 4 (four) times a day as needed for muscle spasms 60 tablet 1    mupirocin (BACTROBAN) 2 % ointment Apply topically 3 (three) times a day 22 g 1    predniSONE 5 mg tablet Take 20 mg po daily x 1 week, then 15 mg po daily x 1 week, then 10 mg po daily x 1 week, then 7.5 mg daily x 1 week, then 5 mg po daily x 1 week then 2.5 mg daily thereafter (Patient not taking: Reported on 12/27/2024) 100 tablet 1     No current facility-administered medications on file prior to visit.         Objective   /80 (BP Location: Left arm, Patient Position: Sitting, Cuff Size: Adult)   Pulse 70   Ht 5' 6\" (1.676 m)   Wt (!) 160 kg (352 lb 12.8 oz)  "  LMP  (Approximate)   SpO2 98%   BMI 56.94 kg/m²      Physical Exam  Constitutional:       Appearance: Normal appearance.   Cardiovascular:      Rate and Rhythm: Normal rate and regular rhythm.   Pulmonary:      Breath sounds: Normal breath sounds.   Musculoskeletal:         General: Tenderness (Tenderness noted bilateral wrists;  Trigger points noted bilateral elbows, shoulders, hips, knees) present. No swelling.   Skin:     General: Skin is warm and dry.   Neurological:      General: No focal deficit present.      Mental Status: She is alert.           Dragon Dictation software was used to dictate this note. It may contain errors with dictating incorrect words/spelling. Please contact provider directly for any questions.

## 2025-01-08 NOTE — ASSESSMENT & PLAN NOTE
Lower back pain with left lower extremity sciatica symptoms.  She recently started on gabapentin.  She is following with pain management and is scheduled for injections in February.

## 2025-01-08 NOTE — ASSESSMENT & PLAN NOTE
Myofascial symptoms are much improved with the addition of magnesium malate supplement.  Encouraged regular home exercise as well.

## 2025-01-10 ENCOUNTER — TELEPHONE (OUTPATIENT)
Dept: PAIN MEDICINE | Facility: CLINIC | Age: 45
End: 2025-01-10

## 2025-01-10 ENCOUNTER — OFFICE VISIT (OUTPATIENT)
Dept: PODIATRY | Facility: CLINIC | Age: 45
End: 2025-01-10
Payer: COMMERCIAL

## 2025-01-10 VITALS
WEIGHT: 293 LBS | HEART RATE: 105 BPM | RESPIRATION RATE: 16 BRPM | TEMPERATURE: 97.6 F | HEIGHT: 66 IN | OXYGEN SATURATION: 97 % | BODY MASS INDEX: 47.09 KG/M2

## 2025-01-10 DIAGNOSIS — L60.0 INGROWN NAIL OF GREAT TOE OF RIGHT FOOT: Primary | ICD-10-CM

## 2025-01-10 PROCEDURE — 99212 OFFICE O/P EST SF 10 MIN: CPT | Performed by: PODIATRIST

## 2025-01-10 RX ORDER — MAGNESIUM OXIDE/MAG AA CHELATE 300 MG
CAPSULE ORAL
COMMUNITY

## 2025-01-10 RX ORDER — CHLORAL HYDRATE 500 MG
CAPSULE ORAL
COMMUNITY

## 2025-01-10 NOTE — PROGRESS NOTES
"Name: Hemalatha Hahn      : 1980      MRN: [id number]  Encounter Provider: Meg Ojeda DPM  Encounter Date: 1/10/2025   Encounter department: [facility] PODIATRY [place]  :  Assessment & Plan  Ingrown nail of great toe of right foot         IMPRESSION:  RLE GT lateral nail border ingrown toenail PNA follow-up without SOI     PLAN:  Follow-up right GT lateral nail border PNA  Infection resolved, no residual symptoms noted  Patient counseled again on ingrown toenails and further treatment options if ingrown toenail reoccurs including permanent PNA for definitive treatment  If irritation begins patient should soak her feet in Epsom salts and warm water.  Wear wide toebox shoes.  Vaseline and moisturizers may be used at nail edges to keep skin flexible  Patient counseled on clinical signs of infection will contact the office and present to the ED for further evaluation and management if these occur.  Follow-up as needed    History of Present Illness   HPI  Hemalatha Hahn is a 44 y.o. female who presents patient presents for evaluation and management of right great toe lateral nail border status post partial nail avulsion.  Patient was adherent with postprocedural care.  She has had complete resolution of symptoms including redness, swelling, drainage, and pain.  She presents today for follow-up visit and to discuss plan for future treatment if ingrown's recur.      Review of Systems  Constitutional:  Negative for chills and fever.   Respiratory:  Negative for chest tightness and shortness of breath.    Cardiovascular:  Negative for leg swelling.   Skin: Negative for wound       Objective   Pulse 105   Temp 97.6 °F (36.4 °C)   Resp 16   Ht 5' 6\" (1.676 m)   Wt (!) 160 kg (352 lb)   LMP  (Approximate)   SpO2 97%   BMI 56.81 kg/m²      Physical Exam  Constitutional:       General: She is not in acute distress.     Appearance: She is obese. She is not ill-appearing.   Cardiovascular:      Comments: RLE " DP/PT pulse 1/4 bilaterally  CRT less than 3 seconds to distal digits  Skin temperature gradient even from knees to digits RLE  Musculoskeletal:         General: No pain     Comments: Great toe no pain with palpation of lateral nail border  Skin:     Capillary Refill: Capillary refill takes less than 2 seconds.      Findings: Lesion present.      Comments: Right great toe lateral nail border no erythema, no edema, no crepitus, no fluctuance, no drainage noted.  Symptoms resolved.  No SOI

## 2025-01-10 NOTE — TELEPHONE ENCOUNTER
Okay to double dosage of gabapentin, to 200 mg 3 times daily.  I see that she is not scheduled to see me till March.  Could we please try to move that appointment up.  I'd like to see her in about 3-4 weeks if possible.

## 2025-01-20 ENCOUNTER — TELEPHONE (OUTPATIENT)
Dept: PAIN MEDICINE | Facility: CLINIC | Age: 45
End: 2025-01-20

## 2025-01-20 DIAGNOSIS — F41.8 DEPRESSION WITH ANXIETY: ICD-10-CM

## 2025-01-20 DIAGNOSIS — M51.16 LUMBAR DISC DISEASE WITH RADICULOPATHY: ICD-10-CM

## 2025-01-20 RX ORDER — GABAPENTIN 100 MG/1
200 CAPSULE ORAL 3 TIMES DAILY
Qty: 180 CAPSULE | Refills: 1 | Status: SHIPPED | OUTPATIENT
Start: 2025-01-20 | End: 2025-01-21 | Stop reason: SDUPTHER

## 2025-01-20 NOTE — TELEPHONE ENCOUNTER
Caller: [facility]     Doctor: Kocher    Reason for call: they need clear instructions on the Gabapentin Script, if we could call them back or  refax     Call back#: [phone]

## 2025-01-21 RX ORDER — GABAPENTIN 100 MG/1
200 CAPSULE ORAL 3 TIMES DAILY
Qty: 180 CAPSULE | Refills: 1 | Status: SHIPPED | OUTPATIENT
Start: 2025-01-21

## 2025-01-21 RX ORDER — FLUOXETINE 40 MG/1
40 CAPSULE ORAL DAILY
Qty: 100 CAPSULE | Refills: 0 | Status: SHIPPED | OUTPATIENT
Start: 2025-01-21

## 2025-01-22 DIAGNOSIS — F98.8 ATTENTION DEFICIT DISORDER (ADD) WITHOUT HYPERACTIVITY: ICD-10-CM

## 2025-01-22 RX ORDER — METHYLPHENIDATE HYDROCHLORIDE 54 MG/1
54 TABLET ORAL DAILY
Qty: 30 TABLET | Refills: 0 | Status: SHIPPED | OUTPATIENT
Start: 2025-01-22

## 2025-01-31 ENCOUNTER — OFFICE VISIT (OUTPATIENT)
Dept: PAIN MEDICINE | Facility: CLINIC | Age: 45
End: 2025-01-31
Payer: COMMERCIAL

## 2025-01-31 VITALS
OXYGEN SATURATION: 98 % | HEIGHT: 66 IN | TEMPERATURE: 98.2 F | HEART RATE: 92 BPM | WEIGHT: 293 LBS | BODY MASS INDEX: 47.09 KG/M2

## 2025-01-31 DIAGNOSIS — M51.16 LUMBAR DISC DISEASE WITH RADICULOPATHY: ICD-10-CM

## 2025-01-31 PROCEDURE — 99214 OFFICE O/P EST MOD 30 MIN: CPT | Performed by: NURSE PRACTITIONER

## 2025-01-31 RX ORDER — GABAPENTIN 300 MG/1
300 CAPSULE ORAL 3 TIMES DAILY
Qty: 90 CAPSULE | Refills: 2 | Status: SHIPPED | OUTPATIENT
Start: 2025-01-31

## 2025-01-31 RX ORDER — METHYLPHENIDATE HYDROCHLORIDE 54 MG/1
TABLET, EXTENDED RELEASE ORAL
COMMUNITY
Start: 2025-01-23

## 2025-01-31 NOTE — PROGRESS NOTES
Assessment:  1. Lumbar disc disease with radiculopathy        Plan:  While the patient was in the office today, I did have a thorough conversation regarding their chronic pain syndrome, medication management, and treatment plan options.  Patient is being seen for a follow-up visit.  She is scheduled for left-sided L5-S1 and S1 transforaminal epidural steroid injection on 2/20/2025.  She previously underwent the same injection on 7/23/2024 and her pain was up to 90% improved for several months.    She was started on gabapentin which has been titrated to 200 mg 3 times daily with slight improvement.  At this point, we will increase gabapentin to 300 mg 3 times daily.  A new prescription was sent to her pharmacy.  Patient is noting slight swelling in her hands with gabapentin.  I advised her to call us if it gets worse and we will resume 200 mg 3 times daily.    Follow-up 1 month after injection.      History of Present Illness:  The patient is a 44 y.o. female who presents for a follow up office visit in regards to Back Pain and Leg Pain.   The patient’s current symptoms include complaints of pain in the right buttock that radiates down the posterior right leg to her foot.  Current pain level is an 8/10.  Quality of pain is described as dull, aching, occasionally sharp.    Current pain medications includes: Gabapentin 200 mg 3 times daily.     I have personally reviewed and/or updated the patient's past medical history, past surgical history, family history, social history, current medications, allergies, and vital signs today.         Review of Systems  Review of Systems   Musculoskeletal:  Positive for back pain, gait problem and joint swelling.        Decreased ROM  Pain left leg   All other systems reviewed and are negative.          Past Medical History:   Diagnosis Date    Allergic rhinitis     Anxiety     Arthritis     Asthma     COVID-19 12/17/2020    CPAP (continuous positive airway pressure) dependence      Daytime hypersomnolence     LAST ASSESSED [id number]    Depression     GERD (gastroesophageal reflux disease)     occas    IUD (intrauterine device) in place     Lumbar disc disease     Migraine     RA (rheumatoid arthritis) (HCC)     Rheumatoid arthritis (HCC)     Sleep apnea     SVT (supraventricular tachycardia) (HCC)     Varicella     Wears glasses        Past Surgical History:   Procedure Laterality Date    CHOLECYSTECTOMY      LUMBAR EPIDURAL INJECTION  07/2024    NASAL SEPTUM SURGERY      NASAL SEPTAL DEVIATION REPAIR-Dr. Navarrete    [place] [facility] CPTR ASSTD PX EXTRADURAL CRANIAL N/A 01/08/2019    Procedure: FUNCTIONAL ENDOSCOPIC SINUS SURGERY (FESS) IMAGED GUIDED; PLACEMENT OF MULTIPLE PROPEL IMPLANTS;  Surgeon: South Navarrete DO;  Location: [facility];  Service: ENT    SINUS SURGERY      US GUIDED BREAST BIOPSY LEFT COMPLETE Left 06/15/2022    benign    WISDOM TOOTH EXTRACTION         Family History   Problem Relation Age of Onset    Asthma Mother     Hypertension Mother     COPD Mother     Obesity Father     Hypothyroidism Father     Chiari malformation Daughter     Diabetes Daughter         Type 1    Celiac disease Daughter     No Known Problems Maternal Grandmother     Lung disease Maternal Grandfather     Cancer Paternal Grandmother     Aneurysm Paternal Grandfather     Celiac disease Paternal Grandfather     Obesity Brother     Chiari malformation Brother     Obesity Son     Autism Son     No Known Problems Maternal Aunt     Breast cancer Paternal Aunt         postmenopausal    No Known Problems Paternal Aunt     No Known Problems Paternal Aunt     Breast cancer additional onset Neg Hx     Endometrial cancer Neg Hx     Colon cancer Neg Hx     Ovarian cancer Neg Hx     BRCA1 Positive Neg Hx     BRCA1 Negative Neg Hx     BRCA 1/2 Neg Hx     BRCA2 Positive Neg Hx     BRCA2 Negative Neg Hx     Heart disease Neg Hx        Social History     Occupational History    Not on file   Tobacco Use    Smoking status:  Former     Current packs/day: 0.00     Types: Cigarettes    Smokeless tobacco: Never    Tobacco comments:     quit 5 years ago    Vaping Use    Vaping status: Never Used   Substance and Sexual Activity    Alcohol use: Yes     Alcohol/week: 4.0 standard drinks of alcohol     Types: 4 Standard drinks or equivalent per week     Comment: social     Drug use: No    Sexual activity: Yes     Partners: Male     Birth control/protection: I.U.D.     Comment: Mirena          Current Outpatient Medications:     ergocalciferol (VITAMIN D2) 50,000 units, Take 1 capsule (50,000 Units total) by mouth once a week for 12 doses, Disp: 12 capsule, Rfl: 0    fexofenadine (ALLEGRA) 180 MG tablet, Take 180 mg by mouth daily, Disp: , Rfl:     FLUoxetine (PROzac) 40 MG capsule, Take 1 capsule (40 mg total) by mouth daily, Disp: 100 capsule, Rfl: 0    fluticasone (FLONASE) 50 mcg/act nasal spray, 2 sprays into each nostril daily, Disp: , Rfl:     gabapentin (NEURONTIN) 300 mg capsule, Take 1 capsule (300 mg total) by mouth 3 (three) times a day, Disp: 90 capsule, Rfl: 2    ibuprofen (MOTRIN) 200 mg tablet, Take 200 mg by mouth every 6 (six) hours as needed for mild pain., Disp: , Rfl:     Magnesium 300 MG CAPS, , Disp: , Rfl:     methylphenidate (Concerta) 54 MG ER tablet, Take 1 tablet (54 mg total) by mouth daily Max Daily Amount: 54 mg, Disp: 30 tablet, Rfl: 0    Methylphenidate HCl ER 54 MG TB24, , Disp: , Rfl:     metoprolol tartrate (LOPRESSOR) 25 mg tablet, Take 1 tablet (25 mg total) by mouth every 12 (twelve) hours, Disp: 90 tablet, Rfl: 3    mometasone-formoterol (DULERA) 200-5 MCG/ACT inhaler, Inhale 2 puffs 2 (two) times a day Rinse mouth after use., Disp: 13 g, Rfl: 0    Omega-3 Fatty Acids (fish oil) 1,000 mg, , Disp: , Rfl:     topiramate (Topamax) 50 MG tablet, Take 1 tablet (50 mg total) by mouth 2 (two) times a day, Disp: 60 tablet, Rfl: 2    Vitamin D-Vitamin K (VITAMIN K2-VITAMIN D3 PO), , Disp: , Rfl:     Xeljanz XR 11  "MG TB24, , Disp: , Rfl:     No Known Allergies    Physical Exam:    Pulse 92   Temp 98.2 °F (36.8 °C)   Ht 5' 6\" (1.676 m)   Wt (!) 160 kg (352 lb)   SpO2 98%   BMI 56.81 kg/m²     Constitutional:normal, well developed, well nourished, alert, in no distress and non-toxic and no overt pain behavior. and obese  Eyes:anicteric  HEENT:grossly intact  Neck:supple, symmetric, trachea midline and no masses   Pulmonary:even and unlabored  Cardiovascular:No edema or pitting edema present  Skin:Normal without rashes or lesions and well hydrated  Psychiatric:Mood and affect appropriate  Neurologic:Cranial Nerves II-XII grossly intact  Musculoskeletal: Gait is slow and guarded.    Imaging    Study Result    Narrative & Impression   MRI LUMBAR SPINE WITHOUT CONTRAST     INDICATION: M54.16: Radiculopathy, lumbar region.    MRI LUMBAR SPINE WO CONTRAST,LBP, Lumbar back pain with radiculopathy affecting left lower extremity        COMPARISON: CT lower extremity right without contrast 9/13/2021. CT abdomen pelvis without contrast 12/20/2010. MRI lumbar spine without contrast 11/25/2008.     TECHNIQUE:  Multiplanar, multisequence imaging of the lumbar spine was performed. .        IMAGE QUALITY:  Diagnostic     FINDINGS:     VERTEBRAL BODIES:  There are 5 lumbar type vertebral bodies. Grade 1 retrolisthesis L2-L3. No scoliosis.  No compression fracture.     Type I Modic endplate change L2-L3 and L4-L5. Type II Modic endplate change L5-S1. Small focal areas of bone marrow edema in left L4 and bilateral L5 pedicles (left worse than right), likely stress reaction. Otherwise, normal bone marrow signal.     SACRUM:  Normal signal within the sacrum. No evidence of insufficiency or stress fracture.     DISTAL CORD AND CONUS:  Normal size and signal within the distal cord and conus.     PARASPINAL SOFT TISSUES: Slightly edematous soft tissue changes adjacent to left L4-L5 and bilateral L5-S1 facet joints, likely due to active facet " arthritis.     LOWER THORACIC DISC SPACES:  Normal disc height and signal.  No disc herniation, canal stenosis or foraminal narrowing.     LUMBAR DISC SPACES: Multilevel osteophytes, disc height loss, and facet arthropathy.     L1-L2: Facet arthropathy, trace facet joint effusions. No significant canal stenosis or foraminal narrowing.     L2-L3: Mild diffuse disc bulge. Facet arthropathy, small bilateral facet joint effusions (right worse than left). No significant canal stenosis or foraminal narrowing.     L3-L4: Mild diffuse disc bulge, small right subarticular disc protrusion. Facet arthropathy, trace facet joint effusions. No significant canal stenosis or foraminal narrowing.     L4-L5: Mild diffuse disc bulge. Facet arthropathy, ligamentum flavum thickening, small right facet joint effusion, tiny synovial cyst in left anterolateral epidural space. Mild canal stenosis. Mild bilateral foraminal narrowing.     L5-S1: Mild diffuse disc bulge, posterior marginal osteophytes. Facet arthropathy, small synovial cysts posterior to left L5-S1 facet joint. No significant canal stenosis. Mild bilateral foraminal narrowing.     Multilevel degenerative changes have worsened since MRI lumbar spine 11/25/2008.     OTHER FINDINGS: Partially imaged 2.8 cm simple right ovarian cyst (1:25), benign.     IMPRESSION:     Findings suggestive of active facet arthritis at left L4-L5 and bilateral L5-S1 facet joints.     Small focal areas of bone marrow edema in left L4 and bilateral L5 pedicles (left worse than right), likely stress reaction.     Tiny synovial cyst in left anterolateral epidural space at L4-L5. Small synovial cysts posterior to left L5-S1 facet joint.     Worsened multilevel degenerative changes of lumbar spine with varying degrees of canal stenosis (mild L4-L5) and foraminal narrowing (mild bilateral L4-5 and bilateral L5-S1), as detailed above.     The study was marked in EPIC for significant notification.         No  orders of the defined types were placed in this encounter.

## 2025-01-31 NOTE — H&P (VIEW-ONLY)
Assessment:  1. Lumbar disc disease with radiculopathy        Plan:  While the patient was in the office today, I did have a thorough conversation regarding their chronic pain syndrome, medication management, and treatment plan options.  Patient is being seen for a follow-up visit.  She is scheduled for left-sided L5-S1 and S1 transforaminal epidural steroid injection on 2/20/2025.  She previously underwent the same injection on 7/23/2024 and her pain was up to 90% improved for several months.    She was started on gabapentin which has been titrated to 200 mg 3 times daily with slight improvement.  At this point, we will increase gabapentin to 300 mg 3 times daily.  A new prescription was sent to her pharmacy.  Patient is noting slight swelling in her hands with gabapentin.  I advised her to call us if it gets worse and we will resume 200 mg 3 times daily.    Follow-up 1 month after injection.      History of Present Illness:  The patient is a 44 y.o. female who presents for a follow up office visit in regards to Back Pain and Leg Pain.   The patient’s current symptoms include complaints of pain in the right buttock that radiates down the posterior right leg to her foot.  Current pain level is an 8/10.  Quality of pain is described as dull, aching, occasionally sharp.    Current pain medications includes: Gabapentin 200 mg 3 times daily.     I have personally reviewed and/or updated the patient's past medical history, past surgical history, family history, social history, current medications, allergies, and vital signs today.         Review of Systems  Review of Systems   Musculoskeletal:  Positive for back pain, gait problem and joint swelling.        Decreased ROM  Pain left leg   All other systems reviewed and are negative.          Past Medical History:   Diagnosis Date    Allergic rhinitis     Anxiety     Arthritis     Asthma     COVID-19 12/17/2020    CPAP (continuous positive airway pressure) dependence      Daytime hypersomnolence     LAST ASSESSED [id number]    Depression     GERD (gastroesophageal reflux disease)     occas    IUD (intrauterine device) in place     Lumbar disc disease     Migraine     RA (rheumatoid arthritis) (HCC)     Rheumatoid arthritis (HCC)     Sleep apnea     SVT (supraventricular tachycardia) (HCC)     Varicella     Wears glasses        Past Surgical History:   Procedure Laterality Date    CHOLECYSTECTOMY      LUMBAR EPIDURAL INJECTION  07/2024    NASAL SEPTUM SURGERY      NASAL SEPTAL DEVIATION REPAIR-Dr. Navarrete    [place] [facility] CPTR ASSTD PX EXTRADURAL CRANIAL N/A 01/08/2019    Procedure: FUNCTIONAL ENDOSCOPIC SINUS SURGERY (FESS) IMAGED GUIDED; PLACEMENT OF MULTIPLE PROPEL IMPLANTS;  Surgeon: [place] Navarrete DO;  Location: [facility];  Service: ENT    SINUS SURGERY      US GUIDED BREAST BIOPSY LEFT COMPLETE Left 06/15/2022    benign    WISDOM TOOTH EXTRACTION         Family History   Problem Relation Age of Onset    Asthma Mother     Hypertension Mother     COPD Mother     Obesity Father     Hypothyroidism Father     Chiari malformation Daughter     Diabetes Daughter         Type 1    Celiac disease Daughter     No Known Problems Maternal Grandmother     Lung disease Maternal Grandfather     Cancer Paternal Grandmother     Aneurysm Paternal Grandfather     Celiac disease Paternal Grandfather     Obesity Brother     Chiari malformation Brother     Obesity Son     Autism Son     No Known Problems Maternal Aunt     Breast cancer Paternal Aunt         postmenopausal    No Known Problems Paternal Aunt     No Known Problems Paternal Aunt     Breast cancer additional onset Neg Hx     Endometrial cancer Neg Hx     Colon cancer Neg Hx     Ovarian cancer Neg Hx     BRCA1 Positive Neg Hx     BRCA1 Negative Neg Hx     BRCA 1/2 Neg Hx     BRCA2 Positive Neg Hx     BRCA2 Negative Neg Hx     Heart disease Neg Hx        Social History     Occupational History    Not on file   Tobacco Use    Smoking status:  Former     Current packs/day: 0.00     Types: Cigarettes    Smokeless tobacco: Never    Tobacco comments:     quit 5 years ago    Vaping Use    Vaping status: Never Used   Substance and Sexual Activity    Alcohol use: Yes     Alcohol/week: 4.0 standard drinks of alcohol     Types: 4 Standard drinks or equivalent per week     Comment: social     Drug use: No    Sexual activity: Yes     Partners: Male     Birth control/protection: I.U.D.     Comment: Mirena          Current Outpatient Medications:     ergocalciferol (VITAMIN D2) 50,000 units, Take 1 capsule (50,000 Units total) by mouth once a week for 12 doses, Disp: 12 capsule, Rfl: 0    fexofenadine (ALLEGRA) 180 MG tablet, Take 180 mg by mouth daily, Disp: , Rfl:     FLUoxetine (PROzac) 40 MG capsule, Take 1 capsule (40 mg total) by mouth daily, Disp: 100 capsule, Rfl: 0    fluticasone (FLONASE) 50 mcg/act nasal spray, 2 sprays into each nostril daily, Disp: , Rfl:     gabapentin (NEURONTIN) 300 mg capsule, Take 1 capsule (300 mg total) by mouth 3 (three) times a day, Disp: 90 capsule, Rfl: 2    ibuprofen (MOTRIN) 200 mg tablet, Take 200 mg by mouth every 6 (six) hours as needed for mild pain., Disp: , Rfl:     Magnesium 300 MG CAPS, , Disp: , Rfl:     methylphenidate (Concerta) 54 MG ER tablet, Take 1 tablet (54 mg total) by mouth daily Max Daily Amount: 54 mg, Disp: 30 tablet, Rfl: 0    Methylphenidate HCl ER 54 MG TB24, , Disp: , Rfl:     metoprolol tartrate (LOPRESSOR) 25 mg tablet, Take 1 tablet (25 mg total) by mouth every 12 (twelve) hours, Disp: 90 tablet, Rfl: 3    mometasone-formoterol (DULERA) 200-5 MCG/ACT inhaler, Inhale 2 puffs 2 (two) times a day Rinse mouth after use., Disp: 13 g, Rfl: 0    Omega-3 Fatty Acids (fish oil) 1,000 mg, , Disp: , Rfl:     topiramate (Topamax) 50 MG tablet, Take 1 tablet (50 mg total) by mouth 2 (two) times a day, Disp: 60 tablet, Rfl: 2    Vitamin D-Vitamin K (VITAMIN K2-VITAMIN D3 PO), , Disp: , Rfl:     Xeljanz XR 11  "MG TB24, , Disp: , Rfl:     No Known Allergies    Physical Exam:    Pulse 92   Temp 98.2 °F (36.8 °C)   Ht 5' 6\" (1.676 m)   Wt (!) 160 kg (352 lb)   SpO2 98%   BMI 56.81 kg/m²     Constitutional:normal, well developed, well nourished, alert, in no distress and non-toxic and no overt pain behavior. and obese  Eyes:anicteric  HEENT:grossly intact  Neck:supple, symmetric, trachea midline and no masses   Pulmonary:even and unlabored  Cardiovascular:No edema or pitting edema present  Skin:Normal without rashes or lesions and well hydrated  Psychiatric:Mood and affect appropriate  Neurologic:Cranial Nerves II-XII grossly intact  Musculoskeletal: Gait is slow and guarded.    Imaging    Study Result    Narrative & Impression   MRI LUMBAR SPINE WITHOUT CONTRAST     INDICATION: M54.16: Radiculopathy, lumbar region.    MRI LUMBAR SPINE WO CONTRAST,LBP, Lumbar back pain with radiculopathy affecting left lower extremity        COMPARISON: CT lower extremity right without contrast 9/13/2021. CT abdomen pelvis without contrast 12/20/2010. MRI lumbar spine without contrast 11/25/2008.     TECHNIQUE:  Multiplanar, multisequence imaging of the lumbar spine was performed. .        IMAGE QUALITY:  Diagnostic     FINDINGS:     VERTEBRAL BODIES:  There are 5 lumbar type vertebral bodies. Grade 1 retrolisthesis L2-L3. No scoliosis.  No compression fracture.     Type I Modic endplate change L2-L3 and L4-L5. Type II Modic endplate change L5-S1. Small focal areas of bone marrow edema in left L4 and bilateral L5 pedicles (left worse than right), likely stress reaction. Otherwise, normal bone marrow signal.     SACRUM:  Normal signal within the sacrum. No evidence of insufficiency or stress fracture.     DISTAL CORD AND CONUS:  Normal size and signal within the distal cord and conus.     PARASPINAL SOFT TISSUES: Slightly edematous soft tissue changes adjacent to left L4-L5 and bilateral L5-S1 facet joints, likely due to active facet " arthritis.     LOWER THORACIC DISC SPACES:  Normal disc height and signal.  No disc herniation, canal stenosis or foraminal narrowing.     LUMBAR DISC SPACES: Multilevel osteophytes, disc height loss, and facet arthropathy.     L1-L2: Facet arthropathy, trace facet joint effusions. No significant canal stenosis or foraminal narrowing.     L2-L3: Mild diffuse disc bulge. Facet arthropathy, small bilateral facet joint effusions (right worse than left). No significant canal stenosis or foraminal narrowing.     L3-L4: Mild diffuse disc bulge, small right subarticular disc protrusion. Facet arthropathy, trace facet joint effusions. No significant canal stenosis or foraminal narrowing.     L4-L5: Mild diffuse disc bulge. Facet arthropathy, ligamentum flavum thickening, small right facet joint effusion, tiny synovial cyst in left anterolateral epidural space. Mild canal stenosis. Mild bilateral foraminal narrowing.     L5-S1: Mild diffuse disc bulge, posterior marginal osteophytes. Facet arthropathy, small synovial cysts posterior to left L5-S1 facet joint. No significant canal stenosis. Mild bilateral foraminal narrowing.     Multilevel degenerative changes have worsened since MRI lumbar spine 11/25/2008.     OTHER FINDINGS: Partially imaged 2.8 cm simple right ovarian cyst (1:25), benign.     IMPRESSION:     Findings suggestive of active facet arthritis at left L4-L5 and bilateral L5-S1 facet joints.     Small focal areas of bone marrow edema in left L4 and bilateral L5 pedicles (left worse than right), likely stress reaction.     Tiny synovial cyst in left anterolateral epidural space at L4-L5. Small synovial cysts posterior to left L5-S1 facet joint.     Worsened multilevel degenerative changes of lumbar spine with varying degrees of canal stenosis (mild L4-L5) and foraminal narrowing (mild bilateral L4-5 and bilateral L5-S1), as detailed above.     The study was marked in EPIC for significant notification.         No  orders of the defined types were placed in this encounter.

## 2025-02-06 ENCOUNTER — TELEPHONE (OUTPATIENT)
Age: 45
End: 2025-02-06

## 2025-02-06 NOTE — TELEPHONE ENCOUNTER
Patient called,     Patient states she is sending FMLA paper work through the [employer], there are a total of 4 images.      If unable to read documents please let patient know and she will fax them to the office. Fax number was provided.    If any questions please reach out to the patient for clarity.    Please print out paperwork and give to provider for completion. All pages must be filled out to be approved by HR.     Please Advise and notify patient, thank you.

## 2025-02-20 ENCOUNTER — APPOINTMENT (OUTPATIENT)
Dept: RADIOLOGY | Facility: HOSPITAL | Age: 45
End: 2025-02-20
Payer: COMMERCIAL

## 2025-02-20 ENCOUNTER — HOSPITAL ENCOUNTER (OUTPATIENT)
Facility: HOSPITAL | Age: 45
Setting detail: OUTPATIENT SURGERY
Discharge: HOME | End: 2025-02-20
Attending: ANESTHESIOLOGY | Admitting: ANESTHESIOLOGY
Payer: COMMERCIAL

## 2025-02-20 VITALS
DIASTOLIC BLOOD PRESSURE: 69 MMHG | BODY MASS INDEX: 47.09 KG/M2 | TEMPERATURE: 98.1 F | RESPIRATION RATE: 18 BRPM | OXYGEN SATURATION: 98 % | HEIGHT: 66 IN | SYSTOLIC BLOOD PRESSURE: 101 MMHG | HEART RATE: 62 BPM | WEIGHT: 293 LBS

## 2025-02-20 LAB
EXT PREGNANCY TEST URINE: NEGATIVE
EXT. CONTROL: NORMAL

## 2025-02-20 PROCEDURE — 64483 NJX AA&/STRD TFRM EPI L/S 1: CPT | Performed by: ANESTHESIOLOGY

## 2025-02-20 PROCEDURE — 64484 NJX AA&/STRD TFRM EPI L/S EA: CPT | Performed by: ANESTHESIOLOGY

## 2025-02-20 PROCEDURE — 81025 URINE PREGNANCY TEST: CPT | Performed by: ANESTHESIOLOGY

## 2025-02-20 RX ORDER — LIDOCAINE HYDROCHLORIDE 10 MG/ML
INJECTION, SOLUTION EPIDURAL; INFILTRATION; INTRACAUDAL; PERINEURAL AS NEEDED
Status: DISCONTINUED | OUTPATIENT
Start: 2025-02-20 | End: 2025-02-20 | Stop reason: HOSPADM

## 2025-02-20 RX ORDER — DEXAMETHASONE SODIUM PHOSPHATE 10 MG/ML
INJECTION, SOLUTION INTRAMUSCULAR; INTRAVENOUS AS NEEDED
Status: DISCONTINUED | OUTPATIENT
Start: 2025-02-20 | End: 2025-02-20 | Stop reason: HOSPADM

## 2025-02-20 NOTE — INTERVAL H&P NOTE
H&P reviewed. After examining the patient I find no changes in the patients condition since the H&P had been written.    Vitals:    02/20/25 1047   BP: 121/82   Pulse: 77   Resp: 18   Temp: 98 °F (36.7 °C)   SpO2: 96%

## 2025-02-20 NOTE — DISCHARGE INSTR - AVS FIRST PAGE
Epidural Steroid Injection   WHAT YOU NEED TO KNOW:   An epidural steroid injection (EZIO) is a procedure to inject steroid medicine into the epidural space. The epidural space is between your spinal cord and vertebrae. Steroids reduce inflammation and fluid buildup in your spine that may be causing pain. You may be given pain medicine along with the steroids.          ACTIVITY  Do not drive or operate machinery today.  No strenuous activity today - bending, lifting, etc.  You may resume normal activites starting tomorrow - start slowly and as tolerated.  You may shower today, but no tub baths or hot tubs.  You may have numbness for several hours from the local anesthetic. Please use caution and common sense, especially with weight-bearing activities.    CARE OF THE INJECTION SITE  If you have soreness or pain, apply ice to the area today (20 minutes on/20 minutes off).  Starting tomorrow, you may use warm, moist heat or ice if needed.  You may have an increase or change in your discomfort for 36-48 hours after your treatment.  Apply ice and continue with any pain medication you have been prescribed.  Notify the [facility] if you have any of the following: redness, drainage, swelling, headache, stiff neck or fever above 100°F.    SPECIAL INSTRUCTIONS  Our office will contact you in approximately 14 days for a progress report.    MEDICATIONS  Continue to take all routine medications.  Our office may have instructed you to hold some medications.    As no general anesthesia was used in today's procedure, you should not experience any side effects related to anesthesia.     If you are diabetic, the steroids used in today's injection may temporarily increase your blood sugar levels after the first few days after your injection. Please keep a close eye on your sugars and alert the doctor who manages your diabetes if your sugars are significantly high from your baseline or you are symptomatic.     If you have a  problem specifically related to your procedure, please call our office at [phone].  Problems not related to your procedure should be directed to your primary care physician.

## 2025-02-20 NOTE — OP NOTE
OPERATIVE REPORT  PATIENT NAME: Hemalatha Hahn    :  1980  MRN: [id number]  Pt Location: [facility] IR    SURGERY DATE: 2025    Surgeons and Role:     * Mikayla Klein MD - Primary    Preop Diagnosis:  Lumbar disc disease with radiculopathy [M51.16]    Post-Op Diagnosis Codes:     * Lumbar disc disease with radiculopathy [M51.16]    Procedure(s):  Left - BLOCK / INJECTION EPIDURAL STEROID LUMBAR L5-S1 & S1 TFESI    Specimen(s):  * No specimens in log *    Estimated Blood Loss:   Minimal    Drains:  * No LDAs found *    Anesthesia Type:   Local    Operative Indications:  Lumbar disc disease with radiculopathy [M51.16]      Operative Findings:  same      Complications:   None    Procedure and Technique:  Fluoroscopically-guided left L5-S1 and S1 transforaminal epidural steroid injection under fluoroscopy      After discussing the risks, benefits, and alternatives to the procedure, the patient expressed understanding and wished to proceed.  The patient was brought to the fluoroscopy suite and placed in the prone position.  A procedural pause was conducted to verify:  correct patient identity, procedure to be performed and as applicable, correct side and site, correct patient position, and availability of implants, special equipment and special requirements.  After identifying the left L5 pedicles fluoroscopically with an oblique view, the skin was sterilely prepped and draped in the usual fashion using Chloraprep skin prep.  The skin and subcutaneous tissue were anesthetized with 0.5% lidocaine.  A 5 inch 22 gauge spinal needle was then advanced under fluoroscopic guidance to the posterior aspect of the left L5-S1 neural foramens.  Appropriate foraminal depth was determined with a lateral fluoroscopic view, and AP visualization confirmed needle positioning at approximately the 6 o’clock position relative to the pedicles.  After negative aspiration, 1 mL of Omnipaque 300 contrast was injected using  live fluoroscopy/digital subtraction angiography, confirming appropriate transforaminal spread without evidence of intravascular or intrathecal uptake.   Next, a local anesthetic test dose consisting of 1 mL of 2% lidocaine was injected through the needle at each level.  After an appropriate period of observation, a directed neurological exam was performed which revealed no new neurologic deficits.  A 5 inch 22 gauge spinal needle was then advanced under fluoroscopic guidance to the posterior aspect of the left S1 neural foramen.  Appropriate foraminal depth was determined with a lateral fluoroscopic view, and AP visualization confirmed needle positioning at approximately slightly past the posterior sacrum canal.  After negative aspiration, 1 mL of Omnipaque 300 contrast was injected using live fluoroscopy/digital subtraction angiography, confirming appropriate transforaminal spread without evidence of intravascular or intrathecal uptake.   Next, a local anesthetic test dose consisting of 1 mL of 2% lidocaine was injected through the needle at each level.  After an appropriate period of observation, a directed neurological exam was performed which revealed no new neurologic deficits.Next, a 1.5 ml solution consisting of 7.5 mg of dexamethasone in sterile saline was injected slowly and incrementally into the epidural space at each level.  Following the injection the needles were withdrawn slightly and flushed with lidocaine as they were fully extracted.  The patient tolerated the procedure well and there were no apparent complications.  The patient did not develop any new neurologic deficits.  After appropriate observation, the patient was dismissed from the clinic in good condition under their own power.    I was present for the entire procedure.    Patient Disposition:  hemodynamically stable             SIGNATURE: Mikayla Klein MD  DATE: February 20, 2025  TIME: 11:39 AM

## 2025-03-04 ENCOUNTER — TELEPHONE (OUTPATIENT)
Dept: INTERNAL MEDICINE CLINIC | Facility: CLINIC | Age: 45
End: 2025-03-04

## 2025-03-06 ENCOUNTER — TELEPHONE (OUTPATIENT)
Dept: PAIN MEDICINE | Facility: CLINIC | Age: 45
End: 2025-03-06

## 2025-03-07 ENCOUNTER — NURSE TRIAGE (OUTPATIENT)
Age: 45
End: 2025-03-07

## 2025-03-07 DIAGNOSIS — M05.79 RHEUMATOID ARTHRITIS INVOLVING MULTIPLE SITES WITH POSITIVE RHEUMATOID FACTOR (HCC): Primary | ICD-10-CM

## 2025-03-07 NOTE — TELEPHONE ENCOUNTER
"FOLLOW UP: Pt looking for labs to be done for Vit D and Inflammation    REASON FOR CONVERSATION: Arthritis    SYMPTOMS: Bilateral hand/knee pain    OTHER: Fatigue    DISPOSITION: Home Care         Pt states being having pain for about week and half. Hand and knee pain that is causing her to be fatigued. States can barely walk.      Pt is taking Xeljanz and taking it as prescribed. Pt was initially on gabapentin but stopped it about or about 2 weeks. Also had steroid injection in the back during the same time but that has improved.       Thumb, wrist area moderate around6, bilateral knees and ankles 8-9 states severe. She work 12 hours days but sits a lot, worse with ambulation    Has been taking OTC meds with no relief and dulls pain. Has tried homeopathic regimens as well with minimal relief.      Last time this happened it was due to her Vit D being low. She is currently taking OTC Vit D unsure if that is related.       [facility] in [place] if needing medication and if can order labs.       Reason for Disposition   Knee pain   Hand pain    Answer Assessment - Initial Assessment Questions  1. ONSET: \"When did the pain start?\"      1 week and half  2. LOCATION: \"Where is the pain located?\"      Thumb wrist area  3. PAIN: \"How bad is the pain?\" (Scale 1-10; or mild, moderate, severe)      Moderate, 6  4. WORK OR EXERCISE: \"Has there been any recent work or exercise that involved this part (i.e., hand or wrist) of the body?\"      Works 12 hours a day  5. CAUSE: \"What do you think is causing the pain?\"      Possible flare  6. AGGRAVATING FACTORS: \"What makes the pain worse?\" (e.g., using computer)      None  7. OTHER SYMPTOMS: \"Do you have any other symptoms?\" (e.g., fever, neck pain, numbness or tingling, rash, swelling)      None    Answer Assessment - Initial Assessment Questions  1. LOCATION and RADIATION: \"Where is the pain located?\"       Bilateral knees  2. QUALITY: \"What does the pain feel like?\"  (e.g., sharp, " "dull, aching, burning)      Dull  3. SEVERITY: \"How bad is the pain?\" \"What does it keep you from doing?\"   (Scale 1-10; or mild, moderate, severe)      Severe 8-9  4. ONSET: \"When did the pain start?\" \"Does it come and go, or is it there all the time?\"      1 week and half  5. RECURRENT: \"Have you had this pain before?\" If Yes, ask: \"When, and what happened then?\"      It's been months and nothing helped  6. SETTING: \"Has there been any recent work, exercise or other activity that involved that part of the body?\"       Work 12 hours  7. AGGRAVATING FACTORS: \"What makes the knee pain worse?\" (e.g., walking, climbing stairs, running)      ambulating  8. ASSOCIATED SYMPTOMS: \"Is there any swelling or redness of the knee?\"      None  9. OTHER SYMPTOMS: \"Do you have any other symptoms?\" (e.g., chest pain, difficulty breathing, fever, calf pain)      None    Protocols used: Hand Pain-Adult-OH, Knee Pain-Adult-OH    "

## 2025-03-10 DIAGNOSIS — F98.8 ATTENTION DEFICIT DISORDER (ADD) WITHOUT HYPERACTIVITY: ICD-10-CM

## 2025-03-11 RX ORDER — METHYLPREDNISOLONE 4 MG/1
TABLET ORAL
Qty: 1 EACH | Refills: 0 | Status: SHIPPED | OUTPATIENT
Start: 2025-03-11

## 2025-03-11 NOTE — TELEPHONE ENCOUNTER
Spoke with pt and made her aware of her vitamin D levels and that last time they were checked in January they were within normal limits, and that seems like flare up of RA symptoms.        Pt is wanting the Medrol Dosepak to be sent to the [facility] in [place]

## 2025-03-11 NOTE — TELEPHONE ENCOUNTER
Her vitamin D was low in November and I did give her a prescription for weekly vitamin D at that time.  She had her labs checked again in January and her vitamin D was still low but had improved significantly.  There are orders in there for her to go for another vitamin D test prior to her next appointment.  If she is still taking over-the-counter supplement I would not suspect that her vitamin D is low anymore as she was almost within normal limits in January.  It sounds like she is having a flare of her rheumatoid arthritis symptoms.  I can give her a prescription for Medrol Dosepak to help with the flare symptoms if she would like.

## 2025-03-12 ENCOUNTER — PATIENT MESSAGE (OUTPATIENT)
Age: 45
End: 2025-03-12

## 2025-03-12 DIAGNOSIS — M05.79 RHEUMATOID ARTHRITIS INVOLVING MULTIPLE SITES WITH POSITIVE RHEUMATOID FACTOR (HCC): Primary | ICD-10-CM

## 2025-03-12 RX ORDER — TOFACITINIB 11 MG/1
11 TABLET, FILM COATED, EXTENDED RELEASE ORAL DAILY
Qty: 90 TABLET | Refills: 1 | Status: SHIPPED | OUTPATIENT
Start: 2025-03-12 | End: 2025-09-08

## 2025-03-13 RX ORDER — METHYLPHENIDATE HYDROCHLORIDE 54 MG/1
54 TABLET ORAL DAILY
Qty: 30 TABLET | Refills: 0 | Status: SHIPPED | OUTPATIENT
Start: 2025-03-13

## 2025-03-14 NOTE — TELEPHONE ENCOUNTER
Pt called in with 100% improvement and pain level 0-1/10.    Pt weaned herself off of the gabapentin.

## 2025-03-24 ENCOUNTER — OFFICE VISIT (OUTPATIENT)
Dept: PAIN MEDICINE | Facility: CLINIC | Age: 45
End: 2025-03-24
Payer: COMMERCIAL

## 2025-03-24 VITALS
HEART RATE: 67 BPM | BODY MASS INDEX: 47.09 KG/M2 | WEIGHT: 293 LBS | TEMPERATURE: 98 F | RESPIRATION RATE: 16 BRPM | OXYGEN SATURATION: 97 % | HEIGHT: 66 IN

## 2025-03-24 DIAGNOSIS — M79.18 MYOFASCIAL PAIN: ICD-10-CM

## 2025-03-24 DIAGNOSIS — M51.16 LUMBAR DISC DISEASE WITH RADICULOPATHY: Primary | ICD-10-CM

## 2025-03-24 PROCEDURE — 99213 OFFICE O/P EST LOW 20 MIN: CPT | Performed by: NURSE PRACTITIONER

## 2025-03-24 NOTE — PROGRESS NOTES
Assessment:  1. Lumbar disc disease with radiculopathy    2. Myofascial pain        Plan:  While the patient was in the office today, I did have a thorough conversation regarding their chronic pain syndrome, medication management, and treatment plan options.  Patient is being seen for a follow-up visit.  She recently underwent a left-sided L5-S1 and S1 transforaminal epidural steroid injection on 2/20/2025.  Patient is reporting complete resolution of her left leg pain.  Her low back pain has significantly improved.  She has been able to discontinue gabapentin altogether.    Continue with chiropractic care as needed.    Continue with home exercise program for lumbar core strengthening/stretching.    Follow-up in 2 months.  Call sooner if pain increases.      History of Present Illness:  The patient is a 44 y.o. female who presents for a follow up office visit in regards to Back Pain.   The patient’s current symptoms include complaints of low back pain.  Current pain level is a 1/10.  Quality of pain is described as dull and aching.    Current pain medications includes: None.     I have personally reviewed and/or updated the patient's past medical history, past surgical history, family history, social history, current medications, allergies, and vital signs today.         Review of Systems  Review of Systems   Constitutional: Negative.    HENT: Negative.     Eyes: Negative.    Respiratory: Negative.     Cardiovascular: Negative.    Gastrointestinal: Negative.    Endocrine: Negative.    Genitourinary: Negative.    Musculoskeletal:         Stiffness in knees   Skin: Negative.    Allergic/Immunologic: Negative.    Neurological: Negative.    Hematological: Negative.    Psychiatric/Behavioral: Negative.             Past Medical History:   Diagnosis Date    Allergic rhinitis     Anxiety     Arthritis     Asthma     COVID-19 12/17/2020    CPAP (continuous positive airway pressure) dependence     Daytime hypersomnolence      LAST ASSESSED [id number]    Depression     GERD (gastroesophageal reflux disease)     occas    IUD (intrauterine device) in place     Lumbar disc disease     Migraine     RA (rheumatoid arthritis) (HCC)     Rheumatoid arthritis (HCC)     Sleep apnea     SVT (supraventricular tachycardia) (HCC)     Varicella     Wears glasses        Past Surgical History:   Procedure Laterality Date    CHOLECYSTECTOMY      EPIDURAL BLOCK INJECTION Left 2/20/2025    Procedure: BLOCK / INJECTION EPIDURAL STEROID LUMBAR L5-S1 & S1 TFESI;  Surgeon: Mikayla Klein MD;  Location: [facility];  Service: Pain Management     LUMBAR EPIDURAL INJECTION  07/2024    NASAL SEPTUM SURGERY      NASAL SEPTAL DEVIATION REPAIR-Dr. Navarrete    DE STRTCTC CPTR ASSTD PX EXTRADURAL CRANIAL N/A 01/08/2019    Procedure: FUNCTIONAL ENDOSCOPIC SINUS SURGERY (FESS) IMAGED GUIDED; PLACEMENT OF MULTIPLE PROPEL IMPLANTS;  Surgeon: South Navarrete DO;  Location: [facility];  Service: ENT    SINUS SURGERY      US GUIDED BREAST BIOPSY LEFT COMPLETE Left 06/15/2022    benign    WISDOM TOOTH EXTRACTION         Family History   Problem Relation Age of Onset    Asthma Mother     Hypertension Mother     COPD Mother     Obesity Father     Hypothyroidism Father     Chiari malformation Daughter     Diabetes Daughter         Type 1    Celiac disease Daughter     No Known Problems Maternal Grandmother     Lung disease Maternal Grandfather     Cancer Paternal Grandmother     Aneurysm Paternal Grandfather     Celiac disease Paternal Grandfather     Obesity Brother     Chiari malformation Brother     Obesity Son     Autism Son     No Known Problems Maternal Aunt     Breast cancer Paternal Aunt         postmenopausal    No Known Problems Paternal Aunt     No Known Problems Paternal Aunt     Breast cancer additional onset Neg Hx     Endometrial cancer Neg Hx     Colon cancer Neg Hx     Ovarian cancer Neg Hx     BRCA1 Positive Neg Hx     BRCA1 Negative Neg Hx     BRCA 1/2 Neg Hx      BRCA2 Positive Neg Hx     BRCA2 Negative Neg Hx     Heart disease Neg Hx        Social History     Occupational History    Not on file   Tobacco Use    Smoking status: Former     Current packs/day: 0.00     Types: Cigarettes    Smokeless tobacco: Never    Tobacco comments:     quit 5 years ago    Vaping Use    Vaping status: Never Used   Substance and Sexual Activity    Alcohol use: Yes     Alcohol/week: 4.0 standard drinks of alcohol     Types: 4 Standard drinks or equivalent per week     Comment: social     Drug use: No    Sexual activity: Yes     Partners: Male     Birth control/protection: I.U.D.     Comment: Mirena          Current Outpatient Medications:     fexofenadine (ALLEGRA) 180 MG tablet, Take 180 mg by mouth daily, Disp: , Rfl:     FLUoxetine (PROzac) 40 MG capsule, Take 1 capsule (40 mg total) by mouth daily, Disp: 100 capsule, Rfl: 0    fluticasone (FLONASE) 50 mcg/act nasal spray, 2 sprays into each nostril daily, Disp: , Rfl:     ibuprofen (MOTRIN) 200 mg tablet, Take 200 mg by mouth every 6 (six) hours as needed for mild pain., Disp: , Rfl:     Magnesium 300 MG CAPS, , Disp: , Rfl:     methylphenidate (Concerta) 54 MG ER tablet, Take 1 tablet (54 mg total) by mouth daily Max Daily Amount: 54 mg, Disp: 30 tablet, Rfl: 0    metoprolol tartrate (LOPRESSOR) 25 mg tablet, Take 1 tablet (25 mg total) by mouth every 12 (twelve) hours, Disp: 90 tablet, Rfl: 3    mometasone-formoterol (DULERA) 200-5 MCG/ACT inhaler, Inhale 2 puffs 2 (two) times a day Rinse mouth after use., Disp: 13 g, Rfl: 0    Omega-3 Fatty Acids (fish oil) 1,000 mg, , Disp: , Rfl:     topiramate (Topamax) 50 MG tablet, Take 1 tablet (50 mg total) by mouth 2 (two) times a day, Disp: 60 tablet, Rfl: 2    Vitamin D-Vitamin K (VITAMIN K2-VITAMIN D3 PO), , Disp: , Rfl:     Xeljanz XR 11 MG TB24, Take 1 tablet (11 mg total) by mouth in the morning, Disp: 90 tablet, Rfl: 1    ergocalciferol (VITAMIN D2) 50,000 units, Take 1 capsule (50,000  "Units total) by mouth once a week for 12 doses, Disp: 12 capsule, Rfl: 0    gabapentin (NEURONTIN) 300 mg capsule, Take 1 capsule (300 mg total) by mouth 3 (three) times a day, Disp: 90 capsule, Rfl: 2    methylPREDNISolone 4 MG tablet therapy pack, Use as directed on package, Disp: 1 each, Rfl: 0    No Known Allergies    Physical Exam:    Pulse 67   Temp 98 °F (36.7 °C) (Temporal)   Resp 16   Ht 5' 6\" (1.676 m)   Wt (!) 161 kg (355 lb)   SpO2 97%   BMI 57.30 kg/m²     Constitutional:normal, well developed, well nourished, alert, in no distress and non-toxic and no overt pain behavior. and obese  Eyes:anicteric  HEENT:grossly intact  Neck:supple, symmetric, trachea midline and no masses   Pulmonary:even and unlabored  Cardiovascular:No edema or pitting edema present  Skin:Normal without rashes or lesions and well hydrated  Psychiatric:Mood and affect appropriate  Neurologic:Cranial Nerves II-XII grossly intact  Musculoskeletal:normal    Imaging  No orders to display       No orders of the defined types were placed in this encounter.     Narrative & Impression         LUMBAR SPINE     INDICATION:   Pain in leg, unspecified.      COMPARISON:  There are no previous examinations available for comparison.     VIEWS:  XR SPINE LUMBAR COMPLETE W BENDING MINIMUM 6 VIEWS  Images: 8     FINDINGS:     Bone mineralization is normal. There is a slight degenerative retrolisthesis of L2 on L3 which measures approximately 3-4 mm. The vertebral bodies and posterior elements of the lumbar spine appear intact and otherwise appropriately aligned, without acute   fracture or subluxation. Vertebral body stature is maintained throughout, as is the normal lumbar lordosis.     Mild multilevel lumbar degenerative disc disease throughout, moderate at L5-S1, with moderate facet hypertrophic change bilaterally at the L4-L5 and L5-S1 levels. No significant bony neural foraminal narrowing.     No paraspinal soft tissue abnormality.         "      IMPRESSION:        Multilevel lumbar spine degenerative changes, pronounced most at L4-L5 and L5-S1. Slight degenerative retrolisthesis of L2 on L3.     Electronically signed: 11/09/2023 01:19 PM Sidney Ponce MD

## 2025-04-11 ENCOUNTER — OFFICE VISIT (OUTPATIENT)
Dept: OBGYN CLINIC | Facility: CLINIC | Age: 45
End: 2025-04-11
Payer: COMMERCIAL

## 2025-04-11 ENCOUNTER — APPOINTMENT (OUTPATIENT)
Dept: RADIOLOGY | Facility: CLINIC | Age: 45
End: 2025-04-11
Attending: STUDENT IN AN ORGANIZED HEALTH CARE EDUCATION/TRAINING PROGRAM
Payer: COMMERCIAL

## 2025-04-11 VITALS — BODY MASS INDEX: 47.09 KG/M2 | HEIGHT: 66 IN | WEIGHT: 293 LBS

## 2025-04-11 DIAGNOSIS — M25.561 PAIN IN BOTH KNEES, UNSPECIFIED CHRONICITY: ICD-10-CM

## 2025-04-11 DIAGNOSIS — M17.0 PRIMARY OSTEOARTHRITIS OF BOTH KNEES: Primary | ICD-10-CM

## 2025-04-11 DIAGNOSIS — M25.562 PAIN IN BOTH KNEES, UNSPECIFIED CHRONICITY: ICD-10-CM

## 2025-04-11 PROCEDURE — 73562 X-RAY EXAM OF KNEE 3: CPT

## 2025-04-11 PROCEDURE — 99204 OFFICE O/P NEW MOD 45 MIN: CPT | Performed by: STUDENT IN AN ORGANIZED HEALTH CARE EDUCATION/TRAINING PROGRAM

## 2025-04-11 NOTE — PROGRESS NOTES
Ortho Sports Medicine Clinic Visit       Assessment & Plan  Primary osteoarthritis of both knees    Orders:    XR knee 3 vw right non injury; Future    XR knee 3 vw left non injury; Future    Ambulatory Referral to Physical Therapy; Future    Ambulatory Referral to Orthopedic Surgery; Future    I reviewed the history, exam, imaging with the patient in clinic today.  I did review the patient's radiographs which show significant degenerative changes most severe in the medial compartment bilaterally, worse on the left knee.  Patient does endorse pain most significant on the medial side of the knee in clinic today.  I discussed potential treatment options with the patient including conservative and surgical management.  Patient has tried medications without any improvement in her symptoms.  I discussed other treatment options including physical therapy and injections.  Patient was amenable to both.  I did provide her with a prescription for physical therapy.  I also provided her with a referral for ultrasound-guided corticosteroid injections into the knees.  I discussed with the patient to follow-up on an as-needed basis. The patient demonstrated understanding of the discussion and was in agreement with the plan.  All of the questions were answered.  Patient can reach out to clinic with any questions or concerns at any time.    follow up: PRN  Imaging: none      Chief Complaint   Patient presents with    Left Knee - Pain    Right Knee - Pain       History of Present Illness:  The patient is a 45 y.o. female seen in clinic for bilateral knee pain. Patient states the pain is located over the medial aspect bilaterally. She states the pain started about 2 months ago with no specific injury. She does have a history of RA and treated with Xeljanz. Patient complains of associated popping, stiffness, and weakness. She states the pain is aggravated with walking at the gym/even ground and using stairs. She has tried ice, heat,  tylenol, ibuprofen, voltaren gel, and home stretches. She has tried PT for her back but not her knees. She feels that her pain is getting worse. She states that her back pain has resolved with a steroid injection from pain management and is now looking to treat her knees.       Occupation: MRI tech    The patient has the following co-morbidities: RA, lumbar disc disease, chronic pain syndrome      Knee Surgical History:  None    Past Medical, Social and Family History:  Past Medical History:   Diagnosis Date    Allergic rhinitis     Anxiety     Arthritis     Asthma     COVID-19 12/17/2020    CPAP (continuous positive airway pressure) dependence     Daytime hypersomnolence     LAST ASSESSED [id number]    Depression     GERD (gastroesophageal reflux disease)     occas    IUD (intrauterine device) in place     Lumbar disc disease     Migraine     RA (rheumatoid arthritis) ([facility])     Rheumatoid arthritis ([facility])     Sleep apnea     SVT (supraventricular tachycardia) ([facility])     Varicella     Wears glasses      Past Surgical History:   Procedure Laterality Date    CHOLECYSTECTOMY      EPIDURAL BLOCK INJECTION Left 2/20/2025    Procedure: BLOCK / INJECTION EPIDURAL STEROID LUMBAR L5-S1 & S1 TFESI;  Surgeon: Mikayla Klein MD;  Location: [facility];  Service: Pain Management     LUMBAR EPIDURAL INJECTION  07/2024    NASAL SEPTUM SURGERY      NASAL SEPTAL DEVIATION REPAIR-Dr. Navarrete    TX STRTCTC CPTR ASSTD PX EXTRADURAL CRANIAL N/A 01/08/2019    Procedure: FUNCTIONAL ENDOSCOPIC SINUS SURGERY (FESS) IMAGED GUIDED; PLACEMENT OF MULTIPLE PROPEL IMPLANTS;  Surgeon: South Navarrete DO;  Location: [facility];  Service: ENT    SINUS SURGERY      US GUIDED BREAST BIOPSY LEFT COMPLETE Left 06/15/2022    benign    WISDOM TOOTH EXTRACTION       No Known Allergies  Current Outpatient Medications on File Prior to Visit   Medication Sig Dispense Refill    fexofenadine (ALLEGRA) 180 MG tablet Take 180 mg by mouth daily      FLUoxetine  (PROzac) 40 MG capsule Take 1 capsule (40 mg total) by mouth daily 100 capsule 0    fluticasone (FLONASE) 50 mcg/act nasal spray 2 sprays into each nostril daily      ibuprofen (MOTRIN) 200 mg tablet Take 200 mg by mouth every 6 (six) hours as needed for mild pain.      Magnesium 300 MG CAPS       methylphenidate (Concerta) 54 MG ER tablet Take 1 tablet (54 mg total) by mouth daily Max Daily Amount: 54 mg 30 tablet 0    metoprolol tartrate (LOPRESSOR) 25 mg tablet Take 1 tablet (25 mg total) by mouth every 12 (twelve) hours 90 tablet 3    mometasone-formoterol (DULERA) 200-5 MCG/ACT inhaler Inhale 2 puffs 2 (two) times a day Rinse mouth after use. 13 g 0    Omega-3 Fatty Acids (fish oil) 1,000 mg       Vitamin D-Vitamin K (VITAMIN K2-VITAMIN D3 PO)       Xeljanz XR 11 MG TB24 Take 1 tablet (11 mg total) by mouth in the morning 90 tablet 1    ergocalciferol (VITAMIN D2) 50,000 units Take 1 capsule (50,000 Units total) by mouth once a week for 12 doses 12 capsule 0    gabapentin (NEURONTIN) 300 mg capsule Take 1 capsule (300 mg total) by mouth 3 (three) times a day 90 capsule 2    methylPREDNISolone 4 MG tablet therapy pack Use as directed on package 1 each 0    topiramate (Topamax) 50 MG tablet Take 1 tablet (50 mg total) by mouth 2 (two) times a day 60 tablet 2     No current facility-administered medications on file prior to visit.     Social History     Socioeconomic History    Marital status: /Civil Union     Spouse name: Not on file    Number of children: Not on file    Years of education: Not on file    Highest education level: Not on file   Occupational History    Not on file   Tobacco Use    Smoking status: Former     Current packs/day: 0.00     Types: Cigarettes    Smokeless tobacco: Never    Tobacco comments:     quit 5 years ago    Vaping Use    Vaping status: Never Used   Substance and Sexual Activity    Alcohol use: Yes     Alcohol/week: 4.0 standard drinks of alcohol     Types: 4 Standard drinks  or equivalent per week     Comment: social     Drug use: No    Sexual activity: Yes     Partners: Male     Birth control/protection: I.U.D.     Comment: Mirena    Other Topics Concern    Not on file   Social History Narrative    Not on file     Social Drivers of Health     Financial Resource Strain: Not on file   Food Insecurity: Not on file   Transportation Needs: Not on file   Physical Activity: Not on file   Stress: Not on file   Social Connections: Unknown (6/18/2024)    Received from [email or web address]     How often do you feel lonely or isolated from those around you? (Adult - for ages 18 years and over): Not on file   Intimate Partner Violence: Not on file   Housing Stability: Not on file         I have reviewed the past medical, surgical, social and family history, medications and allergies as documented in the EMR.      Physical Exam:    Focused bilateral knee exam:  Ambulates with a antalgic gait.    Inspection:  - Skin intact  - Ecchymosis: no  - Erythema: no  - Gross deformity: no  - Previous surgical incisions: no  - Effusion: minimal    Palpation:  - Medial patellar facet: no  - Lateral patellar facet: no  - Tibial tubercle: no  - Patella tendon: no  - Pes anserine bursa: no  - Gerdy's tubercle: no  - Popliteal fossa: no    - Lateral epicondyle: no  - Medial epicondyle: no  - Posterior calf: no    Range of motion:  - Extension: 0 degrees  - Flexion: 120 degrees    Strength:  - Patient able to perform a straight leg raise  - Hip flexion: 5/5  - Knee extension: 5/5  - Knee flexion: 5/5  - Ankle DF: 5/5  - Ankle PF: 5/5    Meniscus:  - Medial joint line tenderness: yes  - Lateral joint line tenderness: no  - Monica's: no  - Flexion compression: no  - Thessaly test: n/a    Collateral ligaments:  -  Varus laxity at full extension: no  -  Varus laxity at full in 30° of knee flexion: no  -  Valgus laxity at full extension: no  -  Valgus laxity at full in 30° of knee flexion: no    Cruciate  ligaments:  - Lachman: 1A  - Pivot shift test: no  - Anterior drawer: 1A  - Posterior drawer: 1A  - Posterior tibial sag: no    Range of motion testing of the hip and ankle are within normal limits.    No calf tenderness to palpation bilaterally. Negative Christiano test    LE NV Exam:   +2 DP/PT pulses bilaterally  Sensation intact to light touch L2-S1 bilaterally, SPN/DPN/tibial/saphenous/sural nerve distributions  Motor intact in SPN/DPN/TA nerve distributions      Knee Imaging    X-rays of the bilateral knee were obtained on 4/11/2025 and reviewed with the patient. Per my independent review, the imaging shows severe degenerative changes most significant in the medial compartment bilaterally      Procedures:  None      This note was written with the use of dictation software. Please excuse any errors.      Scribe Attestation      I,:  Jose Joseph PA-C am acting as a scribe while in the presence of the attending physician.:       I,:  South Garcia MD personally performed the services described in this documentation    as scribed in my presence.:

## 2025-04-11 NOTE — ASSESSMENT & PLAN NOTE
Orders:    XR knee 3 vw right non injury; Future    XR knee 3 vw left non injury; Future    Ambulatory Referral to Physical Therapy; Future    Ambulatory Referral to Orthopedic Surgery; Future

## 2025-04-17 ENCOUNTER — OFFICE VISIT (OUTPATIENT)
Dept: OBGYN CLINIC | Facility: CLINIC | Age: 45
End: 2025-04-17
Payer: COMMERCIAL

## 2025-04-17 VITALS
OXYGEN SATURATION: 96 % | HEIGHT: 66 IN | TEMPERATURE: 99.6 F | HEART RATE: 76 BPM | BODY MASS INDEX: 47.09 KG/M2 | WEIGHT: 293 LBS

## 2025-04-17 DIAGNOSIS — M17.0 PRIMARY OSTEOARTHRITIS OF BOTH KNEES: Primary | ICD-10-CM

## 2025-04-17 PROCEDURE — 20611 DRAIN/INJ JOINT/BURSA W/US: CPT | Performed by: FAMILY MEDICINE

## 2025-04-17 PROCEDURE — 99244 OFF/OP CNSLTJ NEW/EST MOD 40: CPT | Performed by: FAMILY MEDICINE

## 2025-04-17 RX ORDER — BUPIVACAINE HYDROCHLORIDE 5 MG/ML
3.5 INJECTION, SOLUTION PERINEURAL
Status: COMPLETED | OUTPATIENT
Start: 2025-04-17 | End: 2025-04-17

## 2025-04-17 RX ORDER — TRIAMCINOLONE ACETONIDE 40 MG/ML
40 INJECTION, SUSPENSION INTRA-ARTICULAR; INTRAMUSCULAR
Status: COMPLETED | OUTPATIENT
Start: 2025-04-17 | End: 2025-04-17

## 2025-04-17 RX ADMIN — BUPIVACAINE HYDROCHLORIDE 3.5 ML: 5 INJECTION, SOLUTION PERINEURAL at 11:30

## 2025-04-17 RX ADMIN — TRIAMCINOLONE ACETONIDE 40 MG: 40 INJECTION, SUSPENSION INTRA-ARTICULAR; INTRAMUSCULAR at 11:30

## 2025-04-17 NOTE — PROGRESS NOTES
"[facility] [address] [place]  [facility] [id number] [phone]      Assessment:  1. Primary osteoarthritis of both knees  Assessment & Plan:    Orders:    Ambulatory Referral to Orthopedic Surgery    Orders:  -     Ambulatory Referral to Orthopedic Surgery    Assessment & Plan  Primary osteoarthritis of both knees    Orders:    Ambulatory Referral to Orthopedic Surgery      Patient Instructions   F/u here as needed  F/u with Dr. Garcia  US guided B knee steroid injection  Plan:  Patient Instructions   F/u here as needed  F/u with Dr. Garcia  US guided B knee steroid injection   Return if symptoms worsen or fail to improve.    Chief Complaint:  Chief Complaint   Patient presents with    Right Knee - Follow-up    Left Knee - Follow-up       Subjective:   HPI    Patient ID: Hemalatha Hahn is a 45 y.o. female     I was asked by Dr. Garcia to evaluate Ms. Hahn for an US guided B knee steroid injection  Pain for about 1-2 months  Denies recent trauma/fall  Pain walking  L worse than R  Better with sitting.  Tylenol/motrin PRN- helps a little      Review of Systems   Constitutional:  Negative for fatigue and fever.   Respiratory:  Negative for shortness of breath.    Cardiovascular:  Negative for chest pain.   Gastrointestinal:  Negative for abdominal pain and nausea.   Genitourinary:  Negative for dysuria.   Musculoskeletal:  Positive for arthralgias.   Skin:  Negative for rash and wound.   Neurological:  Negative for weakness and headaches.       Objective:  Vitals:  Pulse 76   Temp 99.6 °F (37.6 °C) (Tympanic)   Ht 5' 6\" (1.676 m)   Wt (!) 164 kg (362 lb)   SpO2 96%   BMI 58.43 kg/m²     The following portions of the patient's history were reviewed and updated as appropriate: allergies, current medications, past family history, past medical history, past social history, past surgical history, and problem list.    Physical exam:  Physical " Exam  Constitutional:       Appearance: Normal appearance. She is normal weight.   HENT:      Head: Normocephalic.   Eyes:      Extraocular Movements: Extraocular movements intact.   Pulmonary:      Effort: Pulmonary effort is normal.   Musculoskeletal:      Cervical back: Normal range of motion.      Right knee: No effusion.      Instability Tests: Medial Monica test negative and lateral Monica test negative.      Left knee: No effusion.      Instability Tests: Medial Monica test positive. Lateral Monica test negative.   Skin:     General: Skin is warm and dry.   Neurological:      General: No focal deficit present.      Mental Status: She is alert and oriented to person, place, and time. Mental status is at baseline.   Psychiatric:         Mood and Affect: Mood normal.         Behavior: Behavior normal.         Thought Content: Thought content normal.         Judgment: Judgment normal.       Right Knee Exam     Tenderness   The patient is experiencing tenderness in the medial joint line.    Range of Motion   The patient has normal right knee ROM.    Tests   Monica:  Medial - negative Lateral - negative  Varus: negative Valgus: negative    Other   Swelling: none  Effusion: no effusion present      Left Knee Exam     Range of Motion   The patient has normal left knee ROM.    Tests   Monica:  Medial - positive Lateral - negative  Varus: negative Valgus: negative    Other   Swelling: none  Effusion: no effusion present          Observations   Left Knee   Negative for effusion.     Right Knee   Negative for effusion.       I have personally reviewed pertinent films in PACS and my interpretation is XR R knee medial joint space narrowing. Severe OA. L knee severe OA..  Large joint arthrocentesis: R knee  [place] Protocol:  procedure performed by consultantConsent: Verbal consent obtained.  Risks and benefits: risks, benefits and alternatives were discussed  Consent given by: patient  Time out: Immediately  "prior to procedure a \"time out\" was called to verify the correct patient, procedure, equipment, support staff and site/side marked as required.  Timeout called at: 4/17/2025 12:05 PM.  Site marked: the operative site was marked  Supporting Documentation  Indications: pain   Procedure Details  Location: knee - R knee  Preparation: Patient was prepped and draped in the usual sterile fashion  Needle size: 22 G  Ultrasound guidance: yes  Approach: anterolateral  Medications administered: 40 mg triamcinolone acetonide 40 mg/mL; 3.5 mL bupivacaine 0.5 %    Patient tolerance: patient tolerated the procedure well with no immediate complications  Dressing:  Sterile dressing applied      Large joint arthrocentesis: L knee  Universal Protocol:  procedure performed by consultantConsent: Verbal consent obtained.  Risks and benefits: risks, benefits and alternatives were discussed  Consent given by: patient  Time out: Immediately prior to procedure a \"time out\" was called to verify the correct patient, procedure, equipment, support staff and site/side marked as required.  Timeout called at: 4/17/2025 12:05 PM.  Site marked: the operative site was marked  Supporting Documentation  Indications: pain   Procedure Details  Location: knee - L knee  Preparation: Patient was prepped and draped in the usual sterile fashion  Needle size: 22 G  Ultrasound guidance: yes  Approach: anterolateral  Medications administered: 40 mg triamcinolone acetonide 40 mg/mL; 3.5 mL bupivacaine 0.5 %    Patient tolerance: patient tolerated the procedure well with no immediate complications  Dressing:  Sterile dressing applied            Garo Huynh MD   "

## 2025-04-22 DIAGNOSIS — F41.8 DEPRESSION WITH ANXIETY: ICD-10-CM

## 2025-04-22 DIAGNOSIS — F98.8 ATTENTION DEFICIT DISORDER (ADD) WITHOUT HYPERACTIVITY: ICD-10-CM

## 2025-04-23 RX ORDER — FLUOXETINE HYDROCHLORIDE 40 MG/1
40 CAPSULE ORAL DAILY
Qty: 30 CAPSULE | Refills: 0 | Status: SHIPPED | OUTPATIENT
Start: 2025-04-23

## 2025-04-24 RX ORDER — METHYLPHENIDATE HYDROCHLORIDE 54 MG/1
54 TABLET ORAL DAILY
Qty: 30 TABLET | Refills: 0 | Status: SHIPPED | OUTPATIENT
Start: 2025-04-24

## 2025-05-09 ENCOUNTER — TELEPHONE (OUTPATIENT)
Dept: PODIATRY | Facility: CLINIC | Age: 45
End: 2025-05-09

## 2025-05-22 ENCOUNTER — OFFICE VISIT (OUTPATIENT)
Dept: INTERNAL MEDICINE CLINIC | Facility: CLINIC | Age: 45
End: 2025-05-22
Payer: COMMERCIAL

## 2025-05-22 VITALS
HEIGHT: 66 IN | DIASTOLIC BLOOD PRESSURE: 82 MMHG | BODY MASS INDEX: 47.09 KG/M2 | TEMPERATURE: 98.1 F | HEART RATE: 83 BPM | OXYGEN SATURATION: 99 % | SYSTOLIC BLOOD PRESSURE: 132 MMHG | WEIGHT: 293 LBS

## 2025-05-22 DIAGNOSIS — M05.79 RHEUMATOID ARTHRITIS INVOLVING MULTIPLE SITES WITH POSITIVE RHEUMATOID FACTOR (HCC): ICD-10-CM

## 2025-05-22 DIAGNOSIS — F33.41 RECURRENT MAJOR DEPRESSIVE DISORDER, IN PARTIAL REMISSION (HCC): ICD-10-CM

## 2025-05-22 DIAGNOSIS — E78.5 MILD HYPERLIPIDEMIA: ICD-10-CM

## 2025-05-22 DIAGNOSIS — Z00.00 ANNUAL PHYSICAL EXAM: Primary | ICD-10-CM

## 2025-05-22 DIAGNOSIS — E55.9 VITAMIN D DEFICIENCY: ICD-10-CM

## 2025-05-22 DIAGNOSIS — I47.10 SVT (SUPRAVENTRICULAR TACHYCARDIA) (HCC): ICD-10-CM

## 2025-05-22 DIAGNOSIS — F41.9 ANXIETY: ICD-10-CM

## 2025-05-22 DIAGNOSIS — F98.8 ATTENTION DEFICIT DISORDER (ADD) WITHOUT HYPERACTIVITY: ICD-10-CM

## 2025-05-22 DIAGNOSIS — L70.9 ACNE, UNSPECIFIED ACNE TYPE: ICD-10-CM

## 2025-05-22 DIAGNOSIS — E66.813 CLASS 3 OBESITY: ICD-10-CM

## 2025-05-22 DIAGNOSIS — Z12.31 VISIT FOR SCREENING MAMMOGRAM: ICD-10-CM

## 2025-05-22 PROBLEM — J44.1 COPD EXACERBATION (HCC): Status: RESOLVED | Noted: 2024-03-11 | Resolved: 2025-05-22

## 2025-05-22 PROCEDURE — 99396 PREV VISIT EST AGE 40-64: CPT | Performed by: FAMILY MEDICINE

## 2025-05-22 PROCEDURE — 99214 OFFICE O/P EST MOD 30 MIN: CPT | Performed by: FAMILY MEDICINE

## 2025-05-22 RX ORDER — ADAPALENE 45 G/G
GEL TOPICAL
Qty: 45 G | Refills: 1 | Status: SHIPPED | OUTPATIENT
Start: 2025-05-22

## 2025-05-22 RX ORDER — LORAZEPAM 0.5 MG/1
0.5 TABLET ORAL EVERY 8 HOURS PRN
Qty: 30 TABLET | Refills: 0 | Status: SHIPPED | OUTPATIENT
Start: 2025-05-22

## 2025-05-22 NOTE — ASSESSMENT & PLAN NOTE
Anxiety symptoms with recent stressors discussed.  Prescription given for lorazepam to take on an as-needed basis.  PDMP reviewed.  Risks and benefits discussed.  Orders:  •  LORazepam (ATIVAN) 0.5 mg tablet; Take 1 tablet (0.5 mg total) by mouth every 8 (eight) hours as needed for anxiety  •  CBC and differential; Future

## 2025-05-22 NOTE — PATIENT INSTRUCTIONS
"Patient Education     Routine physical for adults   The Basics   Written by the doctors and editors at [facility]   What is a physical? -- A physical is a routine visit, or \"check-up,\" with your doctor. You might also hear it called a \"wellness visit\" or \"preventive visit.\"  During each visit, the doctor will:   Ask about your physical and mental health   Ask about your habits, behaviors, and lifestyle   Do an exam   Give you vaccines if needed   Talk to you about any medicines you take   Give advice about your health   Answer your questions  Getting regular check-ups is an important part of taking care of your health. It can help your doctor find and treat any problems you have. But it's also important for preventing health problems.  A routine physical is different from a \"sick visit.\" A sick visit is when you see a doctor because of a health concern or problem. Since physicals are scheduled ahead of time, you can think about what you want to ask the doctor.  How often should I get a physical? -- It depends on your age and health. In general, for people age 21 years and older:   If you are younger than 50 years, you might be able to get a physical every 3 years.   If you are 50 years or older, your doctor might recommend a physical every year.  If you have an ongoing health condition, like diabetes or high blood pressure, your doctor will probably want to see you more often.  What happens during a physical? -- In general, each visit will include:   Physical exam - The doctor or nurse will check your height, weight, heart rate, and blood pressure. They will also look at your eyes and ears. They will ask about how you are feeling and whether you have any symptoms that bother you.   Medicines - It's a good idea to bring a list of all the medicines you take to each doctor visit. Your doctor will talk to you about your medicines and answer any questions. Tell them if you are having any side effects that bother you. You " "should also tell them if you are having trouble paying for any of your medicines.   Habits and behaviors - This includes:   Your diet   Your exercise habits   Whether you smoke, drink alcohol, or use drugs   Whether you are sexually active   Whether you feel safe at home  Your doctor will talk to you about things you can do to improve your health and lower your risk of health problems. They will also offer help and support. For example, if you want to quit smoking, they can give you advice and might prescribe medicines. If you want to improve your diet or get more physical activity, they can help you with this, too.   Lab tests, if needed - The tests you get will depend on your age and situation. For example, your doctor might want to check your:   Cholesterol   Blood sugar   Iron level   Vaccines - The recommended vaccines will depend on your age, health, and what vaccines you already had. Vaccines are very important because they can prevent certain serious or deadly infections.   Discussion of screening - \"Screening\" means checking for diseases or other health problems before they cause symptoms. Your doctor can recommend screening based on your age, risk, and preferences. This might include tests to check for:   Cancer, such as breast, prostate, cervical, ovarian, colorectal, prostate, lung, or skin cancer   Sexually transmitted infections, such as chlamydia and gonorrhea   Mental health conditions like depression and anxiety  Your doctor will talk to you about the different types of screening tests. They can help you decide which screenings to have. They can also explain what the results might mean.   Answering questions - The physical is a good time to ask the doctor or nurse questions about your health. If needed, they can refer you to other doctors or specialists, too.  Adults older than 65 years often need other care, too. As you get older, your doctor will talk to you about:   How to prevent falling at " home   Hearing or vision tests   Memory testing   How to take your medicines safely   Making sure that you have the help and support you need at home  All topics are updated as new evidence becomes available and our peer review process is complete.  This topic retrieved from [employer] on: May 02, 2024.  Topic 807245 Version 1.0  Release: 32.4.3 - C32.122  © 2024 UpToDate, Inc. and/or its affiliates. All rights reserved.  Consumer Information Use and Disclaimer   Disclaimer: This generalized information is a limited summary of diagnosis, treatment, and/or medication information. It is not meant to be comprehensive and should be used as a tool to help the user understand and/or assess potential diagnostic and treatment options. It does NOT include all information about conditions, treatments, medications, side effects, or risks that may apply to a specific patient. It is not intended to be medical advice or a substitute for the medical advice, diagnosis, or treatment of a health care provider based on the health care provider's examination and assessment of a patient's specific and unique circumstances. Patients must speak with a health care provider for complete information about their health, medical questions, and treatment options, including any risks or benefits regarding use of medications. This information does not endorse any treatments or medications as safe, effective, or approved for treating a specific patient. UpToDate, Inc. and its affiliates disclaim any warranty or liability relating to this information or the use thereof.The use of this information is governed by the Terms of Use, available at https://www.[employer].com/en/know/clinical-effectiveness-terms. 2024© UpToDate, Inc. and its affiliates and/or licensors. All rights reserved.  Copyright   © 2024 UpToDate, Inc. and/or its affiliates. All rights reserved.

## 2025-05-22 NOTE — ASSESSMENT & PLAN NOTE
Has developed acne in the chin area.  Has been severe at times.  Differin gel as noted.  Watch for any significant drying of the skin.  Orders:  •  adapalene (DIFFERIN) 0.1 % gel; Apply topically daily at bedtime

## 2025-05-22 NOTE — ASSESSMENT & PLAN NOTE
Cholesterol has been borderline in the past.  Will check lipid panel and make recommendations thereafter.  Watch diet.  Increase fiber in diet.  Orders:  •  Comprehensive metabolic panel; Future  •  Lipid panel; Future  •  TSH, 3rd generation; Future

## 2025-05-22 NOTE — ASSESSMENT & PLAN NOTE
Continue to follow-up with rheumatology.  Continue with the Xeljanz as per their recommendations.  Orders:  •  CBC and differential; Future

## 2025-05-22 NOTE — ASSESSMENT & PLAN NOTE
Slow but steady weight loss recommended.  The injectable medications cause slight irritation.  Watch diet.  Watch portion sizes.  Continue to exercise regularly and try to increase the intensity and the frequency slowly.  Food choices discussed.  Orders:  •  Hemoglobin A1C; Future

## 2025-05-22 NOTE — PROGRESS NOTES
Adult Annual Physical  Name: Hemalatha Hahn      : 1980      MRN: [id number]  Encounter Provider: Meg Mckeon MD  Encounter Date: 2025   Encounter department: [facility]    :  Assessment & Plan  Annual physical exam  Slip for laboratory testing given.  Continue with regular exercise program.  Slow but steady weight loss recommended.  Up-to-date on mammogram.  Will be due for colon cancer screening next year.       Acne, unspecified acne type  Has developed acne in the chin area.  Has been severe at times.  Differin gel as noted.  Watch for any significant drying of the skin.  Orders:  •  adapalene (DIFFERIN) 0.1 % gel; Apply topically daily at bedtime    SVT (supraventricular tachycardia) (HCC)  Currently controlled.  Continue with the metoprolol.  Watch for any worsening.  Orders:  •  CBC and differential; Future    Rheumatoid arthritis involving multiple sites with positive rheumatoid factor (HCC)  Continue to follow-up with rheumatology.  Continue with the Xeljanz as per their recommendations.  Orders:  •  CBC and differential; Future    Recurrent major depressive disorder, in partial remission (HCC)  Depression symptoms discussed.  Continue with the Prozac.  She does feel some of the worsening at present is likely situational.  Watch for any worsening.  Methods for stress relief discussed.  Depression Screening Follow-up Plan: Patient's depression screening was positive with a PHQ-9 score of 7. Patient with underlying depression and was advised to continue current medications as prescribed.         Attention deficit disorder (ADD) without hyperactivity  Continue with the Concerta.  This has been effective for her.       Mild hyperlipidemia  Cholesterol has been borderline in the past.  Will check lipid panel and make recommendations thereafter.  Watch diet.  Increase fiber in diet.  Orders:  •  Comprehensive metabolic panel; Future  •  Lipid panel; Future  •  TSH,  3rd generation; Future    Vitamin D deficiency  Continue with vitamin D supplementation.  Will continue to follow vitamin D level with routine laboratory testing and adjust dosing if needed.  Orders:  •  Vitamin D 25 hydroxy; Future    Anxiety  Anxiety symptoms with recent stressors discussed.  Prescription given for lorazepam to take on an as-needed basis.  PDMP reviewed.  Risks and benefits discussed.  Orders:  •  LORazepam (ATIVAN) 0.5 mg tablet; Take 1 tablet (0.5 mg total) by mouth every 8 (eight) hours as needed for anxiety  •  CBC and differential; Future    Class 3 obesity  Slow but steady weight loss recommended.  The injectable medications cause slight irritation.  Watch diet.  Watch portion sizes.  Continue to exercise regularly and try to increase the intensity and the frequency slowly.  Food choices discussed.  Orders:  •  Hemoglobin A1C; Future    Visit for screening mammogram    Orders:  •  Mammo screening bilateral w 3d and cad; Future        Preventive Screenings:  - Diabetes Screening: screening up-to-date, risks/benefits discussed and orders placed  - Cholesterol Screening: screening not indicated, has hyperlipidemia, risks/benefits discussed and orders placed   - Hepatitis C screening: screening up-to-date   - HIV screening: screening up-to-date   - Cervical cancer screening: risks/benefits discussed   - Breast cancer screening: screening up-to-date   - Colon cancer screening: screening not indicated   - Lung cancer screening: screening not indicated     Immunizations:  - Immunizations due: Prevnar 20, Tdap and Zoster (Shingrix)    Counseling/Anticipatory Guidance:  - Alcohol: discussed moderation in alcohol intake and recommendations for healthy alcohol use.   - Dental health: discussed importance of regular tooth brushing, flossing, and dental visits.   - Diet: discussed recommendations for a healthy/well-balanced diet.   - Exercise: the importance of regular exercise/physical activity was  discussed. Recommend exercise 3-5 times per week for at least 30 minutes.   - Injury prevention: discussed safety/seat belts, safety helmets, smoke detectors, carbon monoxide detectors, and smoking near bedding or upholstery.       Depression Screening and Follow-up Plan: Patient's depression screening was positive with a PHQ-9 score of 7.   Patient with underlying depression and was advised to continue current medications as prescribed.         History of Present Illness     Adult Annual Physical:  Patient presents for annual physical. She presents for annual wellness visit as well as routine follow-up.  Has had a lot of stressors.  She and her  are .  She just found out about this earlier this week.  Has been very stressful.  She also has been helping her children as well as her parents.  Has felt overwhelmed.  Is trying to exercise more regularly.  Is going to the gym about twice a week.  Did not tolerate the injectable weight loss medications because of site irritation.  She continues to follow-up with rheumatology.  Does notice improvement with the Xeljanz.  Has been tolerating this without difficulty.  Areas of significant acne on the chin.  Feels some of this is related to the stressors as well as shaving that area.  Concentration is better with the Concerta.  She does feel that the Prozac helped significantly with her depression symptoms and much of her symptoms now are situational.  Did try stopping the metoprolol but developed palpitations again and has restarted it..     Diet and Physical Activity:  - Diet/Nutrition: no special diet.  - Exercise: moderate cardiovascular exercise, strength training exercises, 1-2 times a week on average and 30-60 minutes on average.    Depression Screening:    - PHQ-9 Score: 7    General Health:  - Sleep: sleeps well, 7-8 hours of sleep on average and uses CPAP machine.  - Hearing: normal hearing right ear and normal hearing left ear.  - Vision: most  "recent eye exam > 1 year ago and wears glasses and contacts.  - Dental: regular dental visits, brushes teeth once daily and floss regularly.    /GYN Health:  - Follows with GYN: yes.   - Menopause: perimenopausal.   - History of STDs: no  - Contraception: IUD placement.      Advanced Care Planning:  - Has an advanced directive?: no    - Has a durable medical POA?: no      Review of Systems   Constitutional:  Positive for activity change. Negative for appetite change, chills and fever.   HENT:  Negative for congestion and rhinorrhea.    Eyes:  Negative for visual disturbance.   Respiratory:  Negative for chest tightness and shortness of breath.    Cardiovascular:  Negative for chest pain and palpitations.   Gastrointestinal:  Negative for abdominal pain, blood in stool, diarrhea, nausea and vomiting.   Endocrine: Negative for polydipsia, polyphagia and polyuria.   Genitourinary:  Negative for dysuria, frequency and urgency.   Musculoskeletal:  Positive for arthralgias. Negative for gait problem.   Skin:  Negative for color change.   Neurological:  Negative for dizziness and headaches.   Hematological:  Does not bruise/bleed easily.   Psychiatric/Behavioral:  Positive for dysphoric mood. Negative for confusion and sleep disturbance. The patient is nervous/anxious.          Objective   /82 (BP Location: Left arm, Patient Position: Sitting, Cuff Size: Large)   Pulse 83   Temp 98.1 °F (36.7 °C)   Ht 5' 6\" (1.676 m)   Wt (!) 164 kg (361 lb)   SpO2 99%   BMI 58.27 kg/m²     Physical Exam  Vitals and nursing note reviewed.   Constitutional:       General: She is not in acute distress.     Appearance: She is well-developed and well-groomed. She is morbidly obese.   HENT:      Head: Normocephalic and atraumatic.     Eyes:      General:         Right eye: No discharge.         Left eye: No discharge.      Conjunctiva/sclera: Conjunctivae normal.      Pupils: Pupils are equal, round, and reactive to light. "       Cardiovascular:      Rate and Rhythm: Normal rate and regular rhythm.      Heart sounds: Normal heart sounds. No murmur heard.     No friction rub. No gallop.   Pulmonary:      Effort: No respiratory distress.      Breath sounds: No wheezing or rales.   Abdominal:      General: Bowel sounds are normal. There is no distension.      Tenderness: There is no abdominal tenderness.   Lymphadenopathy:      Cervical: No cervical adenopathy.     Skin:     General: Skin is warm and dry.     Neurological:      Mental Status: She is alert and oriented to person, place, and time.     Psychiatric:         Mood and Affect: Mood is anxious. Affect is tearful.         Speech: Speech normal.         Behavior: Behavior normal. Behavior is cooperative.         Cognition and Memory: Cognition and memory normal.

## 2025-05-22 NOTE — ASSESSMENT & PLAN NOTE
Depression symptoms discussed.  Continue with the Prozac.  She does feel some of the worsening at present is likely situational.  Watch for any worsening.  Methods for stress relief discussed.  Depression Screening Follow-up Plan: Patient's depression screening was positive with a PHQ-9 score of 7. Patient with underlying depression and was advised to continue current medications as prescribed.

## 2025-05-22 NOTE — ASSESSMENT & PLAN NOTE
Currently controlled.  Continue with the metoprolol.  Watch for any worsening.  Orders:  •  CBC and differential; Future

## 2025-06-01 DIAGNOSIS — F41.8 DEPRESSION WITH ANXIETY: ICD-10-CM

## 2025-06-01 DIAGNOSIS — F98.8 ATTENTION DEFICIT DISORDER (ADD) WITHOUT HYPERACTIVITY: ICD-10-CM

## 2025-06-01 RX ORDER — FLUOXETINE HYDROCHLORIDE 40 MG/1
40 CAPSULE ORAL DAILY
Qty: 30 CAPSULE | Refills: 0 | Status: SHIPPED | OUTPATIENT
Start: 2025-06-01

## 2025-06-02 RX ORDER — METHYLPHENIDATE HYDROCHLORIDE 54 MG/1
54 TABLET ORAL DAILY
Qty: 30 TABLET | Refills: 0 | Status: SHIPPED | OUTPATIENT
Start: 2025-06-02

## 2025-06-05 ENCOUNTER — TELEPHONE (OUTPATIENT)
Dept: INTERNAL MEDICINE CLINIC | Facility: CLINIC | Age: 45
End: 2025-06-05

## 2025-06-05 NOTE — TELEPHONE ENCOUNTER
PA for adapalene (DIFFERIN) 0.1 % SUBMITTED         via    [x]SS    [x]PA sent as URGENT    All office notes, labs and other pertaining documents and studies sent. Clinical questions answered. Awaiting determination from insurance company.     Turnaround time for your insurance to make a decision on your Prior Authorization can take 7-21 business days.

## 2025-06-06 ENCOUNTER — OFFICE VISIT (OUTPATIENT)
Dept: PODIATRY | Facility: CLINIC | Age: 45
End: 2025-06-06
Payer: COMMERCIAL

## 2025-06-06 VITALS
WEIGHT: 293 LBS | OXYGEN SATURATION: 97 % | RESPIRATION RATE: 16 BRPM | HEART RATE: 70 BPM | HEIGHT: 66 IN | BODY MASS INDEX: 47.09 KG/M2 | TEMPERATURE: 97.2 F

## 2025-06-06 DIAGNOSIS — L60.0 INGROWN TOENAIL OF LEFT FOOT: Primary | ICD-10-CM

## 2025-06-06 PROCEDURE — 11750 EXCISION NAIL&NAIL MATRIX: CPT | Performed by: PODIATRIST

## 2025-06-06 PROCEDURE — 99212 OFFICE O/P EST SF 10 MIN: CPT | Performed by: PODIATRIST

## 2025-06-06 RX ORDER — MUPIROCIN 20 MG/G
OINTMENT TOPICAL DAILY
Qty: 22 G | Refills: 1 | Status: SHIPPED | OUTPATIENT
Start: 2025-06-06

## 2025-06-06 NOTE — PROGRESS NOTES
"Name: Hemalatha Hahn      : 1980      MRN: [id number]  Encounter Provider: Meg Ojeda DPM  Encounter Date: 2025   [facility]  :  Assessment & Plan  Ingrown toenail of left foot    Orders:    mupirocin (BACTROBAN) 2 % ointment; Apply topically daily      IMPRESSION:  LLE GT medial nail border ingrown without SOI     PLAN:  Patient presents with left great toe medial ingrown toenail.  No clinical signs of infection noted at today's evaluation  Patient counseled on ingrown toenail procedures and treatment modality options.  These include slant back, partial nail avulsion, partial permanent nail avulsion  Patient has a history of bilateral ingrown toenails and due to significant pain and reoccurrence patient would like to proceed with permanent partial nail avulsion of left great toe medial nail border  Risks and benefits reviewed at length prior to procedure, postprocedure care reviewed at length  Left GT medial nail border ingrown permanent PNA performed patient's tolerance without incidence  Rx mupirocin ointment 2% to pharmacy for application to area daily  Patient counseled on clinical signs of infection will contact the office if questions or concerns arise  PCP note reviewed 2025  Follow-up 2 weeks for reevaluation    Nail removal    Date/Time: 2025 9:00 AM    Performed by: Meg Ojeda DPM  Authorized by: Meg Ojeda DPM    Patient location:  Clinic  Indications / Diagnosis:  Left great toe medial nail border ingrown toenail    Universal Protocol:  procedure performed by consultantConsent: Verbal consent not obtained  Risks and benefits: risks, benefits and alternatives were discussed  Consent given by: patient  Time out: Immediately prior to procedure a \"time out\" was called to verify the correct patient, procedure, equipment, support staff and site/side marked as required.  Patient understanding: patient states understanding of " "the procedure being performed  Patient identity confirmed: verbally with patient    Location:     Foot:  L big toe  Pre-procedure details:     Skin preparation:  Betadine and alcohol  Anesthesia (see MAR for exact dosages):     Anesthesia method:  Nerve block    Block needle gauge:  25 G    Block anesthetic:  Lidocaine 1% w/o epi    Block injection procedure:  Anatomic landmarks identified, negative aspiration for blood and anatomic landmarks palpated  Nail Removal:     Nail removed:  Partial    Nail side:  Medial    Nail bed sutured: no    Ingrown nail:     Wedge excision of skin: no      Nail matrix removed or ablated:  Partial  Nails trimmed:     Number of nails trimmed:  0  Post-procedure details:     Dressing:  4x4 sterile gauze and antibiotic ointment (coban)    Patient tolerance of procedure:  Tolerated well, no immediate complications        History of Present Illness   HPI  Hemalatha Hahn is a 45 y.o. female who presents for evaluation and management of left great toe medial nail border due to painful ingrown.  Patient denies any redness, swelling, or drainage.  She states that increasing pain over the last several weeks.  Patient has a history of chronic ingrown's.  She would like to discuss ingrown toenail procedure      Review of Systems  Constitutional:  Negative for chills and fever.   Respiratory:  Negative for chest tightness and shortness of breath.    Cardiovascular:  Negative for leg swelling.   Skin: Negative for wound       Objective   Pulse 70   Temp (!) 97.2 °F (36.2 °C) (Temporal)   Resp 16   Ht 5' 6\" (1.676 m)   Wt (!) 163 kg (360 lb 6.4 oz)   SpO2 97%   BMI 58.17 kg/m²      Physical Exam  Constitutional:       General: She is not in acute distress.     Appearance: She is obese. She is not ill-appearing.   Cardiovascular:      Comments:  DP/PT pulse 1/4 bilaterally  CRT less than 3 seconds to distal digits left foot  Skin temperature gradient even from knees to digits " left  Musculoskeletal:         General:      Comments: Great toe no pain with palpation of lateral nail border left  Skin:     Capillary Refill: Capillary refill takes less than 2 seconds.      Findings: Lesion present.      Comments: Left great toenail incurvated with tenderness along medial nail border.  No significant erythema or edema.  No drainage.  Thickening and dystrophy of nail noted

## 2025-06-09 NOTE — TELEPHONE ENCOUNTER
PA for adapalene (DIFFERIN) 0.1 %  APPROVED     Date(s) approved         Patient advised by          [x]MyChart Message  []Phone call   []LMOM  []L/M to call office as no active Communication consent on file  []Unable to leave detailed message as VM not approved on Communication consent       Pharmacy advised by    [x]Fax  []Phone call  []Secure Chat     Approval letter scanned into Media no upon determination

## 2025-06-11 ENCOUNTER — APPOINTMENT (OUTPATIENT)
Dept: LAB | Facility: HOSPITAL | Age: 45
End: 2025-06-11
Payer: COMMERCIAL

## 2025-06-11 DIAGNOSIS — E66.813 CLASS 3 OBESITY: ICD-10-CM

## 2025-06-11 DIAGNOSIS — E78.5 MILD HYPERLIPIDEMIA: ICD-10-CM

## 2025-06-11 DIAGNOSIS — F41.9 ANXIETY: ICD-10-CM

## 2025-06-11 DIAGNOSIS — Z79.899 ENCOUNTER FOR LONG-TERM (CURRENT) USE OF MEDICATIONS: ICD-10-CM

## 2025-06-11 DIAGNOSIS — I47.10 SVT (SUPRAVENTRICULAR TACHYCARDIA) (HCC): ICD-10-CM

## 2025-06-11 DIAGNOSIS — M05.79 RHEUMATOID ARTHRITIS INVOLVING MULTIPLE SITES WITH POSITIVE RHEUMATOID FACTOR (HCC): ICD-10-CM

## 2025-06-11 DIAGNOSIS — E55.9 VITAMIN D DEFICIENCY: ICD-10-CM

## 2025-06-11 LAB
25(OH)D3 SERPL-MCNC: 23.3 NG/ML (ref 30–100)
ALBUMIN SERPL BCG-MCNC: 4.1 G/DL (ref 3.5–5)
ALP SERPL-CCNC: 55 U/L (ref 34–104)
ALT SERPL W P-5'-P-CCNC: 17 U/L (ref 7–52)
ANION GAP SERPL CALCULATED.3IONS-SCNC: 8 MMOL/L (ref 4–13)
AST SERPL W P-5'-P-CCNC: 17 U/L (ref 13–39)
BASOPHILS # BLD AUTO: 0.09 THOUSANDS/ÂΜL (ref 0–0.1)
BASOPHILS NFR BLD AUTO: 2 % (ref 0–1)
BILIRUB SERPL-MCNC: 1.16 MG/DL (ref 0.2–1)
BUN SERPL-MCNC: 19 MG/DL (ref 5–25)
CALCIUM SERPL-MCNC: 8.9 MG/DL (ref 8.4–10.2)
CHLORIDE SERPL-SCNC: 103 MMOL/L (ref 96–108)
CHOLEST SERPL-MCNC: 194 MG/DL (ref ?–200)
CO2 SERPL-SCNC: 27 MMOL/L (ref 21–32)
CREAT SERPL-MCNC: 0.93 MG/DL (ref 0.6–1.3)
CRP SERPL QL: 2.2 MG/L
EOSINOPHIL # BLD AUTO: 0.5 THOUSAND/ÂΜL (ref 0–0.61)
EOSINOPHIL NFR BLD AUTO: 9 % (ref 0–6)
ERYTHROCYTE [DISTWIDTH] IN BLOOD BY AUTOMATED COUNT: 13.5 % (ref 11.6–15.1)
ERYTHROCYTE [SEDIMENTATION RATE] IN BLOOD: 15 MM/HOUR (ref 0–19)
EST. AVERAGE GLUCOSE BLD GHB EST-MCNC: 114 MG/DL
GFR SERPL CREATININE-BSD FRML MDRD: 74 ML/MIN/1.73SQ M
GLUCOSE P FAST SERPL-MCNC: 91 MG/DL (ref 65–99)
HBA1C MFR BLD: 5.6 %
HCT VFR BLD AUTO: 41.2 % (ref 34.8–46.1)
HDLC SERPL-MCNC: 60 MG/DL
HGB BLD-MCNC: 13.3 G/DL (ref 11.5–15.4)
IMM GRANULOCYTES # BLD AUTO: 0.02 THOUSAND/UL (ref 0–0.2)
IMM GRANULOCYTES NFR BLD AUTO: 0 % (ref 0–2)
LDLC SERPL CALC-MCNC: 117 MG/DL (ref 0–100)
LYMPHOCYTES # BLD AUTO: 1.02 THOUSANDS/ÂΜL (ref 0.6–4.47)
LYMPHOCYTES NFR BLD AUTO: 18 % (ref 14–44)
MCH RBC QN AUTO: 31.1 PG (ref 26.8–34.3)
MCHC RBC AUTO-ENTMCNC: 32.3 G/DL (ref 31.4–37.4)
MCV RBC AUTO: 97 FL (ref 82–98)
MONOCYTES # BLD AUTO: 0.52 THOUSAND/ÂΜL (ref 0.17–1.22)
MONOCYTES NFR BLD AUTO: 9 % (ref 4–12)
NEUTROPHILS # BLD AUTO: 3.45 THOUSANDS/ÂΜL (ref 1.85–7.62)
NEUTS SEG NFR BLD AUTO: 62 % (ref 43–75)
NONHDLC SERPL-MCNC: 134 MG/DL
NRBC BLD AUTO-RTO: 0 /100 WBCS
PLATELET # BLD AUTO: 241 THOUSANDS/UL (ref 149–390)
PMV BLD AUTO: 11 FL (ref 8.9–12.7)
POTASSIUM SERPL-SCNC: 3.8 MMOL/L (ref 3.5–5.3)
PROT SERPL-MCNC: 7.2 G/DL (ref 6.4–8.4)
RBC # BLD AUTO: 4.27 MILLION/UL (ref 3.81–5.12)
SODIUM SERPL-SCNC: 138 MMOL/L (ref 135–147)
TRIGL SERPL-MCNC: 84 MG/DL (ref ?–150)
TSH SERPL DL<=0.05 MIU/L-ACNC: 1.48 UIU/ML (ref 0.45–4.5)
WBC # BLD AUTO: 5.6 THOUSAND/UL (ref 4.31–10.16)

## 2025-06-11 PROCEDURE — 36415 COLL VENOUS BLD VENIPUNCTURE: CPT

## 2025-06-11 PROCEDURE — 86140 C-REACTIVE PROTEIN: CPT

## 2025-06-11 PROCEDURE — 85652 RBC SED RATE AUTOMATED: CPT

## 2025-06-11 PROCEDURE — 82306 VITAMIN D 25 HYDROXY: CPT

## 2025-06-11 PROCEDURE — 84443 ASSAY THYROID STIM HORMONE: CPT

## 2025-06-11 PROCEDURE — 85025 COMPLETE CBC W/AUTO DIFF WBC: CPT

## 2025-06-11 PROCEDURE — 80053 COMPREHEN METABOLIC PANEL: CPT

## 2025-06-11 PROCEDURE — 83036 HEMOGLOBIN GLYCOSYLATED A1C: CPT

## 2025-06-11 PROCEDURE — 80061 LIPID PANEL: CPT

## 2025-06-17 ENCOUNTER — RESULTS FOLLOW-UP (OUTPATIENT)
Age: 45
End: 2025-06-17

## 2025-06-17 DIAGNOSIS — E55.9 VITAMIN D DEFICIENCY: Primary | ICD-10-CM

## 2025-06-17 RX ORDER — ERGOCALCIFEROL 1.25 MG/1
50000 CAPSULE, LIQUID FILLED ORAL WEEKLY
Qty: 8 CAPSULE | Refills: 0 | Status: SHIPPED | OUTPATIENT
Start: 2025-06-17 | End: 2025-08-07

## 2025-06-19 ENCOUNTER — OFFICE VISIT (OUTPATIENT)
Dept: PODIATRY | Facility: CLINIC | Age: 45
End: 2025-06-19
Payer: COMMERCIAL

## 2025-06-19 VITALS
BODY MASS INDEX: 47.09 KG/M2 | TEMPERATURE: 98 F | WEIGHT: 293 LBS | HEART RATE: 74 BPM | RESPIRATION RATE: 16 BRPM | OXYGEN SATURATION: 98 % | HEIGHT: 66 IN

## 2025-06-19 DIAGNOSIS — L60.0 INGROWN TOENAIL OF LEFT FOOT: Primary | ICD-10-CM

## 2025-06-19 PROCEDURE — 99212 OFFICE O/P EST SF 10 MIN: CPT | Performed by: PODIATRIST

## 2025-06-19 NOTE — PROGRESS NOTES
"Name: Hemalatha Hahn      : 1980      MRN: [id number]  Encounter Provider: Meg Ojeda DPM  Encounter Date: 2025   Encounter department: [facility] [place]  :  Assessment & Plan  Ingrown toenail of left foot         IMPRESSION:  LLE GT medial nail border s/p perm PNA     PLAN:  Patient presents for evaluation management of left great toe status post permanent partial nail avulsion performed at last visit.    Pain has resolved, no SOI  Patient counseled at length on importance of wide toebox shoes but rubbing and pressure on the area.  Patient counseled on nail trimming techniques  Continue with Vaseline massage for conditioning of nail and surrounding area  Follow-up as needed    History of Present Illness   HPI  Hemalatha Hahn is a 45 y.o. female who presents for follow-up of left great toe medial nail border permanent partial nail avulsion performed at last visit.  Patient has been adherent to postprocedure care.  She denies any continued pain, redness, or swelling.  She denies any systemic signs of infection      Review of Systems  Constitutional:  Negative for chills and fever.   Respiratory:  Negative for chest tightness and shortness of breath.    Cardiovascular:  Negative for leg swelling.   Skin: Negative for wound       Objective   Pulse 74   Temp 98 °F (36.7 °C) (Temporal)   Resp 16   Ht 5' 6\" (1.676 m)   Wt (!) 163 kg (360 lb)   SpO2 98%   BMI 58.11 kg/m²      Physical Exam  Constitutional:       General: She is not in acute distress.     Appearance: She is obese. She is not ill-appearing.   Cardiovascular:      Comments:  DP/PT pulse 1/4 bilaterally  CRT less than 3 seconds to distal digits left foot  Skin temperature gradient even from knees to digits left  Musculoskeletal:         General:      Comments no pain with palpation of bilateral GT  Skin:     Capillary Refill: Capillary refill takes less than 2 seconds.      Findings: Lesion present.      Comments: Left " great toe medial nail border.  Stable scab without drainage.  No erythema or edema.  No crepitus, no fluctuance, no tenderness with palpation.  Symptoms resolved

## 2025-06-25 ENCOUNTER — TELEPHONE (OUTPATIENT)
Dept: RHEUMATOLOGY | Facility: CLINIC | Age: 45
End: 2025-06-25

## 2025-06-25 NOTE — TELEPHONE ENCOUNTER
PA for Xeljanz 11mg SUBMITTED to [employer]    via    []CMM-KEY:    [x]Surescripts-Case ID # [id number]   []Availity-Auth ID #  NDC #    []Faxed to plan   []Other website    []Phone call Case ID #      [x]PA sent as URGENT    All office notes, labs and other pertaining documents and studies sent. Clinical questions answered. Awaiting determination from insurance company.     Turnaround time for your insurance to make a decision on your Prior Authorization can take 7-21 business days.

## 2025-06-25 NOTE — TELEPHONE ENCOUNTER
PA request for Xeljanz received, form scanned into media and attached to encounter, please advise, thank you.

## 2025-06-27 ENCOUNTER — TELEPHONE (OUTPATIENT)
Dept: RHEUMATOLOGY | Facility: CLINIC | Age: 45
End: 2025-06-27

## 2025-06-30 NOTE — TELEPHONE ENCOUNTER
PA for Xeljanz 11mg APPROVED     Date(s) approved June 25, 2026     Case #     Patient advised by          []MyChart Message  [x]Phone call   []LMOM  []L/M to call office as no active Communication consent on file  []Unable to leave detailed message as VM not approved on Communication consent       Pharmacy advised by    []Fax  []Phone call  [x]Secure Chat    Specialty Pharmacy    [x][facility]     Approval letter scanned into Media Yes

## 2025-07-11 DIAGNOSIS — F98.8 ATTENTION DEFICIT DISORDER (ADD) WITHOUT HYPERACTIVITY: ICD-10-CM

## 2025-07-11 DIAGNOSIS — F41.8 DEPRESSION WITH ANXIETY: ICD-10-CM

## 2025-07-12 RX ORDER — FLUOXETINE HYDROCHLORIDE 40 MG/1
40 CAPSULE ORAL DAILY
Qty: 30 CAPSULE | Refills: 0 | Status: SHIPPED | OUTPATIENT
Start: 2025-07-12

## 2025-07-14 RX ORDER — METHYLPHENIDATE HYDROCHLORIDE 54 MG/1
54 TABLET ORAL DAILY
Qty: 30 TABLET | Refills: 0 | Status: SHIPPED | OUTPATIENT
Start: 2025-07-14

## 2025-07-31 ENCOUNTER — PATIENT MESSAGE (OUTPATIENT)
Age: 45
End: 2025-07-31

## 2025-08-07 ENCOUNTER — OFFICE VISIT (OUTPATIENT)
Dept: OBGYN CLINIC | Facility: CLINIC | Age: 45
End: 2025-08-07
Payer: COMMERCIAL

## 2025-08-07 VITALS
BODY MASS INDEX: 47.09 KG/M2 | WEIGHT: 293 LBS | OXYGEN SATURATION: 99 % | HEIGHT: 66 IN | HEART RATE: 70 BPM | TEMPERATURE: 97.9 F

## 2025-08-07 DIAGNOSIS — M17.0 PRIMARY OSTEOARTHRITIS OF BOTH KNEES: Primary | ICD-10-CM

## 2025-08-07 PROCEDURE — 20610 DRAIN/INJ JOINT/BURSA W/O US: CPT | Performed by: FAMILY MEDICINE

## 2025-08-07 PROCEDURE — 99214 OFFICE O/P EST MOD 30 MIN: CPT | Performed by: FAMILY MEDICINE

## 2025-08-07 RX ORDER — TRIAMCINOLONE ACETONIDE 40 MG/ML
40 INJECTION, SUSPENSION INTRA-ARTICULAR; INTRAMUSCULAR
Status: COMPLETED | OUTPATIENT
Start: 2025-08-07 | End: 2025-08-07

## 2025-08-07 RX ORDER — BUPIVACAINE HYDROCHLORIDE 5 MG/ML
3.5 INJECTION, SOLUTION PERINEURAL
Status: COMPLETED | OUTPATIENT
Start: 2025-08-07 | End: 2025-08-07

## 2025-08-07 RX ADMIN — BUPIVACAINE HYDROCHLORIDE 3.5 ML: 5 INJECTION, SOLUTION PERINEURAL at 12:15

## 2025-08-07 RX ADMIN — TRIAMCINOLONE ACETONIDE 40 MG: 40 INJECTION, SUSPENSION INTRA-ARTICULAR; INTRAMUSCULAR at 12:15

## (undated) DEVICE — PATIENT TRACKER 9734887XOM NON-INVASIVE

## (undated) DEVICE — NEEDLE 25G X 1 1/2

## (undated) DEVICE — GLOVE SRG BIOGEL ORTHOPEDIC 7

## (undated) DEVICE — GLOVE INDICATOR PI UNDERGLOVE SZ 6.5 BLUE

## (undated) DEVICE — SCD SEQUENTIAL COMPRESSION COMFORT SLEEVE LARGE KNEE LENGTH: Brand: KENDALL SCD

## (undated) DEVICE — TUBING 1895522 5PK STRAIGHTSHOT TO XPS: Brand: STRAIGHTSHOT®

## (undated) DEVICE — SPECIMEN SOCK - SHORT: Brand: MEDI-VAC

## (undated) DEVICE — GLOVE INDICATOR PI UNDERGLOVE SZ 8.5 BLUE

## (undated) DEVICE — SHEATH 1912010 5PK 4MM/30DEG STORZ XOMED: Brand: ENDO-SCRUB®

## (undated) DEVICE — SYRINGE 10ML LL CONTROL TOP

## (undated) DEVICE — CHLORAPREP HI-LITE 10.5ML ORANGE

## (undated) DEVICE — TRAY EPIDURAL PERIFIX 20GA X 3.5IN TUOHY 8ML

## (undated) DEVICE — GLOVE SRG BIOGEL 6.5

## (undated) DEVICE — BLADE 1884080EM TRICUT 4MMX13CM M4 ROHS: Brand: FUSION®

## (undated) DEVICE — SHEATH 1912000 5PK 4MM/0DEG STORZ XOMED: Brand: ENDO-SCRUB®

## (undated) DEVICE — DISPOSABLE OR TOWEL: Brand: CARDINAL HEALTH

## (undated) DEVICE — STERILE NASAL PACK: Brand: CARDINAL HEALTH

## (undated) DEVICE — TUBING SUCTION 5MM X 12 FT

## (undated) DEVICE — ANTI-FOG SOLUTION WITH FOAM PAD: Brand: DEVON

## (undated) DEVICE — FLEXIBLE ADHESIVE BANDAGE,X-LARGE: Brand: CURITY

## (undated) DEVICE — SPECIMEN CONTAINER STERILE PEEL PACK

## (undated) DEVICE — NEEDLE SPINAL 22G X 5IN QUINCKE

## (undated) DEVICE — NEURO PATTIES 1/2 X 3

## (undated) DEVICE — 2000CC GUARDIAN II: Brand: GUARDIAN

## (undated) DEVICE — SINUFOAM RR650

## (undated) DEVICE — INSTRUMENT TRACKER 9733533XOM ENT 1PK

## (undated) DEVICE — SYRINGE 5ML LL